# Patient Record
Sex: FEMALE | Race: OTHER | NOT HISPANIC OR LATINO | Employment: OTHER | URBAN - METROPOLITAN AREA
[De-identification: names, ages, dates, MRNs, and addresses within clinical notes are randomized per-mention and may not be internally consistent; named-entity substitution may affect disease eponyms.]

---

## 2018-01-01 ENCOUNTER — APPOINTMENT (OUTPATIENT)
Dept: RADIOLOGY | Facility: MEDICAL CENTER | Age: 83
DRG: 100 | End: 2018-01-01
Attending: HOSPITALIST
Payer: MEDICARE

## 2018-01-01 ENCOUNTER — APPOINTMENT (OUTPATIENT)
Dept: RADIOLOGY | Facility: MEDICAL CENTER | Age: 83
DRG: 470 | End: 2018-01-01
Attending: ORTHOPAEDIC SURGERY
Payer: OTHER MISCELLANEOUS

## 2018-01-01 ENCOUNTER — APPOINTMENT (OUTPATIENT)
Dept: RADIOLOGY | Facility: MEDICAL CENTER | Age: 83
DRG: 100 | End: 2018-01-01
Attending: INTERNAL MEDICINE
Payer: MEDICARE

## 2018-01-01 ENCOUNTER — APPOINTMENT (OUTPATIENT)
Dept: CARDIOLOGY | Facility: MEDICAL CENTER | Age: 83
DRG: 100 | End: 2018-01-01
Attending: INTERNAL MEDICINE
Payer: MEDICARE

## 2018-01-01 ENCOUNTER — HOSPITAL ENCOUNTER (INPATIENT)
Facility: MEDICAL CENTER | Age: 83
LOS: 5 days | DRG: 392 | End: 2018-10-10
Attending: EMERGENCY MEDICINE | Admitting: HOSPITALIST
Payer: MEDICARE

## 2018-01-01 ENCOUNTER — APPOINTMENT (OUTPATIENT)
Dept: RADIOLOGY | Facility: IMAGING CENTER | Age: 83
End: 2018-01-01
Attending: FAMILY MEDICINE
Payer: OTHER MISCELLANEOUS

## 2018-01-01 ENCOUNTER — APPOINTMENT (OUTPATIENT)
Dept: RADIOLOGY | Facility: MEDICAL CENTER | Age: 83
DRG: 392 | End: 2018-01-01
Attending: EMERGENCY MEDICINE
Payer: MEDICARE

## 2018-01-01 ENCOUNTER — OFFICE VISIT (OUTPATIENT)
Dept: URGENT CARE | Facility: CLINIC | Age: 83
End: 2018-01-01
Payer: OTHER MISCELLANEOUS

## 2018-01-01 ENCOUNTER — APPOINTMENT (OUTPATIENT)
Dept: RADIOLOGY | Facility: MEDICAL CENTER | Age: 83
DRG: 392 | End: 2018-01-01
Attending: INTERNAL MEDICINE
Payer: MEDICARE

## 2018-01-01 ENCOUNTER — APPOINTMENT (OUTPATIENT)
Dept: RADIOLOGY | Facility: MEDICAL CENTER | Age: 83
DRG: 100 | End: 2018-01-01
Attending: EMERGENCY MEDICINE
Payer: MEDICARE

## 2018-01-01 ENCOUNTER — HOSPITAL ENCOUNTER (INPATIENT)
Facility: MEDICAL CENTER | Age: 83
LOS: 18 days | DRG: 100 | End: 2018-10-31
Attending: EMERGENCY MEDICINE | Admitting: INTERNAL MEDICINE
Payer: MEDICARE

## 2018-01-01 ENCOUNTER — APPOINTMENT (OUTPATIENT)
Dept: RADIOLOGY | Facility: MEDICAL CENTER | Age: 83
DRG: 470 | End: 2018-01-01
Attending: EMERGENCY MEDICINE
Payer: OTHER MISCELLANEOUS

## 2018-01-01 ENCOUNTER — APPOINTMENT (OUTPATIENT)
Dept: RADIOLOGY | Facility: IMAGING CENTER | Age: 83
End: 2018-01-01
Attending: NURSE PRACTITIONER
Payer: OTHER MISCELLANEOUS

## 2018-01-01 ENCOUNTER — APPOINTMENT (OUTPATIENT)
Dept: RADIOLOGY | Facility: MEDICAL CENTER | Age: 83
DRG: 100 | End: 2018-01-01
Attending: PSYCHIATRY & NEUROLOGY
Payer: MEDICARE

## 2018-01-01 ENCOUNTER — HOSPITAL ENCOUNTER (INPATIENT)
Facility: MEDICAL CENTER | Age: 83
LOS: 4 days | DRG: 470 | End: 2018-07-18
Attending: EMERGENCY MEDICINE | Admitting: HOSPITALIST
Payer: OTHER MISCELLANEOUS

## 2018-01-01 VITALS
WEIGHT: 100 LBS | TEMPERATURE: 99.5 F | HEART RATE: 77 BPM | BODY MASS INDEX: 19.63 KG/M2 | OXYGEN SATURATION: 97 % | HEIGHT: 60 IN | DIASTOLIC BLOOD PRESSURE: 88 MMHG | SYSTOLIC BLOOD PRESSURE: 128 MMHG | RESPIRATION RATE: 14 BRPM

## 2018-01-01 VITALS
WEIGHT: 100 LBS | SYSTOLIC BLOOD PRESSURE: 150 MMHG | RESPIRATION RATE: 12 BRPM | TEMPERATURE: 101.6 F | DIASTOLIC BLOOD PRESSURE: 98 MMHG | HEART RATE: 84 BPM | BODY MASS INDEX: 19.53 KG/M2 | OXYGEN SATURATION: 93 %

## 2018-01-01 VITALS
OXYGEN SATURATION: 94 % | HEIGHT: 63 IN | DIASTOLIC BLOOD PRESSURE: 62 MMHG | SYSTOLIC BLOOD PRESSURE: 100 MMHG | BODY MASS INDEX: 18.59 KG/M2 | TEMPERATURE: 97.5 F | HEART RATE: 87 BPM | RESPIRATION RATE: 18 BRPM | WEIGHT: 104.94 LBS

## 2018-01-01 VITALS
HEART RATE: 93 BPM | RESPIRATION RATE: 18 BRPM | SYSTOLIC BLOOD PRESSURE: 115 MMHG | BODY MASS INDEX: 22.46 KG/M2 | HEIGHT: 63 IN | WEIGHT: 126.76 LBS | OXYGEN SATURATION: 99 % | DIASTOLIC BLOOD PRESSURE: 76 MMHG | TEMPERATURE: 98.1 F

## 2018-01-01 VITALS — BODY MASS INDEX: 28.05 KG/M2 | TEMPERATURE: 99.3 F | HEIGHT: 63 IN | WEIGHT: 158.29 LBS

## 2018-01-01 DIAGNOSIS — R79.89 ELEVATED TROPONIN: ICD-10-CM

## 2018-01-01 DIAGNOSIS — I48.0 PAROXYSMAL ATRIAL FIBRILLATION (HCC): ICD-10-CM

## 2018-01-01 DIAGNOSIS — K59.00 CONSTIPATION, UNSPECIFIED CONSTIPATION TYPE: ICD-10-CM

## 2018-01-01 DIAGNOSIS — G40.901 STATUS EPILEPTICUS (HCC): ICD-10-CM

## 2018-01-01 DIAGNOSIS — R05.8 PRODUCTIVE COUGH: ICD-10-CM

## 2018-01-01 DIAGNOSIS — R19.7 DIARRHEA WITH DEHYDRATION: ICD-10-CM

## 2018-01-01 DIAGNOSIS — E87.5 HYPERKALEMIA: ICD-10-CM

## 2018-01-01 DIAGNOSIS — B96.89 ACUTE BACTERIAL BRONCHITIS: Primary | ICD-10-CM
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
## 2018-01-01 DIAGNOSIS — D18.1 BENIGN EXTRA-AXIAL HYGROMA: ICD-10-CM

## 2018-01-01 DIAGNOSIS — S72.002A CLOSED FRACTURE OF NECK OF LEFT FEMUR, INITIAL ENCOUNTER (HCC): ICD-10-CM

## 2018-01-01 DIAGNOSIS — A41.9 SEPSIS, DUE TO UNSPECIFIED ORGANISM: ICD-10-CM

## 2018-01-01 DIAGNOSIS — J98.8 RTI (RESPIRATORY TRACT INFECTION): ICD-10-CM

## 2018-01-01 DIAGNOSIS — S72.002A CLOSED FRACTURE OF LEFT HIP, INITIAL ENCOUNTER (HCC): ICD-10-CM

## 2018-01-01 DIAGNOSIS — R56.9 NEW ONSET SEIZURE (HCC): ICD-10-CM

## 2018-01-01 DIAGNOSIS — J96.01 ACUTE RESPIRATORY FAILURE WITH HYPOXIA (HCC): ICD-10-CM

## 2018-01-01 DIAGNOSIS — N17.9 AKI (ACUTE KIDNEY INJURY) (HCC): ICD-10-CM

## 2018-01-01 DIAGNOSIS — J20.8 ACUTE BACTERIAL BRONCHITIS: Primary | ICD-10-CM

## 2018-01-01 DIAGNOSIS — J40 BRONCHITIS: ICD-10-CM

## 2018-01-01 LAB
ABO GROUP BLD: NORMAL
ABO GROUP BLD: NORMAL
ACTION RANGE TRIGGERED IACRT: NO
ACTION RANGE TRIGGERED IACRT: YES
ALBUMIN SERPL BCP-MCNC: 2.5 G/DL (ref 3.2–4.9)
ALBUMIN SERPL BCP-MCNC: 2.9 G/DL (ref 3.2–4.9)
ALBUMIN SERPL BCP-MCNC: 3.1 G/DL (ref 3.2–4.9)
ALBUMIN SERPL BCP-MCNC: 3.3 G/DL (ref 3.2–4.9)
ALBUMIN SERPL BCP-MCNC: 3.6 G/DL (ref 3.2–4.9)
ALBUMIN SERPL BCP-MCNC: 3.7 G/DL (ref 3.2–4.9)
ALBUMIN SERPL BCP-MCNC: 3.8 G/DL (ref 3.2–4.9)
ALBUMIN/GLOB SERPL: 0.9 G/DL
ALBUMIN/GLOB SERPL: 1 G/DL
ALBUMIN/GLOB SERPL: 1.1 G/DL
ALBUMIN/GLOB SERPL: 1.1 G/DL
ALBUMIN/GLOB SERPL: 1.2 G/DL
ALBUMIN/GLOB SERPL: 1.2 G/DL
ALBUMIN/GLOB SERPL: 1.8 G/DL
ALP SERPL-CCNC: 46 U/L (ref 30–99)
ALP SERPL-CCNC: 57 U/L (ref 30–99)
ALP SERPL-CCNC: 58 U/L (ref 30–99)
ALP SERPL-CCNC: 58 U/L (ref 30–99)
ALP SERPL-CCNC: 60 U/L (ref 30–99)
ALP SERPL-CCNC: 60 U/L (ref 30–99)
ALP SERPL-CCNC: 65 U/L (ref 30–99)
ALP SERPL-CCNC: 68 U/L (ref 30–99)
ALP SERPL-CCNC: 74 U/L (ref 30–99)
ALT SERPL-CCNC: 15 U/L (ref 2–50)
ALT SERPL-CCNC: 18 U/L (ref 2–50)
ALT SERPL-CCNC: 19 U/L (ref 2–50)
ALT SERPL-CCNC: 23 U/L (ref 2–50)
ALT SERPL-CCNC: 24 U/L (ref 2–50)
ALT SERPL-CCNC: 7 U/L (ref 2–50)
AMMONIA PLAS-SCNC: 33 UMOL/L (ref 11–45)
AMMONIA PLAS-SCNC: 36 UMOL/L (ref 11–45)
AMMONIA PLAS-SCNC: 40 UMOL/L (ref 11–45)
AMMONIA PLAS-SCNC: 42 UMOL/L (ref 11–45)
AMMONIA PLAS-SCNC: 44 UMOL/L (ref 11–45)
AMMONIA PLAS-SCNC: 47 UMOL/L (ref 11–45)
AMMONIA PLAS-SCNC: 54 UMOL/L (ref 11–45)
AMMONIA PLAS-SCNC: 63 UMOL/L (ref 11–45)
AMMONIA PLAS-SCNC: 70 UMOL/L (ref 11–45)
AMMONIA PLAS-SCNC: 72 UMOL/L (ref 11–45)
AMMONIA PLAS-SCNC: 83 UMOL/L (ref 11–45)
ANION GAP SERPL CALC-SCNC: 10 MMOL/L (ref 0–11.9)
ANION GAP SERPL CALC-SCNC: 11 MMOL/L (ref 0–11.9)
ANION GAP SERPL CALC-SCNC: 11 MMOL/L (ref 0–11.9)
ANION GAP SERPL CALC-SCNC: 12 MMOL/L (ref 0–11.9)
ANION GAP SERPL CALC-SCNC: 12 MMOL/L (ref 0–11.9)
ANION GAP SERPL CALC-SCNC: 14 MMOL/L (ref 0–11.9)
ANION GAP SERPL CALC-SCNC: 16 MMOL/L (ref 0–11.9)
ANION GAP SERPL CALC-SCNC: 18 MMOL/L (ref 0–11.9)
ANION GAP SERPL CALC-SCNC: 2 MMOL/L (ref 0–11.9)
ANION GAP SERPL CALC-SCNC: 3 MMOL/L (ref 0–11.9)
ANION GAP SERPL CALC-SCNC: 5 MMOL/L (ref 0–11.9)
ANION GAP SERPL CALC-SCNC: 6 MMOL/L (ref 0–11.9)
ANION GAP SERPL CALC-SCNC: 7 MMOL/L (ref 0–11.9)
ANION GAP SERPL CALC-SCNC: 8 MMOL/L (ref 0–11.9)
ANION GAP SERPL CALC-SCNC: 9 MMOL/L (ref 0–11.9)
ANION GAP SERPL CALC-SCNC: 9 MMOL/L (ref 0–11.9)
ANISOCYTOSIS BLD QL SMEAR: ABNORMAL
ANISOCYTOSIS BLD QL SMEAR: ABNORMAL
APPEARANCE UR: CLEAR
APTT PPP: 126 SEC (ref 24.7–36)
APTT PPP: 28.4 SEC (ref 24.7–36)
APTT PPP: 29.3 SEC (ref 24.7–36)
APTT PPP: 29.7 SEC (ref 24.7–36)
APTT PPP: 32.9 SEC (ref 24.7–36)
APTT PPP: 34.3 SEC (ref 24.7–36)
AST SERPL-CCNC: 13 U/L (ref 12–45)
AST SERPL-CCNC: 28 U/L (ref 12–45)
AST SERPL-CCNC: 34 U/L (ref 12–45)
AST SERPL-CCNC: 35 U/L (ref 12–45)
AST SERPL-CCNC: 40 U/L (ref 12–45)
AST SERPL-CCNC: 45 U/L (ref 12–45)
AST SERPL-CCNC: 46 U/L (ref 12–45)
AST SERPL-CCNC: 47 U/L (ref 12–45)
AST SERPL-CCNC: 48 U/L (ref 12–45)
BACTERIA #/AREA URNS HPF: ABNORMAL /HPF
BACTERIA #/AREA URNS HPF: ABNORMAL /HPF
BACTERIA BLD CULT: NORMAL
BACTERIA BRONCH AEROBE CULT: ABNORMAL
BACTERIA STL CULT: NORMAL
BACTERIA UR CULT: NORMAL
BASE EXCESS BLDA CALC-SCNC: -1 MMOL/L (ref -4–3)
BASE EXCESS BLDA CALC-SCNC: -4 MMOL/L (ref -4–3)
BASE EXCESS BLDA CALC-SCNC: 1 MMOL/L (ref -4–3)
BASE EXCESS BLDA CALC-SCNC: 10 MMOL/L (ref -4–3)
BASE EXCESS BLDA CALC-SCNC: 11 MMOL/L (ref -4–3)
BASE EXCESS BLDA CALC-SCNC: 12 MMOL/L (ref -4–3)
BASE EXCESS BLDA CALC-SCNC: 13 MMOL/L (ref -4–3)
BASE EXCESS BLDA CALC-SCNC: 14 MMOL/L (ref -4–3)
BASE EXCESS BLDA CALC-SCNC: 14 MMOL/L (ref -4–3)
BASE EXCESS BLDA CALC-SCNC: 15 MMOL/L (ref -4–3)
BASE EXCESS BLDA CALC-SCNC: 16 MMOL/L (ref -4–3)
BASE EXCESS BLDA CALC-SCNC: 2 MMOL/L (ref -4–3)
BASE EXCESS BLDA CALC-SCNC: 4 MMOL/L (ref -4–3)
BASE EXCESS BLDA CALC-SCNC: 9 MMOL/L (ref -4–3)
BASOPHILS # BLD AUTO: 0 % (ref 0–1.8)
BASOPHILS # BLD AUTO: 0.1 % (ref 0–1.8)
BASOPHILS # BLD AUTO: 0.2 % (ref 0–1.8)
BASOPHILS # BLD AUTO: 0.3 % (ref 0–1.8)
BASOPHILS # BLD AUTO: 0.3 % (ref 0–1.8)
BASOPHILS # BLD AUTO: 0.4 % (ref 0–1.8)
BASOPHILS # BLD AUTO: 0.5 % (ref 0–1.8)
BASOPHILS # BLD AUTO: 0.7 % (ref 0–1.8)
BASOPHILS # BLD: 0 K/UL (ref 0–0.12)
BASOPHILS # BLD: 0.01 K/UL (ref 0–0.12)
BASOPHILS # BLD: 0.02 K/UL (ref 0–0.12)
BASOPHILS # BLD: 0.03 K/UL (ref 0–0.12)
BASOPHILS # BLD: 0.04 K/UL (ref 0–0.12)
BASOPHILS # BLD: 0.04 K/UL (ref 0–0.12)
BASOPHILS # BLD: 0.05 K/UL (ref 0–0.12)
BASOPHILS # BLD: 0.06 K/UL (ref 0–0.12)
BILIRUB SERPL-MCNC: 0.4 MG/DL (ref 0.1–1.5)
BILIRUB SERPL-MCNC: 0.4 MG/DL (ref 0.1–1.5)
BILIRUB SERPL-MCNC: 0.5 MG/DL (ref 0.1–1.5)
BILIRUB SERPL-MCNC: 0.7 MG/DL (ref 0.1–1.5)
BILIRUB SERPL-MCNC: 0.7 MG/DL (ref 0.1–1.5)
BILIRUB SERPL-MCNC: 1 MG/DL (ref 0.1–1.5)
BILIRUB SERPL-MCNC: 1.8 MG/DL (ref 0.1–1.5)
BILIRUB UR QL STRIP.AUTO: ABNORMAL
BILIRUB UR QL STRIP.AUTO: NEGATIVE
BLD GP AB SCN SERPL QL: NORMAL
BNP SERPL-MCNC: 559 PG/ML (ref 0–100)
BODY TEMPERATURE: ABNORMAL DEGREES
BUN SERPL-MCNC: 16 MG/DL (ref 8–22)
BUN SERPL-MCNC: 16 MG/DL (ref 8–22)
BUN SERPL-MCNC: 18 MG/DL (ref 8–22)
BUN SERPL-MCNC: 19 MG/DL (ref 8–22)
BUN SERPL-MCNC: 19 MG/DL (ref 8–22)
BUN SERPL-MCNC: 20 MG/DL (ref 8–22)
BUN SERPL-MCNC: 20 MG/DL (ref 8–22)
BUN SERPL-MCNC: 22 MG/DL (ref 8–22)
BUN SERPL-MCNC: 26 MG/DL (ref 8–22)
BUN SERPL-MCNC: 27 MG/DL (ref 8–22)
BUN SERPL-MCNC: 29 MG/DL (ref 8–22)
BUN SERPL-MCNC: 31 MG/DL (ref 8–22)
BUN SERPL-MCNC: 32 MG/DL (ref 8–22)
BUN SERPL-MCNC: 32 MG/DL (ref 8–22)
BUN SERPL-MCNC: 35 MG/DL (ref 8–22)
BUN SERPL-MCNC: 39 MG/DL (ref 8–22)
BUN SERPL-MCNC: 39 MG/DL (ref 8–22)
BUN SERPL-MCNC: 40 MG/DL (ref 8–22)
BUN SERPL-MCNC: 42 MG/DL (ref 8–22)
BUN SERPL-MCNC: 44 MG/DL (ref 8–22)
BUN SERPL-MCNC: 46 MG/DL (ref 8–22)
BUN SERPL-MCNC: 47 MG/DL (ref 8–22)
BUN SERPL-MCNC: 50 MG/DL (ref 8–22)
BUN SERPL-MCNC: 56 MG/DL (ref 8–22)
BUN SERPL-MCNC: 57 MG/DL (ref 8–22)
BUN SERPL-MCNC: 58 MG/DL (ref 8–22)
C DIFF DNA SPEC QL NAA+PROBE: NEGATIVE
C DIFF TOX A+B STL QL IA: NEGATIVE
C DIFF TOX GENS STL QL NAA+PROBE: NORMAL
CALCIUM SERPL-MCNC: 7.6 MG/DL (ref 8.4–10.2)
CALCIUM SERPL-MCNC: 7.8 MG/DL (ref 8.5–10.5)
CALCIUM SERPL-MCNC: 8 MG/DL (ref 8.4–10.2)
CALCIUM SERPL-MCNC: 8.1 MG/DL (ref 8.4–10.2)
CALCIUM SERPL-MCNC: 8.1 MG/DL (ref 8.5–10.5)
CALCIUM SERPL-MCNC: 8.3 MG/DL (ref 8.5–10.5)
CALCIUM SERPL-MCNC: 8.4 MG/DL (ref 8.4–10.2)
CALCIUM SERPL-MCNC: 8.4 MG/DL (ref 8.5–10.5)
CALCIUM SERPL-MCNC: 8.5 MG/DL (ref 8.4–10.2)
CALCIUM SERPL-MCNC: 8.5 MG/DL (ref 8.5–10.5)
CALCIUM SERPL-MCNC: 8.5 MG/DL (ref 8.5–10.5)
CALCIUM SERPL-MCNC: 8.6 MG/DL (ref 8.4–10.2)
CALCIUM SERPL-MCNC: 8.7 MG/DL (ref 8.5–10.5)
CALCIUM SERPL-MCNC: 8.8 MG/DL (ref 8.4–10.2)
CALCIUM SERPL-MCNC: 8.8 MG/DL (ref 8.5–10.5)
CALCIUM SERPL-MCNC: 8.9 MG/DL (ref 8.5–10.5)
CALCIUM SERPL-MCNC: 9 MG/DL (ref 8.4–10.2)
CALCIUM SERPL-MCNC: 9 MG/DL (ref 8.5–10.5)
CALCIUM SERPL-MCNC: 9 MG/DL (ref 8.5–10.5)
CALCIUM SERPL-MCNC: 9.1 MG/DL (ref 8.5–10.5)
CALCIUM SERPL-MCNC: 9.3 MG/DL (ref 8.4–10.2)
CASTS 1761B: ABNORMAL /LPF
CASTS URNS QL MICRO: ABNORMAL /LPF
CASTS URNS QL MICRO: ABNORMAL /LPF
CHLORIDE SERPL-SCNC: 100 MMOL/L (ref 96–112)
CHLORIDE SERPL-SCNC: 100 MMOL/L (ref 96–112)
CHLORIDE SERPL-SCNC: 101 MMOL/L (ref 96–112)
CHLORIDE SERPL-SCNC: 102 MMOL/L (ref 96–112)
CHLORIDE SERPL-SCNC: 103 MMOL/L (ref 96–112)
CHLORIDE SERPL-SCNC: 103 MMOL/L (ref 96–112)
CHLORIDE SERPL-SCNC: 104 MMOL/L (ref 96–112)
CHLORIDE SERPL-SCNC: 104 MMOL/L (ref 96–112)
CHLORIDE SERPL-SCNC: 105 MMOL/L (ref 96–112)
CHLORIDE SERPL-SCNC: 105 MMOL/L (ref 96–112)
CHLORIDE SERPL-SCNC: 108 MMOL/L (ref 96–112)
CHLORIDE SERPL-SCNC: 109 MMOL/L (ref 96–112)
CHLORIDE SERPL-SCNC: 111 MMOL/L (ref 96–112)
CHLORIDE SERPL-SCNC: 113 MMOL/L (ref 96–112)
CHLORIDE SERPL-SCNC: 95 MMOL/L (ref 96–112)
CHLORIDE SERPL-SCNC: 97 MMOL/L (ref 96–112)
CHLORIDE SERPL-SCNC: 98 MMOL/L (ref 96–112)
CHLORIDE SERPL-SCNC: 99 MMOL/L (ref 96–112)
CHLORIDE SERPL-SCNC: 99 MMOL/L (ref 96–112)
CO2 BLDA-SCNC: 22 MMOL/L (ref 20–33)
CO2 BLDA-SCNC: 22 MMOL/L (ref 20–33)
CO2 BLDA-SCNC: 25 MMOL/L (ref 20–33)
CO2 BLDA-SCNC: 25 MMOL/L (ref 20–33)
CO2 BLDA-SCNC: 30 MMOL/L (ref 20–33)
CO2 BLDA-SCNC: 34 MMOL/L (ref 20–33)
CO2 BLDA-SCNC: 36 MMOL/L (ref 20–33)
CO2 BLDA-SCNC: 36 MMOL/L (ref 20–33)
CO2 BLDA-SCNC: 37 MMOL/L (ref 20–33)
CO2 BLDA-SCNC: 38 MMOL/L (ref 20–33)
CO2 BLDA-SCNC: 38 MMOL/L (ref 20–33)
CO2 BLDA-SCNC: 39 MMOL/L (ref 20–33)
CO2 BLDA-SCNC: 40 MMOL/L (ref 20–33)
CO2 BLDA-SCNC: 41 MMOL/L (ref 20–33)
CO2 SERPL-SCNC: 16 MMOL/L (ref 20–33)
CO2 SERPL-SCNC: 17 MMOL/L (ref 20–33)
CO2 SERPL-SCNC: 19 MMOL/L (ref 20–33)
CO2 SERPL-SCNC: 20 MMOL/L (ref 20–33)
CO2 SERPL-SCNC: 21 MMOL/L (ref 20–33)
CO2 SERPL-SCNC: 22 MMOL/L (ref 20–33)
CO2 SERPL-SCNC: 23 MMOL/L (ref 20–33)
CO2 SERPL-SCNC: 23 MMOL/L (ref 20–33)
CO2 SERPL-SCNC: 24 MMOL/L (ref 20–33)
CO2 SERPL-SCNC: 24 MMOL/L (ref 20–33)
CO2 SERPL-SCNC: 25 MMOL/L (ref 20–33)
CO2 SERPL-SCNC: 28 MMOL/L (ref 20–33)
CO2 SERPL-SCNC: 31 MMOL/L (ref 20–33)
CO2 SERPL-SCNC: 33 MMOL/L (ref 20–33)
CO2 SERPL-SCNC: 34 MMOL/L (ref 20–33)
CO2 SERPL-SCNC: 35 MMOL/L (ref 20–33)
CO2 SERPL-SCNC: 36 MMOL/L (ref 20–33)
CO2 SERPL-SCNC: 38 MMOL/L (ref 20–33)
CO2 SERPL-SCNC: 38 MMOL/L (ref 20–33)
CO2 SERPL-SCNC: 39 MMOL/L (ref 20–33)
COLOR UR: ABNORMAL
COLOR UR: YELLOW
COMMENT 1642: NORMAL
CORTIS SERPL-MCNC: 18.7 UG/DL (ref 0–23)
CREAT SERPL-MCNC: 0.76 MG/DL (ref 0.5–1.4)
CREAT SERPL-MCNC: 0.77 MG/DL (ref 0.5–1.4)
CREAT SERPL-MCNC: 0.78 MG/DL (ref 0.5–1.4)
CREAT SERPL-MCNC: 0.78 MG/DL (ref 0.5–1.4)
CREAT SERPL-MCNC: 0.79 MG/DL (ref 0.5–1.4)
CREAT SERPL-MCNC: 0.8 MG/DL (ref 0.5–1.4)
CREAT SERPL-MCNC: 0.82 MG/DL (ref 0.5–1.4)
CREAT SERPL-MCNC: 0.82 MG/DL (ref 0.5–1.4)
CREAT SERPL-MCNC: 0.83 MG/DL (ref 0.5–1.4)
CREAT SERPL-MCNC: 0.84 MG/DL (ref 0.5–1.4)
CREAT SERPL-MCNC: 0.87 MG/DL (ref 0.5–1.4)
CREAT SERPL-MCNC: 0.87 MG/DL (ref 0.5–1.4)
CREAT SERPL-MCNC: 0.88 MG/DL (ref 0.5–1.4)
CREAT SERPL-MCNC: 0.88 MG/DL (ref 0.5–1.4)
CREAT SERPL-MCNC: 0.89 MG/DL (ref 0.5–1.4)
CREAT SERPL-MCNC: 0.89 MG/DL (ref 0.5–1.4)
CREAT SERPL-MCNC: 0.9 MG/DL (ref 0.5–1.4)
CREAT SERPL-MCNC: 0.93 MG/DL (ref 0.5–1.4)
CREAT SERPL-MCNC: 0.93 MG/DL (ref 0.5–1.4)
CREAT SERPL-MCNC: 0.99 MG/DL (ref 0.5–1.4)
CREAT SERPL-MCNC: 1.05 MG/DL (ref 0.5–1.4)
CREAT SERPL-MCNC: 1.06 MG/DL (ref 0.5–1.4)
CREAT SERPL-MCNC: 1.14 MG/DL (ref 0.5–1.4)
CREAT SERPL-MCNC: 1.18 MG/DL (ref 0.5–1.4)
CREAT SERPL-MCNC: 1.37 MG/DL (ref 0.5–1.4)
CREAT SERPL-MCNC: 1.82 MG/DL (ref 0.5–1.4)
CREAT SERPL-MCNC: 1.87 MG/DL (ref 0.5–1.4)
CREAT SERPL-MCNC: 2.03 MG/DL (ref 0.5–1.4)
CRP SERPL HS-MCNC: 2.46 MG/DL (ref 0–0.75)
CRP SERPL HS-MCNC: 3.96 MG/DL (ref 0–0.75)
CRP SERPL HS-MCNC: 5.3 MG/DL (ref 0–0.75)
CRP SERPL HS-MCNC: 5.8 MG/DL (ref 0–0.75)
E COLI SXT1+2 STL IA: NORMAL
E COLI SXT1+2 STL IA: NORMAL
EKG IMPRESSION: NORMAL
EOSINOPHIL # BLD AUTO: 0 K/UL (ref 0–0.51)
EOSINOPHIL # BLD AUTO: 0.01 K/UL (ref 0–0.51)
EOSINOPHIL # BLD AUTO: 0.02 K/UL (ref 0–0.51)
EOSINOPHIL # BLD AUTO: 0.03 K/UL (ref 0–0.51)
EOSINOPHIL # BLD AUTO: 0.04 K/UL (ref 0–0.51)
EOSINOPHIL # BLD AUTO: 0.06 K/UL (ref 0–0.51)
EOSINOPHIL # BLD AUTO: 0.07 K/UL (ref 0–0.51)
EOSINOPHIL NFR BLD: 0 % (ref 0–6.9)
EOSINOPHIL NFR BLD: 0.1 % (ref 0–6.9)
EOSINOPHIL NFR BLD: 0.2 % (ref 0–6.9)
EOSINOPHIL NFR BLD: 0.3 % (ref 0–6.9)
EOSINOPHIL NFR BLD: 0.4 % (ref 0–6.9)
EOSINOPHIL NFR BLD: 0.4 % (ref 0–6.9)
EOSINOPHIL NFR BLD: 0.5 % (ref 0–6.9)
EOSINOPHIL NFR BLD: 0.5 % (ref 0–6.9)
EOSINOPHIL NFR BLD: 0.8 % (ref 0–6.9)
EPI CELLS #/AREA URNS HPF: ABNORMAL /HPF
EPI CELLS #/AREA URNS HPF: ABNORMAL /HPF
ERYTHROCYTE [DISTWIDTH] IN BLOOD BY AUTOMATED COUNT: 43.5 FL (ref 35.9–50)
ERYTHROCYTE [DISTWIDTH] IN BLOOD BY AUTOMATED COUNT: 43.7 FL (ref 35.9–50)
ERYTHROCYTE [DISTWIDTH] IN BLOOD BY AUTOMATED COUNT: 43.7 FL (ref 35.9–50)
ERYTHROCYTE [DISTWIDTH] IN BLOOD BY AUTOMATED COUNT: 45.1 FL (ref 35.9–50)
ERYTHROCYTE [DISTWIDTH] IN BLOOD BY AUTOMATED COUNT: 45.1 FL (ref 35.9–50)
ERYTHROCYTE [DISTWIDTH] IN BLOOD BY AUTOMATED COUNT: 45.3 FL (ref 35.9–50)
ERYTHROCYTE [DISTWIDTH] IN BLOOD BY AUTOMATED COUNT: 45.6 FL (ref 35.9–50)
ERYTHROCYTE [DISTWIDTH] IN BLOOD BY AUTOMATED COUNT: 46 FL (ref 35.9–50)
ERYTHROCYTE [DISTWIDTH] IN BLOOD BY AUTOMATED COUNT: 46.4 FL (ref 35.9–50)
ERYTHROCYTE [DISTWIDTH] IN BLOOD BY AUTOMATED COUNT: 46.8 FL (ref 35.9–50)
ERYTHROCYTE [DISTWIDTH] IN BLOOD BY AUTOMATED COUNT: 47.6 FL (ref 35.9–50)
ERYTHROCYTE [DISTWIDTH] IN BLOOD BY AUTOMATED COUNT: 47.7 FL (ref 35.9–50)
ERYTHROCYTE [DISTWIDTH] IN BLOOD BY AUTOMATED COUNT: 47.7 FL (ref 35.9–50)
ERYTHROCYTE [DISTWIDTH] IN BLOOD BY AUTOMATED COUNT: 48 FL (ref 35.9–50)
ERYTHROCYTE [DISTWIDTH] IN BLOOD BY AUTOMATED COUNT: 48.3 FL (ref 35.9–50)
ERYTHROCYTE [DISTWIDTH] IN BLOOD BY AUTOMATED COUNT: 48.5 FL (ref 35.9–50)
ERYTHROCYTE [DISTWIDTH] IN BLOOD BY AUTOMATED COUNT: 48.7 FL (ref 35.9–50)
ERYTHROCYTE [DISTWIDTH] IN BLOOD BY AUTOMATED COUNT: 48.8 FL (ref 35.9–50)
ERYTHROCYTE [DISTWIDTH] IN BLOOD BY AUTOMATED COUNT: 49 FL (ref 35.9–50)
ERYTHROCYTE [DISTWIDTH] IN BLOOD BY AUTOMATED COUNT: 49.4 FL (ref 35.9–50)
ERYTHROCYTE [DISTWIDTH] IN BLOOD BY AUTOMATED COUNT: 49.5 FL (ref 35.9–50)
ERYTHROCYTE [DISTWIDTH] IN BLOOD BY AUTOMATED COUNT: 49.5 FL (ref 35.9–50)
ERYTHROCYTE [DISTWIDTH] IN BLOOD BY AUTOMATED COUNT: 50.4 FL (ref 35.9–50)
EST. AVERAGE GLUCOSE BLD GHB EST-MCNC: 117 MG/DL
FLUAV+FLUBV AG SPEC QL IA: NORMAL
GIANT PLATELETS BLD QL SMEAR: NORMAL
GLOBULIN SER CALC-MCNC: 2.1 G/DL (ref 1.9–3.5)
GLOBULIN SER CALC-MCNC: 2.5 G/DL (ref 1.9–3.5)
GLOBULIN SER CALC-MCNC: 2.7 G/DL (ref 1.9–3.5)
GLOBULIN SER CALC-MCNC: 2.7 G/DL (ref 1.9–3.5)
GLOBULIN SER CALC-MCNC: 2.9 G/DL (ref 1.9–3.5)
GLOBULIN SER CALC-MCNC: 3 G/DL (ref 1.9–3.5)
GLOBULIN SER CALC-MCNC: 3.1 G/DL (ref 1.9–3.5)
GLOBULIN SER CALC-MCNC: 3.1 G/DL (ref 1.9–3.5)
GLOBULIN SER CALC-MCNC: 3.6 G/DL (ref 1.9–3.5)
GLUCOSE SERPL-MCNC: 103 MG/DL (ref 65–99)
GLUCOSE SERPL-MCNC: 104 MG/DL (ref 65–99)
GLUCOSE SERPL-MCNC: 105 MG/DL (ref 65–99)
GLUCOSE SERPL-MCNC: 105 MG/DL (ref 65–99)
GLUCOSE SERPL-MCNC: 107 MG/DL (ref 65–99)
GLUCOSE SERPL-MCNC: 110 MG/DL (ref 65–99)
GLUCOSE SERPL-MCNC: 112 MG/DL (ref 65–99)
GLUCOSE SERPL-MCNC: 118 MG/DL (ref 65–99)
GLUCOSE SERPL-MCNC: 120 MG/DL (ref 65–99)
GLUCOSE SERPL-MCNC: 124 MG/DL (ref 65–99)
GLUCOSE SERPL-MCNC: 126 MG/DL (ref 65–99)
GLUCOSE SERPL-MCNC: 127 MG/DL (ref 65–99)
GLUCOSE SERPL-MCNC: 130 MG/DL (ref 65–99)
GLUCOSE SERPL-MCNC: 131 MG/DL (ref 65–99)
GLUCOSE SERPL-MCNC: 132 MG/DL (ref 65–99)
GLUCOSE SERPL-MCNC: 133 MG/DL (ref 65–99)
GLUCOSE SERPL-MCNC: 141 MG/DL (ref 65–99)
GLUCOSE SERPL-MCNC: 147 MG/DL (ref 65–99)
GLUCOSE SERPL-MCNC: 155 MG/DL (ref 65–99)
GLUCOSE SERPL-MCNC: 155 MG/DL (ref 65–99)
GLUCOSE SERPL-MCNC: 70 MG/DL (ref 65–99)
GLUCOSE SERPL-MCNC: 81 MG/DL (ref 65–99)
GLUCOSE SERPL-MCNC: 83 MG/DL (ref 65–99)
GLUCOSE SERPL-MCNC: 85 MG/DL (ref 65–99)
GLUCOSE SERPL-MCNC: 87 MG/DL (ref 65–99)
GLUCOSE SERPL-MCNC: 90 MG/DL (ref 65–99)
GLUCOSE SERPL-MCNC: 94 MG/DL (ref 65–99)
GLUCOSE SERPL-MCNC: 96 MG/DL (ref 65–99)
GLUCOSE UR STRIP.AUTO-MCNC: NEGATIVE MG/DL
GRAM STN SPEC: ABNORMAL
GRAM STN SPEC: NORMAL
GRAM STN SPEC: NORMAL
HBA1C MFR BLD: 5.7 % (ref 0–5.6)
HBV CORE AB SERPL QL IA: NEGATIVE
HBV SURFACE AB SERPL IA-ACNC: 55.11 MIU/ML (ref 0–10)
HBV SURFACE AG SER QL: NEGATIVE
HCO3 BLDA-SCNC: 20.6 MMOL/L (ref 17–25)
HCO3 BLDA-SCNC: 20.8 MMOL/L (ref 17–25)
HCO3 BLDA-SCNC: 24.1 MMOL/L (ref 17–25)
HCO3 BLDA-SCNC: 24.3 MMOL/L (ref 17–25)
HCO3 BLDA-SCNC: 28.6 MMOL/L (ref 17–25)
HCO3 BLDA-SCNC: 33 MMOL/L (ref 17–25)
HCO3 BLDA-SCNC: 34.7 MMOL/L (ref 17–25)
HCO3 BLDA-SCNC: 35 MMOL/L (ref 17–25)
HCO3 BLDA-SCNC: 35.4 MMOL/L (ref 17–25)
HCO3 BLDA-SCNC: 36 MMOL/L (ref 17–25)
HCO3 BLDA-SCNC: 37.2 MMOL/L (ref 17–25)
HCO3 BLDA-SCNC: 37.2 MMOL/L (ref 17–25)
HCO3 BLDA-SCNC: 37.7 MMOL/L (ref 17–25)
HCO3 BLDA-SCNC: 37.9 MMOL/L (ref 17–25)
HCO3 BLDA-SCNC: 38.6 MMOL/L (ref 17–25)
HCO3 BLDA-SCNC: 39.3 MMOL/L (ref 17–25)
HCT VFR BLD AUTO: 24.1 % (ref 37–47)
HCT VFR BLD AUTO: 27 % (ref 37–47)
HCT VFR BLD AUTO: 27.4 % (ref 37–47)
HCT VFR BLD AUTO: 28.5 % (ref 37–47)
HCT VFR BLD AUTO: 29.5 % (ref 37–47)
HCT VFR BLD AUTO: 29.8 % (ref 37–47)
HCT VFR BLD AUTO: 30.1 % (ref 37–47)
HCT VFR BLD AUTO: 30.3 % (ref 37–47)
HCT VFR BLD AUTO: 30.5 % (ref 37–47)
HCT VFR BLD AUTO: 30.7 % (ref 37–47)
HCT VFR BLD AUTO: 30.8 % (ref 37–47)
HCT VFR BLD AUTO: 30.9 % (ref 37–47)
HCT VFR BLD AUTO: 31.3 % (ref 37–47)
HCT VFR BLD AUTO: 31.4 % (ref 37–47)
HCT VFR BLD AUTO: 31.8 % (ref 37–47)
HCT VFR BLD AUTO: 32.9 % (ref 37–47)
HCT VFR BLD AUTO: 34 % (ref 37–47)
HCT VFR BLD AUTO: 34.5 % (ref 37–47)
HCT VFR BLD AUTO: 34.9 % (ref 37–47)
HCT VFR BLD AUTO: 35.4 % (ref 37–47)
HCT VFR BLD AUTO: 35.9 % (ref 37–47)
HCT VFR BLD AUTO: 36.2 % (ref 37–47)
HCT VFR BLD AUTO: 36.8 % (ref 37–47)
HCT VFR BLD AUTO: 37.6 % (ref 37–47)
HCT VFR BLD AUTO: 38.6 % (ref 37–47)
HCV AB SER QL: NEGATIVE
HGB BLD-MCNC: 10 G/DL (ref 12–16)
HGB BLD-MCNC: 10.4 G/DL (ref 12–16)
HGB BLD-MCNC: 10.5 G/DL (ref 12–16)
HGB BLD-MCNC: 10.7 G/DL (ref 12–16)
HGB BLD-MCNC: 10.7 G/DL (ref 12–16)
HGB BLD-MCNC: 10.9 G/DL (ref 12–16)
HGB BLD-MCNC: 11.1 G/DL (ref 12–16)
HGB BLD-MCNC: 11.2 G/DL (ref 12–16)
HGB BLD-MCNC: 11.3 G/DL (ref 12–16)
HGB BLD-MCNC: 11.3 G/DL (ref 12–16)
HGB BLD-MCNC: 12 G/DL (ref 12–16)
HGB BLD-MCNC: 12.3 G/DL (ref 12–16)
HGB BLD-MCNC: 7.4 G/DL (ref 12–16)
HGB BLD-MCNC: 8.5 G/DL (ref 12–16)
HGB BLD-MCNC: 8.7 G/DL (ref 12–16)
HGB BLD-MCNC: 8.8 G/DL (ref 12–16)
HGB BLD-MCNC: 9.1 G/DL (ref 12–16)
HGB BLD-MCNC: 9.2 G/DL (ref 12–16)
HGB BLD-MCNC: 9.5 G/DL (ref 12–16)
HGB BLD-MCNC: 9.5 G/DL (ref 12–16)
HGB BLD-MCNC: 9.6 G/DL (ref 12–16)
HGB BLD-MCNC: 9.6 G/DL (ref 12–16)
HGB BLD-MCNC: 9.7 G/DL (ref 12–16)
HGB BLD-MCNC: 9.8 G/DL (ref 12–16)
HGB BLD-MCNC: 9.8 G/DL (ref 12–16)
HIV 1+2 AB+HIV1 P24 AG SERPL QL IA: NON REACTIVE
HOROWITZ INDEX BLDA+IHG-RTO: 116 MM[HG]
HOROWITZ INDEX BLDA+IHG-RTO: 142 MM[HG]
HOROWITZ INDEX BLDA+IHG-RTO: 220 MM[HG]
HOROWITZ INDEX BLDA+IHG-RTO: 234 MM[HG]
HOROWITZ INDEX BLDA+IHG-RTO: 245 MM[HG]
HOROWITZ INDEX BLDA+IHG-RTO: 247 MM[HG]
HOROWITZ INDEX BLDA+IHG-RTO: 247 MM[HG]
HOROWITZ INDEX BLDA+IHG-RTO: 248 MM[HG]
HOROWITZ INDEX BLDA+IHG-RTO: 253 MM[HG]
HOROWITZ INDEX BLDA+IHG-RTO: 293 MM[HG]
HOROWITZ INDEX BLDA+IHG-RTO: 293 MM[HG]
HOROWITZ INDEX BLDA+IHG-RTO: 307 MM[HG]
HOROWITZ INDEX BLDA+IHG-RTO: 323 MM[HG]
HOROWITZ INDEX BLDA+IHG-RTO: 369 MM[HG]
HOROWITZ INDEX BLDA+IHG-RTO: 43 MM[HG]
HOROWITZ INDEX BLDA+IHG-RTO: 447 MM[HG]
IMM GRANULOCYTES # BLD AUTO: 0.02 K/UL (ref 0–0.11)
IMM GRANULOCYTES # BLD AUTO: 0.04 K/UL (ref 0–0.11)
IMM GRANULOCYTES # BLD AUTO: 0.04 K/UL (ref 0–0.11)
IMM GRANULOCYTES # BLD AUTO: 0.06 K/UL (ref 0–0.11)
IMM GRANULOCYTES # BLD AUTO: 0.06 K/UL (ref 0–0.11)
IMM GRANULOCYTES # BLD AUTO: 0.07 K/UL (ref 0–0.11)
IMM GRANULOCYTES # BLD AUTO: 0.08 K/UL (ref 0–0.11)
IMM GRANULOCYTES # BLD AUTO: 0.09 K/UL (ref 0–0.11)
IMM GRANULOCYTES # BLD AUTO: 0.09 K/UL (ref 0–0.11)
IMM GRANULOCYTES # BLD AUTO: 0.13 K/UL (ref 0–0.11)
IMM GRANULOCYTES # BLD AUTO: 0.13 K/UL (ref 0–0.11)
IMM GRANULOCYTES # BLD AUTO: 0.14 K/UL (ref 0–0.11)
IMM GRANULOCYTES # BLD AUTO: 0.14 K/UL (ref 0–0.11)
IMM GRANULOCYTES # BLD AUTO: 0.2 K/UL (ref 0–0.11)
IMM GRANULOCYTES # BLD AUTO: 0.3 K/UL (ref 0–0.11)
IMM GRANULOCYTES NFR BLD AUTO: 0.4 % (ref 0–0.9)
IMM GRANULOCYTES NFR BLD AUTO: 0.4 % (ref 0–0.9)
IMM GRANULOCYTES NFR BLD AUTO: 0.5 % (ref 0–0.9)
IMM GRANULOCYTES NFR BLD AUTO: 0.6 % (ref 0–0.9)
IMM GRANULOCYTES NFR BLD AUTO: 0.7 % (ref 0–0.9)
IMM GRANULOCYTES NFR BLD AUTO: 0.8 % (ref 0–0.9)
IMM GRANULOCYTES NFR BLD AUTO: 0.8 % (ref 0–0.9)
IMM GRANULOCYTES NFR BLD AUTO: 0.9 % (ref 0–0.9)
IMM GRANULOCYTES NFR BLD AUTO: 1 % (ref 0–0.9)
IMM GRANULOCYTES NFR BLD AUTO: 1 % (ref 0–0.9)
IMM GRANULOCYTES NFR BLD AUTO: 1.1 % (ref 0–0.9)
IMM GRANULOCYTES NFR BLD AUTO: 1.7 % (ref 0–0.9)
IMM GRANULOCYTES NFR BLD AUTO: 2 % (ref 0–0.9)
IMM GRANULOCYTES NFR BLD AUTO: 2.7 % (ref 0–0.9)
IMM GRANULOCYTES NFR BLD AUTO: 2.8 % (ref 0–0.9)
INR PPP: 1.15 (ref 0.87–1.13)
INR PPP: 1.2 (ref 0.87–1.13)
INST. QUALIFIED PATIENT IIQPT: YES
INT CON NEG: NEGATIVE
INT CON POS: POSITIVE
IRON SATN MFR SERPL: ABNORMAL % (ref 15–55)
IRON SERPL-MCNC: <10 UG/DL (ref 40–170)
KETONES UR STRIP.AUTO-MCNC: 15 MG/DL
KETONES UR STRIP.AUTO-MCNC: NEGATIVE MG/DL
LACTATE BLD-SCNC: 1.8 MMOL/L (ref 0.5–2)
LACTATE BLD-SCNC: 2.3 MMOL/L (ref 0.5–2)
LACTATE BLD-SCNC: 2.4 MMOL/L (ref 0.5–2)
LACTATE BLD-SCNC: 2.6 MMOL/L (ref 0.5–2)
LACTATE BLD-SCNC: 3.5 MMOL/L (ref 0.5–2)
LACTATE BLD-SCNC: 5.9 MMOL/L (ref 0.5–2)
LEUKOCYTE ESTERASE UR QL STRIP.AUTO: NEGATIVE
LIPASE SERPL-CCNC: 15 U/L (ref 7–58)
LV EJECT FRACT  99904: 40
LV EJECT FRACT MOD 2C 99903: 53.23
LV EJECT FRACT MOD 4C 99902: 50.85
LV EJECT FRACT MOD BP 99901: 50.13
LYMPHOCYTES # BLD AUTO: 0.3 K/UL (ref 1–4.8)
LYMPHOCYTES # BLD AUTO: 0.39 K/UL (ref 1–4.8)
LYMPHOCYTES # BLD AUTO: 0.4 K/UL (ref 1–4.8)
LYMPHOCYTES # BLD AUTO: 0.42 K/UL (ref 1–4.8)
LYMPHOCYTES # BLD AUTO: 0.46 K/UL (ref 1–4.8)
LYMPHOCYTES # BLD AUTO: 0.51 K/UL (ref 1–4.8)
LYMPHOCYTES # BLD AUTO: 0.59 K/UL (ref 1–4.8)
LYMPHOCYTES # BLD AUTO: 0.6 K/UL (ref 1–4.8)
LYMPHOCYTES # BLD AUTO: 0.7 K/UL (ref 1–4.8)
LYMPHOCYTES # BLD AUTO: 0.71 K/UL (ref 1–4.8)
LYMPHOCYTES # BLD AUTO: 0.72 K/UL (ref 1–4.8)
LYMPHOCYTES # BLD AUTO: 0.72 K/UL (ref 1–4.8)
LYMPHOCYTES # BLD AUTO: 0.73 K/UL (ref 1–4.8)
LYMPHOCYTES # BLD AUTO: 0.74 K/UL (ref 1–4.8)
LYMPHOCYTES # BLD AUTO: 0.77 K/UL (ref 1–4.8)
LYMPHOCYTES # BLD AUTO: 0.78 K/UL (ref 1–4.8)
LYMPHOCYTES # BLD AUTO: 0.92 K/UL (ref 1–4.8)
LYMPHOCYTES # BLD AUTO: 0.93 K/UL (ref 1–4.8)
LYMPHOCYTES # BLD AUTO: 1.1 K/UL (ref 1–4.8)
LYMPHOCYTES # BLD AUTO: 1.2 K/UL (ref 1–4.8)
LYMPHOCYTES # BLD AUTO: 1.29 K/UL (ref 1–4.8)
LYMPHOCYTES # BLD AUTO: 1.36 K/UL (ref 1–4.8)
LYMPHOCYTES # BLD AUTO: 1.65 K/UL (ref 1–4.8)
LYMPHOCYTES NFR BLD: 10.1 % (ref 22–41)
LYMPHOCYTES NFR BLD: 10.1 % (ref 22–41)
LYMPHOCYTES NFR BLD: 11.7 % (ref 22–41)
LYMPHOCYTES NFR BLD: 11.8 % (ref 22–41)
LYMPHOCYTES NFR BLD: 12 % (ref 22–41)
LYMPHOCYTES NFR BLD: 13 % (ref 22–41)
LYMPHOCYTES NFR BLD: 15 % (ref 22–41)
LYMPHOCYTES NFR BLD: 4.3 % (ref 22–41)
LYMPHOCYTES NFR BLD: 4.7 % (ref 22–41)
LYMPHOCYTES NFR BLD: 4.9 % (ref 22–41)
LYMPHOCYTES NFR BLD: 5.6 % (ref 22–41)
LYMPHOCYTES NFR BLD: 5.6 % (ref 22–41)
LYMPHOCYTES NFR BLD: 5.7 % (ref 22–41)
LYMPHOCYTES NFR BLD: 6 % (ref 22–41)
LYMPHOCYTES NFR BLD: 6.2 % (ref 22–41)
LYMPHOCYTES NFR BLD: 6.6 % (ref 22–41)
LYMPHOCYTES NFR BLD: 7 % (ref 22–41)
LYMPHOCYTES NFR BLD: 7.1 % (ref 22–41)
LYMPHOCYTES NFR BLD: 8 % (ref 22–41)
LYMPHOCYTES NFR BLD: 8.2 % (ref 22–41)
LYMPHOCYTES NFR BLD: 8.4 % (ref 22–41)
LYMPHOCYTES NFR BLD: 9.8 % (ref 22–41)
LYMPHOCYTES NFR BLD: 9.8 % (ref 22–41)
MACROCYTES BLD QL SMEAR: ABNORMAL
MACROCYTES BLD QL SMEAR: ABNORMAL
MAGNESIUM SERPL-MCNC: 1.8 MG/DL (ref 1.5–2.5)
MAGNESIUM SERPL-MCNC: 1.8 MG/DL (ref 1.5–2.5)
MAGNESIUM SERPL-MCNC: 1.9 MG/DL (ref 1.5–2.5)
MAGNESIUM SERPL-MCNC: 2 MG/DL (ref 1.5–2.5)
MAGNESIUM SERPL-MCNC: 2.1 MG/DL (ref 1.5–2.5)
MAGNESIUM SERPL-MCNC: 2.2 MG/DL (ref 1.5–2.5)
MAGNESIUM SERPL-MCNC: 2.3 MG/DL (ref 1.5–2.5)
MAGNESIUM SERPL-MCNC: 2.3 MG/DL (ref 1.5–2.5)
MAGNESIUM SERPL-MCNC: 2.4 MG/DL (ref 1.5–2.5)
MAGNESIUM SERPL-MCNC: 2.7 MG/DL (ref 1.5–2.5)
MANUAL DIFF BLD: ABNORMAL
MANUAL DIFF BLD: NORMAL
MCH RBC QN AUTO: 26.1 PG (ref 27–33)
MCH RBC QN AUTO: 26.2 PG (ref 27–33)
MCH RBC QN AUTO: 26.5 PG (ref 27–33)
MCH RBC QN AUTO: 26.8 PG (ref 27–33)
MCH RBC QN AUTO: 26.8 PG (ref 27–33)
MCH RBC QN AUTO: 26.9 PG (ref 27–33)
MCH RBC QN AUTO: 27 PG (ref 27–33)
MCH RBC QN AUTO: 27.2 PG (ref 27–33)
MCH RBC QN AUTO: 27.2 PG (ref 27–33)
MCH RBC QN AUTO: 27.3 PG (ref 27–33)
MCH RBC QN AUTO: 27.4 PG (ref 27–33)
MCH RBC QN AUTO: 27.4 PG (ref 27–33)
MCH RBC QN AUTO: 27.5 PG (ref 27–33)
MCH RBC QN AUTO: 27.6 PG (ref 27–33)
MCH RBC QN AUTO: 27.7 PG (ref 27–33)
MCH RBC QN AUTO: 27.9 PG (ref 27–33)
MCH RBC QN AUTO: 29.2 PG (ref 27–33)
MCH RBC QN AUTO: 29.4 PG (ref 27–33)
MCHC RBC AUTO-ENTMCNC: 29.8 G/DL (ref 33.6–35)
MCHC RBC AUTO-ENTMCNC: 30.2 G/DL (ref 33.6–35)
MCHC RBC AUTO-ENTMCNC: 30.7 G/DL (ref 33.6–35)
MCHC RBC AUTO-ENTMCNC: 30.8 G/DL (ref 33.6–35)
MCHC RBC AUTO-ENTMCNC: 30.9 G/DL (ref 33.6–35)
MCHC RBC AUTO-ENTMCNC: 31 G/DL (ref 33.6–35)
MCHC RBC AUTO-ENTMCNC: 31.2 G/DL (ref 33.6–35)
MCHC RBC AUTO-ENTMCNC: 31.5 G/DL (ref 33.6–35)
MCHC RBC AUTO-ENTMCNC: 31.9 G/DL (ref 33.6–35)
MCHC RBC AUTO-ENTMCNC: 32 G/DL (ref 33.6–35)
MCHC RBC AUTO-ENTMCNC: 32.1 G/DL (ref 33.6–35)
MCHC RBC AUTO-ENTMCNC: 32.2 G/DL (ref 33.6–35)
MCHC RBC AUTO-ENTMCNC: 33.1 G/DL (ref 33.6–35)
MCV RBC AUTO: 84.2 FL (ref 81.4–97.8)
MCV RBC AUTO: 84.5 FL (ref 81.4–97.8)
MCV RBC AUTO: 84.9 FL (ref 81.4–97.8)
MCV RBC AUTO: 85 FL (ref 81.4–97.8)
MCV RBC AUTO: 85.4 FL (ref 81.4–97.8)
MCV RBC AUTO: 85.5 FL (ref 81.4–97.8)
MCV RBC AUTO: 85.5 FL (ref 81.4–97.8)
MCV RBC AUTO: 85.8 FL (ref 81.4–97.8)
MCV RBC AUTO: 85.9 FL (ref 81.4–97.8)
MCV RBC AUTO: 86 FL (ref 81.4–97.8)
MCV RBC AUTO: 86.1 FL (ref 81.4–97.8)
MCV RBC AUTO: 86.1 FL (ref 81.4–97.8)
MCV RBC AUTO: 86.9 FL (ref 81.4–97.8)
MCV RBC AUTO: 87.3 FL (ref 81.4–97.8)
MCV RBC AUTO: 87.5 FL (ref 81.4–97.8)
MCV RBC AUTO: 87.5 FL (ref 81.4–97.8)
MCV RBC AUTO: 87.6 FL (ref 81.4–97.8)
MCV RBC AUTO: 87.8 FL (ref 81.4–97.8)
MCV RBC AUTO: 87.9 FL (ref 81.4–97.8)
MCV RBC AUTO: 87.9 FL (ref 81.4–97.8)
MCV RBC AUTO: 88.2 FL (ref 81.4–97.8)
MCV RBC AUTO: 91.1 FL (ref 81.4–97.8)
MCV RBC AUTO: 92.1 FL (ref 81.4–97.8)
METAMYELOCYTES NFR BLD MANUAL: 13 %
METAMYELOCYTES NFR BLD MANUAL: 4 %
METAMYELOCYTES NFR BLD MANUAL: 8 %
MICRO URNS: ABNORMAL
MICRO URNS: ABNORMAL
MICRO URNS: NORMAL
MICRO URNS: NORMAL
MONOCYTES # BLD AUTO: 0.12 K/UL (ref 0–0.85)
MONOCYTES # BLD AUTO: 0.19 K/UL (ref 0–0.85)
MONOCYTES # BLD AUTO: 0.23 K/UL (ref 0–0.85)
MONOCYTES # BLD AUTO: 0.32 K/UL (ref 0–0.85)
MONOCYTES # BLD AUTO: 0.56 K/UL (ref 0–0.85)
MONOCYTES # BLD AUTO: 0.56 K/UL (ref 0–0.85)
MONOCYTES # BLD AUTO: 0.63 K/UL (ref 0–0.85)
MONOCYTES # BLD AUTO: 0.66 K/UL (ref 0–0.85)
MONOCYTES # BLD AUTO: 0.66 K/UL (ref 0–0.85)
MONOCYTES # BLD AUTO: 0.69 K/UL (ref 0–0.85)
MONOCYTES # BLD AUTO: 0.78 K/UL (ref 0–0.85)
MONOCYTES # BLD AUTO: 0.82 K/UL (ref 0–0.85)
MONOCYTES # BLD AUTO: 0.93 K/UL (ref 0–0.85)
MONOCYTES # BLD AUTO: 0.96 K/UL (ref 0–0.85)
MONOCYTES # BLD AUTO: 0.97 K/UL (ref 0–0.85)
MONOCYTES # BLD AUTO: 1.11 K/UL (ref 0–0.85)
MONOCYTES # BLD AUTO: 1.11 K/UL (ref 0–0.85)
MONOCYTES # BLD AUTO: 1.13 K/UL (ref 0–0.85)
MONOCYTES # BLD AUTO: 1.14 K/UL (ref 0–0.85)
MONOCYTES # BLD AUTO: 1.18 K/UL (ref 0–0.85)
MONOCYTES # BLD AUTO: 1.31 K/UL (ref 0–0.85)
MONOCYTES # BLD AUTO: 1.48 K/UL (ref 0–0.85)
MONOCYTES # BLD AUTO: 1.86 K/UL (ref 0–0.85)
MONOCYTES NFR BLD AUTO: 10 % (ref 0–13.4)
MONOCYTES NFR BLD AUTO: 10 % (ref 0–13.4)
MONOCYTES NFR BLD AUTO: 10.1 % (ref 0–13.4)
MONOCYTES NFR BLD AUTO: 10.2 % (ref 0–13.4)
MONOCYTES NFR BLD AUTO: 10.4 % (ref 0–13.4)
MONOCYTES NFR BLD AUTO: 10.6 % (ref 0–13.4)
MONOCYTES NFR BLD AUTO: 10.7 % (ref 0–13.4)
MONOCYTES NFR BLD AUTO: 11.8 % (ref 0–13.4)
MONOCYTES NFR BLD AUTO: 14.6 % (ref 0–13.4)
MONOCYTES NFR BLD AUTO: 2.2 % (ref 0–13.4)
MONOCYTES NFR BLD AUTO: 3.7 % (ref 0–13.4)
MONOCYTES NFR BLD AUTO: 4.1 % (ref 0–13.4)
MONOCYTES NFR BLD AUTO: 4.6 % (ref 0–13.4)
MONOCYTES NFR BLD AUTO: 6 % (ref 0–13.4)
MONOCYTES NFR BLD AUTO: 7 % (ref 0–13.4)
MONOCYTES NFR BLD AUTO: 7.5 % (ref 0–13.4)
MONOCYTES NFR BLD AUTO: 7.7 % (ref 0–13.4)
MONOCYTES NFR BLD AUTO: 9.2 % (ref 0–13.4)
MONOCYTES NFR BLD AUTO: 9.3 % (ref 0–13.4)
MONOCYTES NFR BLD AUTO: 9.5 % (ref 0–13.4)
MONOCYTES NFR BLD AUTO: 9.9 % (ref 0–13.4)
MORPHOLOGY BLD-IMP: NORMAL
MORPHOLOGY BLD-IMP: NORMAL
MUCOUS THREADS #/AREA URNS HPF: ABNORMAL /HPF
MYELOCYTES NFR BLD MANUAL: 1 %
NEUTROPHILS # BLD AUTO: 10.04 K/UL (ref 2–7.15)
NEUTROPHILS # BLD AUTO: 10.21 K/UL (ref 2–7.15)
NEUTROPHILS # BLD AUTO: 10.3 K/UL (ref 2–7.15)
NEUTROPHILS # BLD AUTO: 3.46 K/UL (ref 2–7.15)
NEUTROPHILS # BLD AUTO: 4.9 K/UL (ref 2–7.15)
NEUTROPHILS # BLD AUTO: 4.92 K/UL (ref 2–7.15)
NEUTROPHILS # BLD AUTO: 5.61 K/UL (ref 2–7.15)
NEUTROPHILS # BLD AUTO: 5.71 K/UL (ref 2–7.15)
NEUTROPHILS # BLD AUTO: 6.16 K/UL (ref 2–7.15)
NEUTROPHILS # BLD AUTO: 6.63 K/UL (ref 2–7.15)
NEUTROPHILS # BLD AUTO: 7.08 K/UL (ref 2–7.15)
NEUTROPHILS # BLD AUTO: 7.25 K/UL (ref 2–7.15)
NEUTROPHILS # BLD AUTO: 7.45 K/UL (ref 2–7.15)
NEUTROPHILS # BLD AUTO: 7.67 K/UL (ref 2–7.15)
NEUTROPHILS # BLD AUTO: 7.84 K/UL (ref 2–7.15)
NEUTROPHILS # BLD AUTO: 7.85 K/UL (ref 2–7.15)
NEUTROPHILS # BLD AUTO: 7.85 K/UL (ref 2–7.15)
NEUTROPHILS # BLD AUTO: 7.91 K/UL (ref 2–7.15)
NEUTROPHILS # BLD AUTO: 8.61 K/UL (ref 2–7.15)
NEUTROPHILS # BLD AUTO: 8.99 K/UL (ref 2–7.15)
NEUTROPHILS # BLD AUTO: 9.03 K/UL (ref 2–7.15)
NEUTROPHILS # BLD AUTO: 9.18 K/UL (ref 2–7.15)
NEUTROPHILS # BLD AUTO: 9.92 K/UL (ref 2–7.15)
NEUTROPHILS NFR BLD: 24 % (ref 44–72)
NEUTROPHILS NFR BLD: 46 % (ref 44–72)
NEUTROPHILS NFR BLD: 54 % (ref 44–72)
NEUTROPHILS NFR BLD: 71.1 % (ref 44–72)
NEUTROPHILS NFR BLD: 77 % (ref 44–72)
NEUTROPHILS NFR BLD: 77.4 % (ref 44–72)
NEUTROPHILS NFR BLD: 77.6 % (ref 44–72)
NEUTROPHILS NFR BLD: 78.5 % (ref 44–72)
NEUTROPHILS NFR BLD: 79 % (ref 44–72)
NEUTROPHILS NFR BLD: 80.4 % (ref 44–72)
NEUTROPHILS NFR BLD: 80.5 % (ref 44–72)
NEUTROPHILS NFR BLD: 81.7 % (ref 44–72)
NEUTROPHILS NFR BLD: 82.3 % (ref 44–72)
NEUTROPHILS NFR BLD: 82.7 % (ref 44–72)
NEUTROPHILS NFR BLD: 83.1 % (ref 44–72)
NEUTROPHILS NFR BLD: 83.4 % (ref 44–72)
NEUTROPHILS NFR BLD: 83.7 % (ref 44–72)
NEUTROPHILS NFR BLD: 84 % (ref 44–72)
NEUTROPHILS NFR BLD: 84.6 % (ref 44–72)
NEUTROPHILS NFR BLD: 86 % (ref 44–72)
NEUTROPHILS NFR BLD: 87.3 % (ref 44–72)
NEUTROPHILS NFR BLD: 87.7 % (ref 44–72)
NEUTROPHILS NFR BLD: 91.8 % (ref 44–72)
NEUTS BAND NFR BLD MANUAL: 24 % (ref 0–10)
NEUTS BAND NFR BLD MANUAL: 28 % (ref 0–10)
NEUTS BAND NFR BLD MANUAL: 43 % (ref 0–10)
NITRITE UR QL STRIP.AUTO: NEGATIVE
NRBC # BLD AUTO: 0 K/UL
NRBC BLD-RTO: 0 /100 WBC
O2/TOTAL GAS SETTING VFR VENT: 30 %
O2/TOTAL GAS SETTING VFR VENT: 40 %
O2/TOTAL GAS SETTING VFR VENT: 40 %
O2/TOTAL GAS SETTING VFR VENT: 50 %
O2/TOTAL GAS SETTING VFR VENT: 60 %
O2/TOTAL GAS SETTING VFR VENT: 60 %
O2/TOTAL GAS SETTING VFR VENT: 80 %
O2/TOTAL GAS SETTING VFR VENT: 80 %
OVALOCYTES BLD QL SMEAR: NORMAL
OVALOCYTES BLD QL SMEAR: NORMAL
PATHOLOGY CONSULT NOTE: NORMAL
PCO2 BLDA: 26 MMHG (ref 26–37)
PCO2 BLDA: 30.9 MMHG (ref 26–37)
PCO2 BLDA: 32.9 MMHG (ref 26–37)
PCO2 BLDA: 34 MMHG (ref 26–37)
PCO2 BLDA: 35.2 MMHG (ref 26–37)
PCO2 BLDA: 38.7 MMHG (ref 26–37)
PCO2 BLDA: 39.1 MMHG (ref 26–37)
PCO2 BLDA: 39.2 MMHG (ref 26–37)
PCO2 BLDA: 42.1 MMHG (ref 26–37)
PCO2 BLDA: 42.5 MMHG (ref 26–37)
PCO2 BLDA: 43.1 MMHG (ref 26–37)
PCO2 BLDA: 49.8 MMHG (ref 26–37)
PCO2 BLDA: 50.6 MMHG (ref 26–37)
PCO2 BLDA: 50.9 MMHG (ref 26–37)
PCO2 BLDA: 51 MMHG (ref 26–37)
PCO2 BLDA: 51.2 MMHG (ref 26–37)
PCO2 TEMP ADJ BLDA: 26.3 MMHG (ref 26–37)
PCO2 TEMP ADJ BLDA: 31.8 MMHG (ref 26–37)
PCO2 TEMP ADJ BLDA: 32.3 MMHG (ref 26–37)
PCO2 TEMP ADJ BLDA: 32.5 MMHG (ref 26–37)
PCO2 TEMP ADJ BLDA: 37.1 MMHG (ref 26–37)
PCO2 TEMP ADJ BLDA: 38.7 MMHG (ref 26–37)
PCO2 TEMP ADJ BLDA: 39.2 MMHG (ref 26–37)
PCO2 TEMP ADJ BLDA: 39.6 MMHG (ref 26–37)
PCO2 TEMP ADJ BLDA: 41.2 MMHG (ref 26–37)
PCO2 TEMP ADJ BLDA: 41.7 MMHG (ref 26–37)
PCO2 TEMP ADJ BLDA: 43.1 MMHG (ref 26–37)
PCO2 TEMP ADJ BLDA: 48.9 MMHG (ref 26–37)
PCO2 TEMP ADJ BLDA: 49.5 MMHG (ref 26–37)
PCO2 TEMP ADJ BLDA: 49.8 MMHG (ref 26–37)
PCO2 TEMP ADJ BLDA: 50.4 MMHG (ref 26–37)
PCO2 TEMP ADJ BLDA: 51.8 MMHG (ref 26–37)
PH BLDA: 7.4 [PH] (ref 7.4–7.5)
PH BLDA: 7.44 [PH] (ref 7.4–7.5)
PH BLDA: 7.45 [PH] (ref 7.4–7.5)
PH BLDA: 7.45 [PH] (ref 7.4–7.5)
PH BLDA: 7.47 [PH] (ref 7.4–7.5)
PH BLDA: 7.47 [PH] (ref 7.4–7.5)
PH BLDA: 7.49 [PH] (ref 7.4–7.5)
PH BLDA: 7.49 [PH] (ref 7.4–7.5)
PH BLDA: 7.5 [PH] (ref 7.4–7.5)
PH BLDA: 7.5 [PH] (ref 7.4–7.5)
PH BLDA: 7.51 [PH] (ref 7.4–7.5)
PH BLDA: 7.55 [PH] (ref 7.4–7.5)
PH BLDA: 7.56 [PH] (ref 7.4–7.5)
PH BLDA: 7.56 [PH] (ref 7.4–7.5)
PH BLDA: 7.59 [PH] (ref 7.4–7.5)
PH BLDA: 7.65 [PH] (ref 7.4–7.5)
PH TEMP ADJ BLDA: 7.41 [PH] (ref 7.4–7.5)
PH TEMP ADJ BLDA: 7.43 [PH] (ref 7.4–7.5)
PH TEMP ADJ BLDA: 7.44 [PH] (ref 7.4–7.5)
PH TEMP ADJ BLDA: 7.46 [PH] (ref 7.4–7.5)
PH TEMP ADJ BLDA: 7.47 [PH] (ref 7.4–7.5)
PH TEMP ADJ BLDA: 7.48 [PH] (ref 7.4–7.5)
PH TEMP ADJ BLDA: 7.48 [PH] (ref 7.4–7.5)
PH TEMP ADJ BLDA: 7.49 [PH] (ref 7.4–7.5)
PH TEMP ADJ BLDA: 7.49 [PH] (ref 7.4–7.5)
PH TEMP ADJ BLDA: 7.5 [PH] (ref 7.4–7.5)
PH TEMP ADJ BLDA: 7.51 [PH] (ref 7.4–7.5)
PH TEMP ADJ BLDA: 7.56 [PH] (ref 7.4–7.5)
PH TEMP ADJ BLDA: 7.59 [PH] (ref 7.4–7.5)
PH TEMP ADJ BLDA: 7.67 [PH] (ref 7.4–7.5)
PH UR STRIP.AUTO: 5 [PH]
PH UR STRIP.AUTO: 5 [PH]
PH UR STRIP.AUTO: 5.5 [PH]
PH UR STRIP.AUTO: 6.5 [PH]
PHOSPHATE SERPL-MCNC: 1.2 MG/DL (ref 2.5–4.5)
PHOSPHATE SERPL-MCNC: 1.3 MG/DL (ref 2.5–4.5)
PHOSPHATE SERPL-MCNC: 1.4 MG/DL (ref 2.5–4.5)
PHOSPHATE SERPL-MCNC: 1.6 MG/DL (ref 2.5–4.5)
PHOSPHATE SERPL-MCNC: 1.9 MG/DL (ref 2.5–4.5)
PHOSPHATE SERPL-MCNC: 2 MG/DL (ref 2.5–4.5)
PHOSPHATE SERPL-MCNC: 2.1 MG/DL (ref 2.5–4.5)
PHOSPHATE SERPL-MCNC: 2.3 MG/DL (ref 2.5–4.5)
PHOSPHATE SERPL-MCNC: 2.4 MG/DL (ref 2.5–4.5)
PHOSPHATE SERPL-MCNC: 2.6 MG/DL (ref 2.5–4.5)
PHOSPHATE SERPL-MCNC: 2.8 MG/DL (ref 2.5–4.5)
PHOSPHATE SERPL-MCNC: 3.5 MG/DL (ref 2.5–4.5)
PLATELET # BLD AUTO: 151 K/UL (ref 164–446)
PLATELET # BLD AUTO: 174 K/UL (ref 164–446)
PLATELET # BLD AUTO: 188 K/UL (ref 164–446)
PLATELET # BLD AUTO: 195 K/UL (ref 164–446)
PLATELET # BLD AUTO: 223 K/UL (ref 164–446)
PLATELET # BLD AUTO: 228 K/UL (ref 164–446)
PLATELET # BLD AUTO: 231 K/UL (ref 164–446)
PLATELET # BLD AUTO: 243 K/UL (ref 164–446)
PLATELET # BLD AUTO: 247 K/UL (ref 164–446)
PLATELET # BLD AUTO: 250 K/UL (ref 164–446)
PLATELET # BLD AUTO: 251 K/UL (ref 164–446)
PLATELET # BLD AUTO: 251 K/UL (ref 164–446)
PLATELET # BLD AUTO: 254 K/UL (ref 164–446)
PLATELET # BLD AUTO: 259 K/UL (ref 164–446)
PLATELET # BLD AUTO: 262 K/UL (ref 164–446)
PLATELET # BLD AUTO: 266 K/UL (ref 164–446)
PLATELET # BLD AUTO: 269 K/UL (ref 164–446)
PLATELET # BLD AUTO: 276 K/UL (ref 164–446)
PLATELET # BLD AUTO: 288 K/UL (ref 164–446)
PLATELET # BLD AUTO: 290 K/UL (ref 164–446)
PLATELET # BLD AUTO: 294 K/UL (ref 164–446)
PLATELET # BLD AUTO: 300 K/UL (ref 164–446)
PLATELET # BLD AUTO: 303 K/UL (ref 164–446)
PLATELET # BLD AUTO: 307 K/UL (ref 164–446)
PLATELET # BLD AUTO: 310 K/UL (ref 164–446)
PLATELET BLD QL SMEAR: NORMAL
PMV BLD AUTO: 10.2 FL (ref 9–12.9)
PMV BLD AUTO: 10.4 FL (ref 9–12.9)
PMV BLD AUTO: 10.4 FL (ref 9–12.9)
PMV BLD AUTO: 10.5 FL (ref 9–12.9)
PMV BLD AUTO: 10.6 FL (ref 9–12.9)
PMV BLD AUTO: 10.7 FL (ref 9–12.9)
PMV BLD AUTO: 10.7 FL (ref 9–12.9)
PMV BLD AUTO: 10.8 FL (ref 9–12.9)
PMV BLD AUTO: 10.9 FL (ref 9–12.9)
PMV BLD AUTO: 11 FL (ref 9–12.9)
PMV BLD AUTO: 11.1 FL (ref 9–12.9)
PMV BLD AUTO: 11.1 FL (ref 9–12.9)
PMV BLD AUTO: 11.2 FL (ref 9–12.9)
PMV BLD AUTO: 11.5 FL (ref 9–12.9)
PO2 BLDA: 117 MMHG (ref 64–87)
PO2 BLDA: 124 MMHG (ref 64–87)
PO2 BLDA: 132 MMHG (ref 64–87)
PO2 BLDA: 134 MMHG (ref 64–87)
PO2 BLDA: 187 MMHG (ref 64–87)
PO2 BLDA: 26 MMHG (ref 64–87)
PO2 BLDA: 295 MMHG (ref 64–87)
PO2 BLDA: 58 MMHG (ref 64–87)
PO2 BLDA: 71 MMHG (ref 64–87)
PO2 BLDA: 74 MMHG (ref 64–87)
PO2 BLDA: 74 MMHG (ref 64–87)
PO2 BLDA: 76 MMHG (ref 64–87)
PO2 BLDA: 88 MMHG (ref 64–87)
PO2 BLDA: 92 MMHG (ref 64–87)
PO2 BLDA: 97 MMHG (ref 64–87)
PO2 BLDA: 98 MMHG (ref 64–87)
PO2 TEMP ADJ BLDA: 100 MMHG (ref 64–87)
PO2 TEMP ADJ BLDA: 118 MMHG (ref 64–87)
PO2 TEMP ADJ BLDA: 121 MMHG (ref 64–87)
PO2 TEMP ADJ BLDA: 129 MMHG (ref 64–87)
PO2 TEMP ADJ BLDA: 131 MMHG (ref 64–87)
PO2 TEMP ADJ BLDA: 181 MMHG (ref 64–87)
PO2 TEMP ADJ BLDA: 27 MMHG (ref 64–87)
PO2 TEMP ADJ BLDA: 298 MMHG (ref 64–87)
PO2 TEMP ADJ BLDA: 58 MMHG (ref 64–87)
PO2 TEMP ADJ BLDA: 72 MMHG (ref 64–87)
PO2 TEMP ADJ BLDA: 74 MMHG (ref 64–87)
PO2 TEMP ADJ BLDA: 74 MMHG (ref 64–87)
PO2 TEMP ADJ BLDA: 76 MMHG (ref 64–87)
PO2 TEMP ADJ BLDA: 86 MMHG (ref 64–87)
PO2 TEMP ADJ BLDA: 93 MMHG (ref 64–87)
PO2 TEMP ADJ BLDA: 95 MMHG (ref 64–87)
POIKILOCYTOSIS BLD QL SMEAR: NORMAL
POTASSIUM SERPL-SCNC: 2.8 MMOL/L (ref 3.6–5.5)
POTASSIUM SERPL-SCNC: 3.1 MMOL/L (ref 3.6–5.5)
POTASSIUM SERPL-SCNC: 3.2 MMOL/L (ref 3.6–5.5)
POTASSIUM SERPL-SCNC: 3.3 MMOL/L (ref 3.6–5.5)
POTASSIUM SERPL-SCNC: 3.4 MMOL/L (ref 3.6–5.5)
POTASSIUM SERPL-SCNC: 3.5 MMOL/L (ref 3.6–5.5)
POTASSIUM SERPL-SCNC: 3.7 MMOL/L (ref 3.6–5.5)
POTASSIUM SERPL-SCNC: 3.7 MMOL/L (ref 3.6–5.5)
POTASSIUM SERPL-SCNC: 3.8 MMOL/L (ref 3.6–5.5)
POTASSIUM SERPL-SCNC: 3.9 MMOL/L (ref 3.6–5.5)
POTASSIUM SERPL-SCNC: 4.1 MMOL/L (ref 3.6–5.5)
POTASSIUM SERPL-SCNC: 4.5 MMOL/L (ref 3.6–5.5)
POTASSIUM SERPL-SCNC: 4.5 MMOL/L (ref 3.6–5.5)
POTASSIUM SERPL-SCNC: 4.6 MMOL/L (ref 3.6–5.5)
POTASSIUM SERPL-SCNC: 4.7 MMOL/L (ref 3.6–5.5)
POTASSIUM SERPL-SCNC: 5 MMOL/L (ref 3.6–5.5)
POTASSIUM SERPL-SCNC: 5.9 MMOL/L (ref 3.6–5.5)
PREALB SERPL-MCNC: 17 MG/DL (ref 18–38)
PREALB SERPL-MCNC: 6 MG/DL (ref 18–38)
PREALB SERPL-MCNC: 7 MG/DL (ref 18–38)
PREALB SERPL-MCNC: 7 MG/DL (ref 18–38)
PROT SERPL-MCNC: 5.2 G/DL (ref 6–8.2)
PROT SERPL-MCNC: 5.4 G/DL (ref 6–8.2)
PROT SERPL-MCNC: 5.6 G/DL (ref 6–8.2)
PROT SERPL-MCNC: 5.8 G/DL (ref 6–8.2)
PROT SERPL-MCNC: 5.8 G/DL (ref 6–8.2)
PROT SERPL-MCNC: 6.2 G/DL (ref 6–8.2)
PROT SERPL-MCNC: 6.3 G/DL (ref 6–8.2)
PROT SERPL-MCNC: 6.9 G/DL (ref 6–8.2)
PROT SERPL-MCNC: 7.2 G/DL (ref 6–8.2)
PROT UR QL STRIP: NEGATIVE MG/DL
PROTHROMBIN TIME: 14.9 SEC (ref 12–14.6)
PROTHROMBIN TIME: 15.1 SEC (ref 12–14.6)
RBC # BLD AUTO: 2.76 M/UL (ref 4.2–5.4)
RBC # BLD AUTO: 3.12 M/UL (ref 4.2–5.4)
RBC # BLD AUTO: 3.18 M/UL (ref 4.2–5.4)
RBC # BLD AUTO: 3.23 M/UL (ref 4.2–5.4)
RBC # BLD AUTO: 3.37 M/UL (ref 4.2–5.4)
RBC # BLD AUTO: 3.4 M/UL (ref 4.2–5.4)
RBC # BLD AUTO: 3.46 M/UL (ref 4.2–5.4)
RBC # BLD AUTO: 3.47 M/UL (ref 4.2–5.4)
RBC # BLD AUTO: 3.52 M/UL (ref 4.2–5.4)
RBC # BLD AUTO: 3.57 M/UL (ref 4.2–5.4)
RBC # BLD AUTO: 3.62 M/UL (ref 4.2–5.4)
RBC # BLD AUTO: 3.66 M/UL (ref 4.2–5.4)
RBC # BLD AUTO: 3.73 M/UL (ref 4.2–5.4)
RBC # BLD AUTO: 3.83 M/UL (ref 4.2–5.4)
RBC # BLD AUTO: 3.85 M/UL (ref 4.2–5.4)
RBC # BLD AUTO: 3.96 M/UL (ref 4.2–5.4)
RBC # BLD AUTO: 4.04 M/UL (ref 4.2–5.4)
RBC # BLD AUTO: 4.06 M/UL (ref 4.2–5.4)
RBC # BLD AUTO: 4.1 M/UL (ref 4.2–5.4)
RBC # BLD AUTO: 4.12 M/UL (ref 4.2–5.4)
RBC # BLD AUTO: 4.2 M/UL (ref 4.2–5.4)
RBC # BLD AUTO: 4.45 M/UL (ref 4.2–5.4)
RBC # BLD AUTO: 4.5 M/UL (ref 4.2–5.4)
RBC # URNS HPF: ABNORMAL /HPF
RBC # URNS HPF: ABNORMAL /HPF
RBC BLD AUTO: NORMAL
RBC BLD AUTO: PRESENT
RBC BLD AUTO: PRESENT
RBC UR QL AUTO: ABNORMAL
RBC UR QL AUTO: ABNORMAL
RBC UR QL AUTO: NEGATIVE
RBC UR QL AUTO: NEGATIVE
RH BLD: NORMAL
RH BLD: NORMAL
RHODAMINE-AURAMINE STN SPEC: NORMAL
RHODAMINE-AURAMINE STN SPEC: NORMAL
SAO2 % BLDA: 100 % (ref 93–99)
SAO2 % BLDA: 100 % (ref 93–99)
SAO2 % BLDA: 52 % (ref 93–99)
SAO2 % BLDA: 91 % (ref 93–99)
SAO2 % BLDA: 96 % (ref 93–99)
SAO2 % BLDA: 97 % (ref 93–99)
SAO2 % BLDA: 98 % (ref 93–99)
SAO2 % BLDA: 99 % (ref 93–99)
SIGNIFICANT IND 70042: ABNORMAL
SIGNIFICANT IND 70042: NORMAL
SITE SITE: ABNORMAL
SITE SITE: NORMAL
SODIUM SERPL-SCNC: 134 MMOL/L (ref 135–145)
SODIUM SERPL-SCNC: 136 MMOL/L (ref 135–145)
SODIUM SERPL-SCNC: 136 MMOL/L (ref 135–145)
SODIUM SERPL-SCNC: 137 MMOL/L (ref 135–145)
SODIUM SERPL-SCNC: 138 MMOL/L (ref 135–145)
SODIUM SERPL-SCNC: 139 MMOL/L (ref 135–145)
SODIUM SERPL-SCNC: 140 MMOL/L (ref 135–145)
SODIUM SERPL-SCNC: 141 MMOL/L (ref 135–145)
SODIUM SERPL-SCNC: 142 MMOL/L (ref 135–145)
SODIUM SERPL-SCNC: 142 MMOL/L (ref 135–145)
SODIUM SERPL-SCNC: 143 MMOL/L (ref 135–145)
SODIUM SERPL-SCNC: 143 MMOL/L (ref 135–145)
SOURCE SOURCE: ABNORMAL
SOURCE SOURCE: NORMAL
SP GR UR REFRACTOMETRY: 1.03
SP GR UR STRIP.AUTO: 1.01
SP GR UR STRIP.AUTO: 1.02
SP GR UR STRIP.AUTO: 1.02
SPECIMEN DRAWN FROM PATIENT: ABNORMAL
TARGETS BLD QL SMEAR: NORMAL
TIBC SERPL-MCNC: 179 UG/DL (ref 250–450)
TRIGL SERPL-MCNC: 102 MG/DL (ref 0–149)
TRIGL SERPL-MCNC: 188 MG/DL (ref 0–149)
TRIGL SERPL-MCNC: 75 MG/DL (ref 0–149)
TRIGL SERPL-MCNC: 90 MG/DL (ref 0–149)
TRIGL SERPL-MCNC: 96 MG/DL (ref 0–149)
TROPONIN I SERPL-MCNC: 0.06 NG/ML (ref 0–0.04)
TROPONIN I SERPL-MCNC: 0.06 NG/ML (ref 0–0.04)
TROPONIN I SERPL-MCNC: 0.15 NG/ML (ref 0–0.04)
TROPONIN I SERPL-MCNC: 1.07 NG/ML (ref 0–0.04)
TROPONIN I SERPL-MCNC: 1.22 NG/ML (ref 0–0.04)
TROPONIN I SERPL-MCNC: 1.4 NG/ML (ref 0–0.04)
TSH SERPL DL<=0.005 MIU/L-ACNC: 1.27 UIU/ML (ref 0.38–5.33)
UNIDENT CRYS URNS QL MICRO: ABNORMAL /HPF
UNIDENT CRYS URNS QL MICRO: ABNORMAL /HPF
UROBILINOGEN UR STRIP.AUTO-MCNC: 0.2 MG/DL
UROBILINOGEN UR STRIP.AUTO-MCNC: 0.2 MG/DL
VALPROATE SERPL-MCNC: 111.9 UG/ML (ref 50–100)
VALPROATE SERPL-MCNC: 124 UG/ML (ref 50–100)
VALPROATE SERPL-MCNC: 126.1 UG/ML (ref 50–100)
VALPROATE SERPL-MCNC: 127.3 UG/ML (ref 50–100)
VALPROATE SERPL-MCNC: 133 UG/ML (ref 50–100)
VALPROATE SERPL-MCNC: 135.7 UG/ML (ref 50–100)
VALPROATE SERPL-MCNC: 165.3 UG/ML (ref 50–100)
VALPROATE SERPL-MCNC: 37.9 UG/ML (ref 50–100)
VALPROATE SERPL-MCNC: 52.9 UG/ML (ref 50–100)
VALPROATE SERPL-MCNC: 71.2 UG/ML (ref 50–100)
VALPROATE SERPL-MCNC: 81.4 UG/ML (ref 50–100)
VALPROATE SERPL-MCNC: 90.2 UG/ML (ref 50–100)
VALPROATE SERPL-MCNC: 96.5 UG/ML (ref 50–100)
VIT B12 SERPL-MCNC: 960 PG/ML (ref 211–911)
WBC # BLD AUTO: 10.2 K/UL (ref 4.8–10.8)
WBC # BLD AUTO: 10.5 K/UL (ref 4.8–10.8)
WBC # BLD AUTO: 10.7 K/UL (ref 4.8–10.8)
WBC # BLD AUTO: 11 K/UL (ref 4.8–10.8)
WBC # BLD AUTO: 11 K/UL (ref 4.8–10.8)
WBC # BLD AUTO: 11.3 K/UL (ref 4.8–10.8)
WBC # BLD AUTO: 11.4 K/UL (ref 4.8–10.8)
WBC # BLD AUTO: 11.7 K/UL (ref 4.8–10.8)
WBC # BLD AUTO: 12.3 K/UL (ref 4.8–10.8)
WBC # BLD AUTO: 12.5 K/UL (ref 4.8–10.8)
WBC # BLD AUTO: 12.8 K/UL (ref 4.8–10.8)
WBC # BLD AUTO: 14 K/UL (ref 4.8–10.8)
WBC # BLD AUTO: 4.2 K/UL (ref 4.8–10.8)
WBC # BLD AUTO: 5.3 K/UL (ref 4.8–10.8)
WBC # BLD AUTO: 5.6 K/UL (ref 4.8–10.8)
WBC # BLD AUTO: 7.2 K/UL (ref 4.8–10.8)
WBC # BLD AUTO: 7.2 K/UL (ref 4.8–10.8)
WBC # BLD AUTO: 7.4 K/UL (ref 4.8–10.8)
WBC # BLD AUTO: 8 K/UL (ref 4.8–10.8)
WBC # BLD AUTO: 8.4 K/UL (ref 4.8–10.8)
WBC # BLD AUTO: 8.7 K/UL (ref 4.8–10.8)
WBC # BLD AUTO: 9.4 K/UL (ref 4.8–10.8)
WBC # BLD AUTO: 9.9 K/UL (ref 4.8–10.8)
WBC #/AREA URNS HPF: ABNORMAL /HPF
WBC #/AREA URNS HPF: ABNORMAL /HPF
WBC STL QL MICRO: NORMAL

## 2018-01-01 PROCEDURE — 700102 HCHG RX REV CODE 250 W/ 637 OVERRIDE(OP): Performed by: INTERNAL MEDICINE

## 2018-01-01 PROCEDURE — 80048 BASIC METABOLIC PNL TOTAL CA: CPT

## 2018-01-01 PROCEDURE — 80164 ASSAY DIPROPYLACETIC ACD TOT: CPT

## 2018-01-01 PROCEDURE — 700111 HCHG RX REV CODE 636 W/ 250 OVERRIDE (IP): Performed by: INTERNAL MEDICINE

## 2018-01-01 PROCEDURE — 700105 HCHG RX REV CODE 258: Performed by: INTERNAL MEDICINE

## 2018-01-01 PROCEDURE — 85027 COMPLETE CBC AUTOMATED: CPT

## 2018-01-01 PROCEDURE — 86140 C-REACTIVE PROTEIN: CPT

## 2018-01-01 PROCEDURE — 770001 HCHG ROOM/CARE - MED/SURG/GYN PRIV*

## 2018-01-01 PROCEDURE — 84100 ASSAY OF PHOSPHORUS: CPT

## 2018-01-01 PROCEDURE — 83735 ASSAY OF MAGNESIUM: CPT

## 2018-01-01 PROCEDURE — 97166 OT EVAL MOD COMPLEX 45 MIN: CPT

## 2018-01-01 PROCEDURE — 71045 X-RAY EXAM CHEST 1 VIEW: CPT

## 2018-01-01 PROCEDURE — 700105 HCHG RX REV CODE 258: Performed by: HOSPITALIST

## 2018-01-01 PROCEDURE — 700105 HCHG RX REV CODE 258: Performed by: PSYCHIATRY & NEUROLOGY

## 2018-01-01 PROCEDURE — 31500 INSERT EMERGENCY AIRWAY: CPT | Performed by: INTERNAL MEDICINE

## 2018-01-01 PROCEDURE — 81001 URINALYSIS AUTO W/SCOPE: CPT

## 2018-01-01 PROCEDURE — 0BH17EZ INSERTION OF ENDOTRACHEAL AIRWAY INTO TRACHEA, VIA NATURAL OR ARTIFICIAL OPENING: ICD-10-PCS | Performed by: INTERNAL MEDICINE

## 2018-01-01 PROCEDURE — 770022 HCHG ROOM/CARE - ICU (200)

## 2018-01-01 PROCEDURE — 36415 COLL VENOUS BLD VENIPUNCTURE: CPT

## 2018-01-01 PROCEDURE — 94640 AIRWAY INHALATION TREATMENT: CPT

## 2018-01-01 PROCEDURE — A9270 NON-COVERED ITEM OR SERVICE: HCPCS | Performed by: PSYCHIATRY & NEUROLOGY

## 2018-01-01 PROCEDURE — 95951 EEG-24 HR: CPT | Performed by: PSYCHIATRY & NEUROLOGY

## 2018-01-01 PROCEDURE — 87046 STOOL CULTR AEROBIC BACT EA: CPT

## 2018-01-01 PROCEDURE — 36600 WITHDRAWAL OF ARTERIAL BLOOD: CPT

## 2018-01-01 PROCEDURE — 700101 HCHG RX REV CODE 250: Performed by: INTERNAL MEDICINE

## 2018-01-01 PROCEDURE — A9270 NON-COVERED ITEM OR SERVICE: HCPCS | Performed by: INTERNAL MEDICINE

## 2018-01-01 PROCEDURE — 700102 HCHG RX REV CODE 250 W/ 637 OVERRIDE(OP): Performed by: PSYCHIATRY & NEUROLOGY

## 2018-01-01 PROCEDURE — 87206 SMEAR FLUORESCENT/ACID STAI: CPT

## 2018-01-01 PROCEDURE — 80053 COMPREHEN METABOLIC PANEL: CPT

## 2018-01-01 PROCEDURE — 82140 ASSAY OF AMMONIA: CPT

## 2018-01-01 PROCEDURE — 82803 BLOOD GASES ANY COMBINATION: CPT

## 2018-01-01 PROCEDURE — 94150 VITAL CAPACITY TEST: CPT

## 2018-01-01 PROCEDURE — 700101 HCHG RX REV CODE 250: Performed by: HOSPITALIST

## 2018-01-01 PROCEDURE — 85025 COMPLETE CBC W/AUTO DIFF WBC: CPT

## 2018-01-01 PROCEDURE — 700111 HCHG RX REV CODE 636 W/ 250 OVERRIDE (IP): Performed by: HOSPITALIST

## 2018-01-01 PROCEDURE — 97530 THERAPEUTIC ACTIVITIES: CPT

## 2018-01-01 PROCEDURE — 31624 DX BRONCHOSCOPE/LAVAGE: CPT | Performed by: INTERNAL MEDICINE

## 2018-01-01 PROCEDURE — 99233 SBSQ HOSP IP/OBS HIGH 50: CPT | Performed by: PSYCHIATRY & NEUROLOGY

## 2018-01-01 PROCEDURE — 93922 UPR/L XTREMITY ART 2 LEVELS: CPT

## 2018-01-01 PROCEDURE — 99291 CRITICAL CARE FIRST HOUR: CPT

## 2018-01-01 PROCEDURE — 83036 HEMOGLOBIN GLYCOSYLATED A1C: CPT

## 2018-01-01 PROCEDURE — 87040 BLOOD CULTURE FOR BACTERIA: CPT | Mod: 91

## 2018-01-01 PROCEDURE — 74176 CT ABD & PELVIS W/O CONTRAST: CPT

## 2018-01-01 PROCEDURE — 90670 PCV13 VACCINE IM: CPT | Performed by: INTERNAL MEDICINE

## 2018-01-01 PROCEDURE — C1751 CATH, INF, PER/CENT/MIDLINE: HCPCS

## 2018-01-01 PROCEDURE — 4A10X4Z MONITORING OF CENTRAL NERVOUS ELECTRICAL ACTIVITY, EXTERNAL APPROACH: ICD-10-PCS | Performed by: PSYCHIATRY & NEUROLOGY

## 2018-01-01 PROCEDURE — 99232 SBSQ HOSP IP/OBS MODERATE 35: CPT | Performed by: HOSPITALIST

## 2018-01-01 PROCEDURE — 700102 HCHG RX REV CODE 250 W/ 637 OVERRIDE(OP): Performed by: HOSPITALIST

## 2018-01-01 PROCEDURE — 84443 ASSAY THYROID STIM HORMONE: CPT

## 2018-01-01 PROCEDURE — 85610 PROTHROMBIN TIME: CPT

## 2018-01-01 PROCEDURE — 302977 HCHG BRONCHOSCOPY PROC-THERAPEUTIC

## 2018-01-01 PROCEDURE — 700105 HCHG RX REV CODE 258: Performed by: EMERGENCY MEDICINE

## 2018-01-01 PROCEDURE — 36569 INSJ PICC 5 YR+ W/O IMAGING: CPT

## 2018-01-01 PROCEDURE — 99232 SBSQ HOSP IP/OBS MODERATE 35: CPT | Performed by: PSYCHIATRY & NEUROLOGY

## 2018-01-01 PROCEDURE — P9047 ALBUMIN (HUMAN), 25%, 50ML: HCPCS | Mod: JG | Performed by: INTERNAL MEDICINE

## 2018-01-01 PROCEDURE — A9270 NON-COVERED ITEM OR SERVICE: HCPCS | Performed by: ORTHOPAEDIC SURGERY

## 2018-01-01 PROCEDURE — 99233 SBSQ HOSP IP/OBS HIGH 50: CPT | Performed by: HOSPITALIST

## 2018-01-01 PROCEDURE — 99223 1ST HOSP IP/OBS HIGH 75: CPT | Mod: AI | Performed by: INTERNAL MEDICINE

## 2018-01-01 PROCEDURE — 99291 CRITICAL CARE FIRST HOUR: CPT | Performed by: INTERNAL MEDICINE

## 2018-01-01 PROCEDURE — 86704 HEP B CORE ANTIBODY TOTAL: CPT

## 2018-01-01 PROCEDURE — 87116 MYCOBACTERIA CULTURE: CPT

## 2018-01-01 PROCEDURE — 87015 SPECIMEN INFECT AGNT CONCNTJ: CPT

## 2018-01-01 PROCEDURE — 99292 CRITICAL CARE ADDL 30 MIN: CPT | Mod: 25 | Performed by: INTERNAL MEDICINE

## 2018-01-01 PROCEDURE — 97535 SELF CARE MNGMENT TRAINING: CPT

## 2018-01-01 PROCEDURE — 99291 CRITICAL CARE FIRST HOUR: CPT | Mod: 25 | Performed by: INTERNAL MEDICINE

## 2018-01-01 PROCEDURE — 88305 TISSUE EXAM BY PATHOLOGIST: CPT

## 2018-01-01 PROCEDURE — 3E02340 INTRODUCTION OF INFLUENZA VACCINE INTO MUSCLE, PERCUTANEOUS APPROACH: ICD-10-PCS | Performed by: HOSPITALIST

## 2018-01-01 PROCEDURE — 86803 HEPATITIS C AB TEST: CPT

## 2018-01-01 PROCEDURE — A9270 NON-COVERED ITEM OR SERVICE: HCPCS | Performed by: HOSPITALIST

## 2018-01-01 PROCEDURE — 99292 CRITICAL CARE ADDL 30 MIN: CPT | Performed by: INTERNAL MEDICINE

## 2018-01-01 PROCEDURE — 72170 X-RAY EXAM OF PELVIS: CPT

## 2018-01-01 PROCEDURE — 73030 X-RAY EXAM OF SHOULDER: CPT | Mod: RT

## 2018-01-01 PROCEDURE — G8978 MOBILITY CURRENT STATUS: HCPCS | Mod: CL

## 2018-01-01 PROCEDURE — 94760 N-INVAS EAR/PLS OXIMETRY 1: CPT

## 2018-01-01 PROCEDURE — 94003 VENT MGMT INPAT SUBQ DAY: CPT

## 2018-01-01 PROCEDURE — 93926 LOWER EXTREMITY STUDY: CPT | Mod: RT

## 2018-01-01 PROCEDURE — 11055 PARING/CUTG B9 HYPRKER LES 1: CPT

## 2018-01-01 PROCEDURE — 73501 X-RAY EXAM HIP UNI 1 VIEW: CPT | Mod: LT

## 2018-01-01 PROCEDURE — 87102 FUNGUS ISOLATION CULTURE: CPT

## 2018-01-01 PROCEDURE — 160009 HCHG ANES TIME/MIN: Performed by: ORTHOPAEDIC SURGERY

## 2018-01-01 PROCEDURE — 160002 HCHG RECOVERY MINUTES (STAT): Performed by: ORTHOPAEDIC SURGERY

## 2018-01-01 PROCEDURE — 99291 CRITICAL CARE FIRST HOUR: CPT | Performed by: HOSPITALIST

## 2018-01-01 PROCEDURE — 99232 SBSQ HOSP IP/OBS MODERATE 35: CPT | Performed by: INTERNAL MEDICINE

## 2018-01-01 PROCEDURE — 84134 ASSAY OF PREALBUMIN: CPT

## 2018-01-01 PROCEDURE — 501445 HCHG STAPLER, SKIN DISP: Performed by: ORTHOPAEDIC SURGERY

## 2018-01-01 PROCEDURE — 92950 HEART/LUNG RESUSCITATION CPR: CPT

## 2018-01-01 PROCEDURE — 700111 HCHG RX REV CODE 636 W/ 250 OVERRIDE (IP): Performed by: PSYCHIATRY & NEUROLOGY

## 2018-01-01 PROCEDURE — 89055 LEUKOCYTE ASSESSMENT FECAL: CPT

## 2018-01-01 PROCEDURE — 95951 EEG-24 HR: CPT | Mod: 26 | Performed by: PSYCHIATRY & NEUROLOGY

## 2018-01-01 PROCEDURE — 500122 HCHG BOVIE, BLADE: Performed by: ORTHOPAEDIC SURGERY

## 2018-01-01 PROCEDURE — 85007 BL SMEAR W/DIFF WBC COUNT: CPT

## 2018-01-01 PROCEDURE — 83880 ASSAY OF NATRIURETIC PEPTIDE: CPT

## 2018-01-01 PROCEDURE — 74022 RADEX COMPL AQT ABD SERIES: CPT

## 2018-01-01 PROCEDURE — 99223 1ST HOSP IP/OBS HIGH 75: CPT | Mod: AI | Performed by: HOSPITALIST

## 2018-01-01 PROCEDURE — 84478 ASSAY OF TRIGLYCERIDES: CPT

## 2018-01-01 PROCEDURE — 99214 OFFICE O/P EST MOD 30 MIN: CPT | Performed by: NURSE PRACTITIONER

## 2018-01-01 PROCEDURE — 36556 INSERT NON-TUNNEL CV CATH: CPT

## 2018-01-01 PROCEDURE — 31500 INSERT EMERGENCY AIRWAY: CPT | Mod: GC | Performed by: INTERNAL MEDICINE

## 2018-01-01 PROCEDURE — 84484 ASSAY OF TROPONIN QUANT: CPT

## 2018-01-01 PROCEDURE — 500367 HCHG DRAIN KIT, HEMOVAC: Performed by: ORTHOPAEDIC SURGERY

## 2018-01-01 PROCEDURE — 85730 THROMBOPLASTIN TIME PARTIAL: CPT

## 2018-01-01 PROCEDURE — 83605 ASSAY OF LACTIC ACID: CPT

## 2018-01-01 PROCEDURE — 99239 HOSP IP/OBS DSCHRG MGMT >30: CPT | Performed by: INTERNAL MEDICINE

## 2018-01-01 PROCEDURE — 87186 SC STD MICRODIL/AGAR DIL: CPT

## 2018-01-01 PROCEDURE — 700111 HCHG RX REV CODE 636 W/ 250 OVERRIDE (IP)

## 2018-01-01 PROCEDURE — 90471 IMMUNIZATION ADMIN: CPT

## 2018-01-01 PROCEDURE — 302978 HCHG BRONCHOSCOPY-DIAGNOSTIC

## 2018-01-01 PROCEDURE — 99497 ADVNCD CARE PLAN 30 MIN: CPT | Performed by: INTERNAL MEDICINE

## 2018-01-01 PROCEDURE — 70450 CT HEAD/BRAIN W/O DYE: CPT

## 2018-01-01 PROCEDURE — 86901 BLOOD TYPING SEROLOGIC RH(D): CPT

## 2018-01-01 PROCEDURE — 73060 X-RAY EXAM OF HUMERUS: CPT | Mod: RT

## 2018-01-01 PROCEDURE — 87804 INFLUENZA ASSAY W/OPTIC: CPT | Performed by: FAMILY MEDICINE

## 2018-01-01 PROCEDURE — 99221 1ST HOSP IP/OBS SF/LOW 40: CPT | Mod: 25 | Performed by: PSYCHIATRY & NEUROLOGY

## 2018-01-01 PROCEDURE — 87077 CULTURE AEROBIC IDENTIFY: CPT

## 2018-01-01 PROCEDURE — B54NZZA ULTRASONOGRAPHY OF LEFT UPPER EXTREMITY VEINS, GUIDANCE: ICD-10-PCS | Performed by: INTERNAL MEDICINE

## 2018-01-01 PROCEDURE — 93971 EXTREMITY STUDY: CPT | Mod: LT

## 2018-01-01 PROCEDURE — 31500 INSERT EMERGENCY AIRWAY: CPT

## 2018-01-01 PROCEDURE — 96366 THER/PROPH/DIAG IV INF ADDON: CPT

## 2018-01-01 PROCEDURE — 94669 MECHANICAL CHEST WALL OSCILL: CPT

## 2018-01-01 PROCEDURE — 87045 FECES CULTURE AEROBIC BACT: CPT

## 2018-01-01 PROCEDURE — 700111 HCHG RX REV CODE 636 W/ 250 OVERRIDE (IP): Mod: JG | Performed by: INTERNAL MEDICINE

## 2018-01-01 PROCEDURE — 99285 EMERGENCY DEPT VISIT HI MDM: CPT

## 2018-01-01 PROCEDURE — 73090 X-RAY EXAM OF FOREARM: CPT | Mod: RT

## 2018-01-01 PROCEDURE — 87205 SMEAR GRAM STAIN: CPT

## 2018-01-01 PROCEDURE — G0475 HIV COMBINATION ASSAY: HCPCS

## 2018-01-01 PROCEDURE — 93306 TTE W/DOPPLER COMPLETE: CPT

## 2018-01-01 PROCEDURE — 700111 HCHG RX REV CODE 636 W/ 250 OVERRIDE (IP): Performed by: EMERGENCY MEDICINE

## 2018-01-01 PROCEDURE — 0B21XEZ CHANGE ENDOTRACHEAL AIRWAY IN TRACHEA, EXTERNAL APPROACH: ICD-10-PCS | Performed by: INTERNAL MEDICINE

## 2018-01-01 PROCEDURE — 770006 HCHG ROOM/CARE - MED/SURG/GYN SEMI*

## 2018-01-01 PROCEDURE — 700101 HCHG RX REV CODE 250

## 2018-01-01 PROCEDURE — 87086 URINE CULTURE/COLONY COUNT: CPT

## 2018-01-01 PROCEDURE — 87070 CULTURE OTHR SPECIMN AEROBIC: CPT

## 2018-01-01 PROCEDURE — 93005 ELECTROCARDIOGRAM TRACING: CPT | Performed by: EMERGENCY MEDICINE

## 2018-01-01 PROCEDURE — 36556 INSERT NON-TUNNEL CV CATH: CPT | Performed by: INTERNAL MEDICINE

## 2018-01-01 PROCEDURE — A9270 NON-COVERED ITEM OR SERVICE: HCPCS

## 2018-01-01 PROCEDURE — A6250 SKIN SEAL PROTECT MOISTURIZR: HCPCS | Performed by: INTERNAL MEDICINE

## 2018-01-01 PROCEDURE — 90662 IIV NO PRSV INCREASED AG IM: CPT | Performed by: HOSPITALIST

## 2018-01-01 PROCEDURE — 96365 THER/PROPH/DIAG IV INF INIT: CPT

## 2018-01-01 PROCEDURE — G8988 SELF CARE GOAL STATUS: HCPCS | Mod: CJ

## 2018-01-01 PROCEDURE — 86850 RBC ANTIBODY SCREEN: CPT

## 2018-01-01 PROCEDURE — 87340 HEPATITIS B SURFACE AG IA: CPT

## 2018-01-01 PROCEDURE — 94002 VENT MGMT INPAT INIT DAY: CPT

## 2018-01-01 PROCEDURE — 99231 SBSQ HOSP IP/OBS SF/LOW 25: CPT | Performed by: INTERNAL MEDICINE

## 2018-01-01 PROCEDURE — 81003 URINALYSIS AUTO W/O SCOPE: CPT

## 2018-01-01 PROCEDURE — L8699 PROSTHETIC IMPLANT NOS: HCPCS | Performed by: ORTHOPAEDIC SURGERY

## 2018-01-01 PROCEDURE — 87493 C DIFF AMPLIFIED PROBE: CPT

## 2018-01-01 PROCEDURE — 31645 BRNCHSC W/THER ASPIR 1ST: CPT | Mod: 59 | Performed by: INTERNAL MEDICINE

## 2018-01-01 PROCEDURE — 700102 HCHG RX REV CODE 250 W/ 637 OVERRIDE(OP)

## 2018-01-01 PROCEDURE — 502000 HCHG MISC OR IMPLANTS RC 0278: Performed by: ORTHOPAEDIC SURGERY

## 2018-01-01 PROCEDURE — G8987 SELF CARE CURRENT STATUS: HCPCS | Mod: CK

## 2018-01-01 PROCEDURE — 700102 HCHG RX REV CODE 250 W/ 637 OVERRIDE(OP): Performed by: ORTHOPAEDIC SURGERY

## 2018-01-01 PROCEDURE — 97161 PT EVAL LOW COMPLEX 20 MIN: CPT

## 2018-01-01 PROCEDURE — 0B9F8ZX DRAINAGE OF RIGHT LOWER LUNG LOBE, VIA NATURAL OR ARTIFICIAL OPENING ENDOSCOPIC, DIAGNOSTIC: ICD-10-PCS | Performed by: INTERNAL MEDICINE

## 2018-01-01 PROCEDURE — 93010 ELECTROCARDIOGRAM REPORT: CPT | Performed by: INTERNAL MEDICINE

## 2018-01-01 PROCEDURE — A6402 STERILE GAUZE <= 16 SQ IN: HCPCS | Performed by: ORTHOPAEDIC SURGERY

## 2018-01-01 PROCEDURE — 86900 BLOOD TYPING SEROLOGIC ABO: CPT

## 2018-01-01 PROCEDURE — 5A1955Z RESPIRATORY VENTILATION, GREATER THAN 96 CONSECUTIVE HOURS: ICD-10-PCS | Performed by: INTERNAL MEDICINE

## 2018-01-01 PROCEDURE — 93005 ELECTROCARDIOGRAM TRACING: CPT | Performed by: INTERNAL MEDICINE

## 2018-01-01 PROCEDURE — 99221 1ST HOSP IP/OBS SF/LOW 40: CPT | Performed by: INTERNAL MEDICINE

## 2018-01-01 PROCEDURE — 83540 ASSAY OF IRON: CPT

## 2018-01-01 PROCEDURE — 302214 INTUBATION BOX: Performed by: INTERNAL MEDICINE

## 2018-01-01 PROCEDURE — 95951 EEG-24 HR: CPT | Mod: 52 | Performed by: PSYCHIATRY & NEUROLOGY

## 2018-01-01 PROCEDURE — 83550 IRON BINDING TEST: CPT

## 2018-01-01 PROCEDURE — 83605 ASSAY OF LACTIC ACID: CPT | Mod: 91

## 2018-01-01 PROCEDURE — 95951 EEG-24 HR: CPT | Mod: 26,52 | Performed by: PSYCHIATRY & NEUROLOGY

## 2018-01-01 PROCEDURE — 88112 CYTOPATH CELL ENHANCE TECH: CPT

## 2018-01-01 PROCEDURE — 87899 AGENT NOS ASSAY W/OPTIC: CPT

## 2018-01-01 PROCEDURE — 5A1945Z RESPIRATORY VENTILATION, 24-96 CONSECUTIVE HOURS: ICD-10-PCS | Performed by: HOSPITALIST

## 2018-01-01 PROCEDURE — 99214 OFFICE O/P EST MOD 30 MIN: CPT | Mod: 25 | Performed by: FAMILY MEDICINE

## 2018-01-01 PROCEDURE — B548ZZA ULTRASONOGRAPHY OF SUPERIOR VENA CAVA, GUIDANCE: ICD-10-PCS | Performed by: INTERNAL MEDICINE

## 2018-01-01 PROCEDURE — 160042 HCHG SURGERY MINUTES - EA ADDL 1 MIN LEVEL 5: Performed by: ORTHOPAEDIC SURGERY

## 2018-01-01 PROCEDURE — 303105 HCHG CATHETER EXTRA

## 2018-01-01 PROCEDURE — 87040 BLOOD CULTURE FOR BACTERIA: CPT

## 2018-01-01 PROCEDURE — 87324 CLOSTRIDIUM AG IA: CPT

## 2018-01-01 PROCEDURE — 82607 VITAMIN B-12: CPT

## 2018-01-01 PROCEDURE — 82533 TOTAL CORTISOL: CPT

## 2018-01-01 PROCEDURE — 302255 BARRIER CREAM MOISTURE BAZA PROTECT (ZINC) 5OZ: Performed by: INTERNAL MEDICINE

## 2018-01-01 PROCEDURE — 87106 FUNGI IDENTIFICATION YEAST: CPT

## 2018-01-01 PROCEDURE — G8979 MOBILITY GOAL STATUS: HCPCS | Mod: CJ

## 2018-01-01 PROCEDURE — 74018 RADEX ABDOMEN 1 VIEW: CPT

## 2018-01-01 PROCEDURE — P9047 ALBUMIN (HUMAN), 25%, 50ML: HCPCS | Mod: JG | Performed by: HOSPITALIST

## 2018-01-01 PROCEDURE — 501838 HCHG SUTURE GENERAL: Performed by: ORTHOPAEDIC SURGERY

## 2018-01-01 PROCEDURE — 99233 SBSQ HOSP IP/OBS HIGH 50: CPT | Performed by: INTERNAL MEDICINE

## 2018-01-01 PROCEDURE — G8978 MOBILITY CURRENT STATUS: HCPCS | Mod: CJ

## 2018-01-01 PROCEDURE — 3E0234Z INTRODUCTION OF SERUM, TOXOID AND VACCINE INTO MUSCLE, PERCUTANEOUS APPROACH: ICD-10-PCS | Performed by: HOSPITALIST

## 2018-01-01 PROCEDURE — 700105 HCHG RX REV CODE 258: Performed by: PHYSICIAN ASSISTANT

## 2018-01-01 PROCEDURE — 160031 HCHG SURGERY MINUTES - 1ST 30 MINS LEVEL 5: Performed by: ORTHOPAEDIC SURGERY

## 2018-01-01 PROCEDURE — 83690 ASSAY OF LIPASE: CPT

## 2018-01-01 PROCEDURE — 160048 HCHG OR STATISTICAL LEVEL 1-5: Performed by: ORTHOPAEDIC SURGERY

## 2018-01-01 PROCEDURE — 05HC33Z INSERTION OF INFUSION DEVICE INTO LEFT BASILIC VEIN, PERCUTANEOUS APPROACH: ICD-10-PCS | Performed by: INTERNAL MEDICINE

## 2018-01-01 PROCEDURE — 97110 THERAPEUTIC EXERCISES: CPT

## 2018-01-01 PROCEDURE — 31720 CLEARANCE OF AIRWAYS: CPT

## 2018-01-01 PROCEDURE — 0BH17EZ INSERTION OF ENDOTRACHEAL AIRWAY INTO TRACHEA, VIA NATURAL OR ARTIFICIAL OPENING: ICD-10-PCS | Performed by: EMERGENCY MEDICINE

## 2018-01-01 PROCEDURE — 160035 HCHG PACU - 1ST 60 MINS PHASE I: Performed by: ORTHOPAEDIC SURGERY

## 2018-01-01 PROCEDURE — 95956 EEG-24 HR: CPT | Performed by: PSYCHIATRY & NEUROLOGY

## 2018-01-01 PROCEDURE — 86706 HEP B SURFACE ANTIBODY: CPT

## 2018-01-01 PROCEDURE — 0SRS019 REPLACEMENT OF LEFT HIP JOINT, FEMORAL SURFACE WITH METAL SYNTHETIC SUBSTITUTE, CEMENTED, OPEN APPROACH: ICD-10-PCS | Performed by: ORTHOPAEDIC SURGERY

## 2018-01-01 PROCEDURE — 70551 MRI BRAIN STEM W/O DYE: CPT

## 2018-01-01 PROCEDURE — 96372 THER/PROPH/DIAG INJ SC/IM: CPT | Performed by: FAMILY MEDICINE

## 2018-01-01 PROCEDURE — 51702 INSERT TEMP BLADDER CATH: CPT

## 2018-01-01 PROCEDURE — 71046 X-RAY EXAM CHEST 2 VIEWS: CPT | Mod: TC,FY | Performed by: FAMILY MEDICINE

## 2018-01-01 PROCEDURE — 502578 HCHG PACK, TOTAL HIP: Performed by: ORTHOPAEDIC SURGERY

## 2018-01-01 PROCEDURE — G8979 MOBILITY GOAL STATUS: HCPCS | Mod: CI

## 2018-01-01 PROCEDURE — 02HV33Z INSERTION OF INFUSION DEVICE INTO SUPERIOR VENA CAVA, PERCUTANEOUS APPROACH: ICD-10-PCS | Performed by: INTERNAL MEDICINE

## 2018-01-01 PROCEDURE — 700111 HCHG RX REV CODE 636 W/ 250 OVERRIDE (IP): Performed by: PHYSICIAN ASSISTANT

## 2018-01-01 DEVICE — IMPLANT COCR 12/14 FEM HEAD 28 +0: Type: IMPLANTABLE DEVICE | Site: HIP | Status: FUNCTIONAL

## 2018-01-01 DEVICE — IMPLANT CONQ FX FEM COMP SZ 11: Type: IMPLANTABLE DEVICE | Site: HIP | Status: FUNCTIONAL

## 2018-01-01 DEVICE — BONE CEMENT SIMPLEX ANTIBIO - (10/PK): Type: IMPLANTABLE DEVICE | Site: HIP | Status: FUNCTIONAL

## 2018-01-01 DEVICE — DISTAL INVIS CENT SZ 10MM: Type: IMPLANTABLE DEVICE | Site: HIP | Status: FUNCTIONAL

## 2018-01-01 DEVICE — IMPLANT TANDEM BIPOLAR COCR 46OD 28ID: Type: IMPLANTABLE DEVICE | Site: HIP | Status: FUNCTIONAL

## 2018-01-01 DEVICE — IMPLANTABLE DEVICE: Type: IMPLANTABLE DEVICE | Site: HIP | Status: FUNCTIONAL

## 2018-01-01 RX ORDER — CEFDINIR 300 MG/1
300 CAPSULE ORAL EVERY 12 HOURS
Qty: 12 CAP | Refills: 0 | Status: SHIPPED | OUTPATIENT
Start: 2018-01-01 | End: 2018-01-01

## 2018-01-01 RX ORDER — BISACODYL 10 MG
10 SUPPOSITORY, RECTAL RECTAL
Status: DISCONTINUED | OUTPATIENT
Start: 2018-01-01 | End: 2018-01-01 | Stop reason: HOSPADM

## 2018-01-01 RX ORDER — AMOXICILLIN 250 MG
2 CAPSULE ORAL 2 TIMES DAILY
Qty: 30 TAB | Refills: 0 | Status: SHIPPED | OUTPATIENT
Start: 2018-01-01

## 2018-01-01 RX ORDER — DEXMEDETOMIDINE HYDROCHLORIDE 4 UG/ML
.1-1.5 INJECTION, SOLUTION INTRAVENOUS CONTINUOUS
Status: DISCONTINUED | OUTPATIENT
Start: 2018-01-01 | End: 2018-01-01

## 2018-01-01 RX ORDER — MAGNESIUM SULFATE HEPTAHYDRATE 40 MG/ML
2 INJECTION, SOLUTION INTRAVENOUS ONCE
Status: COMPLETED | OUTPATIENT
Start: 2018-01-01 | End: 2018-01-01

## 2018-01-01 RX ORDER — POLYETHYLENE GLYCOL 3350 17 G/17G
1 POWDER, FOR SOLUTION ORAL
Status: DISCONTINUED | OUTPATIENT
Start: 2018-01-01 | End: 2018-01-01

## 2018-01-01 RX ORDER — ALBUMIN (HUMAN) 12.5 G/50ML
25 SOLUTION INTRAVENOUS EVERY 6 HOURS
Status: COMPLETED | OUTPATIENT
Start: 2018-01-01 | End: 2018-01-01

## 2018-01-01 RX ORDER — ACETAMINOPHEN 650 MG/1
650 SUPPOSITORY RECTAL EVERY 4 HOURS PRN
Status: DISCONTINUED | OUTPATIENT
Start: 2018-01-01 | End: 2018-01-01

## 2018-01-01 RX ORDER — SUCCINYLCHOLINE CHLORIDE 20 MG/ML
100 INJECTION INTRAMUSCULAR; INTRAVENOUS ONCE
Status: COMPLETED | OUTPATIENT
Start: 2018-01-01 | End: 2018-01-01

## 2018-01-01 RX ORDER — AMOXICILLIN 250 MG
2 CAPSULE ORAL 2 TIMES DAILY
Status: DISCONTINUED | OUTPATIENT
Start: 2018-01-01 | End: 2018-01-01

## 2018-01-01 RX ORDER — BISACODYL 10 MG
10 SUPPOSITORY, RECTAL RECTAL
Status: DISCONTINUED | OUTPATIENT
Start: 2018-01-01 | End: 2018-01-01

## 2018-01-01 RX ORDER — GUAIFENESIN AND DEXTROMETHORPHAN HYDROBROMIDE 1200; 60 MG/1; MG/1
1 TABLET, EXTENDED RELEASE ORAL EVERY 12 HOURS PRN
Qty: 20 TAB | Refills: 0 | Status: SHIPPED | OUTPATIENT
Start: 2018-01-01 | End: 2018-01-01

## 2018-01-01 RX ORDER — AMOXICILLIN 250 MG
2 CAPSULE ORAL 2 TIMES DAILY
Status: DISCONTINUED | OUTPATIENT
Start: 2018-01-01 | End: 2018-01-01 | Stop reason: HOSPADM

## 2018-01-01 RX ORDER — FUROSEMIDE 10 MG/ML
20 INJECTION INTRAMUSCULAR; INTRAVENOUS
Status: DISCONTINUED | OUTPATIENT
Start: 2018-01-01 | End: 2018-01-01

## 2018-01-01 RX ORDER — LABETALOL HYDROCHLORIDE 5 MG/ML
10 INJECTION, SOLUTION INTRAVENOUS EVERY 4 HOURS PRN
Status: DISCONTINUED | OUTPATIENT
Start: 2018-01-01 | End: 2018-01-01 | Stop reason: HOSPADM

## 2018-01-01 RX ORDER — ETOMIDATE 2 MG/ML
20 INJECTION INTRAVENOUS ONCE
Status: COMPLETED | OUTPATIENT
Start: 2018-01-01 | End: 2018-01-01

## 2018-01-01 RX ORDER — MORPHINE SULFATE 0.5 MG/ML
INJECTION, SOLUTION EPIDURAL; INTRATHECAL; INTRAVENOUS
Status: DISCONTINUED | OUTPATIENT
Start: 2018-01-01 | End: 2018-01-01 | Stop reason: HOSPADM

## 2018-01-01 RX ORDER — ONDANSETRON 2 MG/ML
4 INJECTION INTRAMUSCULAR; INTRAVENOUS EVERY 4 HOURS PRN
Status: DISCONTINUED | OUTPATIENT
Start: 2018-01-01 | End: 2018-01-01 | Stop reason: HOSPADM

## 2018-01-01 RX ORDER — SUCCINYLCHOLINE CHLORIDE 20 MG/ML
50 INJECTION INTRAMUSCULAR; INTRAVENOUS ONCE
Status: COMPLETED | OUTPATIENT
Start: 2018-01-01 | End: 2018-01-01

## 2018-01-01 RX ORDER — SODIUM CHLORIDE 9 MG/ML
1000 INJECTION, SOLUTION INTRAVENOUS ONCE
Status: COMPLETED | OUTPATIENT
Start: 2018-01-01 | End: 2018-01-01

## 2018-01-01 RX ORDER — PROPOFOL 10 MG/ML
50 INJECTION, EMULSION INTRAVENOUS ONCE
Status: COMPLETED | OUTPATIENT
Start: 2018-01-01 | End: 2018-01-01

## 2018-01-01 RX ORDER — LORAZEPAM 2 MG/ML
1 CONCENTRATE ORAL
Status: DISCONTINUED | OUTPATIENT
Start: 2018-01-01 | End: 2018-01-01

## 2018-01-01 RX ORDER — CEFTRIAXONE 1 G/1
1 INJECTION, POWDER, FOR SOLUTION INTRAMUSCULAR; INTRAVENOUS ONCE
Status: COMPLETED | OUTPATIENT
Start: 2018-01-01 | End: 2018-01-01

## 2018-01-01 RX ORDER — ASPIRIN 81 MG/1
81 TABLET, CHEWABLE ORAL DAILY
Status: DISCONTINUED | OUTPATIENT
Start: 2018-01-01 | End: 2018-01-01

## 2018-01-01 RX ORDER — ASPIRIN 300 MG/1
300 SUPPOSITORY RECTAL EVERY 6 HOURS PRN
Status: DISCONTINUED | OUTPATIENT
Start: 2018-01-01 | End: 2018-01-01

## 2018-01-01 RX ORDER — MIDODRINE HYDROCHLORIDE 5 MG/1
5 TABLET ORAL
Status: DISCONTINUED | OUTPATIENT
Start: 2018-01-01 | End: 2018-01-01

## 2018-01-01 RX ORDER — DOXYCYCLINE HYCLATE 100 MG
100 TABLET ORAL 2 TIMES DAILY
Qty: 20 TAB | Refills: 0 | Status: SHIPPED | OUTPATIENT
Start: 2018-01-01 | End: 2018-01-01

## 2018-01-01 RX ORDER — POTASSIUM CHLORIDE 750 MG/1
10 TABLET, EXTENDED RELEASE ORAL DAILY
Qty: 60 TAB | Refills: 11
Start: 2018-01-01

## 2018-01-01 RX ORDER — ENEMA 19; 7 G/133ML; G/133ML
1 ENEMA RECTAL ONCE
Status: COMPLETED | OUTPATIENT
Start: 2018-01-01 | End: 2018-01-01

## 2018-01-01 RX ORDER — LEVOCARNITINE 1 G/10ML
1000 SOLUTION ORAL 2 TIMES DAILY WITH MEALS
Status: DISCONTINUED | OUTPATIENT
Start: 2018-01-01 | End: 2018-01-01

## 2018-01-01 RX ORDER — IPRATROPIUM BROMIDE AND ALBUTEROL SULFATE 2.5; .5 MG/3ML; MG/3ML
3 SOLUTION RESPIRATORY (INHALATION)
Status: DISCONTINUED | OUTPATIENT
Start: 2018-01-01 | End: 2018-01-01

## 2018-01-01 RX ORDER — ASPIRIN 300 MG/1
300 SUPPOSITORY RECTAL DAILY
Status: DISCONTINUED | OUTPATIENT
Start: 2018-01-01 | End: 2018-01-01

## 2018-01-01 RX ORDER — MORPHINE SULFATE 10 MG/ML
5 INJECTION, SOLUTION INTRAMUSCULAR; INTRAVENOUS
Status: DISCONTINUED | OUTPATIENT
Start: 2018-01-01 | End: 2018-01-01

## 2018-01-01 RX ORDER — OXYCODONE HYDROCHLORIDE 5 MG/1
5 TABLET ORAL EVERY 6 HOURS PRN
Qty: 12 TAB | Refills: 0 | Status: SHIPPED | OUTPATIENT
Start: 2018-01-01 | End: 2018-01-01

## 2018-01-01 RX ORDER — HEPARIN SODIUM 5000 [USP'U]/ML
5000 INJECTION, SOLUTION INTRAVENOUS; SUBCUTANEOUS EVERY 8 HOURS
Status: DISCONTINUED | OUTPATIENT
Start: 2018-01-01 | End: 2018-01-01

## 2018-01-01 RX ORDER — FAMOTIDINE 20 MG/1
20 TABLET, FILM COATED ORAL DAILY
Status: DISCONTINUED | OUTPATIENT
Start: 2018-01-01 | End: 2018-01-01

## 2018-01-01 RX ORDER — SODIUM CHLORIDE 9 MG/ML
30 INJECTION, SOLUTION INTRAVENOUS
Status: COMPLETED | OUTPATIENT
Start: 2018-01-01 | End: 2018-01-01

## 2018-01-01 RX ORDER — MORPHINE SULFATE 100 MG/5ML
10 SOLUTION ORAL
Status: DISCONTINUED | OUTPATIENT
Start: 2018-01-01 | End: 2018-01-01

## 2018-01-01 RX ORDER — HYDROMORPHONE HYDROCHLORIDE 2 MG/ML
.25-.5 INJECTION, SOLUTION INTRAMUSCULAR; INTRAVENOUS; SUBCUTANEOUS
Status: DISCONTINUED | OUTPATIENT
Start: 2018-01-01 | End: 2018-01-01 | Stop reason: HOSPADM

## 2018-01-01 RX ORDER — OXYCODONE HYDROCHLORIDE 10 MG/1
10 TABLET ORAL
Status: DISCONTINUED | OUTPATIENT
Start: 2018-01-01 | End: 2018-01-01

## 2018-01-01 RX ORDER — MORPHINE SULFATE 10 MG/ML
10 INJECTION, SOLUTION INTRAMUSCULAR; INTRAVENOUS
Status: DISCONTINUED | OUTPATIENT
Start: 2018-01-01 | End: 2018-01-01 | Stop reason: HOSPADM

## 2018-01-01 RX ORDER — HEPARIN SODIUM 5000 [USP'U]/ML
5000 INJECTION, SOLUTION INTRAVENOUS; SUBCUTANEOUS EVERY 8 HOURS
Status: DISCONTINUED | OUTPATIENT
Start: 2018-01-01 | End: 2018-01-01 | Stop reason: HOSPADM

## 2018-01-01 RX ORDER — ROCURONIUM BROMIDE 10 MG/ML
50 INJECTION, SOLUTION INTRAVENOUS ONCE
Status: COMPLETED | OUTPATIENT
Start: 2018-01-01 | End: 2018-01-01

## 2018-01-01 RX ORDER — SODIUM CHLORIDE 9 MG/ML
INJECTION, SOLUTION INTRAVENOUS ONCE
Status: COMPLETED | OUTPATIENT
Start: 2018-01-01 | End: 2018-01-01

## 2018-01-01 RX ORDER — MORPHINE SULFATE 10 MG/ML
10 INJECTION, SOLUTION INTRAMUSCULAR; INTRAVENOUS
Status: DISCONTINUED | OUTPATIENT
Start: 2018-01-01 | End: 2018-01-01

## 2018-01-01 RX ORDER — ONDANSETRON 4 MG/1
4 TABLET, ORALLY DISINTEGRATING ORAL EVERY 4 HOURS PRN
Status: DISCONTINUED | OUTPATIENT
Start: 2018-01-01 | End: 2018-01-01 | Stop reason: HOSPADM

## 2018-01-01 RX ORDER — ROCURONIUM BROMIDE 10 MG/ML
INJECTION, SOLUTION INTRAVENOUS
Status: DISCONTINUED
Start: 2018-01-01 | End: 2018-01-01

## 2018-01-01 RX ORDER — ACETAMINOPHEN 325 MG/1
650 TABLET ORAL EVERY 6 HOURS PRN
Status: DISCONTINUED | OUTPATIENT
Start: 2018-01-01 | End: 2018-01-01

## 2018-01-01 RX ORDER — POLYETHYLENE GLYCOL 3350 17 G/17G
1 POWDER, FOR SOLUTION ORAL
Status: DISCONTINUED | OUTPATIENT
Start: 2018-01-01 | End: 2018-01-01 | Stop reason: HOSPADM

## 2018-01-01 RX ORDER — ACETAMINOPHEN 325 MG/1
650 TABLET ORAL EVERY 6 HOURS PRN
Status: DISCONTINUED | OUTPATIENT
Start: 2018-01-01 | End: 2018-01-01 | Stop reason: HOSPADM

## 2018-01-01 RX ORDER — ACETAMINOPHEN 325 MG/1
650 TABLET ORAL EVERY 6 HOURS PRN
Qty: 30 TAB | Refills: 0 | Status: SHIPPED | OUTPATIENT
Start: 2018-01-01

## 2018-01-01 RX ORDER — LORAZEPAM 2 MG/ML
4 INJECTION INTRAMUSCULAR
Status: DISCONTINUED | OUTPATIENT
Start: 2018-01-01 | End: 2018-01-01

## 2018-01-01 RX ORDER — LACTULOSE 20 G/30ML
30 SOLUTION ORAL 3 TIMES DAILY
Status: DISCONTINUED | OUTPATIENT
Start: 2018-01-01 | End: 2018-01-01

## 2018-01-01 RX ORDER — ASPIRIN 325 MG
325 TABLET ORAL DAILY
Status: DISCONTINUED | OUTPATIENT
Start: 2018-01-01 | End: 2018-01-01

## 2018-01-01 RX ORDER — HEPARIN SODIUM 5000 [USP'U]/ML
5000 INJECTION, SOLUTION INTRAVENOUS; SUBCUTANEOUS EVERY 8 HOURS
Refills: 0 | Status: ON HOLD
Start: 2018-01-01 | End: 2018-01-01

## 2018-01-01 RX ORDER — HEPARIN SODIUM 1000 [USP'U]/ML
2600 INJECTION, SOLUTION INTRAVENOUS; SUBCUTANEOUS PRN
Status: DISCONTINUED | OUTPATIENT
Start: 2018-01-01 | End: 2018-01-01

## 2018-01-01 RX ORDER — MICONAZOLE NITRATE 20 MG/G
CREAM TOPICAL EVERY 6 HOURS PRN
Status: DISCONTINUED | OUTPATIENT
Start: 2018-01-01 | End: 2018-01-01

## 2018-01-01 RX ORDER — HYDROMORPHONE HYDROCHLORIDE 2 MG/ML
0.5 INJECTION, SOLUTION INTRAMUSCULAR; INTRAVENOUS; SUBCUTANEOUS
Status: DISCONTINUED | OUTPATIENT
Start: 2018-01-01 | End: 2018-01-01

## 2018-01-01 RX ORDER — OXYCODONE HYDROCHLORIDE 5 MG/1
5 TABLET ORAL EVERY 6 HOURS PRN
Qty: 20 TAB | Refills: 0 | Status: SHIPPED | OUTPATIENT
Start: 2018-01-01 | End: 2018-01-01

## 2018-01-01 RX ORDER — BISACODYL 10 MG
10 SUPPOSITORY, RECTAL RECTAL
Refills: 0 | Status: ON HOLD
Start: 2018-01-01 | End: 2018-01-01

## 2018-01-01 RX ORDER — ASPIRIN 81 MG/1
81 TABLET, CHEWABLE ORAL 2 TIMES DAILY
Status: DISCONTINUED | OUTPATIENT
Start: 2018-01-01 | End: 2018-01-01 | Stop reason: HOSPADM

## 2018-01-01 RX ORDER — LEVOCARNITINE 1 G/10ML
500 SOLUTION ORAL 2 TIMES DAILY WITH MEALS
Status: DISCONTINUED | OUTPATIENT
Start: 2018-01-01 | End: 2018-01-01

## 2018-01-01 RX ORDER — ATROPINE SULFATE 10 MG/ML
2 SOLUTION/ DROPS OPHTHALMIC EVERY 4 HOURS PRN
Status: DISCONTINUED | OUTPATIENT
Start: 2018-01-01 | End: 2018-01-01

## 2018-01-01 RX ORDER — PHENYLEPHRINE HCL IN 0.9% NACL 0.5 MG/5ML
SYRINGE (ML) INTRAVENOUS
Status: DISPENSED
Start: 2018-01-01 | End: 2018-01-01

## 2018-01-01 RX ORDER — SODIUM CHLORIDE 9 MG/ML
INJECTION, SOLUTION INTRAVENOUS CONTINUOUS
Status: DISCONTINUED | OUTPATIENT
Start: 2018-01-01 | End: 2018-01-01

## 2018-01-01 RX ORDER — MIDODRINE HYDROCHLORIDE 5 MG/1
10 TABLET ORAL
Status: DISCONTINUED | OUTPATIENT
Start: 2018-01-01 | End: 2018-01-01

## 2018-01-01 RX ORDER — SENNOSIDES A AND B 8.6 MG/1
8.6 TABLET, FILM COATED ORAL 2 TIMES DAILY
Status: DISCONTINUED | OUTPATIENT
Start: 2018-01-01 | End: 2018-01-01 | Stop reason: HOSPADM

## 2018-01-01 RX ORDER — POLYETHYLENE GLYCOL 3350 17 G/17G
1 POWDER, FOR SOLUTION ORAL DAILY
Status: DISCONTINUED | OUTPATIENT
Start: 2018-01-01 | End: 2018-01-01 | Stop reason: HOSPADM

## 2018-01-01 RX ORDER — POLYETHYLENE GLYCOL 3350 17 G/17G
1 POWDER, FOR SOLUTION ORAL 2 TIMES DAILY
Status: DISCONTINUED | OUTPATIENT
Start: 2018-01-01 | End: 2018-01-01

## 2018-01-01 RX ORDER — SODIUM CHLORIDE 9 MG/ML
500 INJECTION, SOLUTION INTRAVENOUS ONCE
Status: COMPLETED | OUTPATIENT
Start: 2018-01-01 | End: 2018-01-01

## 2018-01-01 RX ORDER — LORAZEPAM 2 MG/ML
1 INJECTION INTRAMUSCULAR
Status: DISCONTINUED | OUTPATIENT
Start: 2018-01-01 | End: 2018-01-01

## 2018-01-01 RX ORDER — PHENYLEPHRINE HCL IN 0.9% NACL 0.5 MG/5ML
SYRINGE (ML) INTRAVENOUS
Status: COMPLETED
Start: 2018-01-01 | End: 2018-01-01

## 2018-01-01 RX ORDER — PHENYLEPHRINE HCL IN 0.9% NACL 0.5 MG/5ML
100 SYRINGE (ML) INTRAVENOUS
Status: ACTIVE | OUTPATIENT
Start: 2018-01-01 | End: 2018-01-01

## 2018-01-01 RX ORDER — BUPIVACAINE HYDROCHLORIDE 2.5 MG/ML
INJECTION, SOLUTION EPIDURAL; INFILTRATION; INTRACAUDAL
Status: DISCONTINUED | OUTPATIENT
Start: 2018-01-01 | End: 2018-01-01 | Stop reason: HOSPADM

## 2018-01-01 RX ORDER — ONDANSETRON 4 MG/1
4 TABLET, ORALLY DISINTEGRATING ORAL EVERY 4 HOURS PRN
Qty: 10 TAB | Refills: 0 | Status: SHIPPED | OUTPATIENT
Start: 2018-01-01 | End: 2018-01-01

## 2018-01-01 RX ORDER — POLYVINYL ALCOHOL 14 MG/ML
2 SOLUTION/ DROPS OPHTHALMIC EVERY 6 HOURS PRN
Status: DISCONTINUED | OUTPATIENT
Start: 2018-01-01 | End: 2018-01-01

## 2018-01-01 RX ORDER — ETOMIDATE 2 MG/ML
10 INJECTION INTRAVENOUS ONCE
Status: COMPLETED | OUTPATIENT
Start: 2018-01-01 | End: 2018-01-01

## 2018-01-01 RX ORDER — FERROUS SULFATE 325(65) MG
325 TABLET ORAL DAILY
Qty: 30 TAB
Start: 2018-01-01

## 2018-01-01 RX ORDER — ASPIRIN 81 MG/1
81 TABLET, CHEWABLE ORAL 2 TIMES DAILY
Qty: 100 TAB
Start: 2018-01-01 | End: 2018-01-01

## 2018-01-01 RX ORDER — FUROSEMIDE 10 MG/ML
20 INJECTION INTRAMUSCULAR; INTRAVENOUS EVERY 12 HOURS
Status: DISCONTINUED | OUTPATIENT
Start: 2018-01-01 | End: 2018-01-01

## 2018-01-01 RX ORDER — LINEZOLID 600 MG/1
600 TABLET, FILM COATED ORAL EVERY 12 HOURS
Status: COMPLETED | OUTPATIENT
Start: 2018-01-01 | End: 2018-01-01

## 2018-01-01 RX ORDER — PHENYLEPHRINE HCL IN 0.9% NACL 0.5 MG/5ML
SYRINGE (ML) INTRAVENOUS
Status: DISCONTINUED
Start: 2018-01-01 | End: 2018-01-01

## 2018-01-01 RX ORDER — POLYETHYLENE GLYCOL 3350 17 G/17G
17 POWDER, FOR SOLUTION ORAL DAILY
Start: 2018-01-01

## 2018-01-01 RX ORDER — POTASSIUM CHLORIDE 20 MEQ/1
10 TABLET, EXTENDED RELEASE ORAL DAILY
Status: DISCONTINUED | OUTPATIENT
Start: 2018-01-01 | End: 2018-01-01 | Stop reason: HOSPADM

## 2018-01-01 RX ORDER — LORAZEPAM 2 MG/ML
1-4 INJECTION INTRAMUSCULAR
Status: DISCONTINUED | OUTPATIENT
Start: 2018-01-01 | End: 2018-01-01

## 2018-01-01 RX ORDER — SODIUM CHLORIDE 9 MG/ML
1000 INJECTION, SOLUTION INTRAVENOUS
Status: COMPLETED | OUTPATIENT
Start: 2018-01-01 | End: 2018-01-01

## 2018-01-01 RX ORDER — OXYCODONE HYDROCHLORIDE 5 MG/1
5 TABLET ORAL
Status: DISCONTINUED | OUTPATIENT
Start: 2018-01-01 | End: 2018-01-01 | Stop reason: HOSPADM

## 2018-01-01 RX ORDER — ONDANSETRON 4 MG/1
4 TABLET, ORALLY DISINTEGRATING ORAL EVERY 4 HOURS PRN
Status: DISCONTINUED | OUTPATIENT
Start: 2018-01-01 | End: 2018-01-01

## 2018-01-01 RX ORDER — LEVETIRACETAM 500 MG/1
1000 TABLET ORAL 2 TIMES DAILY
Status: DISCONTINUED | OUTPATIENT
Start: 2018-01-01 | End: 2018-01-01

## 2018-01-01 RX ORDER — LEVETIRACETAM 500 MG/1
1500 TABLET ORAL 2 TIMES DAILY
Status: DISCONTINUED | OUTPATIENT
Start: 2018-01-01 | End: 2018-01-01

## 2018-01-01 RX ORDER — FAMOTIDINE 20 MG/1
20 TABLET, FILM COATED ORAL EVERY 12 HOURS
Status: DISCONTINUED | OUTPATIENT
Start: 2018-01-01 | End: 2018-01-01

## 2018-01-01 RX ORDER — KETOROLAC TROMETHAMINE 30 MG/ML
INJECTION, SOLUTION INTRAMUSCULAR; INTRAVENOUS
Status: DISCONTINUED | OUTPATIENT
Start: 2018-01-01 | End: 2018-01-01 | Stop reason: HOSPADM

## 2018-01-01 RX ORDER — ACETYLCYSTEINE 200 MG/ML
3 SOLUTION ORAL; RESPIRATORY (INHALATION) EVERY 4 HOURS
Status: DISCONTINUED | OUTPATIENT
Start: 2018-01-01 | End: 2018-01-01

## 2018-01-01 RX ORDER — DIVALPROEX SODIUM 125 MG/1
500 CAPSULE, COATED PELLETS ORAL EVERY 12 HOURS
Status: DISCONTINUED | OUTPATIENT
Start: 2018-01-01 | End: 2018-01-01

## 2018-01-01 RX ADMIN — DEXMEDETOMIDINE HYDROCHLORIDE 0.4 MCG/KG/HR: 100 INJECTION, SOLUTION INTRAVENOUS at 10:06

## 2018-01-01 RX ADMIN — LEVOCARNITINE 1000 MG: 1 SOLUTION ORAL at 17:17

## 2018-01-01 RX ADMIN — PIPERACILLIN AND TAZOBACTAM 3.38 G: 3; .375 INJECTION, POWDER, LYOPHILIZED, FOR SOLUTION INTRAVENOUS; PARENTERAL at 06:00

## 2018-01-01 RX ADMIN — HEPARIN SODIUM 5000 UNITS: 5000 INJECTION, SOLUTION INTRAVENOUS; SUBCUTANEOUS at 13:00

## 2018-01-01 RX ADMIN — POTASSIUM BICARBONATE 75 MEQ: 25 TABLET, EFFERVESCENT ORAL at 05:17

## 2018-01-01 RX ADMIN — SODIUM CHLORIDE 1250 MG: 9 INJECTION, SOLUTION INTRAVENOUS at 17:40

## 2018-01-01 RX ADMIN — Medication 81 MG: at 06:16

## 2018-01-01 RX ADMIN — FAMOTIDINE 20 MG: 20 TABLET ORAL at 06:12

## 2018-01-01 RX ADMIN — IPRATROPIUM BROMIDE AND ALBUTEROL SULFATE 3 ML: .5; 3 SOLUTION RESPIRATORY (INHALATION) at 22:12

## 2018-01-01 RX ADMIN — HYDROCORTISONE SODIUM SUCCINATE 50 MG: 100 INJECTION, POWDER, FOR SOLUTION INTRAMUSCULAR; INTRAVENOUS at 04:59

## 2018-01-01 RX ADMIN — LEVOCARNITINE 500 MG: 1 SOLUTION ORAL at 09:35

## 2018-01-01 RX ADMIN — POTASSIUM BICARBONATE 25 MEQ: 25 TABLET, EFFERVESCENT ORAL at 10:22

## 2018-01-01 RX ADMIN — ACETYLCYSTEINE 3 ML: 200 INHALANT RESPIRATORY (INHALATION) at 23:19

## 2018-01-01 RX ADMIN — VALPROATE SODIUM 750 MG: 100 INJECTION, SOLUTION INTRAVENOUS at 22:03

## 2018-01-01 RX ADMIN — SODIUM CHLORIDE 500 MG: 9 INJECTION, SOLUTION INTRAVENOUS at 17:58

## 2018-01-01 RX ADMIN — HYDROCORTISONE SODIUM SUCCINATE 50 MG: 100 INJECTION, POWDER, FOR SOLUTION INTRAMUSCULAR; INTRAVENOUS at 05:07

## 2018-01-01 RX ADMIN — IPRATROPIUM BROMIDE AND ALBUTEROL SULFATE 3 ML: .5; 3 SOLUTION RESPIRATORY (INHALATION) at 06:36

## 2018-01-01 RX ADMIN — POTASSIUM BICARBONATE 25 MEQ: 25 TABLET, EFFERVESCENT ORAL at 08:50

## 2018-01-01 RX ADMIN — Medication 81 MG: at 05:04

## 2018-01-01 RX ADMIN — VALPROATE SODIUM 750 MG: 100 INJECTION, SOLUTION INTRAVENOUS at 13:42

## 2018-01-01 RX ADMIN — CALCIUM CARBONATE-CHOLECALCIFEROL TAB 250 MG-125 UNIT 1 TABLET: 250-125 TAB at 06:00

## 2018-01-01 RX ADMIN — IPRATROPIUM BROMIDE AND ALBUTEROL SULFATE 3 ML: .5; 3 SOLUTION RESPIRATORY (INHALATION) at 02:27

## 2018-01-01 RX ADMIN — SODIUM CHLORIDE: 9 INJECTION, SOLUTION INTRAVENOUS at 00:17

## 2018-01-01 RX ADMIN — MIDODRINE HYDROCHLORIDE 10 MG: 5 TABLET ORAL at 09:06

## 2018-01-01 RX ADMIN — ACETAMINOPHEN 650 MG: 325 TABLET, FILM COATED ORAL at 21:13

## 2018-01-01 RX ADMIN — FUROSEMIDE 20 MG: 10 INJECTION, SOLUTION INTRAMUSCULAR; INTRAVENOUS at 06:18

## 2018-01-01 RX ADMIN — ACETYLCYSTEINE 3 ML: 200 INHALANT RESPIRATORY (INHALATION) at 03:24

## 2018-01-01 RX ADMIN — IPRATROPIUM BROMIDE AND ALBUTEROL SULFATE 3 ML: .5; 3 SOLUTION RESPIRATORY (INHALATION) at 11:46

## 2018-01-01 RX ADMIN — IPRATROPIUM BROMIDE AND ALBUTEROL SULFATE 3 ML: .5; 3 SOLUTION RESPIRATORY (INHALATION) at 10:19

## 2018-01-01 RX ADMIN — CEFTRIAXONE 1 G: 1 INJECTION, POWDER, FOR SOLUTION INTRAMUSCULAR; INTRAVENOUS at 16:52

## 2018-01-01 RX ADMIN — IPRATROPIUM BROMIDE AND ALBUTEROL SULFATE 3 ML: .5; 3 SOLUTION RESPIRATORY (INHALATION) at 15:23

## 2018-01-01 RX ADMIN — ASPIRIN 325 MG: 325 TABLET, COATED ORAL at 05:10

## 2018-01-01 RX ADMIN — FENTANYL CITRATE 25 MCG: 50 INJECTION, SOLUTION INTRAMUSCULAR; INTRAVENOUS at 00:52

## 2018-01-01 RX ADMIN — MAGNESIUM SULFATE HEPTAHYDRATE 2 G: 40 INJECTION, SOLUTION INTRAVENOUS at 09:15

## 2018-01-01 RX ADMIN — SODIUM CHLORIDE 1250 MG: 9 INJECTION, SOLUTION INTRAVENOUS at 11:17

## 2018-01-01 RX ADMIN — DIVALPROEX SODIUM 500 MG: 125 CAPSULE, COATED PELLETS ORAL at 05:17

## 2018-01-01 RX ADMIN — HEPARIN SODIUM 5000 UNITS: 5000 INJECTION, SOLUTION INTRAVENOUS; SUBCUTANEOUS at 06:40

## 2018-01-01 RX ADMIN — FAMOTIDINE 20 MG: 20 TABLET ORAL at 17:33

## 2018-01-01 RX ADMIN — CEFAZOLIN 1 G: 1 INJECTION, POWDER, FOR SOLUTION INTRAMUSCULAR; INTRAVENOUS at 22:48

## 2018-01-01 RX ADMIN — IPRATROPIUM BROMIDE AND ALBUTEROL SULFATE 3 ML: .5; 3 SOLUTION RESPIRATORY (INHALATION) at 10:37

## 2018-01-01 RX ADMIN — VALPROATE SODIUM 500 MG: 100 INJECTION, SOLUTION INTRAVENOUS at 05:10

## 2018-01-01 RX ADMIN — AMPICILLIN SODIUM AND SULBACTAM SODIUM 3 G: 2; 1 INJECTION, POWDER, FOR SOLUTION INTRAMUSCULAR; INTRAVENOUS at 11:34

## 2018-01-01 RX ADMIN — FAMOTIDINE 20 MG: 20 TABLET ORAL at 05:05

## 2018-01-01 RX ADMIN — MIDODRINE HYDROCHLORIDE 5 MG: 5 TABLET ORAL at 11:50

## 2018-01-01 RX ADMIN — SODIUM CHLORIDE: 9 INJECTION, SOLUTION INTRAVENOUS at 14:41

## 2018-01-01 RX ADMIN — LORAZEPAM 4 MG: 2 INJECTION INTRAMUSCULAR; INTRAVENOUS at 22:55

## 2018-01-01 RX ADMIN — ACETYLCYSTEINE 3 ML: 200 INHALANT RESPIRATORY (INHALATION) at 10:45

## 2018-01-01 RX ADMIN — SODIUM CHLORIDE: 9 INJECTION, SOLUTION INTRAVENOUS at 18:27

## 2018-01-01 RX ADMIN — DIVALPROEX SODIUM 500 MG: 125 CAPSULE, COATED PELLETS ORAL at 18:13

## 2018-01-01 RX ADMIN — VALPROATE SODIUM 750 MG: 100 INJECTION, SOLUTION INTRAVENOUS at 14:55

## 2018-01-01 RX ADMIN — IPRATROPIUM BROMIDE AND ALBUTEROL SULFATE 3 ML: .5; 3 SOLUTION RESPIRATORY (INHALATION) at 03:08

## 2018-01-01 RX ADMIN — LEVOCARNITINE 500 MG: 1 SOLUTION ORAL at 17:23

## 2018-01-01 RX ADMIN — FENTANYL CITRATE 50 MCG: 50 INJECTION INTRAMUSCULAR; INTRAVENOUS at 03:55

## 2018-01-01 RX ADMIN — ENOXAPARIN SODIUM 40 MG: 100 INJECTION SUBCUTANEOUS at 05:04

## 2018-01-01 RX ADMIN — IPRATROPIUM BROMIDE AND ALBUTEROL SULFATE 3 ML: .5; 3 SOLUTION RESPIRATORY (INHALATION) at 22:39

## 2018-01-01 RX ADMIN — SODIUM CHLORIDE 1250 MG: 9 INJECTION, SOLUTION INTRAVENOUS at 17:53

## 2018-01-01 RX ADMIN — HEPARIN SODIUM 5000 UNITS: 5000 INJECTION, SOLUTION INTRAVENOUS; SUBCUTANEOUS at 05:11

## 2018-01-01 RX ADMIN — VALPROATE SODIUM 750 MG: 100 INJECTION, SOLUTION INTRAVENOUS at 21:51

## 2018-01-01 RX ADMIN — LEVOCARNITINE 1000 MG: 1 SOLUTION ORAL at 07:46

## 2018-01-01 RX ADMIN — CALCIUM CARBONATE-CHOLECALCIFEROL TAB 250 MG-125 UNIT 1 TABLET: 250-125 TAB at 05:19

## 2018-01-01 RX ADMIN — HEPARIN SODIUM 5000 UNITS: 5000 INJECTION, SOLUTION INTRAVENOUS; SUBCUTANEOUS at 05:16

## 2018-01-01 RX ADMIN — HEPARIN SODIUM 5000 UNITS: 5000 INJECTION, SOLUTION INTRAVENOUS; SUBCUTANEOUS at 06:16

## 2018-01-01 RX ADMIN — POTASSIUM BICARBONATE 50 MEQ: 25 TABLET, EFFERVESCENT ORAL at 09:35

## 2018-01-01 RX ADMIN — LEVETIRACETAM 1000 MG: 500 TABLET ORAL at 05:39

## 2018-01-01 RX ADMIN — SENNOSIDES AND DOCUSATE SODIUM 2 TABLET: 8.6; 5 TABLET ORAL at 17:14

## 2018-01-01 RX ADMIN — LINEZOLID 600 MG: 600 TABLET, FILM COATED ORAL at 06:39

## 2018-01-01 RX ADMIN — SODIUM CHLORIDE 1000 ML: 9 INJECTION, SOLUTION INTRAVENOUS at 15:56

## 2018-01-01 RX ADMIN — SODIUM CHLORIDE: 9 INJECTION, SOLUTION INTRAVENOUS at 19:30

## 2018-01-01 RX ADMIN — POTASSIUM BICARBONATE 25 MEQ: 25 TABLET, EFFERVESCENT ORAL at 06:06

## 2018-01-01 RX ADMIN — POTASSIUM BICARBONATE 25 MEQ: 25 TABLET, EFFERVESCENT ORAL at 10:28

## 2018-01-01 RX ADMIN — METOPROLOL TARTRATE 12.5 MG: 25 TABLET, FILM COATED ORAL at 06:11

## 2018-01-01 RX ADMIN — FAMOTIDINE 20 MG: 20 TABLET ORAL at 04:59

## 2018-01-01 RX ADMIN — HEPARIN SODIUM 5000 UNITS: 5000 INJECTION, SOLUTION INTRAVENOUS; SUBCUTANEOUS at 22:47

## 2018-01-01 RX ADMIN — ACETYLCYSTEINE 3 ML: 200 INHALANT RESPIRATORY (INHALATION) at 03:08

## 2018-01-01 RX ADMIN — HEPARIN SODIUM 5000 UNITS: 5000 INJECTION, SOLUTION INTRAVENOUS; SUBCUTANEOUS at 22:14

## 2018-01-01 RX ADMIN — IPRATROPIUM BROMIDE AND ALBUTEROL SULFATE 3 ML: .5; 3 SOLUTION RESPIRATORY (INHALATION) at 06:30

## 2018-01-01 RX ADMIN — SODIUM CHLORIDE 1250 MG: 9 INJECTION, SOLUTION INTRAVENOUS at 18:51

## 2018-01-01 RX ADMIN — FENTANYL CITRATE 50 MCG: 50 INJECTION INTRAMUSCULAR; INTRAVENOUS at 23:07

## 2018-01-01 RX ADMIN — STANDARDIZED SENNA CONCENTRATE AND DOCUSATE SODIUM 2 TABLET: 8.6; 5 TABLET ORAL at 17:23

## 2018-01-01 RX ADMIN — ACETAMINOPHEN 650 MG: 325 TABLET, FILM COATED ORAL at 08:46

## 2018-01-01 RX ADMIN — LEVOCARNITINE 500 MG: 1 SOLUTION ORAL at 08:50

## 2018-01-01 RX ADMIN — SODIUM CHLORIDE 500 MG: 9 INJECTION, SOLUTION INTRAVENOUS at 10:22

## 2018-01-01 RX ADMIN — SODIUM CHLORIDE 1000 ML: 9 INJECTION, SOLUTION INTRAVENOUS at 18:44

## 2018-01-01 RX ADMIN — ENOXAPARIN SODIUM 40 MG: 100 INJECTION SUBCUTANEOUS at 06:06

## 2018-01-01 RX ADMIN — ACETYLCYSTEINE 3 ML: 200 INHALANT RESPIRATORY (INHALATION) at 07:26

## 2018-01-01 RX ADMIN — HYDROCORTISONE SODIUM SUCCINATE 50 MG: 100 INJECTION, POWDER, FOR SOLUTION INTRAMUSCULAR; INTRAVENOUS at 11:50

## 2018-01-01 RX ADMIN — HEPARIN SODIUM 2600 UNITS: 1000 INJECTION, SOLUTION INTRAVENOUS; SUBCUTANEOUS at 00:23

## 2018-01-01 RX ADMIN — AMPICILLIN SODIUM AND SULBACTAM SODIUM 3 G: 2; 1 INJECTION, POWDER, FOR SOLUTION INTRAMUSCULAR; INTRAVENOUS at 12:18

## 2018-01-01 RX ADMIN — CALCIUM CARBONATE-CHOLECALCIFEROL TAB 250 MG-125 UNIT 1 TABLET: 250-125 TAB at 21:06

## 2018-01-01 RX ADMIN — MORPHINE SULFATE 10 MG: 10 INJECTION INTRAVENOUS at 04:13

## 2018-01-01 RX ADMIN — HEPARIN SODIUM 5000 UNITS: 5000 INJECTION, SOLUTION INTRAVENOUS; SUBCUTANEOUS at 21:07

## 2018-01-01 RX ADMIN — SENNOSIDES AND DOCUSATE SODIUM 2 TABLET: 8.6; 5 TABLET ORAL at 17:29

## 2018-01-01 RX ADMIN — FAMOTIDINE 20 MG: 20 TABLET ORAL at 05:17

## 2018-01-01 RX ADMIN — HYDROCORTISONE SODIUM SUCCINATE 50 MG: 100 INJECTION, POWDER, FOR SOLUTION INTRAMUSCULAR; INTRAVENOUS at 17:24

## 2018-01-01 RX ADMIN — FAMOTIDINE 20 MG: 20 TABLET ORAL at 06:38

## 2018-01-01 RX ADMIN — VALPROATE SODIUM 500 MG: 100 INJECTION, SOLUTION INTRAVENOUS at 18:01

## 2018-01-01 RX ADMIN — ASPIRIN 81 MG 81 MG: 81 TABLET ORAL at 06:09

## 2018-01-01 RX ADMIN — IPRATROPIUM BROMIDE AND ALBUTEROL SULFATE 3 ML: .5; 3 SOLUTION RESPIRATORY (INHALATION) at 18:26

## 2018-01-01 RX ADMIN — MIDODRINE HYDROCHLORIDE 10 MG: 5 TABLET ORAL at 08:49

## 2018-01-01 RX ADMIN — HEPARIN SODIUM 5000 UNITS: 5000 INJECTION, SOLUTION INTRAVENOUS; SUBCUTANEOUS at 05:07

## 2018-01-01 RX ADMIN — MIDODRINE HYDROCHLORIDE 10 MG: 5 TABLET ORAL at 09:21

## 2018-01-01 RX ADMIN — Medication 81 MG: at 05:07

## 2018-01-01 RX ADMIN — LEVETIRACETAM 1500 MG: 500 TABLET ORAL at 17:27

## 2018-01-01 RX ADMIN — HEPARIN SODIUM 1250 UNITS/HR: 5000 INJECTION, SOLUTION INTRAVENOUS at 15:00

## 2018-01-01 RX ADMIN — VALPROATE SODIUM 500 MG: 100 INJECTION, SOLUTION INTRAVENOUS at 21:08

## 2018-01-01 RX ADMIN — ENOXAPARIN SODIUM 40 MG: 100 INJECTION SUBCUTANEOUS at 14:50

## 2018-01-01 RX ADMIN — METOPROLOL TARTRATE 12.5 MG: 25 TABLET, FILM COATED ORAL at 17:23

## 2018-01-01 RX ADMIN — FAMOTIDINE 20 MG: 20 TABLET ORAL at 05:47

## 2018-01-01 RX ADMIN — ACETAMINOPHEN 650 MG: 325 TABLET, FILM COATED ORAL at 20:14

## 2018-01-01 RX ADMIN — ENOXAPARIN SODIUM 40 MG: 100 INJECTION SUBCUTANEOUS at 05:42

## 2018-01-01 RX ADMIN — SENNOSIDES AND DOCUSATE SODIUM 2 TABLET: 8.6; 5 TABLET ORAL at 06:08

## 2018-01-01 RX ADMIN — IPRATROPIUM BROMIDE AND ALBUTEROL SULFATE 3 ML: .5; 3 SOLUTION RESPIRATORY (INHALATION) at 07:05

## 2018-01-01 RX ADMIN — IPRATROPIUM BROMIDE AND ALBUTEROL SULFATE 3 ML: .5; 3 SOLUTION RESPIRATORY (INHALATION) at 18:46

## 2018-01-01 RX ADMIN — LINEZOLID 600 MG: 600 TABLET, FILM COATED ORAL at 06:15

## 2018-01-01 RX ADMIN — IRON SUCROSE 200 MG: 20 INJECTION, SOLUTION INTRAVENOUS at 06:44

## 2018-01-01 RX ADMIN — IRON SUCROSE 200 MG: 20 INJECTION, SOLUTION INTRAVENOUS at 06:37

## 2018-01-01 RX ADMIN — LEVETIRACETAM 1000 MG: 500 TABLET ORAL at 17:59

## 2018-01-01 RX ADMIN — SODIUM CHLORIDE 500 MG: 9 INJECTION, SOLUTION INTRAVENOUS at 10:48

## 2018-01-01 RX ADMIN — ACETAMINOPHEN 650 MG: 325 TABLET, FILM COATED ORAL at 11:36

## 2018-01-01 RX ADMIN — SODIUM CHLORIDE 1000 ML: 9 INJECTION, SOLUTION INTRAVENOUS at 04:36

## 2018-01-01 RX ADMIN — LEVETIRACETAM 1500 MG: 500 TABLET ORAL at 05:18

## 2018-01-01 RX ADMIN — LEVOCARNITINE 500 MG: 1 SOLUTION ORAL at 11:08

## 2018-01-01 RX ADMIN — HEPARIN SODIUM 5000 UNITS: 5000 INJECTION, SOLUTION INTRAVENOUS; SUBCUTANEOUS at 14:45

## 2018-01-01 RX ADMIN — SENNOSIDES AND DOCUSATE SODIUM 2 TABLET: 8.6; 5 TABLET ORAL at 06:05

## 2018-01-01 RX ADMIN — ACETAMINOPHEN 650 MG: 325 TABLET, FILM COATED ORAL at 21:40

## 2018-01-01 RX ADMIN — MIDODRINE HYDROCHLORIDE 10 MG: 5 TABLET ORAL at 07:46

## 2018-01-01 RX ADMIN — ACETYLCYSTEINE 3 ML: 200 INHALANT RESPIRATORY (INHALATION) at 06:37

## 2018-01-01 RX ADMIN — ACETYLCYSTEINE 3 ML: 200 INHALANT RESPIRATORY (INHALATION) at 02:40

## 2018-01-01 RX ADMIN — FUROSEMIDE 20 MG: 10 INJECTION, SOLUTION INTRAMUSCULAR; INTRAVENOUS at 06:03

## 2018-01-01 RX ADMIN — POTASSIUM BICARBONATE 25 MEQ: 25 TABLET, EFFERVESCENT ORAL at 12:17

## 2018-01-01 RX ADMIN — IPRATROPIUM BROMIDE AND ALBUTEROL SULFATE 3 ML: .5; 3 SOLUTION RESPIRATORY (INHALATION) at 22:26

## 2018-01-01 RX ADMIN — SODIUM CHLORIDE 1250 MG: 9 INJECTION, SOLUTION INTRAVENOUS at 09:00

## 2018-01-01 RX ADMIN — IRON SUCROSE 200 MG: 20 INJECTION, SOLUTION INTRAVENOUS at 13:41

## 2018-01-01 RX ADMIN — DIBASIC SODIUM PHOSPHATE, MONOBASIC POTASSIUM PHOSPHATE AND MONOBASIC SODIUM PHOSPHATE 2 TABLET: 852; 155; 130 TABLET ORAL at 06:08

## 2018-01-01 RX ADMIN — HEPARIN SODIUM 5000 UNITS: 5000 INJECTION, SOLUTION INTRAVENOUS; SUBCUTANEOUS at 05:00

## 2018-01-01 RX ADMIN — SODIUM CHLORIDE 1250 MG: 9 INJECTION, SOLUTION INTRAVENOUS at 01:40

## 2018-01-01 RX ADMIN — SODIUM CHLORIDE: 9 INJECTION, SOLUTION INTRAVENOUS at 11:40

## 2018-01-01 RX ADMIN — FENTANYL CITRATE 50 MCG: 50 INJECTION INTRAMUSCULAR; INTRAVENOUS at 14:03

## 2018-01-01 RX ADMIN — ESLICARBAZEPINE ACETATE 400 MG: 800 TABLET ORAL at 20:50

## 2018-01-01 RX ADMIN — DIVALPROEX SODIUM 500 MG: 125 CAPSULE, COATED PELLETS ORAL at 17:28

## 2018-01-01 RX ADMIN — DIBASIC SODIUM PHOSPHATE, MONOBASIC POTASSIUM PHOSPHATE AND MONOBASIC SODIUM PHOSPHATE 2 TABLET: 852; 155; 130 TABLET ORAL at 10:27

## 2018-01-01 RX ADMIN — FENTANYL CITRATE 25 MCG: 50 INJECTION, SOLUTION INTRAMUSCULAR; INTRAVENOUS at 16:03

## 2018-01-01 RX ADMIN — LEVETIRACETAM 1500 MG: 500 TABLET ORAL at 17:31

## 2018-01-01 RX ADMIN — FUROSEMIDE 20 MG: 10 INJECTION, SOLUTION INTRAMUSCULAR; INTRAVENOUS at 23:36

## 2018-01-01 RX ADMIN — MIDODRINE HYDROCHLORIDE 10 MG: 5 TABLET ORAL at 17:59

## 2018-01-01 RX ADMIN — IPRATROPIUM BROMIDE AND ALBUTEROL SULFATE 3 ML: .5; 3 SOLUTION RESPIRATORY (INHALATION) at 06:35

## 2018-01-01 RX ADMIN — FENTANYL CITRATE 25 MCG: 50 INJECTION, SOLUTION INTRAMUSCULAR; INTRAVENOUS at 23:44

## 2018-01-01 RX ADMIN — IPRATROPIUM BROMIDE AND ALBUTEROL SULFATE 3 ML: .5; 3 SOLUTION RESPIRATORY (INHALATION) at 02:36

## 2018-01-01 RX ADMIN — STANDARDIZED SENNA CONCENTRATE AND DOCUSATE SODIUM 2 TABLET: 8.6; 5 TABLET ORAL at 17:28

## 2018-01-01 RX ADMIN — IPRATROPIUM BROMIDE AND ALBUTEROL SULFATE 3 ML: .5; 3 SOLUTION RESPIRATORY (INHALATION) at 18:28

## 2018-01-01 RX ADMIN — ACETYLCYSTEINE 3 ML: 200 INHALANT RESPIRATORY (INHALATION) at 22:43

## 2018-01-01 RX ADMIN — MIDODRINE HYDROCHLORIDE 5 MG: 5 TABLET ORAL at 07:53

## 2018-01-01 RX ADMIN — ACETYLCYSTEINE 3 ML: 200 INHALANT RESPIRATORY (INHALATION) at 15:32

## 2018-01-01 RX ADMIN — POLYETHYLENE GLYCOL 3350 1 PACKET: 17 POWDER, FOR SOLUTION ORAL at 17:27

## 2018-01-01 RX ADMIN — POTASSIUM BICARBONATE 25 MEQ: 25 TABLET, EFFERVESCENT ORAL at 11:34

## 2018-01-01 RX ADMIN — POLYETHYLENE GLYCOL 3350 1 PACKET: 17 POWDER, FOR SOLUTION ORAL at 08:19

## 2018-01-01 RX ADMIN — IPRATROPIUM BROMIDE AND ALBUTEROL SULFATE 3 ML: .5; 3 SOLUTION RESPIRATORY (INHALATION) at 07:18

## 2018-01-01 RX ADMIN — ETOMIDATE 20 MG: 2 INJECTION INTRAVENOUS at 23:22

## 2018-01-01 RX ADMIN — HYDROCORTISONE SODIUM SUCCINATE 50 MG: 100 INJECTION, POWDER, FOR SOLUTION INTRAMUSCULAR; INTRAVENOUS at 00:33

## 2018-01-01 RX ADMIN — FUROSEMIDE 20 MG: 10 INJECTION, SOLUTION INTRAMUSCULAR; INTRAVENOUS at 17:30

## 2018-01-01 RX ADMIN — ALBUTEROL SULFATE 2.5 MG: 2.5 SOLUTION RESPIRATORY (INHALATION) at 10:50

## 2018-01-01 RX ADMIN — ESLICARBAZEPINE ACETATE 400 MG: 800 TABLET ORAL at 21:05

## 2018-01-01 RX ADMIN — IPRATROPIUM BROMIDE AND ALBUTEROL SULFATE 3 ML: .5; 3 SOLUTION RESPIRATORY (INHALATION) at 16:04

## 2018-01-01 RX ADMIN — SODIUM CHLORIDE 1250 MG: 9 INJECTION, SOLUTION INTRAVENOUS at 17:26

## 2018-01-01 RX ADMIN — FAMOTIDINE 20 MG: 20 TABLET ORAL at 06:05

## 2018-01-01 RX ADMIN — ESLICARBAZEPINE ACETATE 400 MG: 800 TABLET ORAL at 21:59

## 2018-01-01 RX ADMIN — DIBASIC SODIUM PHOSPHATE, MONOBASIC POTASSIUM PHOSPHATE AND MONOBASIC SODIUM PHOSPHATE 2 TABLET: 852; 155; 130 TABLET ORAL at 00:18

## 2018-01-01 RX ADMIN — AMPICILLIN SODIUM AND SULBACTAM SODIUM 3 G: 2; 1 INJECTION, POWDER, FOR SOLUTION INTRAMUSCULAR; INTRAVENOUS at 17:08

## 2018-01-01 RX ADMIN — IPRATROPIUM BROMIDE AND ALBUTEROL SULFATE 3 ML: .5; 3 SOLUTION RESPIRATORY (INHALATION) at 22:57

## 2018-01-01 RX ADMIN — HEPARIN SODIUM 5000 UNITS: 5000 INJECTION, SOLUTION INTRAVENOUS; SUBCUTANEOUS at 14:02

## 2018-01-01 RX ADMIN — IPRATROPIUM BROMIDE AND ALBUTEROL SULFATE 3 ML: .5; 3 SOLUTION RESPIRATORY (INHALATION) at 02:40

## 2018-01-01 RX ADMIN — ESLICARBAZEPINE ACETATE 400 MG: 800 TABLET ORAL at 21:08

## 2018-01-01 RX ADMIN — DIBASIC SODIUM PHOSPHATE, MONOBASIC POTASSIUM PHOSPHATE AND MONOBASIC SODIUM PHOSPHATE 2 TABLET: 852; 155; 130 TABLET ORAL at 11:43

## 2018-01-01 RX ADMIN — FENTANYL CITRATE 100 MCG: 50 INJECTION, SOLUTION INTRAMUSCULAR; INTRAVENOUS at 18:14

## 2018-01-01 RX ADMIN — IPRATROPIUM BROMIDE AND ALBUTEROL SULFATE 3 ML: .5; 3 SOLUTION RESPIRATORY (INHALATION) at 11:10

## 2018-01-01 RX ADMIN — IPRATROPIUM BROMIDE AND ALBUTEROL SULFATE 3 ML: .5; 3 SOLUTION RESPIRATORY (INHALATION) at 15:22

## 2018-01-01 RX ADMIN — AMPICILLIN SODIUM AND SULBACTAM SODIUM 3 G: 2; 1 INJECTION, POWDER, FOR SOLUTION INTRAMUSCULAR; INTRAVENOUS at 13:14

## 2018-01-01 RX ADMIN — IPRATROPIUM BROMIDE AND ALBUTEROL SULFATE 3 ML: .5; 3 SOLUTION RESPIRATORY (INHALATION) at 15:27

## 2018-01-01 RX ADMIN — SENNOSIDES AND DOCUSATE SODIUM 2 TABLET: 8.6; 5 TABLET ORAL at 05:04

## 2018-01-01 RX ADMIN — PROPOFOL 25 MG: 10 INJECTION, EMULSION INTRAVENOUS at 16:40

## 2018-01-01 RX ADMIN — FENTANYL CITRATE 50 MCG: 50 INJECTION INTRAMUSCULAR; INTRAVENOUS at 19:22

## 2018-01-01 RX ADMIN — IPRATROPIUM BROMIDE AND ALBUTEROL SULFATE 3 ML: .5; 3 SOLUTION RESPIRATORY (INHALATION) at 06:49

## 2018-01-01 RX ADMIN — ACETYLCYSTEINE 3 ML: 200 INHALANT RESPIRATORY (INHALATION) at 20:09

## 2018-01-01 RX ADMIN — HEPARIN SODIUM 5000 UNITS: 5000 INJECTION, SOLUTION INTRAVENOUS; SUBCUTANEOUS at 14:47

## 2018-01-01 RX ADMIN — POTASSIUM BICARBONATE 50 MEQ: 25 TABLET, EFFERVESCENT ORAL at 09:52

## 2018-01-01 RX ADMIN — MIDODRINE HYDROCHLORIDE 10 MG: 5 TABLET ORAL at 17:31

## 2018-01-01 RX ADMIN — VALPROATE SODIUM 500 MG: 100 INJECTION, SOLUTION INTRAVENOUS at 01:00

## 2018-01-01 RX ADMIN — SENNOSIDES AND DOCUSATE SODIUM 2 TABLET: 8.6; 5 TABLET ORAL at 18:15

## 2018-01-01 RX ADMIN — CALCIUM CARBONATE-CHOLECALCIFEROL TAB 250 MG-125 UNIT 1 TABLET: 250-125 TAB at 05:16

## 2018-01-01 RX ADMIN — FAMOTIDINE 20 MG: 20 TABLET ORAL at 06:10

## 2018-01-01 RX ADMIN — Medication 81 MG: at 06:38

## 2018-01-01 RX ADMIN — SODIUM CHLORIDE: 9 INJECTION, SOLUTION INTRAVENOUS at 00:00

## 2018-01-01 RX ADMIN — LEVETIRACETAM 1000 MG: 500 TABLET ORAL at 17:08

## 2018-01-01 RX ADMIN — ACETAMINOPHEN 650 MG: 325 TABLET, FILM COATED ORAL at 20:17

## 2018-01-01 RX ADMIN — IPRATROPIUM BROMIDE AND ALBUTEROL SULFATE 3 ML: .5; 3 SOLUTION RESPIRATORY (INHALATION) at 19:52

## 2018-01-01 RX ADMIN — DIBASIC SODIUM PHOSPHATE, MONOBASIC POTASSIUM PHOSPHATE AND MONOBASIC SODIUM PHOSPHATE 2 TABLET: 852; 155; 130 TABLET ORAL at 17:16

## 2018-01-01 RX ADMIN — LEVETIRACETAM 1000 MG: 500 TABLET ORAL at 06:08

## 2018-01-01 RX ADMIN — AMPICILLIN SODIUM AND SULBACTAM SODIUM 3 G: 2; 1 INJECTION, POWDER, FOR SOLUTION INTRAMUSCULAR; INTRAVENOUS at 00:59

## 2018-01-01 RX ADMIN — ETOMIDATE 20 MG: 2 INJECTION INTRAVENOUS at 17:52

## 2018-01-01 RX ADMIN — IPRATROPIUM BROMIDE AND ALBUTEROL SULFATE 3 ML: .5; 3 SOLUTION RESPIRATORY (INHALATION) at 15:08

## 2018-01-01 RX ADMIN — POTASSIUM BICARBONATE 25 MEQ: 25 TABLET, EFFERVESCENT ORAL at 12:33

## 2018-01-01 RX ADMIN — HYDROCORTISONE SODIUM SUCCINATE 50 MG: 100 INJECTION, POWDER, FOR SOLUTION INTRAMUSCULAR; INTRAVENOUS at 21:07

## 2018-01-01 RX ADMIN — IPRATROPIUM BROMIDE AND ALBUTEROL SULFATE 3 ML: .5; 3 SOLUTION RESPIRATORY (INHALATION) at 02:24

## 2018-01-01 RX ADMIN — IPRATROPIUM BROMIDE AND ALBUTEROL SULFATE 3 ML: .5; 3 SOLUTION RESPIRATORY (INHALATION) at 06:41

## 2018-01-01 RX ADMIN — HEPARIN SODIUM 5000 UNITS: 5000 INJECTION, SOLUTION INTRAVENOUS; SUBCUTANEOUS at 21:14

## 2018-01-01 RX ADMIN — ENOXAPARIN SODIUM 40 MG: 100 INJECTION SUBCUTANEOUS at 05:10

## 2018-01-01 RX ADMIN — IPRATROPIUM BROMIDE AND ALBUTEROL SULFATE 3 ML: .5; 3 SOLUTION RESPIRATORY (INHALATION) at 22:38

## 2018-01-01 RX ADMIN — Medication 81 MG: at 06:08

## 2018-01-01 RX ADMIN — MIDODRINE HYDROCHLORIDE 10 MG: 5 TABLET ORAL at 07:45

## 2018-01-01 RX ADMIN — IPRATROPIUM BROMIDE AND ALBUTEROL SULFATE 3 ML: .5; 3 SOLUTION RESPIRATORY (INHALATION) at 18:58

## 2018-01-01 RX ADMIN — IPRATROPIUM BROMIDE AND ALBUTEROL SULFATE 3 ML: .5; 3 SOLUTION RESPIRATORY (INHALATION) at 22:35

## 2018-01-01 RX ADMIN — HYDROCORTISONE SODIUM SUCCINATE 25 MG: 100 INJECTION, POWDER, FOR SOLUTION INTRAMUSCULAR; INTRAVENOUS at 17:08

## 2018-01-01 RX ADMIN — PIPERACILLIN AND TAZOBACTAM 3.38 G: 3; .375 INJECTION, POWDER, LYOPHILIZED, FOR SOLUTION INTRAVENOUS; PARENTERAL at 18:00

## 2018-01-01 RX ADMIN — SODIUM CHLORIDE 1250 MG: 9 INJECTION, SOLUTION INTRAVENOUS at 02:03

## 2018-01-01 RX ADMIN — DIBASIC SODIUM PHOSPHATE, MONOBASIC POTASSIUM PHOSPHATE AND MONOBASIC SODIUM PHOSPHATE 1 TABLET: 852; 155; 130 TABLET ORAL at 17:30

## 2018-01-01 RX ADMIN — ACETYLCYSTEINE 3 ML: 200 INHALANT RESPIRATORY (INHALATION) at 11:37

## 2018-01-01 RX ADMIN — HEPARIN SODIUM 5000 UNITS: 5000 INJECTION, SOLUTION INTRAVENOUS; SUBCUTANEOUS at 21:05

## 2018-01-01 RX ADMIN — LEVOCARNITINE 1000 MG: 1 SOLUTION ORAL at 07:45

## 2018-01-01 RX ADMIN — HEPARIN SODIUM 5000 UNITS: 5000 INJECTION, SOLUTION INTRAVENOUS; SUBCUTANEOUS at 21:53

## 2018-01-01 RX ADMIN — POLYETHYLENE GLYCOL (3350) 1 PACKET: 17 POWDER, FOR SOLUTION ORAL at 06:42

## 2018-01-01 RX ADMIN — HYDROCORTISONE SODIUM SUCCINATE 50 MG: 100 INJECTION, POWDER, FOR SOLUTION INTRAMUSCULAR; INTRAVENOUS at 23:58

## 2018-01-01 RX ADMIN — IPRATROPIUM BROMIDE AND ALBUTEROL SULFATE 3 ML: .5; 3 SOLUTION RESPIRATORY (INHALATION) at 07:26

## 2018-01-01 RX ADMIN — FENTANYL CITRATE 50 MCG: 50 INJECTION INTRAMUSCULAR; INTRAVENOUS at 11:26

## 2018-01-01 RX ADMIN — HEPARIN SODIUM 5000 UNITS: 5000 INJECTION, SOLUTION INTRAVENOUS; SUBCUTANEOUS at 05:03

## 2018-01-01 RX ADMIN — MIDODRINE HYDROCHLORIDE 10 MG: 5 TABLET ORAL at 17:08

## 2018-01-01 RX ADMIN — DIBASIC SODIUM PHOSPHATE, MONOBASIC POTASSIUM PHOSPHATE AND MONOBASIC SODIUM PHOSPHATE 2 TABLET: 852; 155; 130 TABLET ORAL at 17:54

## 2018-01-01 RX ADMIN — POLYETHYLENE GLYCOL (3350) 1 PACKET: 17 POWDER, FOR SOLUTION ORAL at 10:10

## 2018-01-01 RX ADMIN — IPRATROPIUM BROMIDE AND ALBUTEROL SULFATE 3 ML: .5; 3 SOLUTION RESPIRATORY (INHALATION) at 10:45

## 2018-01-01 RX ADMIN — VALPROATE SODIUM 750 MG: 100 INJECTION, SOLUTION INTRAVENOUS at 06:09

## 2018-01-01 RX ADMIN — MAGNESIUM SULFATE IN WATER 2 G: 40 INJECTION, SOLUTION INTRAVENOUS at 10:53

## 2018-01-01 RX ADMIN — HYDROCORTISONE SODIUM SUCCINATE 50 MG: 100 INJECTION, POWDER, FOR SOLUTION INTRAMUSCULAR; INTRAVENOUS at 17:30

## 2018-01-01 RX ADMIN — MAGNESIUM SULFATE HEPTAHYDRATE 2 G: 40 INJECTION, SOLUTION INTRAVENOUS at 09:00

## 2018-01-01 RX ADMIN — VALPROATE SODIUM 750 MG: 100 INJECTION, SOLUTION INTRAVENOUS at 06:03

## 2018-01-01 RX ADMIN — ROCURONIUM BROMIDE 50 MG: 10 INJECTION, SOLUTION INTRAVENOUS at 11:42

## 2018-01-01 RX ADMIN — ASPIRIN 81 MG 81 MG: 81 TABLET ORAL at 20:04

## 2018-01-01 RX ADMIN — HEPARIN SODIUM 5000 UNITS: 5000 INJECTION, SOLUTION INTRAVENOUS; SUBCUTANEOUS at 05:17

## 2018-01-01 RX ADMIN — CALCIUM CARBONATE-CHOLECALCIFEROL TAB 250 MG-125 UNIT 1 TABLET: 250-125 TAB at 05:07

## 2018-01-01 RX ADMIN — Medication 81 MG: at 06:12

## 2018-01-01 RX ADMIN — IPRATROPIUM BROMIDE AND ALBUTEROL SULFATE 3 ML: .5; 3 SOLUTION RESPIRATORY (INHALATION) at 18:53

## 2018-01-01 RX ADMIN — OXYCODONE HYDROCHLORIDE 5 MG: 5 TABLET ORAL at 05:20

## 2018-01-01 RX ADMIN — LEVOCARNITINE 1000 MG: 1 SOLUTION ORAL at 17:59

## 2018-01-01 RX ADMIN — VANCOMYCIN 125 MG: KIT at 06:17

## 2018-01-01 RX ADMIN — HYDROCORTISONE SODIUM SUCCINATE 50 MG: 100 INJECTION, POWDER, FOR SOLUTION INTRAMUSCULAR; INTRAVENOUS at 05:16

## 2018-01-01 RX ADMIN — FENTANYL CITRATE 25 MCG: 50 INJECTION, SOLUTION INTRAMUSCULAR; INTRAVENOUS at 20:46

## 2018-01-01 RX ADMIN — LEVOCARNITINE 500 MG: 1 SOLUTION ORAL at 17:33

## 2018-01-01 RX ADMIN — POTASSIUM CHLORIDE 10 MEQ: 1500 TABLET, EXTENDED RELEASE ORAL at 05:52

## 2018-01-01 RX ADMIN — SENNOSIDES 8.6 MG: 8.6 TABLET, FILM COATED ORAL at 08:17

## 2018-01-01 RX ADMIN — MIDODRINE HYDROCHLORIDE 10 MG: 5 TABLET ORAL at 11:51

## 2018-01-01 RX ADMIN — LINEZOLID 600 MG: 600 TABLET, FILM COATED ORAL at 05:07

## 2018-01-01 RX ADMIN — LEVOCARNITINE 500 MG: 1 SOLUTION ORAL at 17:30

## 2018-01-01 RX ADMIN — POTASSIUM BICARBONATE 25 MEQ: 25 TABLET, EFFERVESCENT ORAL at 17:30

## 2018-01-01 RX ADMIN — POLYETHYLENE GLYCOL 3350 1 PACKET: 17 POWDER, FOR SOLUTION ORAL at 05:42

## 2018-01-01 RX ADMIN — IPRATROPIUM BROMIDE AND ALBUTEROL SULFATE 3 ML: .5; 3 SOLUTION RESPIRATORY (INHALATION) at 20:09

## 2018-01-01 RX ADMIN — ALBUMIN (HUMAN) 25 G: 0.25 INJECTION, SOLUTION INTRAVENOUS at 17:21

## 2018-01-01 RX ADMIN — MIDODRINE HYDROCHLORIDE 10 MG: 5 TABLET ORAL at 09:54

## 2018-01-01 RX ADMIN — ALBUMIN (HUMAN) 25 G: 0.25 INJECTION, SOLUTION INTRAVENOUS at 13:11

## 2018-01-01 RX ADMIN — LEVETIRACETAM 1000 MG: 500 TABLET ORAL at 05:04

## 2018-01-01 RX ADMIN — SODIUM CHLORIDE: 9 INJECTION, SOLUTION INTRAVENOUS at 05:28

## 2018-01-01 RX ADMIN — SENNOSIDES AND DOCUSATE SODIUM 2 TABLET: 8.6; 5 TABLET ORAL at 06:07

## 2018-01-01 RX ADMIN — MIDODRINE HYDROCHLORIDE 10 MG: 5 TABLET ORAL at 17:24

## 2018-01-01 RX ADMIN — Medication 100 MCG: at 11:55

## 2018-01-01 RX ADMIN — MIDODRINE HYDROCHLORIDE 10 MG: 5 TABLET ORAL at 13:15

## 2018-01-01 RX ADMIN — IPRATROPIUM BROMIDE AND ALBUTEROL SULFATE 3 ML: .5; 3 SOLUTION RESPIRATORY (INHALATION) at 11:42

## 2018-01-01 RX ADMIN — DIBASIC SODIUM PHOSPHATE, MONOBASIC POTASSIUM PHOSPHATE AND MONOBASIC SODIUM PHOSPHATE 2 TABLET: 852; 155; 130 TABLET ORAL at 14:56

## 2018-01-01 RX ADMIN — SODIUM CHLORIDE: 9 INJECTION, SOLUTION INTRAVENOUS at 05:12

## 2018-01-01 RX ADMIN — SODIUM CHLORIDE 575 ML: 9 INJECTION, SOLUTION INTRAVENOUS at 00:21

## 2018-01-01 RX ADMIN — MIDODRINE HYDROCHLORIDE 10 MG: 5 TABLET ORAL at 18:14

## 2018-01-01 RX ADMIN — HEPARIN SODIUM 5000 UNITS: 5000 INJECTION, SOLUTION INTRAVENOUS; SUBCUTANEOUS at 21:12

## 2018-01-01 RX ADMIN — DIVALPROEX SODIUM 500 MG: 125 CAPSULE, COATED PELLETS ORAL at 05:07

## 2018-01-01 RX ADMIN — STANDARDIZED SENNA CONCENTRATE AND DOCUSATE SODIUM 2 TABLET: 8.6; 5 TABLET ORAL at 18:14

## 2018-01-01 RX ADMIN — SODIUM CHLORIDE 1250 MG: 9 INJECTION, SOLUTION INTRAVENOUS at 10:54

## 2018-01-01 RX ADMIN — DIVALPROEX SODIUM 500 MG: 125 CAPSULE, COATED PELLETS ORAL at 17:16

## 2018-01-01 RX ADMIN — FENTANYL CITRATE 50 MCG: 50 INJECTION INTRAMUSCULAR; INTRAVENOUS at 13:11

## 2018-01-01 RX ADMIN — MORPHINE SULFATE 10 MG: 10 INJECTION INTRAVENOUS at 18:20

## 2018-01-01 RX ADMIN — DIBASIC SODIUM PHOSPHATE, MONOBASIC POTASSIUM PHOSPHATE AND MONOBASIC SODIUM PHOSPHATE 2 TABLET: 852; 155; 130 TABLET ORAL at 05:10

## 2018-01-01 RX ADMIN — POTASSIUM CHLORIDE 10 MEQ: 1500 TABLET, EXTENDED RELEASE ORAL at 06:42

## 2018-01-01 RX ADMIN — LEVOCARNITINE 500 MG: 1 SOLUTION ORAL at 17:38

## 2018-01-01 RX ADMIN — LINEZOLID 600 MG: 600 TABLET, FILM COATED ORAL at 14:59

## 2018-01-01 RX ADMIN — MIDODRINE HYDROCHLORIDE 5 MG: 5 TABLET ORAL at 17:28

## 2018-01-01 RX ADMIN — SODIUM CHLORIDE: 9 INJECTION, SOLUTION INTRAVENOUS at 14:14

## 2018-01-01 RX ADMIN — THERA TABS 1 TABLET: TAB at 06:08

## 2018-01-01 RX ADMIN — SENNOSIDES AND DOCUSATE SODIUM 2 TABLET: 8.6; 5 TABLET ORAL at 05:18

## 2018-01-01 RX ADMIN — POTASSIUM BICARBONATE 25 MEQ: 25 TABLET, EFFERVESCENT ORAL at 02:24

## 2018-01-01 RX ADMIN — ACETAMINOPHEN 650 MG: 325 TABLET, FILM COATED ORAL at 17:36

## 2018-01-01 RX ADMIN — HYDROCORTISONE SODIUM SUCCINATE 50 MG: 100 INJECTION, POWDER, FOR SOLUTION INTRAMUSCULAR; INTRAVENOUS at 23:06

## 2018-01-01 RX ADMIN — SODIUM CHLORIDE: 9 INJECTION, SOLUTION INTRAVENOUS at 17:38

## 2018-01-01 RX ADMIN — HEPARIN SODIUM 5000 UNITS: 5000 INJECTION, SOLUTION INTRAVENOUS; SUBCUTANEOUS at 14:30

## 2018-01-01 RX ADMIN — DIBASIC SODIUM PHOSPHATE, MONOBASIC POTASSIUM PHOSPHATE AND MONOBASIC SODIUM PHOSPHATE 2 TABLET: 852; 155; 130 TABLET ORAL at 10:54

## 2018-01-01 RX ADMIN — MORPHINE SULFATE 10 MG: 10 INJECTION INTRAVENOUS at 08:01

## 2018-01-01 RX ADMIN — SODIUM CHLORIDE: 9 INJECTION, SOLUTION INTRAVENOUS at 09:00

## 2018-01-01 RX ADMIN — ALBUMIN (HUMAN) 25 G: 0.25 INJECTION, SOLUTION INTRAVENOUS at 23:53

## 2018-01-01 RX ADMIN — FUROSEMIDE 20 MG: 10 INJECTION, SOLUTION INTRAMUSCULAR; INTRAVENOUS at 17:53

## 2018-01-01 RX ADMIN — HEPARIN SODIUM 5000 UNITS: 5000 INJECTION, SOLUTION INTRAVENOUS; SUBCUTANEOUS at 13:35

## 2018-01-01 RX ADMIN — LEVOCARNITINE 500 MG: 1 SOLUTION ORAL at 07:52

## 2018-01-01 RX ADMIN — VALPROATE SODIUM 500 MG: 100 INJECTION, SOLUTION INTRAVENOUS at 14:31

## 2018-01-01 RX ADMIN — VALPROATE SODIUM 750 MG: 100 INJECTION, SOLUTION INTRAVENOUS at 05:43

## 2018-01-01 RX ADMIN — SODIUM CHLORIDE 500 MG: 9 INJECTION, SOLUTION INTRAVENOUS at 01:16

## 2018-01-01 RX ADMIN — SODIUM CHLORIDE: 9 INJECTION, SOLUTION INTRAVENOUS at 05:40

## 2018-01-01 RX ADMIN — DIBASIC SODIUM PHOSPHATE, MONOBASIC POTASSIUM PHOSPHATE AND MONOBASIC SODIUM PHOSPHATE 2 TABLET: 852; 155; 130 TABLET ORAL at 17:58

## 2018-01-01 RX ADMIN — POLYETHYLENE GLYCOL 3350 1 PACKET: 17 POWDER, FOR SOLUTION ORAL at 18:14

## 2018-01-01 RX ADMIN — FUROSEMIDE 20 MG: 10 INJECTION, SOLUTION INTRAMUSCULAR; INTRAVENOUS at 05:05

## 2018-01-01 RX ADMIN — POTASSIUM BICARBONATE 25 MEQ: 25 TABLET, EFFERVESCENT ORAL at 00:33

## 2018-01-01 RX ADMIN — SODIUM CHLORIDE: 9 INJECTION, SOLUTION INTRAVENOUS at 00:44

## 2018-01-01 RX ADMIN — CEFTRIAXONE 1 G: 1 INJECTION, POWDER, FOR SOLUTION INTRAMUSCULAR; INTRAVENOUS at 06:36

## 2018-01-01 RX ADMIN — POTASSIUM BICARBONATE 25 MEQ: 25 TABLET, EFFERVESCENT ORAL at 15:39

## 2018-01-01 RX ADMIN — ACETYLCYSTEINE 3 ML: 200 INHALANT RESPIRATORY (INHALATION) at 07:18

## 2018-01-01 RX ADMIN — IPRATROPIUM BROMIDE AND ALBUTEROL SULFATE 3 ML: .5; 3 SOLUTION RESPIRATORY (INHALATION) at 10:44

## 2018-01-01 RX ADMIN — MICONAZOLE NITRATE: 20 CREAM TOPICAL at 14:23

## 2018-01-01 RX ADMIN — LORAZEPAM 2 MG: 2 INJECTION INTRAMUSCULAR; INTRAVENOUS at 12:18

## 2018-01-01 RX ADMIN — SUCCINYLCHOLINE CHLORIDE 100 MG: 20 INJECTION, SOLUTION INTRAMUSCULAR; INTRAVENOUS at 13:15

## 2018-01-01 RX ADMIN — PIPERACILLIN AND TAZOBACTAM 3.38 G: 3; .375 INJECTION, POWDER, LYOPHILIZED, FOR SOLUTION INTRAVENOUS; PARENTERAL at 14:57

## 2018-01-01 RX ADMIN — ACETYLCYSTEINE 3 ML: 200 INHALANT RESPIRATORY (INHALATION) at 10:29

## 2018-01-01 RX ADMIN — DEXMEDETOMIDINE HYDROCHLORIDE 0.3 MCG/KG/HR: 100 INJECTION, SOLUTION INTRAVENOUS at 10:09

## 2018-01-01 RX ADMIN — ESLICARBAZEPINE ACETATE 400 MG: 800 TABLET ORAL at 21:14

## 2018-01-01 RX ADMIN — SENNOSIDES AND DOCUSATE SODIUM 2 TABLET: 8.6; 5 TABLET ORAL at 20:04

## 2018-01-01 RX ADMIN — IPRATROPIUM BROMIDE AND ALBUTEROL SULFATE 3 ML: .5; 3 SOLUTION RESPIRATORY (INHALATION) at 02:17

## 2018-01-01 RX ADMIN — LEVOCARNITINE 1000 MG: 1 SOLUTION ORAL at 09:06

## 2018-01-01 RX ADMIN — POTASSIUM CHLORIDE 10 MEQ: 1500 TABLET, EXTENDED RELEASE ORAL at 14:48

## 2018-01-01 RX ADMIN — DIBASIC SODIUM PHOSPHATE, MONOBASIC POTASSIUM PHOSPHATE AND MONOBASIC SODIUM PHOSPHATE 2 TABLET: 852; 155; 130 TABLET ORAL at 08:16

## 2018-01-01 RX ADMIN — HYDROCORTISONE SODIUM SUCCINATE 50 MG: 100 INJECTION, POWDER, FOR SOLUTION INTRAMUSCULAR; INTRAVENOUS at 06:39

## 2018-01-01 RX ADMIN — METOPROLOL TARTRATE 12.5 MG: 25 TABLET, FILM COATED ORAL at 06:12

## 2018-01-01 RX ADMIN — IPRATROPIUM BROMIDE AND ALBUTEROL SULFATE 3 ML: .5; 3 SOLUTION RESPIRATORY (INHALATION) at 10:48

## 2018-01-01 RX ADMIN — IPRATROPIUM BROMIDE AND ALBUTEROL SULFATE 3 ML: .5; 3 SOLUTION RESPIRATORY (INHALATION) at 15:30

## 2018-01-01 RX ADMIN — FUROSEMIDE 20 MG: 10 INJECTION, SOLUTION INTRAMUSCULAR; INTRAVENOUS at 06:06

## 2018-01-01 RX ADMIN — FAMOTIDINE 20 MG: 20 TABLET ORAL at 05:16

## 2018-01-01 RX ADMIN — ALBUMIN (HUMAN) 25 G: 0.25 INJECTION, SOLUTION INTRAVENOUS at 11:45

## 2018-01-01 RX ADMIN — INFLUENZA A VIRUS A/MICHIGAN/45/2015 X-275 (H1N1) ANTIGEN (FORMALDEHYDE INACTIVATED), INFLUENZA A VIRUS A/SINGAPORE/INFIMH-16-0019/2016 IVR-186 (H3N2) ANTIGEN (FORMALDEHYDE INACTIVATED), AND INFLUENZA B VIRUS B/MARYLAND/15/2016 BX-69A (A B/COLORADO/6/2017-LIKE VIRUS) ANTIGEN (FORMALDEHYDE INACTIVATED) 0.5 ML: 60; 60; 60 INJECTION, SUSPENSION INTRAMUSCULAR at 03:09

## 2018-01-01 RX ADMIN — HEPARIN SODIUM 5000 UNITS: 5000 INJECTION, SOLUTION INTRAVENOUS; SUBCUTANEOUS at 23:14

## 2018-01-01 RX ADMIN — AMPICILLIN SODIUM AND SULBACTAM SODIUM 3 G: 2; 1 INJECTION, POWDER, FOR SOLUTION INTRAMUSCULAR; INTRAVENOUS at 01:01

## 2018-01-01 RX ADMIN — SODIUM CHLORIDE 500 MG: 9 INJECTION, SOLUTION INTRAVENOUS at 17:19

## 2018-01-01 RX ADMIN — ASPIRIN 81 MG 81 MG: 81 TABLET ORAL at 05:18

## 2018-01-01 RX ADMIN — HYDROCORTISONE SODIUM SUCCINATE 25 MG: 100 INJECTION, POWDER, FOR SOLUTION INTRAMUSCULAR; INTRAVENOUS at 05:30

## 2018-01-01 RX ADMIN — LEVETIRACETAM 1500 MG: 500 TABLET ORAL at 18:13

## 2018-01-01 RX ADMIN — HEPARIN SODIUM 2600 UNITS: 1000 INJECTION, SOLUTION INTRAVENOUS; SUBCUTANEOUS at 06:48

## 2018-01-01 RX ADMIN — HEPARIN SODIUM 5000 UNITS: 5000 INJECTION, SOLUTION INTRAVENOUS; SUBCUTANEOUS at 05:52

## 2018-01-01 RX ADMIN — SUCCINYLCHOLINE CHLORIDE 50 MG: 20 INJECTION, SOLUTION INTRAMUSCULAR; INTRAVENOUS at 17:52

## 2018-01-01 RX ADMIN — VALPROATE SODIUM 500 MG: 100 INJECTION, SOLUTION INTRAVENOUS at 17:29

## 2018-01-01 RX ADMIN — AMPICILLIN SODIUM AND SULBACTAM SODIUM 3 G: 2; 1 INJECTION, POWDER, FOR SOLUTION INTRAMUSCULAR; INTRAVENOUS at 05:26

## 2018-01-01 RX ADMIN — FENTANYL CITRATE 50 MCG: 50 INJECTION INTRAMUSCULAR; INTRAVENOUS at 12:33

## 2018-01-01 RX ADMIN — IPRATROPIUM BROMIDE AND ALBUTEROL SULFATE 3 ML: .5; 3 SOLUTION RESPIRATORY (INHALATION) at 19:14

## 2018-01-01 RX ADMIN — LINEZOLID 600 MG: 600 TABLET, FILM COATED ORAL at 18:13

## 2018-01-01 RX ADMIN — METOPROLOL TARTRATE 12.5 MG: 25 TABLET, FILM COATED ORAL at 17:16

## 2018-01-01 RX ADMIN — THERA TABS 1 TABLET: TAB at 05:39

## 2018-01-01 RX ADMIN — DIBASIC SODIUM PHOSPHATE, MONOBASIC POTASSIUM PHOSPHATE AND MONOBASIC SODIUM PHOSPHATE 1 TABLET: 852; 155; 130 TABLET ORAL at 00:00

## 2018-01-01 RX ADMIN — LINEZOLID 600 MG: 600 TABLET, FILM COATED ORAL at 06:12

## 2018-01-01 RX ADMIN — ACETYLCYSTEINE 3 ML: 200 INHALANT RESPIRATORY (INHALATION) at 18:28

## 2018-01-01 RX ADMIN — DIBASIC SODIUM PHOSPHATE, MONOBASIC POTASSIUM PHOSPHATE AND MONOBASIC SODIUM PHOSPHATE 2 TABLET: 852; 155; 130 TABLET ORAL at 17:31

## 2018-01-01 RX ADMIN — DIBASIC SODIUM PHOSPHATE, MONOBASIC POTASSIUM PHOSPHATE AND MONOBASIC SODIUM PHOSPHATE 2 TABLET: 852; 155; 130 TABLET ORAL at 12:11

## 2018-01-01 RX ADMIN — ALBUMIN (HUMAN) 25 G: 0.25 INJECTION, SOLUTION INTRAVENOUS at 05:42

## 2018-01-01 RX ADMIN — IPRATROPIUM BROMIDE AND ALBUTEROL SULFATE 3 ML: .5; 3 SOLUTION RESPIRATORY (INHALATION) at 02:39

## 2018-01-01 RX ADMIN — VALPROATE SODIUM 750 MG: 100 INJECTION, SOLUTION INTRAVENOUS at 17:27

## 2018-01-01 RX ADMIN — PROPOFOL 10 MCG/KG/MIN: 10 INJECTION, EMULSION INTRAVENOUS at 23:21

## 2018-01-01 RX ADMIN — VANCOMYCIN 125 MG: KIT at 00:48

## 2018-01-01 RX ADMIN — NOREPINEPHRINE BITARTRATE 3 MCG/MIN: 1 INJECTION INTRAVENOUS at 17:29

## 2018-01-01 RX ADMIN — HEPARIN SODIUM 5000 UNITS: 5000 INJECTION, SOLUTION INTRAVENOUS; SUBCUTANEOUS at 06:09

## 2018-01-01 RX ADMIN — SODIUM CHLORIDE 1500 MG: 9 INJECTION, SOLUTION INTRAVENOUS at 17:18

## 2018-01-01 RX ADMIN — IPRATROPIUM BROMIDE AND ALBUTEROL SULFATE 3 ML: .5; 3 SOLUTION RESPIRATORY (INHALATION) at 14:11

## 2018-01-01 RX ADMIN — IPRATROPIUM BROMIDE AND ALBUTEROL SULFATE 3 ML: .5; 3 SOLUTION RESPIRATORY (INHALATION) at 02:54

## 2018-01-01 RX ADMIN — FENTANYL CITRATE 25 MCG: 50 INJECTION, SOLUTION INTRAMUSCULAR; INTRAVENOUS at 08:12

## 2018-01-01 RX ADMIN — IPRATROPIUM BROMIDE AND ALBUTEROL SULFATE 3 ML: .5; 3 SOLUTION RESPIRATORY (INHALATION) at 15:32

## 2018-01-01 RX ADMIN — ROCURONIUM BROMIDE 50 MG: 10 INJECTION, SOLUTION INTRAVENOUS at 23:22

## 2018-01-01 RX ADMIN — HEPARIN SODIUM 5000 UNITS: 5000 INJECTION, SOLUTION INTRAVENOUS; SUBCUTANEOUS at 13:11

## 2018-01-01 RX ADMIN — ACETYLCYSTEINE 3 ML: 200 INHALANT RESPIRATORY (INHALATION) at 23:16

## 2018-01-01 RX ADMIN — ESLICARBAZEPINE ACETATE 400 MG: 800 TABLET ORAL at 21:42

## 2018-01-01 RX ADMIN — ALBUMIN (HUMAN) 25 G: 0.25 INJECTION, SOLUTION INTRAVENOUS at 05:02

## 2018-01-01 RX ADMIN — POTASSIUM PHOSPHATE, MONOBASIC AND POTASSIUM PHOSPHATE, DIBASIC 30 MMOL: 224; 236 INJECTION, SOLUTION INTRAVENOUS at 10:56

## 2018-01-01 RX ADMIN — HYDROCORTISONE SODIUM SUCCINATE 50 MG: 100 INJECTION, POWDER, FOR SOLUTION INTRAMUSCULAR; INTRAVENOUS at 17:21

## 2018-01-01 RX ADMIN — POTASSIUM BICARBONATE 25 MEQ: 25 TABLET, EFFERVESCENT ORAL at 09:21

## 2018-01-01 RX ADMIN — AMPICILLIN SODIUM AND SULBACTAM SODIUM 3 G: 2; 1 INJECTION, POWDER, FOR SOLUTION INTRAMUSCULAR; INTRAVENOUS at 05:15

## 2018-01-01 RX ADMIN — THERA TABS 1 TABLET: TAB at 18:27

## 2018-01-01 RX ADMIN — CALCIUM CARBONATE-CHOLECALCIFEROL TAB 250 MG-125 UNIT 1 TABLET: 250-125 TAB at 05:39

## 2018-01-01 RX ADMIN — FENTANYL CITRATE 25 MCG: 50 INJECTION, SOLUTION INTRAMUSCULAR; INTRAVENOUS at 10:23

## 2018-01-01 RX ADMIN — LEVOCARNITINE 1000 MG: 1 SOLUTION ORAL at 07:44

## 2018-01-01 RX ADMIN — PROPOFOL 5 MCG/KG/MIN: 10 INJECTION, EMULSION INTRAVENOUS at 13:47

## 2018-01-01 RX ADMIN — HEPARIN SODIUM 5000 UNITS: 5000 INJECTION, SOLUTION INTRAVENOUS; SUBCUTANEOUS at 21:08

## 2018-01-01 RX ADMIN — IPRATROPIUM BROMIDE AND ALBUTEROL SULFATE 3 ML: .5; 3 SOLUTION RESPIRATORY (INHALATION) at 07:00

## 2018-01-01 RX ADMIN — SENNOSIDES AND DOCUSATE SODIUM 2 TABLET: 8.6; 5 TABLET ORAL at 17:58

## 2018-01-01 RX ADMIN — SODIUM CHLORIDE 1000 MG: 9 INJECTION, SOLUTION INTRAVENOUS at 18:15

## 2018-01-01 RX ADMIN — LEVETIRACETAM 1500 MG: 500 TABLET ORAL at 05:17

## 2018-01-01 RX ADMIN — HYDROCORTISONE SODIUM SUCCINATE 50 MG: 100 INJECTION, POWDER, FOR SOLUTION INTRAMUSCULAR; INTRAVENOUS at 21:05

## 2018-01-01 RX ADMIN — IPRATROPIUM BROMIDE AND ALBUTEROL SULFATE 3 ML: .5; 3 SOLUTION RESPIRATORY (INHALATION) at 14:21

## 2018-01-01 RX ADMIN — IPRATROPIUM BROMIDE AND ALBUTEROL SULFATE 3 ML: .5; 3 SOLUTION RESPIRATORY (INHALATION) at 23:09

## 2018-01-01 RX ADMIN — ACETAMINOPHEN 650 MG: 325 TABLET, FILM COATED ORAL at 06:09

## 2018-01-01 RX ADMIN — FENTANYL CITRATE 12.5 MCG: 50 INJECTION, SOLUTION INTRAMUSCULAR; INTRAVENOUS at 21:08

## 2018-01-01 RX ADMIN — POLYETHYLENE GLYCOL 3350 1 PACKET: 17 POWDER, FOR SOLUTION ORAL at 17:23

## 2018-01-01 RX ADMIN — DIBASIC SODIUM PHOSPHATE, MONOBASIC POTASSIUM PHOSPHATE AND MONOBASIC SODIUM PHOSPHATE 1 TABLET: 852; 155; 130 TABLET ORAL at 05:04

## 2018-01-01 RX ADMIN — IPRATROPIUM BROMIDE AND ALBUTEROL SULFATE 3 ML: .5; 3 SOLUTION RESPIRATORY (INHALATION) at 23:20

## 2018-01-01 RX ADMIN — ACETYLCYSTEINE 3 ML: 200 INHALANT RESPIRATORY (INHALATION) at 03:05

## 2018-01-01 RX ADMIN — SODIUM CHLORIDE 1250 MG: 9 INJECTION, SOLUTION INTRAVENOUS at 02:48

## 2018-01-01 RX ADMIN — VALPROATE SODIUM 750 MG: 100 INJECTION, SOLUTION INTRAVENOUS at 17:26

## 2018-01-01 RX ADMIN — OXYCODONE HYDROCHLORIDE 5 MG: 5 TABLET ORAL at 20:14

## 2018-01-01 RX ADMIN — ACETYLCYSTEINE 3 ML: 200 INHALANT RESPIRATORY (INHALATION) at 14:21

## 2018-01-01 RX ADMIN — Medication 81 MG: at 05:16

## 2018-01-01 RX ADMIN — SENNOSIDES AND DOCUSATE SODIUM 2 TABLET: 8.6; 5 TABLET ORAL at 06:09

## 2018-01-01 RX ADMIN — LINEZOLID 600 MG: 600 TABLET, FILM COATED ORAL at 05:18

## 2018-01-01 RX ADMIN — IPRATROPIUM BROMIDE AND ALBUTEROL SULFATE 3 ML: .5; 3 SOLUTION RESPIRATORY (INHALATION) at 09:34

## 2018-01-01 RX ADMIN — HYDROCORTISONE SODIUM SUCCINATE 50 MG: 100 INJECTION, POWDER, FOR SOLUTION INTRAMUSCULAR; INTRAVENOUS at 11:32

## 2018-01-01 RX ADMIN — FENTANYL CITRATE 50 MCG: 50 INJECTION INTRAMUSCULAR; INTRAVENOUS at 15:07

## 2018-01-01 RX ADMIN — ASPIRIN 81 MG 81 MG: 81 TABLET ORAL at 06:07

## 2018-01-01 RX ADMIN — HEPARIN SODIUM 5000 UNITS: 5000 INJECTION, SOLUTION INTRAVENOUS; SUBCUTANEOUS at 21:22

## 2018-01-01 RX ADMIN — IPRATROPIUM BROMIDE AND ALBUTEROL SULFATE 3 ML: .5; 3 SOLUTION RESPIRATORY (INHALATION) at 10:58

## 2018-01-01 RX ADMIN — POTASSIUM BICARBONATE 25 MEQ: 25 TABLET, EFFERVESCENT ORAL at 18:20

## 2018-01-01 RX ADMIN — IPRATROPIUM BROMIDE AND ALBUTEROL SULFATE 3 ML: .5; 3 SOLUTION RESPIRATORY (INHALATION) at 03:06

## 2018-01-01 RX ADMIN — SODIUM CHLORIDE: 9 INJECTION, SOLUTION INTRAVENOUS at 07:12

## 2018-01-01 RX ADMIN — SODIUM CHLORIDE 2000 MG: 9 INJECTION, SOLUTION INTRAVENOUS at 11:03

## 2018-01-01 RX ADMIN — IPRATROPIUM BROMIDE AND ALBUTEROL SULFATE 3 ML: .5; 3 SOLUTION RESPIRATORY (INHALATION) at 15:15

## 2018-01-01 RX ADMIN — HYDROCORTISONE SODIUM SUCCINATE 50 MG: 100 INJECTION, POWDER, FOR SOLUTION INTRAMUSCULAR; INTRAVENOUS at 06:08

## 2018-01-01 RX ADMIN — SODIUM CHLORIDE 500 MG: 9 INJECTION, SOLUTION INTRAVENOUS at 05:36

## 2018-01-01 RX ADMIN — PROPOFOL 25 MCG/KG/MIN: 10 INJECTION, EMULSION INTRAVENOUS at 19:13

## 2018-01-01 RX ADMIN — ALBUMIN (HUMAN) 25 G: 0.25 INJECTION, SOLUTION INTRAVENOUS at 01:15

## 2018-01-01 RX ADMIN — IPRATROPIUM BROMIDE AND ALBUTEROL SULFATE 3 ML: .5; 3 SOLUTION RESPIRATORY (INHALATION) at 03:11

## 2018-01-01 RX ADMIN — HEPARIN SODIUM 5000 UNITS: 5000 INJECTION, SOLUTION INTRAVENOUS; SUBCUTANEOUS at 06:07

## 2018-01-01 RX ADMIN — THERA TABS 1 TABLET: TAB at 05:07

## 2018-01-01 RX ADMIN — STANDARDIZED SENNA CONCENTRATE AND DOCUSATE SODIUM 2 TABLET: 8.6; 5 TABLET ORAL at 05:18

## 2018-01-01 RX ADMIN — HEPARIN SODIUM 5000 UNITS: 5000 INJECTION, SOLUTION INTRAVENOUS; SUBCUTANEOUS at 23:32

## 2018-01-01 RX ADMIN — Medication 81 MG: at 06:10

## 2018-01-01 RX ADMIN — POTASSIUM BICARBONATE 25 MEQ: 25 TABLET, EFFERVESCENT ORAL at 05:04

## 2018-01-01 RX ADMIN — ESLICARBAZEPINE ACETATE 400 MG: 800 TABLET ORAL at 22:13

## 2018-01-01 RX ADMIN — MIDODRINE HYDROCHLORIDE 5 MG: 5 TABLET ORAL at 17:24

## 2018-01-01 RX ADMIN — IPRATROPIUM BROMIDE AND ALBUTEROL SULFATE 3 ML: .5; 3 SOLUTION RESPIRATORY (INHALATION) at 23:07

## 2018-01-01 RX ADMIN — VALPROATE SODIUM 500 MG: 100 INJECTION, SOLUTION INTRAVENOUS at 05:15

## 2018-01-01 RX ADMIN — ACETAMINOPHEN 650 MG: 325 TABLET, FILM COATED ORAL at 17:16

## 2018-01-01 RX ADMIN — POTASSIUM BICARBONATE 25 MEQ: 25 TABLET, EFFERVESCENT ORAL at 09:06

## 2018-01-01 RX ADMIN — POLYETHYLENE GLYCOL 3350 1 PACKET: 17 POWDER, FOR SOLUTION ORAL at 17:24

## 2018-01-01 RX ADMIN — SODIUM CHLORIDE 500 MG: 9 INJECTION, SOLUTION INTRAVENOUS at 19:30

## 2018-01-01 RX ADMIN — FAMOTIDINE 20 MG: 20 TABLET ORAL at 06:16

## 2018-01-01 RX ADMIN — FUROSEMIDE 20 MG: 10 INJECTION, SOLUTION INTRAMUSCULAR; INTRAVENOUS at 05:42

## 2018-01-01 RX ADMIN — IPRATROPIUM BROMIDE AND ALBUTEROL SULFATE 3 ML: .5; 3 SOLUTION RESPIRATORY (INHALATION) at 22:43

## 2018-01-01 RX ADMIN — HEPARIN SODIUM 5000 UNITS: 5000 INJECTION, SOLUTION INTRAVENOUS; SUBCUTANEOUS at 14:39

## 2018-01-01 RX ADMIN — ALBUMIN (HUMAN) 25 G: 0.25 INJECTION, SOLUTION INTRAVENOUS at 17:20

## 2018-01-01 RX ADMIN — HYDROCORTISONE SODIUM SUCCINATE 50 MG: 100 INJECTION, POWDER, FOR SOLUTION INTRAMUSCULAR; INTRAVENOUS at 12:34

## 2018-01-01 RX ADMIN — VALPROATE SODIUM 750 MG: 100 INJECTION, SOLUTION INTRAVENOUS at 06:17

## 2018-01-01 RX ADMIN — HEPARIN SODIUM 5000 UNITS: 5000 INJECTION, SOLUTION INTRAVENOUS; SUBCUTANEOUS at 13:45

## 2018-01-01 RX ADMIN — ESLICARBAZEPINE ACETATE 400 MG: 800 TABLET ORAL at 21:31

## 2018-01-01 RX ADMIN — CEFAZOLIN 1 G: 1 INJECTION, POWDER, FOR SOLUTION INTRAMUSCULAR; INTRAVENOUS at 17:37

## 2018-01-01 RX ADMIN — MIDODRINE HYDROCHLORIDE 5 MG: 5 TABLET ORAL at 09:26

## 2018-01-01 RX ADMIN — IPRATROPIUM BROMIDE AND ALBUTEROL SULFATE 3 ML: .5; 3 SOLUTION RESPIRATORY (INHALATION) at 18:13

## 2018-01-01 RX ADMIN — AMPICILLIN SODIUM AND SULBACTAM SODIUM 3 G: 2; 1 INJECTION, POWDER, FOR SOLUTION INTRAMUSCULAR; INTRAVENOUS at 17:35

## 2018-01-01 RX ADMIN — LINEZOLID 600 MG: 600 TABLET, FILM COATED ORAL at 04:59

## 2018-01-01 RX ADMIN — MIDODRINE HYDROCHLORIDE 10 MG: 5 TABLET ORAL at 11:43

## 2018-01-01 RX ADMIN — SODIUM CHLORIDE 1250 MG: 9 INJECTION, SOLUTION INTRAVENOUS at 21:41

## 2018-01-01 RX ADMIN — SENNOSIDES AND DOCUSATE SODIUM 2 TABLET: 8.6; 5 TABLET ORAL at 05:39

## 2018-01-01 RX ADMIN — STANDARDIZED SENNA CONCENTRATE AND DOCUSATE SODIUM 2 TABLET: 8.6; 5 TABLET ORAL at 17:54

## 2018-01-01 RX ADMIN — CEFTRIAXONE 1 G: 1 INJECTION, POWDER, FOR SOLUTION INTRAMUSCULAR; INTRAVENOUS at 08:45

## 2018-01-01 RX ADMIN — ESLICARBAZEPINE ACETATE 400 MG: 800 TABLET ORAL at 21:07

## 2018-01-01 RX ADMIN — SODIUM CHLORIDE 1250 MG: 9 INJECTION, SOLUTION INTRAVENOUS at 03:09

## 2018-01-01 RX ADMIN — IPRATROPIUM BROMIDE AND ALBUTEROL SULFATE 3 ML: .5; 3 SOLUTION RESPIRATORY (INHALATION) at 23:16

## 2018-01-01 RX ADMIN — PROPOFOL 20 MCG/KG/MIN: 10 INJECTION, EMULSION INTRAVENOUS at 05:36

## 2018-01-01 RX ADMIN — POLYETHYLENE GLYCOL 3350 1 PACKET: 17 POWDER, FOR SOLUTION ORAL at 17:54

## 2018-01-01 RX ADMIN — LINEZOLID 600 MG: 600 TABLET, FILM COATED ORAL at 17:38

## 2018-01-01 RX ADMIN — HEPARIN SODIUM 5000 UNITS: 5000 INJECTION, SOLUTION INTRAVENOUS; SUBCUTANEOUS at 05:18

## 2018-01-01 RX ADMIN — ETOMIDATE 10 MG: 2 INJECTION INTRAVENOUS at 13:15

## 2018-01-01 RX ADMIN — ACETAMINOPHEN 650 MG: 325 TABLET, FILM COATED ORAL at 16:51

## 2018-01-01 RX ADMIN — SODIUM CHLORIDE 500 MG: 9 INJECTION, SOLUTION INTRAVENOUS at 05:13

## 2018-01-01 RX ADMIN — ESLICARBAZEPINE ACETATE 400 MG: 800 TABLET ORAL at 08:50

## 2018-01-01 RX ADMIN — IPRATROPIUM BROMIDE AND ALBUTEROL SULFATE 3 ML: .5; 3 SOLUTION RESPIRATORY (INHALATION) at 18:56

## 2018-01-01 RX ADMIN — SODIUM CHLORIDE 1500 MG: 9 INJECTION, SOLUTION INTRAVENOUS at 04:55

## 2018-01-01 RX ADMIN — LEVOCARNITINE 1000 MG: 1 SOLUTION ORAL at 18:25

## 2018-01-01 RX ADMIN — IPRATROPIUM BROMIDE AND ALBUTEROL SULFATE 3 ML: .5; 3 SOLUTION RESPIRATORY (INHALATION) at 07:45

## 2018-01-01 RX ADMIN — HYDROCORTISONE SODIUM SUCCINATE 50 MG: 100 INJECTION, POWDER, FOR SOLUTION INTRAMUSCULAR; INTRAVENOUS at 05:17

## 2018-01-01 RX ADMIN — THERA TABS 1 TABLET: TAB at 05:19

## 2018-01-01 RX ADMIN — MIDODRINE HYDROCHLORIDE 5 MG: 5 TABLET ORAL at 12:33

## 2018-01-01 RX ADMIN — LINEZOLID 600 MG: 600 TABLET, FILM COATED ORAL at 17:29

## 2018-01-01 RX ADMIN — POTASSIUM BICARBONATE 25 MEQ: 25 TABLET, EFFERVESCENT ORAL at 05:42

## 2018-01-01 RX ADMIN — POTASSIUM BICARBONATE 25 MEQ: 25 TABLET, EFFERVESCENT ORAL at 17:54

## 2018-01-01 RX ADMIN — Medication 81 MG: at 05:43

## 2018-01-01 RX ADMIN — MIDODRINE HYDROCHLORIDE 10 MG: 5 TABLET ORAL at 12:35

## 2018-01-01 RX ADMIN — VALPROATE SODIUM 1000 MG: 100 INJECTION, SOLUTION INTRAVENOUS at 10:44

## 2018-01-01 RX ADMIN — LEVOCARNITINE 500 MG: 1 SOLUTION ORAL at 08:40

## 2018-01-01 RX ADMIN — SENNOSIDES AND DOCUSATE SODIUM 2 TABLET: 8.6; 5 TABLET ORAL at 18:00

## 2018-01-01 RX ADMIN — SODIUM CHLORIDE 1500 MG: 9 INJECTION, SOLUTION INTRAVENOUS at 17:33

## 2018-01-01 RX ADMIN — POTASSIUM BICARBONATE 25 MEQ: 25 TABLET, EFFERVESCENT ORAL at 12:18

## 2018-01-01 RX ADMIN — SODIUM CHLORIDE 500 MG: 9 INJECTION, SOLUTION INTRAVENOUS at 01:43

## 2018-01-01 RX ADMIN — HYDROCORTISONE SODIUM SUCCINATE 50 MG: 100 INJECTION, POWDER, FOR SOLUTION INTRAMUSCULAR; INTRAVENOUS at 17:58

## 2018-01-01 RX ADMIN — HEPARIN SODIUM 5000 UNITS: 5000 INJECTION, SOLUTION INTRAVENOUS; SUBCUTANEOUS at 16:47

## 2018-01-01 RX ADMIN — SODIUM CHLORIDE 1250 MG: 9 INJECTION, SOLUTION INTRAVENOUS at 02:14

## 2018-01-01 RX ADMIN — SODIUM CHLORIDE 500 MG: 9 INJECTION, SOLUTION INTRAVENOUS at 00:34

## 2018-01-01 RX ADMIN — IPRATROPIUM BROMIDE AND ALBUTEROL SULFATE 3 ML: .5; 3 SOLUTION RESPIRATORY (INHALATION) at 22:21

## 2018-01-01 RX ADMIN — LEVOCARNITINE 500 MG: 1 SOLUTION ORAL at 06:16

## 2018-01-01 RX ADMIN — Medication 100 MCG: at 12:00

## 2018-01-01 RX ADMIN — ESLICARBAZEPINE ACETATE 400 MG: 800 TABLET ORAL at 21:22

## 2018-01-01 RX ADMIN — IPRATROPIUM BROMIDE AND ALBUTEROL SULFATE 3 ML: .5; 3 SOLUTION RESPIRATORY (INHALATION) at 02:20

## 2018-01-01 RX ADMIN — LEVOCARNITINE 500 MG: 1 SOLUTION ORAL at 17:53

## 2018-01-01 RX ADMIN — MIDODRINE HYDROCHLORIDE 10 MG: 5 TABLET ORAL at 11:34

## 2018-01-01 RX ADMIN — ACETYLCYSTEINE 3 ML: 200 INHALANT RESPIRATORY (INHALATION) at 19:52

## 2018-01-01 RX ADMIN — IPRATROPIUM BROMIDE AND ALBUTEROL SULFATE 3 ML: .5; 3 SOLUTION RESPIRATORY (INHALATION) at 06:37

## 2018-01-01 RX ADMIN — FAMOTIDINE 20 MG: 10 INJECTION, SOLUTION INTRAVENOUS at 19:30

## 2018-01-01 RX ADMIN — IPRATROPIUM BROMIDE AND ALBUTEROL SULFATE 3 ML: .5; 3 SOLUTION RESPIRATORY (INHALATION) at 22:37

## 2018-01-01 RX ADMIN — LEVETIRACETAM 1500 MG: 500 TABLET ORAL at 05:07

## 2018-01-01 RX ADMIN — POLYETHYLENE GLYCOL (3350) 1 PACKET: 17 POWDER, FOR SOLUTION ORAL at 05:51

## 2018-01-01 RX ADMIN — HEPARIN SODIUM 5000 UNITS: 5000 INJECTION, SOLUTION INTRAVENOUS; SUBCUTANEOUS at 14:42

## 2018-01-01 RX ADMIN — FUROSEMIDE 20 MG: 10 INJECTION, SOLUTION INTRAMUSCULAR; INTRAVENOUS at 15:51

## 2018-01-01 RX ADMIN — IPRATROPIUM BROMIDE AND ALBUTEROL SULFATE 3 ML: .5; 3 SOLUTION RESPIRATORY (INHALATION) at 10:26

## 2018-01-01 RX ADMIN — Medication 81 MG: at 05:18

## 2018-01-01 RX ADMIN — LINEZOLID 600 MG: 600 TABLET, FILM COATED ORAL at 17:16

## 2018-01-01 RX ADMIN — ACETYLCYSTEINE 3 ML: 200 INHALANT RESPIRATORY (INHALATION) at 10:48

## 2018-01-01 RX ADMIN — HYDROCORTISONE SODIUM SUCCINATE 50 MG: 100 INJECTION, POWDER, FOR SOLUTION INTRAMUSCULAR; INTRAVENOUS at 05:03

## 2018-01-01 RX ADMIN — VALPROATE SODIUM 500 MG: 100 INJECTION, SOLUTION INTRAVENOUS at 07:53

## 2018-01-01 RX ADMIN — IPRATROPIUM BROMIDE AND ALBUTEROL SULFATE 3 ML: .5; 3 SOLUTION RESPIRATORY (INHALATION) at 18:34

## 2018-01-01 RX ADMIN — LINEZOLID 600 MG: 600 TABLET, FILM COATED ORAL at 17:25

## 2018-01-01 RX ADMIN — POLYETHYLENE GLYCOL 3350, SODIUM SULFATE ANHYDROUS, SODIUM BICARBONATE, SODIUM CHLORIDE, POTASSIUM CHLORIDE 2 L: 236; 22.74; 6.74; 5.86; 2.97 POWDER, FOR SOLUTION ORAL at 11:06

## 2018-01-01 RX ADMIN — HYDROCORTISONE SODIUM SUCCINATE 50 MG: 100 INJECTION, POWDER, FOR SOLUTION INTRAMUSCULAR; INTRAVENOUS at 17:28

## 2018-01-01 RX ADMIN — ASPIRIN 300 MG: 300 SUPPOSITORY RECTAL at 10:02

## 2018-01-01 RX ADMIN — ALBUMIN (HUMAN) 25 G: 0.25 INJECTION, SOLUTION INTRAVENOUS at 01:06

## 2018-01-01 RX ADMIN — FAMOTIDINE 20 MG: 20 TABLET ORAL at 05:39

## 2018-01-01 RX ADMIN — LEVETIRACETAM 1000 MG: 500 TABLET ORAL at 17:24

## 2018-01-01 RX ADMIN — POTASSIUM PHOSPHATE, MONOBASIC AND POTASSIUM PHOSPHATE, DIBASIC 15 MMOL: 224; 236 INJECTION, SOLUTION INTRAVENOUS at 12:06

## 2018-01-01 RX ADMIN — LEVETIRACETAM 1000 MG: 500 TABLET ORAL at 05:16

## 2018-01-01 RX ADMIN — HEPARIN SODIUM 5000 UNITS: 5000 INJECTION, SOLUTION INTRAVENOUS; SUBCUTANEOUS at 14:52

## 2018-01-01 RX ADMIN — POTASSIUM BICARBONATE 25 MEQ: 25 TABLET, EFFERVESCENT ORAL at 11:43

## 2018-01-01 RX ADMIN — IRON SUCROSE 200 MG: 20 INJECTION, SOLUTION INTRAVENOUS at 05:52

## 2018-01-01 RX ADMIN — LEVOCARNITINE 1000 MG: 1 SOLUTION ORAL at 10:26

## 2018-01-01 RX ADMIN — ALBUMIN (HUMAN) 25 G: 0.25 INJECTION, SOLUTION INTRAVENOUS at 15:40

## 2018-01-01 RX ADMIN — STANDARDIZED SENNA CONCENTRATE AND DOCUSATE SODIUM 2 TABLET: 8.6; 5 TABLET ORAL at 06:38

## 2018-01-01 RX ADMIN — SODIUM CHLORIDE 1500 MG: 9 INJECTION, SOLUTION INTRAVENOUS at 06:38

## 2018-01-01 RX ADMIN — STANDARDIZED SENNA CONCENTRATE AND DOCUSATE SODIUM 2 TABLET: 8.6; 5 TABLET ORAL at 17:24

## 2018-01-01 RX ADMIN — IPRATROPIUM BROMIDE AND ALBUTEROL SULFATE 3 ML: .5; 3 SOLUTION RESPIRATORY (INHALATION) at 07:03

## 2018-01-01 RX ADMIN — PIPERACILLIN SODIUM AND TAZOBACTAM SODIUM 4.5 G: 4; .5 INJECTION, POWDER, FOR SOLUTION INTRAVENOUS at 20:35

## 2018-01-01 RX ADMIN — FAMOTIDINE 20 MG: 20 TABLET ORAL at 06:08

## 2018-01-01 RX ADMIN — FAMOTIDINE 20 MG: 10 INJECTION, SOLUTION INTRAVENOUS at 05:36

## 2018-01-01 RX ADMIN — HEPARIN SODIUM 5000 UNITS: 5000 INJECTION, SOLUTION INTRAVENOUS; SUBCUTANEOUS at 22:00

## 2018-01-01 RX ADMIN — IPRATROPIUM BROMIDE AND ALBUTEROL SULFATE 3 ML: .5; 3 SOLUTION RESPIRATORY (INHALATION) at 14:55

## 2018-01-01 RX ADMIN — CALCIUM CARBONATE-CHOLECALCIFEROL TAB 250 MG-125 UNIT 1 TABLET: 250-125 TAB at 06:20

## 2018-01-01 RX ADMIN — HEPARIN SODIUM 5000 UNITS: 5000 INJECTION, SOLUTION INTRAVENOUS; SUBCUTANEOUS at 21:41

## 2018-01-01 RX ADMIN — FAMOTIDINE 20 MG: 20 TABLET ORAL at 05:10

## 2018-01-01 RX ADMIN — SENNOSIDES AND DOCUSATE SODIUM 2 TABLET: 8.6; 5 TABLET ORAL at 17:24

## 2018-01-01 RX ADMIN — Medication 81 MG: at 04:59

## 2018-01-01 RX ADMIN — LINEZOLID 600 MG: 600 TABLET, FILM COATED ORAL at 17:24

## 2018-01-01 RX ADMIN — Medication 81 MG: at 05:17

## 2018-01-01 RX ADMIN — HEPARIN SODIUM 5000 UNITS: 5000 INJECTION, SOLUTION INTRAVENOUS; SUBCUTANEOUS at 22:57

## 2018-01-01 RX ADMIN — IPRATROPIUM BROMIDE AND ALBUTEROL SULFATE 3 ML: .5; 3 SOLUTION RESPIRATORY (INHALATION) at 18:47

## 2018-01-01 RX ADMIN — IRON SUCROSE 200 MG: 20 INJECTION, SOLUTION INTRAVENOUS at 05:10

## 2018-01-01 RX ADMIN — LEVOCARNITINE 1000 MG: 1 SOLUTION ORAL at 08:49

## 2018-01-01 RX ADMIN — ACETAMINOPHEN 650 MG: 325 TABLET, FILM COATED ORAL at 04:28

## 2018-01-01 RX ADMIN — FENTANYL CITRATE 25 MCG: 50 INJECTION, SOLUTION INTRAMUSCULAR; INTRAVENOUS at 03:52

## 2018-01-01 RX ADMIN — MIDODRINE HYDROCHLORIDE 10 MG: 5 TABLET ORAL at 12:17

## 2018-01-01 RX ADMIN — MIDODRINE HYDROCHLORIDE 10 MG: 5 TABLET ORAL at 17:16

## 2018-01-01 RX ADMIN — PROPOFOL 25 MG: 10 INJECTION, EMULSION INTRAVENOUS at 15:23

## 2018-01-01 RX ADMIN — HEPARIN SODIUM 5000 UNITS: 5000 INJECTION, SOLUTION INTRAVENOUS; SUBCUTANEOUS at 06:08

## 2018-01-01 RX ADMIN — HYDROCORTISONE SODIUM SUCCINATE 50 MG: 100 INJECTION, POWDER, FOR SOLUTION INTRAMUSCULAR; INTRAVENOUS at 11:51

## 2018-01-01 RX ADMIN — METOPROLOL TARTRATE 12.5 MG: 25 TABLET, FILM COATED ORAL at 06:16

## 2018-01-01 RX ADMIN — FENTANYL CITRATE 50 MCG: 50 INJECTION INTRAMUSCULAR; INTRAVENOUS at 14:45

## 2018-01-01 RX ADMIN — POTASSIUM BICARBONATE 25 MEQ: 25 TABLET, EFFERVESCENT ORAL at 23:40

## 2018-01-01 RX ADMIN — ACETYLCYSTEINE 3 ML: 200 INHALANT RESPIRATORY (INHALATION) at 23:06

## 2018-01-01 RX ADMIN — SODIUM CHLORIDE 500 ML: 9 INJECTION, SOLUTION INTRAVENOUS at 09:18

## 2018-01-01 RX ADMIN — VALPROATE SODIUM 1000 MG: 100 INJECTION, SOLUTION INTRAVENOUS at 09:15

## 2018-01-01 RX ADMIN — DIVALPROEX SODIUM 500 MG: 125 CAPSULE, COATED PELLETS ORAL at 05:18

## 2018-01-01 RX ADMIN — HEPARIN SODIUM 5000 UNITS: 5000 INJECTION, SOLUTION INTRAVENOUS; SUBCUTANEOUS at 15:00

## 2018-01-01 RX ADMIN — SENNOSIDES AND DOCUSATE SODIUM 2 TABLET: 8.6; 5 TABLET ORAL at 17:53

## 2018-01-01 RX ADMIN — LEVOCARNITINE 500 MG: 1 SOLUTION ORAL at 17:25

## 2018-01-01 RX ADMIN — FUROSEMIDE 20 MG: 10 INJECTION, SOLUTION INTRAMUSCULAR; INTRAVENOUS at 21:22

## 2018-01-01 RX ADMIN — SODIUM PHOSPHATE 133 ML: 7; 19 ENEMA RECTAL at 14:02

## 2018-01-01 RX ADMIN — SENNOSIDES AND DOCUSATE SODIUM 2 TABLET: 8.6; 5 TABLET ORAL at 17:30

## 2018-01-01 RX ADMIN — LEVETIRACETAM 1500 MG: 500 TABLET ORAL at 17:16

## 2018-01-01 RX ADMIN — ETOMIDATE 20 MG: 2 INJECTION INTRAVENOUS at 11:42

## 2018-01-01 RX ADMIN — BISACODYL 10 MG: 10 SUPPOSITORY RECTAL at 15:16

## 2018-01-01 RX ADMIN — POTASSIUM BICARBONATE 25 MEQ: 25 TABLET, EFFERVESCENT ORAL at 17:28

## 2018-01-01 RX ADMIN — Medication 81 MG: at 06:05

## 2018-01-01 RX ADMIN — IPRATROPIUM BROMIDE AND ALBUTEROL SULFATE 3 ML: .5; 3 SOLUTION RESPIRATORY (INHALATION) at 14:35

## 2018-01-01 RX ADMIN — ESLICARBAZEPINE ACETATE 400 MG: 800 TABLET ORAL at 20:17

## 2018-01-01 RX ADMIN — SENNOSIDES AND DOCUSATE SODIUM 2 TABLET: 8.6; 5 TABLET ORAL at 05:10

## 2018-01-01 RX ADMIN — IPRATROPIUM BROMIDE AND ALBUTEROL SULFATE 3 ML: .5; 3 SOLUTION RESPIRATORY (INHALATION) at 22:33

## 2018-01-01 RX ADMIN — IPRATROPIUM BROMIDE AND ALBUTEROL SULFATE 3 ML: .5; 3 SOLUTION RESPIRATORY (INHALATION) at 07:27

## 2018-01-01 RX ADMIN — ASPIRIN 81 MG 81 MG: 81 TABLET ORAL at 17:54

## 2018-01-01 RX ADMIN — METOPROLOL TARTRATE 12.5 MG: 25 TABLET, FILM COATED ORAL at 17:54

## 2018-01-01 RX ADMIN — STANDARDIZED SENNA CONCENTRATE AND DOCUSATE SODIUM 2 TABLET: 8.6; 5 TABLET ORAL at 05:43

## 2018-01-01 RX ADMIN — MAGNESIUM HYDROXIDE 30 ML: 400 SUSPENSION ORAL at 13:52

## 2018-01-01 RX ADMIN — MAGESIUM CITRATE 296 ML: 1.75 LIQUID ORAL at 14:54

## 2018-01-01 RX ADMIN — ACETYLCYSTEINE 3 ML: 200 INHALANT RESPIRATORY (INHALATION) at 15:23

## 2018-01-01 RX ADMIN — FAMOTIDINE 20 MG: 20 TABLET ORAL at 05:04

## 2018-01-01 RX ADMIN — IPRATROPIUM BROMIDE AND ALBUTEROL SULFATE 3 ML: .5; 3 SOLUTION RESPIRATORY (INHALATION) at 11:32

## 2018-01-01 RX ADMIN — DIBASIC SODIUM PHOSPHATE, MONOBASIC POTASSIUM PHOSPHATE AND MONOBASIC SODIUM PHOSPHATE 2 TABLET: 852; 155; 130 TABLET ORAL at 18:19

## 2018-01-01 RX ADMIN — ASPIRIN 81 MG 81 MG: 81 TABLET ORAL at 17:36

## 2018-01-01 RX ADMIN — SODIUM CHLORIDE 1000 MG: 9 INJECTION, SOLUTION INTRAVENOUS at 06:00

## 2018-01-01 RX ADMIN — LEVOCARNITINE 1000 MG: 1 SOLUTION ORAL at 17:30

## 2018-01-01 RX ADMIN — MORPHINE SULFATE 10 MG: 10 INJECTION INTRAVENOUS at 05:27

## 2018-01-01 RX ADMIN — FENTANYL CITRATE 50 MCG: 50 INJECTION INTRAMUSCULAR; INTRAVENOUS at 12:18

## 2018-01-01 RX ADMIN — FENTANYL CITRATE 50 MCG: 50 INJECTION INTRAMUSCULAR; INTRAVENOUS at 08:49

## 2018-01-01 RX ADMIN — MIDODRINE HYDROCHLORIDE 10 MG: 5 TABLET ORAL at 11:54

## 2018-01-01 RX ADMIN — LEVOCARNITINE 500 MG: 1 SOLUTION ORAL at 07:40

## 2018-01-01 RX ADMIN — IPRATROPIUM BROMIDE AND ALBUTEROL SULFATE 3 ML: .5; 3 SOLUTION RESPIRATORY (INHALATION) at 15:43

## 2018-01-01 RX ADMIN — ACETYLCYSTEINE 3 ML: 200 INHALANT RESPIRATORY (INHALATION) at 06:33

## 2018-01-01 RX ADMIN — LEVOCARNITINE 1000 MG: 1 SOLUTION ORAL at 17:24

## 2018-01-01 RX ADMIN — THERA TABS 1 TABLET: TAB at 05:04

## 2018-01-01 RX ADMIN — IPRATROPIUM BROMIDE AND ALBUTEROL SULFATE 3 ML: .5; 3 SOLUTION RESPIRATORY (INHALATION) at 06:56

## 2018-01-01 RX ADMIN — HEPARIN SODIUM 1050 UNITS/HR: 5000 INJECTION, SOLUTION INTRAVENOUS at 17:46

## 2018-01-01 RX ADMIN — SENNOSIDES AND DOCUSATE SODIUM 2 TABLET: 8.6; 5 TABLET ORAL at 05:16

## 2018-01-01 RX ADMIN — FUROSEMIDE 20 MG: 10 INJECTION, SOLUTION INTRAMUSCULAR; INTRAVENOUS at 06:08

## 2018-01-01 RX ADMIN — IPRATROPIUM BROMIDE AND ALBUTEROL SULFATE 3 ML: .5; 3 SOLUTION RESPIRATORY (INHALATION) at 02:47

## 2018-01-01 RX ADMIN — THERA TABS 1 TABLET: TAB at 05:16

## 2018-01-01 RX ADMIN — PNEUMOCOCCAL 13-VALENT CONJUGATE VACCINE 0.5 ML: 2.2; 2.2; 2.2; 2.2; 2.2; 4.4; 2.2; 2.2; 2.2; 2.2; 2.2; 2.2; 2.2 INJECTION, SUSPENSION INTRAMUSCULAR at 03:10

## 2018-01-01 RX ADMIN — SENNOSIDES AND DOCUSATE SODIUM 2 TABLET: 8.6; 5 TABLET ORAL at 11:36

## 2018-01-01 RX ADMIN — IPRATROPIUM BROMIDE AND ALBUTEROL SULFATE 3 ML: .5; 3 SOLUTION RESPIRATORY (INHALATION) at 03:24

## 2018-01-01 RX ADMIN — SODIUM CHLORIDE: 9 INJECTION, SOLUTION INTRAVENOUS at 17:39

## 2018-01-01 RX ADMIN — DIBASIC SODIUM PHOSPHATE, MONOBASIC POTASSIUM PHOSPHATE AND MONOBASIC SODIUM PHOSPHATE 2 TABLET: 852; 155; 130 TABLET ORAL at 05:04

## 2018-01-01 RX ADMIN — LEVOCARNITINE 500 MG: 1 SOLUTION ORAL at 17:18

## 2018-01-01 RX ADMIN — HYDROCORTISONE SODIUM SUCCINATE 50 MG: 100 INJECTION, POWDER, FOR SOLUTION INTRAMUSCULAR; INTRAVENOUS at 13:00

## 2018-01-01 RX ADMIN — HEPARIN SODIUM 5000 UNITS: 5000 INJECTION, SOLUTION INTRAVENOUS; SUBCUTANEOUS at 06:38

## 2018-01-01 RX ADMIN — SODIUM CHLORIDE 1250 MG: 9 INJECTION, SOLUTION INTRAVENOUS at 11:05

## 2018-01-01 RX ADMIN — SODIUM CHLORIDE 1250 MG: 9 INJECTION, SOLUTION INTRAVENOUS at 14:07

## 2018-01-01 ASSESSMENT — ENCOUNTER SYMPTOMS
SHORTNESS OF BREATH: 0
DIARRHEA: 0
FEVER: 0
VOMITING: 0
FEVER: 0
NERVOUS/ANXIOUS: 1
COUGH: 0
NERVOUS/ANXIOUS: 0
MYALGIAS: 0
ABDOMINAL PAIN: 0
ABDOMINAL PAIN: 0
FEVER: 0
WEIGHT LOSS: 1
WHEEZING: 1
COUGH: 0
VOMITING: 0
EYE REDNESS: 0
NAUSEA: 0
TINGLING: 0
COUGH: 1
CHILLS: 0
DIARRHEA: 0
MEMORY LOSS: 1
DEPRESSION: 0
EYE REDNESS: 0
FALLS: 1
CHILLS: 0
WEAKNESS: 1
DIZZINESS: 0
COUGH: 0
EYES NEGATIVE: 1
PALPITATIONS: 0
VOMITING: 0
HEADACHES: 0
DIZZINESS: 0
WEAKNESS: 0
SPUTUM PRODUCTION: 0
CONSTIPATION: 0
EYE DISCHARGE: 0
STRIDOR: 0
CHILLS: 0
DIARRHEA: 0
SHORTNESS OF BREATH: 0
CARDIOVASCULAR NEGATIVE: 1
CONSTIPATION: 0
SPUTUM PRODUCTION: 1
LOSS OF CONSCIOUSNESS: 0
NAUSEA: 0
CONSTIPATION: 0
DIZZINESS: 0
FEVER: 1
HEMOPTYSIS: 0
EYE DISCHARGE: 0
SHORTNESS OF BREATH: 0
CHILLS: 0
GASTROINTESTINAL NEGATIVE: 1
SPUTUM PRODUCTION: 0
COUGH: 1
SORE THROAT: 1
ABDOMINAL PAIN: 0
NAUSEA: 0
MUSCULOSKELETAL NEGATIVE: 1
FOCAL WEAKNESS: 0
DIZZINESS: 0

## 2018-01-01 ASSESSMENT — PAIN SCALES - GENERAL
PAINLEVEL_OUTOF10: 0
PAINLEVEL_OUTOF10: 0
PAINLEVEL_OUTOF10: ASSUMED PAIN PRESENT
PAINLEVEL_OUTOF10: 0
PAINLEVEL_OUTOF10: 0
PAINLEVEL_OUTOF10: 10
PAINLEVEL_OUTOF10: 0
PAINLEVEL_OUTOF10: ASSUMED PAIN PRESENT
PAINLEVEL_OUTOF10: 0
PAINLEVEL_OUTOF10: 0
PAINLEVEL_OUTOF10: ASSUMED PAIN PRESENT
PAINLEVEL_OUTOF10: ASSUMED PAIN PRESENT
PAINLEVEL_OUTOF10: 0
PAINLEVEL_OUTOF10: 8
PAINLEVEL_OUTOF10: 5
PAINLEVEL_OUTOF10: 10
PAINLEVEL_OUTOF10: 0
PAINLEVEL_OUTOF10: ASSUMED PAIN PRESENT
PAINLEVEL_OUTOF10: 10
PAINLEVEL_OUTOF10: ASSUMED PAIN PRESENT
PAINLEVEL_OUTOF10: 0
PAINLEVEL_OUTOF10: 2
PAINLEVEL_OUTOF10: 0
PAINLEVEL_OUTOF10: ASSUMED PAIN PRESENT

## 2018-01-01 ASSESSMENT — LIFESTYLE VARIABLES
EVER_SMOKED: NEVER
EVER_SMOKED: NEVER
ALCOHOL_USE: NO
EVER_SMOKED: NEVER
ALCOHOL_USE: NO
EVER_SMOKED: NEVER

## 2018-01-01 ASSESSMENT — PATIENT HEALTH QUESTIONNAIRE - PHQ9
2. FEELING DOWN, DEPRESSED, IRRITABLE, OR HOPELESS: NOT AT ALL
SUM OF ALL RESPONSES TO PHQ9 QUESTIONS 1 AND 2: 0
SUM OF ALL RESPONSES TO PHQ9 QUESTIONS 1 AND 2: 0
1. LITTLE INTEREST OR PLEASURE IN DOING THINGS: NOT AT ALL
SUM OF ALL RESPONSES TO PHQ9 QUESTIONS 1 AND 2: 0
2. FEELING DOWN, DEPRESSED, IRRITABLE, OR HOPELESS: NOT AT ALL
1. LITTLE INTEREST OR PLEASURE IN DOING THINGS: NOT AT ALL
2. FEELING DOWN, DEPRESSED, IRRITABLE, OR HOPELESS: NOT AT ALL
1. LITTLE INTEREST OR PLEASURE IN DOING THINGS: NOT AT ALL
1. LITTLE INTEREST OR PLEASURE IN DOING THINGS: NOT AT ALL
SUM OF ALL RESPONSES TO PHQ9 QUESTIONS 1 AND 2: 0
1. LITTLE INTEREST OR PLEASURE IN DOING THINGS: NOT AT ALL
SUM OF ALL RESPONSES TO PHQ9 QUESTIONS 1 AND 2: 0
2. FEELING DOWN, DEPRESSED, IRRITABLE, OR HOPELESS: NOT AT ALL
2. FEELING DOWN, DEPRESSED, IRRITABLE, OR HOPELESS: NOT AT ALL

## 2018-01-01 ASSESSMENT — COGNITIVE AND FUNCTIONAL STATUS - GENERAL
STANDING UP FROM CHAIR USING ARMS: A LOT
TURNING FROM BACK TO SIDE WHILE IN FLAT BAD: A LITTLE
MOVING FROM LYING ON BACK TO SITTING ON SIDE OF FLAT BED: A LITTLE
HELP NEEDED FOR BATHING: TOTAL
MOBILITY SCORE: 13
MOVING FROM LYING ON BACK TO SITTING ON SIDE OF FLAT BED: A LOT
MOVING TO AND FROM BED TO CHAIR: UNABLE
SUGGESTED CMS G CODE MODIFIER DAILY ACTIVITY: CK
DRESSING REGULAR LOWER BODY CLOTHING: TOTAL
TOILETING: TOTAL
TOILETING: A LITTLE
WALKING IN HOSPITAL ROOM: A LITTLE
HELP NEEDED FOR BATHING: A LOT
DAILY ACTIVITIY SCORE: 12
TOILETING: A LOT
SUGGESTED CMS G CODE MODIFIER MOBILITY: CL
STANDING UP FROM CHAIR USING ARMS: A LOT
MOBILITY SCORE: 15
CLIMB 3 TO 5 STEPS WITH RAILING: TOTAL
EATING MEALS: A LOT
PERSONAL GROOMING: A LITTLE
STANDING UP FROM CHAIR USING ARMS: TOTAL
WALKING IN HOSPITAL ROOM: A LOT
WALKING IN HOSPITAL ROOM: TOTAL
TURNING FROM BACK TO SIDE WHILE IN FLAT BAD: A LITTLE
MOVING TO AND FROM BED TO CHAIR: A LOT
STANDING UP FROM CHAIR USING ARMS: A LITTLE
DRESSING REGULAR UPPER BODY CLOTHING: A LITTLE
HELP NEEDED FOR BATHING: A LITTLE
DAILY ACTIVITIY SCORE: 6
PERSONAL GROOMING: A LITTLE
MOBILITY SCORE: 6
DRESSING REGULAR LOWER BODY CLOTHING: A LOT
DAILY ACTIVITIY SCORE: 19
SUGGESTED CMS G CODE MODIFIER DAILY ACTIVITY: CK
DRESSING REGULAR UPPER BODY CLOTHING: TOTAL
HELP NEEDED FOR BATHING: A LOT
EATING MEALS: TOTAL
CLIMB 3 TO 5 STEPS WITH RAILING: A LOT
DRESSING REGULAR UPPER BODY CLOTHING: A LITTLE
DRESSING REGULAR UPPER BODY CLOTHING: A LOT
MOVING FROM LYING ON BACK TO SITTING ON SIDE OF FLAT BED: UNABLE
MOVING TO AND FROM BED TO CHAIR: A LITTLE
SUGGESTED CMS G CODE MODIFIER DAILY ACTIVITY: CN
MOVING TO AND FROM BED TO CHAIR: A LOT
CLIMB 3 TO 5 STEPS WITH RAILING: A LOT
DAILY ACTIVITIY SCORE: 16
SUGGESTED CMS G CODE MODIFIER MOBILITY: CK
TOILETING: A LOT
TURNING FROM BACK TO SIDE WHILE IN FLAT BAD: UNABLE
SUGGESTED CMS G CODE MODIFIER DAILY ACTIVITY: CL
SUGGESTED CMS G CODE MODIFIER MOBILITY: CN
MOVING FROM LYING ON BACK TO SITTING ON SIDE OF FLAT BED: A LITTLE
DRESSING REGULAR LOWER BODY CLOTHING: A LITTLE
PERSONAL GROOMING: A LOT
TURNING FROM BACK TO SIDE WHILE IN FLAT BAD: A LITTLE
SUGGESTED CMS G CODE MODIFIER MOBILITY: CK
CLIMB 3 TO 5 STEPS WITH RAILING: A LOT
DRESSING REGULAR LOWER BODY CLOTHING: A LOT
PERSONAL GROOMING: TOTAL
MOBILITY SCORE: 16
WALKING IN HOSPITAL ROOM: A LOT

## 2018-01-01 ASSESSMENT — COPD QUESTIONNAIRES
COPD SCREENING SCORE: 2
DURING THE PAST 4 WEEKS HOW MUCH DID YOU FEEL SHORT OF BREATH: NONE/LITTLE OF THE TIME
DO YOU EVER COUGH UP ANY MUCUS OR PHLEGM?: YES, A FEW DAYS A WEEK OR MONTH
DURING THE PAST 4 WEEKS HOW MUCH DID YOU FEEL SHORT OF BREATH: NONE/LITTLE OF THE TIME
HAVE YOU SMOKED AT LEAST 100 CIGARETTES IN YOUR ENTIRE LIFE: NO/DON'T KNOW
COPD SCREENING SCORE: 3
DO YOU EVER COUGH UP ANY MUCUS OR PHLEGM?: NO/ONLY WITH OCCASIONAL COLDS OR INFECTIONS
HAVE YOU SMOKED AT LEAST 100 CIGARETTES IN YOUR ENTIRE LIFE: NO/DON'T KNOW

## 2018-01-01 ASSESSMENT — GAIT ASSESSMENTS
ASSISTIVE DEVICE: FRONT WHEEL WALKER
DISTANCE (FEET): 5
GAIT LEVEL OF ASSIST: CONTACT GUARD ASSIST
ASSISTIVE DEVICE: FRONT WHEEL WALKER
GAIT LEVEL OF ASSIST: CONTACT GUARD ASSIST
GAIT LEVEL OF ASSIST: UNABLE TO PARTICIPATE
DISTANCE (FEET): 40
DEVIATION: DECREASED HEEL STRIKE;DECREASED TOE OFF;ANTALGIC

## 2018-01-01 ASSESSMENT — PULMONARY FUNCTION TESTS
FVC: .7
FVC: .6
FVC: .4
FVC: .6

## 2018-01-01 ASSESSMENT — ACTIVITIES OF DAILY LIVING (ADL): TOILETING: INDEPENDENT

## 2018-01-01 NOTE — PROGRESS NOTES
Subjective:      Rene Becerril is a 89 y.o. female who presents with Cough (patient states concern for bronchitis)    History reviewed. No pertinent past medical history.    Social History     Social History   • Marital status: Unknown     Spouse name: N/A   • Number of children: N/A   • Years of education: N/A     Occupational History   • Not on file.     Social History Main Topics   • Smoking status: Never Smoker   • Smokeless tobacco: Never Used   • Alcohol use No   • Drug use: No   • Sexual activity: Not on file     Other Topics Concern   • Not on file     Social History Narrative   • No narrative on file     History reviewed. No pertinent family history.    Allergies: Patient has no known allergies.    Patient is a 89-year-old female who presents today with complaint of productive cough over the last 7 days. She is brought in today by her daughter. Patient has had some low-grade fever and aches, she denies shortness breath or difficulty breathing. No prior history of pneumonia.          Cough   This is a new problem. The current episode started in the past 7 days. The problem has been unchanged. The problem occurs constantly. The cough is non-productive. Associated symptoms include a sore throat and wheezing. Pertinent negatives include no hemoptysis. Nothing aggravates the symptoms. She has tried nothing for the symptoms. The treatment provided no relief.       Review of Systems   Constitutional: Positive for malaise/fatigue.   HENT: Positive for congestion and sore throat.    Eyes: Negative.    Respiratory: Positive for cough and wheezing. Negative for hemoptysis and sputum production.    Cardiovascular: Negative.    Gastrointestinal: Negative.    Musculoskeletal: Negative.    Skin: Negative.    All other systems reviewed and are negative.         Objective:     /88   Pulse 77   Temp 37.5 °C (99.5 °F)   Resp 14   Ht 1.524 m (5')   Wt 45.4 kg (100 lb)   SpO2 97%   BMI 19.53 kg/m²      Physical  Exam   Constitutional: She is oriented to person, place, and time. She appears well-developed and well-nourished. No distress.   HENT:   Head: Normocephalic.   Right Ear: External ear normal.   Left Ear: External ear normal.   Nose: Nose normal.   Mouth/Throat: Oropharynx is clear and moist. No oropharyngeal exudate.   Eyes: Conjunctivae and EOM are normal. Pupils are equal, round, and reactive to light.   Neck: Normal range of motion. Neck supple.   Cardiovascular: Normal rate and regular rhythm.    Pulmonary/Chest: Effort normal.   Few scattered wheezes  Breath sounds harsh    Musculoskeletal: Normal range of motion.   Neurological: She is alert and oriented to person, place, and time.   Skin: Skin is warm and dry. Capillary refill takes less than 2 seconds. She is not diaphoretic.   Psychiatric: She has a normal mood and affect. Her behavior is normal. Judgment and thought content normal.   Vitals reviewed.    XR chest:             1/1/2018 3:53 PM    HISTORY/REASON FOR EXAM:  Cough.      TECHNIQUE/EXAM DESCRIPTION AND NUMBER OF VIEWS:  Two views of the chest.    COMPARISON:  None.    FINDINGS:  The lungs are clear.    The cardiac silhouette: normal in size.    Pleura: There are no pleural effusion or pneumothoraces.    Osseous structures: No significant bony abnormality is present.   Impression       No acute cardiopulmonary abnormality identified.            Assessment/Plan:   Bronchitis  Cough     -doxycycline   -tylenol PRN   -ER precautions for respiratory distress    -follow up otherwise for persistent symptoms  There are no diagnoses linked to this encounter.

## 2018-02-01 NOTE — PROGRESS NOTES
Subjective:      Rene Becerril is a 89 y.o. female who presents with Cough (chest congestion x 1 week was treated with doxycycline )    Chief Complaint   Patient presents with   • Cough     chest congestion x 1 week was treated with doxycycline         - This is a very pleasant 89 y.o. female with complaints of cough x 1week. No nv/cp/sob. Feeding well, acting at baseline otherwise. Had some fevers past couple days           ALLERGIES:  Patient has no known allergies.     PMH:  No past medical history on file.     MEDS:    Current Outpatient Prescriptions:   •  cefdinir (OMNICEF) 300 MG Cap, Take 1 Cap by mouth every 12 hours for 6 days., Disp: 12 Cap, Rfl: 0  •  Dextromethorphan-Guaifenesin (MUCINEX DM MAXIMUM STRENGTH)  MG TABLET SR 12 HR, Take 1 tablet by mouth every 12 hours as needed., Disp: 20 Tab, Rfl: 0  •  acetaminophen (TYLENOL) 325 MG Suppos, Insert 325 mg in rectum every four hours as needed., Disp: , Rfl:     Current Facility-Administered Medications:   •  cefTRIAXone (ROCEPHIN) injection 1 g, 1 g, Intramuscular, Once, Amrik Perla M.D.    ** I have documented what I find to be significant in regards to past medical, social, family and surgical history  in my HPI or under PMH/PSH/FH review section, otherwise it is contributory **           HPI    Review of Systems   Constitutional: Positive for fever. Negative for chills.   HENT: Negative for congestion.    Respiratory: Positive for cough and sputum production.    Neurological: Negative for dizziness and focal weakness.          Objective:     /98   Pulse 84   Temp (!) 38.7 °C (101.6 °F)   Resp 12   Wt 45.4 kg (100 lb)   SpO2 93%   BMI 19.53 kg/m²      Physical Exam   Constitutional: She appears well-developed. No distress.   HENT:   Head: Normocephalic and atraumatic.   Mouth/Throat: Oropharynx is clear and moist.   Eyes: Conjunctivae are normal.   Neck: Neck supple.   Cardiovascular: Regular rhythm.    No murmur  heard.  Pulmonary/Chest: Effort normal and breath sounds normal. No respiratory distress.   Neurological: She is alert. She exhibits normal muscle tone.   Skin: Skin is warm and dry.   Psychiatric: She has a normal mood and affect. Judgment normal.   Nursing note and vitals reviewed.              Assessment/Plan:         1. Acute bacterial bronchitis  cefTRIAXone (ROCEPHIN) injection 1 g    cefdinir (OMNICEF) 300 MG Cap    Dextromethorphan-Guaifenesin (MUCINEX DM MAXIMUM STRENGTH)  MG TABLET SR 12 HR   2. RTI (respiratory tract infection)  DX-CHEST-2 VIEWS    POCT Influenza A/B         - rest hydrate  - 24hr recheck w/ PCP  - ER if worse     Dx & d/c instructions discussed w/ patient and/or family members. Follow up w/ Prvt Dr or here in 1 day if not getting better, sooner if needed,  ER if worse and UC/PCP unavailable.        Possible side effects (i.e. Rash, GI upset/constipation, sedation, elevation of BP or sugars) of any medications given discussed.

## 2018-07-14 PROBLEM — R73.9 HYPERGLYCEMIA: Status: ACTIVE | Noted: 2018-01-01

## 2018-07-14 PROBLEM — D64.9 NORMOCYTIC ANEMIA: Status: ACTIVE | Noted: 2018-01-01

## 2018-07-14 PROBLEM — S72.002A FRACTURE OF FEMORAL NECK, LEFT (HCC): Status: ACTIVE | Noted: 2018-01-01

## 2018-07-14 PROBLEM — N17.9 ACUTE KIDNEY INJURY (HCC): Status: ACTIVE | Noted: 2018-01-01

## 2018-07-14 PROBLEM — E86.0 DEHYDRATION: Status: ACTIVE | Noted: 2018-01-01

## 2018-07-14 PROBLEM — D72.829 LEUKOCYTOSIS: Status: ACTIVE | Noted: 2018-01-01

## 2018-07-14 NOTE — ED NOTES
Pt fell yesterday when stepping out of bed. Pt fell on L hip. Has L hip pain and outer rotation. Family states that she has no significant medical hx. Denies hitting head or LOC

## 2018-07-14 NOTE — ASSESSMENT & PLAN NOTE
-No sign of gross bleeding  Iron studies c/w chronic disease   NEPHROLOGY VISIT REPORT    DATE OF VISIT:   12/15/2017     PATIENT NAME: FLORENCIO FOSTER  MEDICAL RECORD NUMBER: [id number]  YOB: 1964     PRIMARY CARE PROVIDER:   Tomás Lance MD    FOLLOW UP OF PROBLEM LIST:   1. Chronic kidney disease stage-III  calculated creatinine clearance 39 ml/min, urine protein/creatinine 0.345, serum creatinine 1.18 mg/dl-November 2017 in the setting of systemic hypertension, cardiorenal syndrome, carboplatinum-based nephrotoxic agents,trastuzumab endothelial damage, positive DONNA-Smith-ribonucleoprotein--- renal histopathology consistent with hypertensive changes versus endothelial cell damaging pathology  2. Vitamin D deficiency-on vitamin D supplementation  3. Secondary hyperparathyroidism-on vitamin D analog therapy  4. Systemic hypertension  5. Hyperlipidemia  6. 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 Sleep apnea, unspecified  8. Ischemic cardiomyopathy-left ventricular ejection fraction 30%-anterior, apical, inferior  9. Breast carcinoma with lymph node metastasis-status post bilateral mastectomy-radiochemotherapy  10. Gastroesophageal reflux disease  11. Anemia of chronic kidney disease/chemotherapy    PHARMACOLOGIC REGIMEN:   Current Medications    ALPRAZOLAM (XANAX) 0.25 MG TABLET    Take 1 tablet by mouth 3 times daily.    ASPIRIN 81 MG TBDP    Take 1 tablet by mouth daily.    BUPROPION (WELLBUTRIN XL) 150 MG 24 HR TABLET    Take 1 tablet by mouth daily.    CALCITRIOL (ROCALTROL) 0.25 MCG CAPSULE    1 cap [id number]    CARVEDILOL (COREG) 12.5 MG TABLET    Take 0.5 tablets by mouth 2 times daily (with meals).    FAMOTIDINE (PEPCID) 20 MG TABLET    Take 1 tablet by mouth 2 times daily.    HYDROCHLOROTHIAZIDE (HYDRODIURIL) 25 MG TABLET    Take 1 tablet by mouth as needed (Leg swelling- take potassium along with this medication).    MAGNESIUM OXIDE (MAG-OX) 400 MG TABLET    Take 800 mg by mouth 2 times daily.    MIRTAZAPINE (REMERON) 15 MG TABLET    Take 1 tablet by  mouth nightly.    POTASSIUM CHLORIDE (K-DUR,KLOR-CON) 20 MEQ CR TABLET    Take 20 mEq by mouth as needed (When taking Hydrochlorothiazide for leg swellng).    ROSUVASTATIN (CRESTOR) 40 MG TABLET    Take 1 tablet by mouth daily.    SERTRALINE (ZOLOFT) 100 MG TABLET    Take 2 tablets by mouth daily.    VITAMIN D, ERGOCALCIFEROL, 71632 UNITS CAPSULE    TAKE ONE CAPSULE BY MOUTH EVERY OTHER WEEK      URINALYSIS:  Specific gravity 1.023, pH 7.5, qualitative protein trace, qualitative blood negative, leukocyte esterase negative, nitrates negative, glucose negative -- urine microscopy many squamous epithelial cells, bacteria of contaminated urinary specimen     INTERIM LABORATORIES:  May 2017--urine protein/creatinine 0.239    August 2017--hematocrit 37.5, hemoglobin 11.9, WBC 5.4, platelets 169, neutrophils 74%, lymphocytes 15%, monocytes 9%, eosinophils 2%--sodium 142, potassium 4.1, chloride 103, bicarbonate 25, blood urea nitrogen 31, creatinine 1.31 glucose 107, calcium 9.9, phosphorus 4.1, magnesium 1.5, albumin 4.1, intact parathyroid hormone 26 November 2017--hematocrit 37.4, hemoglobin 12.0, WBC 4.7, platelet count 150, neutrophils 74%, lymphocytes 14%, monocytes 10%, eosinophils 2%--sodium 142, potassium 3.9, chloride 103, bicarbonate 28, blood urea nitrogen 32, creatinine 1.18, glucose 93, calcium 9.9, phosphorus 4.0, magnesium 1.4, total protein 8.3, albumin 3.9--25 hydroxy vitamin D 36.1, intact parathyroid hormone 39--CA 27.29 24.0, CA 15.3 14    OTHER STUDIES:  CT chest without contrast-November 2017- no lymphadenopathy, normal cardiac silhouette, no pericardial effusion, his esophagus normal, severe coronary calcification, no pleural effusions, right upper lobe reticulated lesion secondary to radiation therapy, diffuse hepatic attenuation, granulomatous changes of liver, no bony lesions    PET scan August 2017 increased uptake left upper lobe lung with CT findings consistent with inflammatory/infectious  infiltrate, right lower lobe increased uptake with CT findings raising possibility of inflammatory changes, increased uptake in subcutaneous right chest wall-posttreatment phenomena, increase activity gluteal musculature consistent with physiologic muscular activity    PT chest-abdomen-pelvis left lower lobe 1.6 x 2.3 nodule, right lower lobe pulmonary nodule 8 mm, diffuse hepatic steatosis    Echocardiogram-left ventricular ejection fraction 30%, hypokinesis of anterior, apical, inferior wall    PROCEDURES/SURGERIES:  Removal of subcutaneous port    HOSPITALIZATIONS:  None    PROVIDER CONTACTS:  Stephen Salvador MD - onocology  Travis Monahan M.D.-pulmonary medicine  Ron Bacon M.D.-general surgery     INTERIM NEPHROLOGY HISTORY:   No urgency, frequency, dysuria or hematuria. Knowledge of proteinuria. No renal colic, urinary incontinence, difficulty initiating urinary stream or sense of incomplete void. One void nocturia.    No edematous changes or cardiorespiratory correlates of volume excess or volume depletion.    No uremic symptoms, distal paresthesias occasional    INTERIM MEDICAL HISTORY:  Presently with clinical remission of invasive ductal carcinoma of the breast    Ongoing evaluated for obstructive sleep apnea and therapy under direction of Travis Monahan pulmonary medicine     PHYSICAL EXAMINATION:   VITAL SIGNS: Blood pressure 128/82, pulse 86, height 5' 4\" (1.626 m), weight 97.9 kg. Body mass index is 37.04 kg/m².  LUNGS: Clear to auscultation without inspiratory rales, expiratory wheezes or pleuritic rub  CARDIAC: Normal rate and rhythm without murmur or pericarditic rub  BACK: Without costovertebral angle tenderness or presacral edema  VASCULATURE: No jugular venous distention sitting  EXTREMITIES: Trace ankle edematous changes   NEUROLOGIC: Normal mentation without muscle fasciculations or myoclonic jerks     TIME SPENT IN DIRECT CONTACT WITH PATIENT: 45 minutes  PERCENT COUNSELING TIME: Greater than 50%  counseling time    IMPRESSION:      1. Chronic kidney disease stage III -- renal clearance function is as good as July 2014--minimal glomerular proteinuria      2. Vitamin D deficiency -- adequate vitamin D supplementation      3. Secondary hyperparathyroidism -- adequate vitamin D analog suppressive therapy      4. Hypomagnesemia -- reasonable oral iron magnesium supplementation with persistent hypomagnesemia      5. Systemic hypertension -- adequate control    MEDICATIONS CHANGES:   None    LABORATORY ORDERS:  August 2018-renal panel, magnesium, intact parathyroid hormone, CBC with differential, urine protein/creatinine    ADDITIONAL ORDERS:  Contact office if need for chemotherapy    REFERRALS/CONSULTATIONS:  None    FOLLOWUP VISIT:  September 2018

## 2018-07-14 NOTE — PROGRESS NOTES
Pt arrived to unit. Slid pt from [facility] to bed, extreme pain, 10/10 to LT hip. Repositioned pt. Placed bonner. Spoke with granddaughter who is POA, informed her of surgery at 0730 tomorrow. Pt notified to call for assistance, bed alarm on.

## 2018-07-14 NOTE — PROGRESS NOTES
Pt resting comfortably in bed. No pain medication required at this time. Granddaughter  At bedside.

## 2018-07-14 NOTE — PROGRESS NOTES
Discussed with Dr. Emerson  Had fall yesterday  Presented today, displaced left femoral neck fx  Ate breakfast  Will plan hemiarthroplasty tomorrow AM

## 2018-07-14 NOTE — CARE PLAN
Problem: Pain Management  Goal: Pain level will decrease to patient's comfort goal  Outcome: PROGRESSING AS EXPECTED  Pt notified to inform the RN of any pain greater than comfort goal.     Problem: Safety  Goal: Will remain free from injury  Outcome: PROGRESSING AS EXPECTED  Plan for surgery tomorrow AM.

## 2018-07-14 NOTE — ED NOTES
IV started. Labs drawn and sent. Unable to place bonner. Straight cath done and urine sent. Family at BS. Call light within reach

## 2018-07-14 NOTE — H&P
Hospital Medicine History & Physical Note    Date of Service  7/14/2018    Primary Care Physician  Pcp Pt States None    Consultants  Orthopedic surgery    Code Status  Full    Chief Complaint  Left hip pain    History of Presenting Illness  89 y.o. female who presented 7/14/2018 with fall with resultant left hip pain.  Patient was in her usual state of health, yesterday morning she went to get out of bed, lost her balance and fell landing on her left hip.  Patient had instant pain.  Son happened to check on her shortly thereafter found her on the floor and assisted her back to bed.  Patient continued to have severe pain.  Today patient continued to have pain so she patient was brought to the emergency department and noted to have a left hip fracture.  Patient has already eaten breakfast so orthopedic surgery will postpone surgery till tomorrow.  Again patient was in her usual state of health, denied any fever, chills, nausea or vomiting.    Review of Systems  Review of Systems   Constitutional: Negative for chills, fever and malaise/fatigue.   HENT: Negative for congestion.    Respiratory: Negative for cough, sputum production, shortness of breath and stridor.    Cardiovascular: Negative for chest pain, palpitations and leg swelling.   Gastrointestinal: Negative for abdominal pain, constipation, diarrhea, nausea and vomiting.   Genitourinary: Negative for dysuria and urgency.   Musculoskeletal: Positive for falls and joint pain (left hip ). Negative for myalgias.   Neurological: Negative for dizziness, tingling, loss of consciousness, weakness and headaches.   Psychiatric/Behavioral: Negative for depression and suicidal ideas.   All other systems reviewed and are negative.      Past Medical History  None    Surgical History  None    Family History  family history is not on file.     Social History   reports that she has never smoked. She has never used smokeless tobacco. She reports that she does not drink alcohol  or use drugs.    Allergies  No Known Allergies    Medications  None       Physical Exam  Blood Pressure : 118/64   Temperature: 37.4 °C (99.4 °F)   Pulse: 91   Respiration: (!) 21   Pulse Oximetry: 97 %     Physical Exam   Constitutional: She is oriented to person, place, and time. No distress.   Thin and frail appearing    HENT:   Head: Normocephalic and atraumatic.   Mouth/Throat: Mucous membranes are dry. No oropharyngeal exudate.   Eyes: Right eye exhibits no discharge. Left eye exhibits no discharge.   Neck: Neck supple. No tracheal deviation present.   Cardiovascular: Normal rate and regular rhythm.  Exam reveals no gallop and no friction rub.    No murmur heard.  Pulmonary/Chest: Effort normal and breath sounds normal. No stridor. No respiratory distress. She has no wheezes. She has no rales. She exhibits no tenderness.   Abdominal: Soft. Bowel sounds are normal. She exhibits no distension. There is no tenderness.   Musculoskeletal: She exhibits tenderness (left hip ). She exhibits no edema.   Decreased rom left hip    Lymphadenopathy:     She has no cervical adenopathy.   Neurological: She is alert and oriented to person, place, and time. No cranial nerve deficit.   Skin: Skin is warm and dry. No rash noted. She is not diaphoretic. No erythema.   Psychiatric: She has a normal mood and affect. Her behavior is normal. Judgment and thought content normal.   Nursing note and vitals reviewed.      Laboratory:  Recent Labs      07/14/18   1142   WBC  11.0*   RBC  3.83*   HEMOGLOBIN  11.2*   HEMATOCRIT  34.9*   MCV  91.1   MCH  29.2   MCHC  32.1*   RDW  47.6   PLATELETCT  269   MPV  10.4     Recent Labs      07/14/18   1142   SODIUM  137   POTASSIUM  3.8   CHLORIDE  104   CO2  24   GLUCOSE  127*   BUN  35*   CREATININE  1.18   CALCIUM  9.0     Recent Labs      07/14/18   1142   ALTSGPT  24   ASTSGOT  46*   ALKPHOSPHAT  57   TBILIRUBIN  1.8*   GLUCOSE  127*     Recent Labs      07/14/18   1142   APTT  28.4   INR   1.20*             No results found for: TROPONINI    Urinalysis:    No results found for: SPECGRAVITY, GLUCOSEUR, KETONES, NITRITE, WBCURINE, RBCURINE, BACTERIA, EPITHELCELL     Imaging:  DX-CHEST-PORTABLE (1 VIEW)   Final Result      No acute cardiopulmonary findings.      DX-HIP-UNILATERAL-WITH PELVIS-1 VIEW LEFT   Final Result      1.  Left femoral neck fracture.      2.  Osteopenia            Assessment/Plan:  I anticipate this patient will require at least two midnights for appropriate medical management, necessitating inpatient admission.    Fracture of femoral neck, left (HCC)   Assessment & Plan    -Patient has already eaten today, orthopedic surgery stated surgery for tomorrow morning  -Diet for now, n.p.o. at midnight, patient cleared medically for surgery  -symptomatic care  -Post fall, non-syncopal  -PT/OT postop        Dehydration   Assessment & Plan    -Start IV fluids        Acute kidney injury (HCC)   Assessment & Plan    -Due to dehydration  -Start IV fluids and repeat BMP in the morning        Hyperglycemia   Assessment & Plan    -Mild, no coverage needed at this time  -No history of diabetes, obtain hemoglobin A1c  -No insulin sliding scale at this time and patient who will soon be n.p.o.        Normocytic anemia   Assessment & Plan    -No sign of gross bleeding  -Repeat CBC in the morning  -Hemoglobin will likely decrease since she is dehydrated        Leukocytosis   Assessment & Plan    -Likely reactive from fall and fracture  -Repeat CBC in the morning            VTE prophylaxis: SCDs

## 2018-07-14 NOTE — ED NOTES
The Medication Reconciliation process has been completed by interviewing the patient    Allergies have been reviewed  Antibiotic use in 30 days - none    Home Pharmacy:  [facility] - [address]

## 2018-07-14 NOTE — ED PROVIDER NOTES
ED Provider Note    CHIEF COMPLAINT  Chief Complaint   Patient presents with   • Hip Injury     L hip pain after fall yesteday. +outer rotation   • Fall     fell yesterday after stepping wrong off bed.       HPI  Rene Becerril is a 89 y.o. female who presents left hip pain.  The patient fell out of bed yesterday and has had pain to her left hip since then.  She is taking care of by her family at home.  She is unable to bear weight today.  Therefore they brought her in the emergency room for further workup.  She ate breakfast this morning about 9 including an egg tortilla and fluids to drink.  She offers no other complaints except for left hip pain.  Denied headache neck or spine pain.  Moving the leg around makes it worse.  Hold still makes it better she says the pain is moderate in nature.  Patient does not speak English but the family interprets.    REVIEW OF SYSTEMS  CONSTITUTIONAL:  Denies fever, chills, weight gain or weight loss.  Positive weakness to the left leg due to pain  EYES:  Denies discharge   ENT:  Denies sore throat, nose or ear pain  CARDIOVASCULAR:  Denies chest pain, palpitations or swelling  RESPIRATORY:  Denies cough or shortness of breath or difficulty breathing  GI:  Denies abdominal pain, vomiting, or diarrhea  MUSCULOSKELETAL: Positive weakness due to pain in the left hip with left hip pain only.  No knee or ankle pain.  No right leg pain no upper extremity pain  SKIN:  No rash  ALLERGIC: No itchy rashes.  NEUROLOGIC:  Denies headache,.  Positive weakness to the left leg due to pain  PSYCHIATRIC:  Denies depression    PAST MEDICAL HISTORY  Per family healthy female    FAMILY HISTORY  No one else is sick at this time  Lives with the granddaughter    SOCIAL HISTORY   reports that she has never smoked. She has never used smokeless tobacco. She reports that she does not drink alcohol or use drugs.    SURGICAL HISTORY  History reviewed. No pertinent surgical history.    CURRENT MEDICATIONS  No  "current facility-administered medications for this encounter.     Current Outpatient Prescriptions:   •  Dextromethorphan-Guaifenesin (MUCINEX DM MAXIMUM STRENGTH)  MG TABLET SR 12 HR, Take 1 tablet by mouth every 12 hours as needed., Disp: 20 Tab, Rfl: 0  •  acetaminophen (TYLENOL) 325 MG Suppos, Insert 325 mg in rectum every four hours as needed., Disp: , Rfl:       ALLERGIES  No Known Allergies    PHYSICAL EXAM  VITAL SIGNS: /64   Pulse 94   Temp 37.4 °C (99.4 °F)   Resp 18   Ht 1.6 m (5' 3\")   Wt 47.6 kg (105 lb)   SpO2 95%   BMI 18.60 kg/m²      Constitutional: Patient is awake alert person place and time. No acute respiratory distress thin elderly female.  Information comes from the family    HENT: Normocephalic, Atraumatic,  Bilateral external ears normal, Oropharynx pink moist with no exudates, Nose patent.   Eyes: Sclera and conjunctiva clear, No discharge.   Neck:  Supple no nuchal rigidity, no thyromegaly or mass. Non-tender  Lymphatic: No supraclavicular,   Cardiovascular: Heart is regular rate and rhythm no murmur, rub or thrill.   Thorax & Lungs: Chest is symmetrical, with good breath sounds. No wheezing or crackles. No respiratory distress, No chest tenderness.   Abdomen:  Soft, No tenderness no hepatosplenomegaly there is no guarding or rebound, No masses, No pulsatile masses.   Skin: Warm, Dry, no petechia, purpura or rash.   Back: Non tender with palpation, No CVA tenderness..  I did palpate her whole spine she said it did not hurt.  All the pain is in the left hip.  Extremities: Left hip is painful to her.  Her leg is shortened and externally rotated.  No knee pain no ankle pain.  She is able to wiggle her toes well.  She states she can feel me touching her toes.  Musculoskeletal: Good range of motion wrists, elbows, shoulders, , knees, ankles pulses  Left leg is externally rotated shortened tenderness to the hip.  Pulses are 1+ dorsalis pedis and posterior tibial to " the left leg.  It is warm.    Neurologic: Alert & oriented Strength is symmetrical in her upper extremities.  She moves the right leg without pain.  She does not move the left leg due to pain to the left hip.    Psychiatric: As per the  she is cooperative without depression      EKG  11:32 AM, normal sinus rhythm, rate 72, , axis QRS -35.  No ST elevations.  Q waves inferiorly.  No old EKG for comparison.    LABS:  Lab Results   Component Value Date    WBC 11.0 (H) 07/14/2018    HEMOGLOBIN 11.2 (L) 07/14/2018    HEMATOCRIT 34.9 (L) 07/14/2018    PLATELETCT 269 07/14/2018    SODIUM 137 07/14/2018    POTASSIUM 3.8 07/14/2018    CHLORIDE 104 07/14/2018    CO2 24 07/14/2018    BUN 35 (H) 07/14/2018    GLUCOSE 127 (H) 07/14/2018    ALTSGPT 24 07/14/2018    ASTSGOT 46 (H) 07/14/2018    INR 1.20 (H) 07/14/2018       RADIOLOGY/PROCEDURES  DX-HIP-UNILATERAL-WITH PELVIS-1 VIEW LEFT   Final Result      1.  Left femoral neck fracture.      2.  Osteopenia      DX-CHEST-PORTABLE (1 VIEW)    (Results Pending)         COURSE & MEDICAL DECISION MAKING  Pertinent Labs & Imaging studies reviewed. (See chart for details)    Patient who fell out of bed last night.  Now has a left femoral neck fracture.  I discussed the case with Dr. Oakley orthopedics.  Since the patient ate just recently he will put her on the schedule to operate tomorrow.  I discussed the case with the [facility] Hospitalist's and they will admit her to the hospital and keep her n.p.o. after midnight tonight.  Again this is an elderly patient who we will obtain EKGs lab work chest x-ray urine and have her kept n.p.o. at midnight and she will have her left hip fracture fixed tomorrow.    FINAL IMPRESSION  1.  Fall  2.  Left hip fracture  3.  Constipation     PLAN  1.  Admission [facility] Hospitalist's  2.  Orthopedic consultation    Electronically signed by: Jhonny Emerson, 7/14/2018 10:44 AM

## 2018-07-15 NOTE — DISCHARGE PLANNING
Medical Social Work    SW reviewed chart and met with pt at bedside.  Present was pt's granddaughter/POA.  SW discussed d/c plan with granddtr, their goal is for pt to d/c to SNF.  SW assisted pt's granddtr is completing choice form, preference is [facility].

## 2018-07-15 NOTE — CARE PLAN
Problem: Infection  Goal: Will remain free from infection  CHG bath performed prior to surgery, clean gown and linens    Problem: Knowledge Deficit  Goal: Knowledge of disease process/condition, treatment plan, diagnostic tests, and medications will improve  Updated family and pt on plan of care for pre-op prep

## 2018-07-15 NOTE — PROGRESS NOTES
Assumed care of pt, daughter bedside, reports feeling to have BM, pt placed on bed pan, safety precautions in place, will continue rounding.

## 2018-07-15 NOTE — CONSULTS
DATE OF SERVICE:  07/14/2018    REQUESTING PHYSICIAN:  Dr. Jhonny Emerson.    CHIEF COMPLAINT:  Left hip pain.    HISTORY:  The patient is a 89-year-old [place] woman who fell yesterday when   she was trying to get out of bed and lost her balance, she landed hard on a   carpeted floor and had pain and could not bear weight, came in today for   evaluation and was found to have a femoral neck fracture.  She had just eaten   breakfast.  Orthopedic consultation was requested.    ALLERGIES:  None.    MEDICATIONS:  None.    PAST MEDICAL HISTORY:  None.    PAST SURGICAL HISTORY:  None.    SOCIAL HISTORY:  The patient does not smoke or drink alcohol or use any drugs.    She lives with her granddaughter and her other granddaughter lives close by.    They live in [place].    FAMILY HISTORY:  Negative.    REVIEW OF SYSTEMS:  No loss of conscious, nausea, vomiting, diarrhea,   constipation, polyuria, dysuria, fevers, chills, weight loss, weight gain,   abdominal pain, chest pain or shortness of breath.    PHYSICAL EXAMINATION:  GENERAL:  The patient is in no acute distress, lying in her bed.  VITAL SIGNS:  Her blood pressure 110/67, heart rate 78, respirations 16,   temperature 97.6.  HEENT:  Normocephalic, atraumatic.  NECK:  Supple, nontender.  CHEST AND ABDOMEN:  Nontender.  No labored breathing.  EXTREMITIES:  Right lower and bilateral upper extremities without tenderness   or deformity.  Left lower extremity is shortened and externally rotated.  Skin   over the left hip is intact.  She is able to dorsiflex and plantarflex her   toes.    LABORATORY DATA:  Include white blood cell count 11,000, hematocrit 34.9% and   platelet count 269,000.  Sodium 137, potassium is 3.8 and creatinine 1.18.    AST and ALT are 46 and 24 respectively.  Albumin is 3.6.  INR is 1.20.    Urinalysis shows no nitrites or leukocyte esterase.    Radiographs of the left hip and pelvis show a displaced femoral neck fracture.    ASSESSMENT:  Closed left  displaced femoral neck fracture.    PLAN:  I recommended hemiarthroplasty.  We will plan for surgery in the   morning.  I have discussed with the patient's granddaughter who speaks   English.  Risks include bleeding, infection, neurovascular injury, pain,   stiffness, fracture, dislocation, leg length discrepancy, implant failure,   thromboembolic phenomena, anesthetic and medical complications, etc.       ____________________________________     MD BRAYDEN ACUNA / BRII    DD:  07/14/2018 18:31:20  DT:  07/14/2018 18:48:37    D#:  [id number]  Job#:  [id number]

## 2018-07-15 NOTE — PROGRESS NOTES
[facility] Hospitalist Progress Note    Date of Service: 7/15/2018    Chief Complaint  89 y.o. female admitted 2018 with hip pain after a fall found to have a hip fracture.     Interval Problem Update  Went to surgery today.   Stable afterwards and comfortable.     Consultants/Specialty  ortho    Disposition  Hospital  Suspect snf        Review of Systems   Unable to perform ROS: Language      Physical Exam  Laboratory/Imaging   Hemodynamics  Temp (24hrs), Av.7 °C (98.1 °F), Min:36.4 °C (97.6 °F), Max:37.4 °C (99.4 °F)   Temperature: 36.4 °C (97.6 °F)  Pulse  Av.7  Min: 66  Max: 94 Heart Rate (Monitored): (!) 45  Blood Pressure : (!) 98/53, NIBP: 126/77      Respiratory      Respiration: 16, Pulse Oximetry: 96 %             Fluids    Intake/Output Summary (Last 24 hours) at 07/15/18 0858  Last data filed at 18 1900   Gross per 24 hour   Intake              120 ml   Output                0 ml   Net              120 ml       Nutrition  Orders Placed This Encounter   Procedures   • DIET NPO     Standing Status:   Standing     Number of Occurrences:   1     Order Specific Question:   Restrict to:     Answer:   Strict [1]     Comments:   pre-op   • Diet Order Regular     Standing Status:   Standing     Number of Occurrences:   1     Order Specific Question:   Diet:     Answer:   Regular [1]     Physical Exam   Constitutional: She is oriented to person, place, and time. She appears well-developed and well-nourished. No distress.   Patient seen and examined  Discussed plan with RN   AKINT:   Right Ear: External ear normal.   Left Ear: External ear normal.   Nose: Nose normal.   Eyes: Conjunctivae are normal. Right eye exhibits no discharge. Left eye exhibits no discharge.   Neck: No JVD present.   Cardiovascular: Regular rhythm and normal heart sounds.    No murmur heard.  Cap refill 2sec  Pulses 2+ throughout     Pulmonary/Chest: Effort normal and breath sounds normal. No stridor. No respiratory distress.  She has no wheezes. She has no rales.   Abdominal: Soft. Bowel sounds are normal. She exhibits no distension. There is no tenderness.   Musculoskeletal: She exhibits tenderness. She exhibits no edema.   Left hip dressed c/d/i  No edema   Neurological: She is alert and oriented to person, place, and time.   Skin: Skin is warm and dry. She is not diaphoretic. No erythema.   Normal skin  Color.    Psychiatric: She has a normal mood and affect. Her behavior is normal.   Nursing note and vitals reviewed.      Recent Labs      07/14/18   1142  07/15/18   0508   WBC  11.0*  8.0   RBC  3.83*  3.40*   HEMOGLOBIN  11.2*  10.0*   HEMATOCRIT  34.9*  31.3*   MCV  91.1  92.1   MCH  29.2  29.4   MCHC  32.1*  31.9*   RDW  47.6  48.3   PLATELETCT  269  262   MPV  10.4  10.6     Recent Labs      07/14/18   1142  07/15/18   0508   SODIUM  137  138   POTASSIUM  3.8  3.8   CHLORIDE  104  109   CO2  24  24   GLUCOSE  127*  94   BUN  35*  35*   CREATININE  1.18  1.14   CALCIUM  9.0  8.5     Recent Labs      07/14/18   1142   APTT  28.4   INR  1.20*                  Assessment/Plan     Fracture of femoral neck, left (HCC)   Assessment & Plan    S/p orif  Pain control  Heparin dvt proph  Pt/ot  Trend cbc          Dehydration   Assessment & Plan    -IV fluids        Acute kidney injury (HCC)   Assessment & Plan    -Due to dehydration  -continue IV fluids         Hyperglycemia   Assessment & Plan    Pending obtain hemoglobin A1c  No dm hx  -No insulin sliding scale for now        Normocytic anemia   Assessment & Plan    -No sign of gross bleeding  fe studies        Leukocytosis   Assessment & Plan    -Likely reactive from fall and fracture  trending          Quality-Core Measures

## 2018-07-15 NOTE — PROGRESS NOTES
Patient with 10/10 pain to right hip with movement after bed pan, medicated per MAR prior to CHG bath

## 2018-07-15 NOTE — OP REPORT
DATE OF SERVICE:  07/15/2018    PREOPERATIVE DIAGNOSIS:  Left femoral neck fracture, closed, comminuted,   displaced.    POSTOPERATIVE DIAGNOSIS:  Left femoral neck fracture, closed, comminuted,   displaced.    SURGICAL PROCEDURE:  Left hip hemiarthroplasty.    SURGEON:  Deven Oakley MD    ASSISTANT:  Renny Sanchez PA-C    ANESTHESIOLOGIST:  Rusty Rico MD    ANESTHETIC:  General.    ESTIMATED BLOOD LOSS:  50 mL    IMPLANTS:  1.  Smith and Nephew size 11 Conquest femoral stem.  2.  Syed and Nephew 46 mm shell with 28 mm/+0 head.    INDICATIONS:  The patient is 89 years old.  She had a fall 2 days ago.  She   had pain in her left hip, could not bear weight.  She came in to the emergency   room yesterday and was found to have displaced femoral neck and was admitted   to the hospitalist service, found to be medically optimized.  I recommended   hemiarthroplasty.  Risks were discussed with the patient by her daughter, who   interpreted for her.  This was acceptable to the patient.  These risks include   bleeding, infection, neurovascular injury, pain, stiffness, fracture,   dislocation, implant failure, leg length discrepancy, thromboembolic   phenomena, anesthetic complications, etc.    DESCRIPTION OF PROCEDURE:  The patient was identified in the preoperative   holding area.  Left leg was marked.  She was taken to the operating room where   general anesthetic was administered.  Intravenous antibiotics were given.    She was placed in the lateral decubitus position on the OR table on a beanbag.    Bony prominences were padded.  The left hindquarter was then prepped and   draped in a sterile fashion.  Time-out was held to confirm the patient   identity and correct surgical site.    Posterior approach to the hip was performed.  Longitudinal incision was made   over the greater trochanter, carried down to the IT band, which was split   longitudinally.  Split was carried into the gluteus fred in line with  its   fibers.  Short external rotators were then elevated off the proximal femur and   a capsulotomy was performed.  The femoral head was removed and sized.  We   removed some comminuted fragments as well.    The proximal femur was then prepared with the box osteotome and hand reamers   up to size 14 and then and then re-broached up to size 13, where we had good   proximal fit.  We trialed and found good leg length and stability.    The trials were removed.  We injected our local anesthetic mixture into the   periarticular tissues.  Distal cement plug was then inserted into the femur   and the canal was washed, brushed, and dried.  Cement was injected in a   retrograde fashion and the femoral stem was inserted in appropriate position.    Extra cement was removed.  Once the cement had dried, we again trialed and   found good leg length and stability.  The trial head was removed and we then   washed and dried the Gracia taper, tapped our bipolar component into place,   reduced the hip.    A short dilute Betadine soak was performed.  This was then irrigated out of   the wound with saline via pulsatile lavage.  The capsule was repaired with #1   Vicryl.  Short external rotators were repaired with #1 Vicryl.  IT band was   closed with #1 Vicryl.  The skin was then closed with 2-0 Vicryl and 3-0   Monocryl.  Steri-Strips were applied followed by gauze and Tegaderms.  The   patient was transferred to her bed in the supine position, extubated, and   taken to recovery room in stable condition.    POSTOPERATIVE PLAN:  1.  Intravenous antibiotics for 24 hours.  2.  Deep venous thrombosis prophylaxis with early mobilization, aspirin and   sequential compression devices.  3.  AP pelvis radiograph in recovery room.  4.  Ongoing preoperative medical management per hospitalist.       ____________________________________     MD BRAYDEN ACUNA / BRII    DD:  07/15/2018 09:04:54  DT:  07/15/2018 09:58:27    D#:  [id number]   Job#:  [id number]

## 2018-07-15 NOTE — OR NURSING
0906- Patient admitted to PACU from Operating room. Endotracheal tube in place. SPO2 noted at 100%. Respirations noted at 10, even and unlabored. Surgical dressing noted to left hip as intact. Ice pack applied to left hip.    0917- Endotracheal Tube discontinued without incident. Lung sounds clear bilaterally and throughout. SPO2 of 100%on 6 liters of oxygen infusing via nasal cannula.     0925- Patient denies discomfort to left hip at this time. Patient able to dorsiflex and plantarflex with left lower extremity. 2+ pulses noted to the dorsalis pedialis and posterior tibialis.     0940-Patient resting in bed. SPO2 99% on 1 liter of oxygen.     0952-At this time patient has met criteria for discharge from PACU stage I and discharge to floor.     1000- Patient discharged back to floor. Report called to general surgical registered nurse.

## 2018-07-16 PROBLEM — Z71.89 ADVANCED CARE PLANNING/COUNSELING DISCUSSION: Status: ACTIVE | Noted: 2018-01-01

## 2018-07-16 PROBLEM — N18.30 CKD (CHRONIC KIDNEY DISEASE) STAGE 3, GFR 30-59 ML/MIN: Status: ACTIVE | Noted: 2018-01-01

## 2018-07-16 PROBLEM — D63.8 ANEMIA OF CHRONIC DISEASE: Status: ACTIVE | Noted: 2018-01-01

## 2018-07-16 NOTE — PROGRESS NOTES
Received report from NOC RN; assumed pt care. Pt resting comfortably in bed. Denies pain at this moment. +CMS. Pt able to dorsi/plantar flex. Ice pack place on LT hip. Rosa in place, IV fluid running. Family at bedside. Pt notified to call for assistance.

## 2018-07-16 NOTE — DIETARY
"Nutrition services: Day 2 of admit.  Rene Becerril is a 89 y.o. female with admitting DX of Closed left hip fracture.     Consult received for BMI - Pt seen for BMI <19 (=18.59), ht=63\", wt=47.6 kg. Pt with negative nursing nutritional screening.     Pt with PMH of renal disorder, fall, cataract, bronchitis, and arthritis. Per chart review, pt presented s/p fall with L hip pain. Pt with L hip hemiarthroplasty on 7/15. Pt with regular diet and intake of dinner on 7/15 50-75% and sufficient.     This writer visited pt and granddaughter in room. Granddaughter, Lashonda, noted the pt speaks tongong and Lashonda provided most of the history. Stated pt has lost weight in the past 2 months and noted her activity has decreased. Pt stated she has not appetite. Lashonda notes the pt is a snacker and tends to have the best appetite in the morning. Pt/Granddaughter agreeable to supplements and snacks. Followed up with MD and agreeable.     Labs, MAR, and Chart Reviewed.     Malnutrition Risk: Age, Recent hip frx, Low BMI    Recommendations/Plan:  1. Diet as ordered. Small portions requested and entered. Supplements and Snacks entered.   2. Encourage intake of 50% or greater.   3. Document intake of all meals  as % taken in ADL's to provide interdisciplinary communication across all shifts.   4. Monitor weight.  5. Nutrition rep will continue to see patient for ongoing meal and snack preferences.             "

## 2018-07-16 NOTE — THERAPY
"Occupational Therapy Evaluation completed.   Functional Status:  Pt is a 89 y/o female, admit after a GLF at home, with resultant L hip fx, underwent hemiarthroplasty. Pt lives with her daughter, who often works out of town. PLOF- I with AMB ADLS with the use of a cane. Pt presents with decreased activity tolerance, pain with ADLS and functional mobility, decreased I with ADLS and functional mobility. Pt requires Min A for UB, Max A for LB self care, Mod A to complete functional transfers with the use of a FWW. Pt and family were educated regarding precautions, AE, DME and safety . Pt will benefit from Acute OT services to increase functional I and safety prior to d/c.   Plan of Care: Will benefit from Occupational Therapy 3 times per week  Discharge Recommendations:  Equipment: Will Continue to Assess for Equipment Needs. Post-acute therapy Discharge to a transitional care facility for continued skilled therapy services.    See \"Rehab Therapy-Acute\" Patient Summary Report for complete documentation.    "

## 2018-07-16 NOTE — DISCHARGE PLANNING
Agency/Facility Name: [facility]  Outcome: Patient declined. [facility] does not contract with patient's insurance provider.

## 2018-07-16 NOTE — CARE PLAN
Problem: Nutritional:  Goal: Achieve adequate nutritional intake  Patient will consume 50% or greater of meals  Outcome: PROGRESSING AS EXPECTED

## 2018-07-16 NOTE — CARE PLAN
Problem: Mobility  Goal: Risk for activity intolerance will decrease    Intervention: Provide rest periods  Patient fatigued post op, provided rest during ambulation  Intervention: Encourage patient to increase activity level in collaboration with Interdisciplinary Team  Patient encouraged to ambulate post op, up with 2 assist, unsteady gait, tolerating 20 feet with ambulation

## 2018-07-16 NOTE — DISCHARGE PLANNING
This SW called [facility] to discuss this patient's referral further.  CAROLEE was informed that they declined the referral because there was no insurance listed on the face sheet.  CAROLEE asked ZAKIA Galdamez to resend referral with insurance information.

## 2018-07-16 NOTE — PROGRESS NOTES
D/Cd bonner per MDs request. Pt transferred up to commode, assistance of 3 people, tolerated poorly.

## 2018-07-16 NOTE — CARE PLAN
Problem: Knowledge Deficit  Goal: Knowledge of disease process/condition, treatment plan, diagnostic tests, and medications will improve    Intervention: Explain information regarding disease process/condition, treatment plan, diagnostic tests, and medications and document in education  1030  Received back to room 215 from PACU VSS dressing CDI to Lt Hip, neuro vasc status intact all post op orders reviewed with granddaughter, 1200 Granddaughter to spend noc assist with ADLS and interpretation pt states little pain Tylenol provides adeq pain relief.  1700 Up in chair bonner output 200 IV fluid bolus initiated, VSS drsg remains CDI with neuro vasc status intact.

## 2018-07-16 NOTE — PROGRESS NOTES
Seen & examined  OOB to chair  Pain controlled    Vitals:    07/15/18 1958 07/16/18 0000 07/16/18 0400 07/16/18 0700   BP: 105/71 108/62 101/61 108/64   Pulse: 75 95 95 64   Resp: 20 18 20 18   Temp: 36.5 °C (97.7 °F) 36.5 °C (97.7 °F) 36.3 °C (97.3 °F) 36.9 °C (98.5 °F)   SpO2: 95% 93% 95% 97%   Weight:       Height:         LLE:  Dressing c/d/I  f/e ankle, toes  Foot w/w/p    POD#1 s/p L hip jeri    - WBAT, posterior hip precautions  - PT/OT, OOB daily  - SQH, ASA 81 mg  - Dispo planning

## 2018-07-16 NOTE — THERAPY
"Physical Therapy Evaluation completed.   Bed Mobility:  Supine to Sit:  (NT, pt sitting on BSC upon entry)  Transfers: Sit to Stand: Moderate Assist, modA stand pivot transfer to chair with FWW  Gait: Level Of Assist: Unable to Participate   Plan of Care: Will benefit from Physical Therapy 5 times per week  Discharge Recommendations: Equipment: Will Continue to Assess for Equipment Needs. Post-acute therapy Discharge to a transitional care facility for continued skilled therapy services.    Pt is a 89 yo female presenting with post-op pain and weakness s/p L hip hemiarthroplasty. Recommend continued acute PT to improve strength and mobility, would benefit from SNF at [place].    See \"Rehab Therapy-Acute\" Patient Summary Report for complete documentation.     "

## 2018-07-16 NOTE — PROGRESS NOTES
Pt sitting up in cardiac chair, nidao, vss, reports no pain to right hip, ice to right hip, cms+, dressing c/d/I, IVG running as ordered, family bedside, will continue rounding.

## 2018-07-17 PROBLEM — F05 DELIRIUM SUPERIMPOSED ON DEMENTIA: Status: ACTIVE | Noted: 2018-01-01

## 2018-07-17 NOTE — THERAPY
"Physical Therapy Treatment completed.   Bed Mobility:  Supine to Sit:  (NT, pt sitting up in chair)  Transfers: Sit to Stand: Contact Guard Assist  Gait: Level Of Assist: Contact Guard Assist with Front-Wheel Walker x 40 ft       Plan of Care: Will benefit from Physical Therapy 5 times per week  Discharge Recommendations: Equipment: Will Continue to Assess for Equipment Needs. Post-acute therapy Discharge to a transitional care facility for continued skilled therapy services.    Pt with good improvement today, able to tolerate increased WB L LE, pain minimal.     See \"Rehab Therapy-Acute\" Patient Summary Report for complete documentation.       "

## 2018-07-17 NOTE — DISCHARGE PLANNING
SW notified by [facility] that [facility] is working on insurance authorization and they will let her know when they have received it.

## 2018-07-17 NOTE — CARE PLAN
Problem: Pain Management  Goal: Pain level will decrease to patient's comfort goal  Outcome: PROGRESSING AS EXPECTED   07/17/18 0122   OTHER   Non Verbal Scale  Calm;Sleeping;Unlabored Breathing   Pt educated to call for pain medication when needed    Problem: Venous Thromboembolism (VTW)/Deep Vein Thrombosis (DVT) Prevention:  Goal: Patient will participate in Venous Thrombosis (VTE)/Deep Vein Thrombosis (DVT)Prevention Measures  Outcome: PROGRESSING AS EXPECTED   07/16/18 2000   OTHER   Risk Assessment Score 2   VTE RISK Moderate   Pharmacologic Prophylaxis Used Unfractionated Heparin   Mechanical/VTE Prophylaxis   Mechanical Prophylaxis  SCDs, Sequential Compression Device   SCDs, Sequential Compression Device Refused

## 2018-07-17 NOTE — PROGRESS NOTES
[facility] Hospitalist Progress Note    Date of Service: 2018    Chief Complaint  89 y.o. female admitted 2018 with left hip fracture following ground-level fall, she underwent ORIF 7/15- Dr Del Cid    Interval Problem Update  Family present when seen, discussed advance directives, explained that patient regardless of otherwise good health would be severely impaired and injured with attempt at CPR.  Granddaughter is agreeable with patient being DNR.  She states the patient had previously been functional in all her ADLs and lives with her other granddaughter, however that granddaughter is not home all the time and they are agreeable with skilled nursing.  Other than pain from her surgery the patient denies complaints.  She is primarily Colombian speaking.    Consultants/Specialty  [facility], orthopedics    Disposition  Skilled nursing-order placed        Review of Systems   Constitutional: Positive for weight loss. Negative for chills, fever and malaise/fatigue.   HENT: Negative for congestion.    Eyes: Negative for discharge and redness.   Respiratory: Negative for cough and shortness of breath.    Cardiovascular: Positive for leg swelling (Her left hip area has significant soft tissue swelling). Negative for chest pain.   Gastrointestinal: Negative for abdominal pain, constipation, diarrhea, nausea and vomiting.   Genitourinary: Negative for dysuria.   Musculoskeletal: Positive for joint pain.   Skin: Negative for itching and rash.   Neurological: Positive for weakness. Negative for dizziness.   Psychiatric/Behavioral: The patient is not nervous/anxious.       Physical Exam  Laboratory/Imaging   Hemodynamics  Temp (24hrs), Av.5 °C (97.7 °F), Min:36.3 °C (97.3 °F), Max:36.9 °C (98.5 °F)   Temperature: 36.4 °C (97.5 °F)  Pulse  Av.3  Min: 62  Max: 96    Blood Pressure : 103/67      Respiratory      Respiration: 18, Pulse Oximetry: 96 %             Fluids    Intake/Output Summary (Last 24 hours) at 18  1912  Last data filed at 07/16/18 1600   Gross per 24 hour   Intake              940 ml   Output              650 ml   Net              290 ml       Nutrition  Orders Placed This Encounter   Procedures   • Diet Order Regular     Standing Status:   Standing     Number of Occurrences:   1     Order Specific Question:   Diet:     Answer:   Regular [1]     Physical Exam   Constitutional: She is oriented to person, place, and time. She appears well-developed and well-nourished. No distress.   HENT:   Head: Normocephalic and atraumatic.   Right Ear: External ear normal.   Left Ear: External ear normal.   Nose: Nose normal.   Mouth/Throat: Oropharynx is clear and moist.   Eyes: Conjunctivae and EOM are normal. Pupils are equal, round, and reactive to light. Right eye exhibits no discharge. Left eye exhibits no discharge. No scleral icterus.   Neck: Neck supple.   Cardiovascular: Normal rate and regular rhythm.    Pulmonary/Chest: Effort normal and breath sounds normal.   Abdominal: Soft. Bowel sounds are normal. She exhibits no distension. There is no tenderness.   Musculoskeletal: She exhibits edema (Left lateral thigh) and tenderness.   Neurological: She is alert and oriented to person, place, and time. A cranial nerve deficit is present.   Skin: Skin is warm and dry. She is not diaphoretic.   Psychiatric: She has a normal mood and affect.   Nursing note and vitals reviewed.      Recent Labs      07/14/18   1142  07/15/18   0508   WBC  11.0*  8.0   RBC  3.83*  3.40*   HEMOGLOBIN  11.2*  10.0*   HEMATOCRIT  34.9*  31.3*   MCV  91.1  92.1   MCH  29.2  29.4   MCHC  32.1*  31.9*   RDW  47.6  48.3   PLATELETCT  269  262   MPV  10.4  10.6     Recent Labs      07/14/18   1142  07/15/18   0508   SODIUM  137  138   POTASSIUM  3.8  3.8   CHLORIDE  104  109   CO2  24  24   GLUCOSE  127*  94   BUN  35*  35*   CREATININE  1.18  1.14   CALCIUM  9.0  8.5     Recent Labs      07/14/18   1142   APTT  28.4   INR  1.20*                   Assessment/Plan     Fracture of femoral neck, left (HCC)   Assessment & Plan    S/p orif  Mobilizing well  Family agreeable w/ SNF          Advanced care planning/counseling discussion   Assessment & Plan    Discussed for 9 minutes with granddaughter explaining CPR consequences  She is agreeable that DNR is appropriate, intubation is OK however        CKD (chronic kidney disease) stage 3, GFR 30-59 ml/min   Assessment & Plan    Little change w/ IVF suspect is CKD        Dehydration   Assessment & Plan    resolved        Hyperglycemia   Assessment & Plan    A1c 5.7        Anemia of chronic disease   Assessment & Plan    -No sign of gross bleeding  Iron studies c/w chronic disease        Leukocytosis   Assessment & Plan    -Likely reactive from fall and fracture  resolved          Quality-Core Measures   Reviewed items::  Labs reviewed and Medications reviewed  Rosa catheter::  No Rosa  DVT prophylaxis pharmacological::  Heparin  Ulcer Prophylaxis::  Not indicated  Assessed for rehabilitation services:  Patient was assess for and/or received rehabilitation services during this hospitalization

## 2018-07-17 NOTE — PROGRESS NOTES
Received report from NOC RN; assumed pt care. Pt attempted to get out of bed by herself twice last night. Pt now up to chair, with granddaughter in room. Pt complains of pain while moving. Granddaughter concerned if SNF is the best choice for this pt. Pt notified to call for assistance.

## 2018-07-17 NOTE — THERAPY
"Occupational Therapy Treatment completed with focus on ADLs, ADL transfers and caregiver training.  Functional Status:  Pt was in the chair at start of OT session- requesting to get dressed. Pt was able to use AE and Min A to complete dressing, Oral care and face washing sinkside with CGA. Pt with impulsive behavior and decreased safety awareness. CGA to complete functional transfers with SOB during activity- informed NSG. Pt will continue to benefit from Acute OT services.  Plan of Care: Will benefit from Occupational Therapy 3 times per week  Discharge Recommendations:  Equipment Will Continue to Assess for Equipment Needs. Post-acute therapy Discharge to a transitional care facility for continued skilled therapy services.    See \"Rehab Therapy-Acute\" Patient Summary Report for complete documentation.   "

## 2018-07-17 NOTE — CARE PLAN
Problem: Pain Management  Goal: Pain level will decrease to patient's comfort goal  Outcome: PROGRESSING AS EXPECTED  Tylenol for pain     Problem: Discharge Barriers/Planning  Goal: Patient's continuum of care needs will be met  Outcome: PROGRESSING AS EXPECTED  Educated pt on POC. Informed daughter that pt is not safe to D/C home. Will updated MD on Granddaughters concern.

## 2018-07-17 NOTE — ASSESSMENT & PLAN NOTE
Completed POLST with Patient who deferred all decisions to her Granddaughter who is her DPOA, both signed as patient was deferring her responses to granddaughter.  > 30 minutes spent completing and explaing POLST

## 2018-07-17 NOTE — DISCHARGE SUMMARY
Discharge Summary    CHIEF COMPLAINT ON ADMISSION  Chief Complaint   Patient presents with   • Hip Injury     L hip pain after fall yesteday. +outer rotation   • Fall     fell yesterday after stepping wrong off bed.       Reason for Admission  Hip Pain     CODE STATUS  DNAR, I OK    HPI & HOSPITAL COURSE  This is a 89 y.o. female without known past medical history here with closed left femoral neck fracture following accidental fall at home.  She presented on 7/14/2018, unfortunately had eaten breakfast that day so surgery could not be performed until the following morning.  On 7/15/2018, she underwent a left hip hemiarthroplasty without complication.  Postoperatively she had a bowel movement and was up to the bathroom with physical therapy.  During the night patient became confused, but is improved this morning.  She's not eating much solid food yet but will drink boost.  She has had some swelling in the area of the surgery extending down her leg.     Advanced directives were addressed with the patient's D POA, the patient participated in the discussion but this deferred all decisions to the DPOA in that discussion. POLST was completed and a copy will accompany her to skilled nursing.    She had some hyperglycemia noted on admission, subsequent A1c was 5.7.  Consistent carbohydrate diet would be most appropriate at skilled nursing, however given her marginal oral intake at present, I think her appetite needs to improve before we restrict her diet.    ADDENDUM:  Patient's DC delayed a day because insurance auth not obtained by SNF yesterday...  Granddaughter not present this AM but patient recognizes me and smiles and is more relaxed this AM.   She denies pain and her LLE swelling is improved somewhat.  Lungs are clear and heart RRR  Abdomen soft and non tender.         Therefore, she is discharged in good and stable condition to skilled nursing facility.    The patient met 2-midnight criteria for an inpatient stay  at the time of discharge.          DISCHARGE DIAGNOSES  Active Problems:    Fracture of femoral neck, left (HCC) POA: Unknown    Leukocytosis POA: Unknown    Anemia of chronic disease POA: Unknown    Hyperglycemia POA: Unknown    Dehydration POA: Unknown    CKD (chronic kidney disease) stage 3, GFR 30-59 ml/min POA: Unknown    Advanced care planning/counseling discussion POA: Unknown    Delirium superimposed on dementia POA: Unknown  Resolved Problems:    * No resolved hospital problems. *      FOLLOW UP  [facility]    MEDICATIONS ON DISCHARGE     Medication List      START taking these medications      Instructions   acetaminophen 325 MG Tabs  Commonly known as:  TYLENOL   Take 2 Tabs by mouth every 6 hours as needed (Mild Pain; (Pain scale 1-3); Temp greater than 100.5 F).  Dose:  650 mg     aspirin 81 MG Chew chewable tablet  Commonly known as:  ASA   Take 1 Tab by mouth 2 Times a Day.  Dose:  81 mg     bisacodyl 10 MG Supp  Commonly known as:  DULCOLAX   Insert 1 Suppository in rectum 1 time daily as needed (if magnesium hydroxide ineffective after 24 hours).  Dose:  10 mg     heparin 5000 UNIT/ML Soln   Inject 1 mL as instructed every 8 hours.  Dose:  5000 Units     magnesium hydroxide 400 MG/5ML Susp  Commonly known as:  MILK OF MAGNESIA   Take 30 mL by mouth 1 time daily as needed (if polyethylene glycol ineffective after 24 hours).  Dose:  30 mL     ondansetron 4 MG Tbdp  Commonly known as:  ZOFRAN ODT   Take 1 Tab by mouth every four hours as needed (give PO if IV route is unavailable. May give per feeding tube.).  Dose:  4 mg     oxyCODONE immediate-release 5 MG Tabs  Commonly known as:  ROXICODONE   Take 1 Tab by mouth every 6 hours as needed for Severe Pain for up to 5 days.  Dose:  5 mg     polyethylene glycol/lytes Pack  Commonly known as:  MIRALAX   Take 1 Packet by mouth every day.  Dose:  17 g     senna-docusate 8.6-50 MG Tabs  Commonly known as:  PERICOLACE or SENOKOT S   Take 2 Tabs by mouth  2 Times a Day.  Dose:  2 Tab        Oxycodone Rx missing today (7/18) will re-print # 12 (3 day supply)    Allergies  No Known Allergies    DIET  Orders Placed This Encounter   Procedures   • Diet Order Regular     Standing Status:   Standing     Number of Occurrences:   1     Order Specific Question:   Diet:     Answer:   Regular [1]   Boost supplements 3 times daily with meals  Transition to consistent carbohydrate as appetite improves    ACTIVITY  - WBAT, posterior hip precautions    Patient should be watched carefully in the evening and at night as she becomes confused and may try to get up without assistance.    LINES, DRAINS, AND WOUNDS  This is an automated list. Peripheral IVs will be removed prior to discharge.       Surgical Incision  Incision Left Hip (Active)   Wound Bed Other (comment) 7/17/2018  7:00 AM   Drainage  None 7/17/2018  7:00 AM   Periwound Skin Normal 7/17/2018  7:00 AM   Daily - Wound Closure Other (Comments) 7/17/2018  7:00 AM   Dressing Options Dry Gauze;Tape 7/17/2018  7:00 AM   Dressing Status / Change Clean;Dry;Intact 7/17/2018  7:00 AM   Daily - Dressing Change Observed 7/17/2018  7:00 AM                  MENTAL STATUS ON TRANSFER  Level of Consciousness: Alert  Orientation : Oriented x 4  Speech: Speech Clear    CONSULTATIONS  Orthopedic-[facility]    PROCEDURES  DATE OF SERVICE:  07/15/2018     PREOPERATIVE DIAGNOSIS:  Left femoral neck fracture, closed, comminuted,   displaced.     POSTOPERATIVE DIAGNOSIS:  Left femoral neck fracture, closed, comminuted,   displaced.     SURGICAL PROCEDURE:  Left hip hemiarthroplasty.     SURGEON:  Deven Oakley MD     ASSISTANT:  Renny Sanchez PA-C     ANESTHESIOLOGIST:  Rusty Rico MD     ANESTHETIC:  General.     ESTIMATED BLOOD LOSS:  50 mL     IMPLANTS:  1.  Smith and Nephew size 11 Conquest femoral stem.  2.  Syed and Nephew 46 mm shell with 28 mm/+0 head.     INDICATIONS:  The patient is 89 years old.   She had a fall 2 days ago.  She   had pain in her left hip, could not bear weight.  She came in to the emergency   room yesterday and was found to have displaced femoral neck and was admitted   to the hospitalist service, found to be medically optimized.  I recommended   hemiarthroplasty.  Risks were discussed with the patient by her daughter, who   interpreted for her.  This was acceptable to the patient.  These risks include   bleeding, infection, neurovascular injury, pain, stiffness, fracture,   dislocation, implant failure, leg length discrepancy, thromboembolic   phenomena, anesthetic complications, etc.     DESCRIPTION OF PROCEDURE:  The patient was identified in the preoperative   holding area.  Left leg was marked.  She was taken to the operating room where   general anesthetic was administered.  Intravenous antibiotics were given.    She was placed in the lateral decubitus position on the OR table on a beanbag.    Bony prominences were padded.  The left hindquarter was then prepped and   draped in a sterile fashion.  Time-out was held to confirm the patient   identity and correct surgical site.     Posterior approach to the hip was performed.  Longitudinal incision was made   over the greater trochanter, carried down to the IT band, which was split   longitudinally.  Split was carried into the gluteus fred in line with its   fibers.  Short external rotators were then elevated off the proximal femur and   a capsulotomy was performed.  The femoral head was removed and sized.  We   removed some comminuted fragments as well.     The proximal femur was then prepared with the box osteotome and hand reamers   up to size 14 and then and then re-broached up to size 13, where we had good   proximal fit.  We trialed and found good leg length and stability.     The trials were removed.  We injected our local anesthetic mixture into the   periarticular tissues.  Distal cement plug was then inserted into the femur    and the canal was washed, brushed, and dried.  Cement was injected in a   retrograde fashion and the femoral stem was inserted in appropriate position.    Extra cement was removed.  Once the cement had dried, we again trialed and   found good leg length and stability.  The trial head was removed and we then   washed and dried the Garcia taper, tapped our bipolar component into place,   reduced the hip.     A short dilute Betadine soak was performed.  This was then irrigated out of   the wound with saline via pulsatile lavage.  The capsule was repaired with #1   Vicryl.  Short external rotators were repaired with #1 Vicryl.  IT band was   closed with #1 Vicryl.  The skin was then closed with 2-0 Vicryl and 3-0   Monocryl.  Steri-Strips were applied followed by gauze and Tegaderms.  The   patient was transferred to her bed in the supine position, extubated, and   taken to recovery room in stable condition.     POSTOPERATIVE PLAN:  1.  Intravenous antibiotics for 24 hours.  2.  Deep venous thrombosis prophylaxis with early mobilization, aspirin and   sequential compression devices.  3.  AP pelvis radiograph in recovery room.  4.  Ongoing preoperative medical management per hospitalist.        ____________________________________     NANCY CELAYA MD            LABORATORY  Lab Results   Component Value Date    SODIUM 138 07/15/2018    POTASSIUM 3.8 07/15/2018    CHLORIDE 109 07/15/2018    CO2 24 07/15/2018    GLUCOSE 94 07/15/2018    BUN 35 (H) 07/15/2018    CREATININE 1.14 07/15/2018        Lab Results   Component Value Date    WBC 8.0 07/15/2018    HEMOGLOBIN 10.0 (L) 07/15/2018    HEMATOCRIT 31.3 (L) 07/15/2018    PLATELETCT 262 07/15/2018      Total D/C time is 33 minutes

## 2018-07-18 NOTE — DISCHARGE PLANNING
Agency/Facility Name: [facility]  Spoke To: Josephine  Outcome: Patient scheduled for transport at 1000 via [facility].   Deborah COUCH notified.

## 2018-07-18 NOTE — DISCHARGE INSTRUCTIONS
"Discharge Instructions    Discharged to other by medical transportation with escort. Discharged via wheelchair, hospital escort: Yes.  Special equipment needed: Not Applicable    Be sure to schedule a follow-up appointment with your primary care doctor or any specialists as instructed.     Discharge Plan:   Diet Plan: Discussed  Activity Level: Discussed  Confirmed Follow up Appointment: Patient to Call and Schedule Appointment  Confirmed Symptoms Management: Discussed  Medication Reconciliation Updated: Yes  Pneumococcal Vaccine Administered/Refused: Not given - Patient refused pneumococcal vaccine  Influenza Vaccine Indication: Patient Refuses    I understand that a diet low in cholesterol, fat, and sodium is recommended for good health. Unless I have been given specific instructions below for another diet, I accept this instruction as my diet prescription.   Other diet: regular    Special Instructions: Discharge instructions for the Orthopedic Patient    Follow up with Primary Care Physician within 2 weeks of discharge to home, regarding:  Review of medications and diagnostic testing.  Surveillance for medical complications.  Workup and treatment of osteoporosis, if appropriate.     -Is this a Joint Replacement patient? No    -Is this patient being discharged with medication to prevent blood clots?  No    · Is patient discharged on Warfarin / Coumadin?   No         Hip Hemiarthroplasty  The hip joint is located where the upper end of the femur meets the pelvis socket (acetabulum). The femur, or thigh bone, looks like a long stem with a ball on the end. The acetabulum is a socket or cup-like structure in the pelvis, or hip bone. This \"ball and socket\" allows your hip to move.  During Hip hemiarthroplasty, your surgeon removes the diseased bone tissue and cartilage from the hip joint. The healthy parts of the hip are left intact. The head of the femur (the ball) and the socket (acetabulum) is replaced with new, " artificial parts. The new hip is made of materials that allow a normal movement of the joint. This surgery usually lasts 2 to 3 hours.   The purpose of this surgery is to reduce pain and improve range of motion. It is one of the most successful joint replacement surgeries. It most often greatly improves the quality of life.  LET YOUR CAREGIVERS KNOW ABOUT:  · Allergies  · Medications taken including herbs, eye drops, over the counter medications, and creams  · Use of steroids (by mouth or creams)  · Previous problems with anesthetics or Novocaine  · Possibility of pregnancy, if this applies  · History of blood clots (thrombophlebitis)  · History of bleeding or blood problems  · Previous surgery  · Other health problems  BEFORE THE PROCEDURE  · Do not eat or drink anything for as long as directed by your caregiver prior to surgery.  · You should be present 60 minutes prior to your procedure or as directed.  Prior to surgery an IV (intravenous line for giving fluids) may be started. You may be given an anesthetic (medications and gas to help you sleep) during the procedure.   AFTER THE PROCEDURE  After surgery, you will be taken to the recovery area. There a nurse will watch and check your progress. You may have a catheter (a long, narrow, hollow tube) in your bladder following surgery. The catheter helps you pass your water. Once you're awake, stable, and taking fluids well, barring other problems you'll be returned to your room. You will receive physical therapy until you are doing well and your caregiver feels it is safe for you to be transferred home or to an extended care facility.  HOME CARE INSTRUCTIONS   · You may resume normal diet and activities as directed or allowed.  · Change dressings if necessary or as directed.  · Only take over-the-counter or prescription medicines for pain, discomfort, or fever as directed by your caregiver.  SEEK IMMEDIATE MEDICAL CARE IF:  You develop:  · Swelling of your calf or  leg or develop shortness of breath or chest pain.  · Redness, swelling, or increasing pain in the wound.  · Pus coming from wound.  · An unexplained oral temperature over 102° F (38.9° C) develops.  · A foul smell coming from the wound or dressing.  · A breaking open of the wound (edges not staying together) after sutures or staples have been removed.  MAKE SURE YOU:   · Understand these instructions.  · Will watch your condition.  · Will get help right away if you are not doing well or get worse.  Document Released: 01/02/2008 Document Revised: 03/11/2013 Document Reviewed: 01/29/2008  ExitCare® Patient Information ©2014 [employer].    Depression / Suicide Risk    As you are discharged from this [facility] facility, it is important to learn how to keep safe from harming yourself.    Recognize the warning signs:  · Abrupt changes in personality, positive or negative- including increase in energy   · Giving away possessions  · Change in eating patterns- significant weight changes-  positive or negative  · Change in sleeping patterns- unable to sleep or sleeping all the time   · Unwillingness or inability to communicate  · Depression  · Unusual sadness, discouragement and loneliness  · Talk of wanting to die  · Neglect of personal appearance   · Rebelliousness- reckless behavior  · Withdrawal from people/activities they love  · Confusion- inability to concentrate     If you or a loved one observes any of these behaviors or has concerns about self-harm, here's what you can do:  · Talk about it- your feelings and reasons for harming yourself  · Remove any means that you might use to hurt yourself (examples: pills, rope, extension cords, firearm)  · Get professional help from the community (Mental Health, Substance Abuse, psychological counseling)  · Do not be alone:Call your Safe Contact- someone whom you trust who will be there for you.  · Call your local CRISIS HOTLINE 149-9118 or 534-311-0441  · Call your local  Children's Mobile Crisis Response Team [place] (592) 692-2467 or www.[employer]  · Call the toll free National Suicide Prevention Hotlines   · National Suicide Prevention Lifeline 312-191-CMAE (4318)  · National Hope Line Network 800-SUICIDE (960-3712)

## 2018-07-18 NOTE — PROGRESS NOTES
[facility] Hospitalist Progress Note    Date of Service: 2018    Chief Complaint  89 y.o. female admitted 2018 with left hip fracture following ground-level fall, she underwent ORIF 7/15- Dr Del Cid    Interval Problem Update  Patient got OOB middle of night and urinated on floor, was anxious and confused and impulsive, managed to get back into bed w/o falling. Granddaughter present and tried to intervene but patient would not follow her instructions to not move w/o assistance.    Consultants/Specialty  [facility], orthopedics    Disposition  Skilled nursing-pending insurance        Review of Systems   Constitutional: Negative for chills, fever and malaise/fatigue.   HENT: Negative for congestion.    Eyes: Negative for discharge and redness.   Respiratory: Negative for cough and shortness of breath.    Cardiovascular: Negative for chest pain.   Gastrointestinal: Negative for abdominal pain, constipation, diarrhea, nausea and vomiting.   Genitourinary: Negative for dysuria.   Musculoskeletal: Positive for joint pain.   Skin: Negative for itching and rash.   Neurological: Negative for dizziness.   Psychiatric/Behavioral: Positive for memory loss. The patient is nervous/anxious.       Physical Exam  Laboratory/Imaging   Hemodynamics  Temp (24hrs), Av.4 °C (97.6 °F), Min:36.3 °C (97.4 °F), Max:36.7 °C (98 °F)   Temperature: 36.4 °C (97.5 °F)  Pulse  Av.2  Min: 62  Max: 96    Blood Pressure : 100/62      Respiratory      Respiration: 18, Pulse Oximetry: 94 %             Fluids    Intake/Output Summary (Last 24 hours) at 18 0940  Last data filed at 18 0900   Gross per 24 hour   Intake              360 ml   Output                0 ml   Net              360 ml       Nutrition  Orders Placed This Encounter   Procedures   • Diet Order Regular     Standing Status:   Standing     Number of Occurrences:   1     Order Specific Question:   Diet:     Answer:   Regular [1]     Physical Exam   Constitutional: She  is oriented to person, place, and time. She appears well-developed and well-nourished. No distress.   HENT:   Head: Normocephalic and atraumatic.   Right Ear: External ear normal.   Left Ear: External ear normal.   Nose: Nose normal.   Mouth/Throat: Oropharynx is clear and moist.   Eyes: Conjunctivae and EOM are normal. Pupils are equal, round, and reactive to light. Right eye exhibits no discharge. Left eye exhibits no discharge. No scleral icterus.   Neck: Neck supple.   Cardiovascular: Normal rate and regular rhythm.    Pulmonary/Chest: Effort normal and breath sounds normal.   Abdominal: Soft. Bowel sounds are normal. She exhibits no distension. There is no tenderness.   Musculoskeletal: She exhibits edema (more diffused down leg, mild RLE edema also) and tenderness.   Neurological: She is alert and oriented to person, place, and time. A cranial nerve deficit is present.   Skin: Skin is warm and dry. She is not diaphoretic.   Psychiatric: Her mood appears anxious. She is hyperactive. Cognition and memory are impaired. She expresses impulsivity.   Nursing note and vitals reviewed.                               Assessment/Plan     Fracture of femoral neck, left (HCC)   Assessment & Plan    S/p orif  Mobilizing well  Accepted at SNF but insurance authpending          Delirium superimposed on dementia   Assessment & Plan    Resolved this morning but needs to be watched at skilled nursing for unsafe mobility attempt        Advanced care planning/counseling discussion   Assessment & Plan    Completed POLST with Patient who deferred all decisions to her Granddaughter who is her DPOA, both signed as patient was deferring her responses to granddaughter.  > 30 minutes spent completing and explaing POLST        CKD (chronic kidney disease) stage 3, GFR 30-59 ml/min   Assessment & Plan    Little change w/ IVF suspect is CKD        Dehydration   Assessment & Plan    resolved        Hyperglycemia   Assessment & Plan    A1c  5.7        Anemia of chronic disease   Assessment & Plan    -No sign of gross bleeding  Iron studies c/w chronic disease        Leukocytosis   Assessment & Plan    -Likely reactive from fall and fracture  resolved          Quality-Core Measures   Reviewed items::  Labs reviewed and Medications reviewed  Rosa catheter::  No Rosa  DVT prophylaxis pharmacological::  Heparin  Ulcer Prophylaxis::  Not indicated  Assessed for rehabilitation services:  Patient was assess for and/or received rehabilitation services during this hospitalization

## 2018-07-18 NOTE — PROGRESS NOTES
Sleeping  Dressing dry  No SCDs on  AVSS  No labs today  XRs OK    Plan:    PT/OT  WBAT LLE  Posterior hip precautions  DVT proph (SCDs, ASA x 4 weeks)  D/c planning  Remove dressing 3-4 days, then may shower with incision uncovered  F/u JADEN 1 week

## 2018-07-18 NOTE — PROGRESS NOTES
[facility]. RX for oxycodone included in discharge packet. Pt discharged in stable condition. Grand daughter with pt at time of transport.     1034 report called to AY[facility] golden.

## 2018-07-18 NOTE — DISCHARGE PLANNING
CAROLEE completed COBRA form and provided to Floor RN.  CAROLEE met with this patient bedside and called and left her [facility] two message per patinet request.

## 2018-07-18 NOTE — CARE PLAN
Problem: Venous Thromboembolism (VTW)/Deep Vein Thrombosis (DVT) Prevention:  Goal: Patient will participate in Venous Thrombosis (VTE)/Deep Vein Thrombosis (DVT)Prevention Measures   07/18/18 0100   OTHER   Risk Assessment Score 3   VTE RISK High   Pharmacologic Prophylaxis Used Unfractionated Heparin   Mechanical/VTE Prophylaxis   Mechanical Prophylaxis  SCDs, Sequential Compression Device   SCDs, Sequential Compression Device Refused

## 2018-07-18 NOTE — PROGRESS NOTES
Received report from Lydia ROLLINS, assumed pt care. Pt resting in bed. Dressing CDI, + pulses. States no needs at this time. Educated to call for assistance. Bed alarm on.

## 2018-10-05 PROBLEM — R65.20 SEVERE SEPSIS (HCC): Status: ACTIVE | Noted: 2018-01-01

## 2018-10-05 PROBLEM — A41.9 SEVERE SEPSIS (HCC): Status: ACTIVE | Noted: 2018-01-01

## 2018-10-05 PROBLEM — N17.9 ACUTE RENAL FAILURE (ARF) (HCC): Status: ACTIVE | Noted: 2018-01-01

## 2018-10-05 PROBLEM — E87.5 HYPERKALEMIA: Status: ACTIVE | Noted: 2018-01-01

## 2018-10-05 PROBLEM — E87.20 LACTIC ACIDOSIS: Status: ACTIVE | Noted: 2018-01-01

## 2018-10-05 NOTE — ED TRIAGE NOTES
"Chief Complaint   Patient presents with   • Diarrhea     x 2 days   • Weakness     /75   Pulse 87   Temp 36.6 °C (97.8 °F)   Resp 17   Ht 1.6 m (5' 3\")   Wt 52.5 kg (115 lb 11.9 oz)   SpO2 99%   BMI 20.50 kg/m²     Family member denies any recent antibx use, reports pt does have \"four very large inflamed hemorrhoids\"  "

## 2018-10-06 PROBLEM — D50.9 IRON DEFICIENCY ANEMIA: Status: ACTIVE | Noted: 2018-01-01

## 2018-10-06 PROBLEM — R79.89 ELEVATED TROPONIN: Status: ACTIVE | Noted: 2018-01-01

## 2018-10-06 PROBLEM — L84 CALLUS OF FOOT: Status: ACTIVE | Noted: 2018-01-01

## 2018-10-06 PROBLEM — R19.7 DIARRHEA WITH DEHYDRATION: Status: ACTIVE | Noted: 2018-01-01

## 2018-10-06 PROBLEM — N18.30 ACUTE RENAL FAILURE SUPERIMPOSED ON STAGE 3 CHRONIC KIDNEY DISEASE (HCC): Status: ACTIVE | Noted: 2018-01-01

## 2018-10-06 NOTE — ASSESSMENT & PLAN NOTE
This is severe sepsis with the following associated acute organ dysfunction(s): acute kidney failure.   Sepsis has been ruled out.  Stool for C. difficile was negative.  No evidence for infection, stop ceftriaxone and observe.  Patient has severe obstipation with stercoral colitis.

## 2018-10-06 NOTE — ASSESSMENT & PLAN NOTE
From dehydration  Doubt infection ut waiting for all cultures  For now to keep on vanc po for  c diff and ceftriaxone for concernf or uti with hx of malodorous urine

## 2018-10-06 NOTE — ASSESSMENT & PLAN NOTE
Initially likely from supply and demand  Unchanged due to clearance from kidneys  Hasn't increased and vitals normal so no need to follow levels at this time

## 2018-10-06 NOTE — CARE PLAN
Problem: Safety  Goal: Will remain free from injury  Outcome: PROGRESSING AS EXPECTED  All safety precautions in place, bed alarm in use, side rails up x3, call light within reach at all times and pt is able to teach back, pt forgetful at times, frequently reeducated and responds to commands/education appropriately    Problem: Infection  Goal: Will remain free from infection  Outcome: PROGRESSING AS EXPECTED  No fever noted; antibiotic therapy in use per MAR    Problem: Bowel/Gastric:  Goal: Normal bowel function is maintained or improved  Outcome: PROGRESSING AS EXPECTED  Isolation for rule out C-diff in use appropriately; awaiting c-diff results; pt having loose stools via incontinence, voiding up to commode    Problem: Skin Integrity  Goal: Risk for impaired skin integrity will decrease  Outcome: PROGRESSING AS EXPECTED  Q2h turns implemented, pillows in use for support/repositioning, barrier cream/mepilex implemented to sacrum

## 2018-10-06 NOTE — ED PROVIDER NOTES
"ED Provider Note    CHIEF COMPLAINT  Chief Complaint   Patient presents with   • Diarrhea     x 2 days   • Weakness       HPI  Rene Becerril is a 89 y.o. female who presents to the emergency department with diarrhea.  Past medical history as documented below.  Patient's granddaughter and power of  at bedside providing history as patient cannot provide history secondary to her dementia.  Granddaughter notes that the patient was discharged proximate 2 months ago from [facility] where she was admitted after fall and hip fracture status post orthopedic surgery inclusive of a left hemiarthroplasty.  Patient has been on a \"vegetable laxative \"since the time of discharge from [facility].  The patient has a chronic history of constipation however over the last couple days has started with a foul-smelling loose persistent diarrhea.  Patient does have hemorrhoids which have been agitated as well.  The patient has also had increased urinary frequency and malodorous urine per the granddaughter.  Otherwise patient has been at her baseline.  No nausea or vomiting.  No evidence of pain.    REVIEW OF SYSTEMS  See HPI for further details. All other systems are negative.     PAST MEDICAL HISTORY   has a past medical history of Arthritis; Bronchitis; Cataract; Delirium superimposed on dementia (7/17/2018); Fall; Hemorrhoids; Renal disorder; and Severe sepsis (HCC) (10/5/2018).    SOCIAL HISTORY  Social History     Social History Main Topics   • Smoking status: Never Smoker   • Smokeless tobacco: Never Used   • Alcohol use No   • Drug use: No   • Sexual activity: Not on file       SURGICAL HISTORY   has a past surgical history that includes other; other; and hip hemiarthroplasty (Left, 7/15/2018).    CURRENT MEDICATIONS  Home Medications    **Home medications have not yet been reviewed for this encounter**         ALLERGIES  No Known Allergies    PHYSICAL EXAM  VITAL SIGNS: /75   Pulse 76   Temp 36.6 °C (97.8 °F)   Resp " "13   Ht 1.6 m (5' 3\")   Wt 52.5 kg (115 lb 11.9 oz)   SpO2 96%   BMI 20.50 kg/m²  @KARLI[[id number]::@   Pulse ox interpretation: I interpret this pulse ox as normal.  Constitutional: Alert, pleasantly demented  HENT: No signs of trauma, Bilateral external ears normal, Nose normal.  Dry mucous membranes  Eyes: Pupils are equal and reactive, Conjunctiva normal, Non-icteric.   Neck: Normal range of motion, No tenderness, Supple, No stridor.   Cardiovascular: Regular rate and rhythm, no murmurs.   Thorax & Lungs: Normal breath sounds, No respiratory distress, No wheezing, No chest tenderness.   Abdomen: Bowel sounds normal, Soft, No tenderness, No masses, No pulsatile masses. No peritoneal signs.  Foul-smelling stool in diaper  Skin: Warm, Dry, No erythema, No rash.   Back: No bony tenderness, No CVA tenderness.   Extremities: Intact distal pulses, No edema, No tenderness, No cyanosis,  Negative Albin's sign.   Musculoskeletal: Good range of motion in all major joints. No tenderness to palpation or major deformities noted.   Neurologic: Alert , Normal motor function, Normal sensory function, No focal deficits noted.  Pleasantly demented      DIAGNOSTIC STUDIES / PROCEDURES    EKG  Sinus rhythm at a rate of 70, PACs, left axis, nonspecific ST changes    LABS  Results for orders placed or performed during the hospital encounter of 10/05/18   CBC WITH DIFFERENTIAL   Result Value Ref Range    WBC 11.3 (H) 4.8 - 10.8 K/uL    RBC 4.50 4.20 - 5.40 M/uL    Hemoglobin 12.3 12.0 - 16.0 g/dL    Hematocrit 38.6 37.0 - 47.0 %    MCV 85.8 81.4 - 97.8 fL    MCH 27.3 27.0 - 33.0 pg    MCHC 31.9 (L) 33.6 - 35.0 g/dL    RDW 43.7 35.9 - 50.0 fL    Platelet Count 303 164 - 446 K/uL    MPV 11.1 9.0 - 12.9 fL    Nucleated RBC 0.00 /100 WBC    NRBC (Absolute) 0.00 K/uL    Neutrophils-Polys 46.00 44.00 - 72.00 %    Lymphocytes 12.00 (L) 22.00 - 41.00 %    Monocytes 10.00 0.00 - 13.40 %    Eosinophils 0.00 0.00 - 6.90 %    Basophils 0.00 0.00 - " 1.80 %    Neutrophils (Absolute) 7.91 (H) 2.00 - 7.15 K/uL    Lymphs (Absolute) 1.36 1.00 - 4.80 K/uL    Monos (Absolute) 1.13 (H) 0.00 - 0.85 K/uL    Eos (Absolute) 0.00 0.00 - 0.51 K/uL    Baso (Absolute) 0.00 0.00 - 0.12 K/uL   COMP METABOLIC PANEL   Result Value Ref Range    Sodium 134 (L) 135 - 145 mmol/L    Potassium 5.9 (H) 3.6 - 5.5 mmol/L    Chloride 101 96 - 112 mmol/L    Co2 17 (L) 20 - 33 mmol/L    Anion Gap 16.0 (H) 0.0 - 11.9    Glucose 130 (H) 65 - 99 mg/dL    Bun 58 (H) 8 - 22 mg/dL    Creatinine 2.03 (H) 0.50 - 1.40 mg/dL    Calcium 9.3 8.4 - 10.2 mg/dL    AST(SGOT) 47 (H) 12 - 45 U/L    ALT(SGPT) 18 2 - 50 U/L    Alkaline Phosphatase 60 30 - 99 U/L    Total Bilirubin 0.7 0.1 - 1.5 mg/dL    Albumin 3.8 3.2 - 4.9 g/dL    Total Protein 6.9 6.0 - 8.2 g/dL    Globulin 3.1 1.9 - 3.5 g/dL    A-G Ratio 1.2 g/dL   LIPASE   Result Value Ref Range    Lipase 15 7 - 58 U/L   TROPONIN   Result Value Ref Range    Troponin I 0.06 (H) 0.00 - 0.04 ng/mL   URINALYSIS CULTURE, IF INDICATED   Result Value Ref Range    Micro Urine Req Microscopic     Color Dark Yellow     Character Clear     Specific Gravity 1.025 <1.035    Ph 5.0 5.0 - 8.0    Glucose Negative Negative mg/dL    Ketones 15 (A) Negative mg/dL    Protein Negative Negative mg/dL    Bilirubin Moderate (A) Negative    Nitrite Negative Negative    Leukocyte Esterase Negative Negative    Occult Blood Trace (A) Negative   LACTIC ACID   Result Value Ref Range    Lactic Acid 5.9 (HH) 0.5 - 2.0 mmol/L   ESTIMATED GFR   Result Value Ref Range    GFR If  28 (A) >60 mL/min/1.73 m 2    GFR If Non African American 23 (A) >60 mL/min/1.73 m 2   DIFFERENTIAL MANUAL   Result Value Ref Range    Bands-Stabs 24.00 (H) 0.00 - 10.00 %    Metamyelocytes 8.00 %    Manual Diff Status PERFORMED    PLATELET ESTIMATE   Result Value Ref Range    Plt Estimation Normal    MORPHOLOGY   Result Value Ref Range    RBC Morphology Normal     Giant Platelets 1+    URINE  MICROSCOPIC (W/UA)   Result Value Ref Range    WBC 0-2 /hpf    RBC 0-2 /hpf    Bacteria Few (A) None /hpf    Epithelial Cells Few Few /hpf    Mucous Threads Moderate /hpf    Urine Crystals Few Amorphous /hpf    Urine Casts >10 Hyaline (A) /lpf    Urine Casts 0-2 Granular /lpf   BASIC METABOLIC PANEL   Result Value Ref Range    Sodium 136 135 - 145 mmol/L    Potassium 5.0 3.6 - 5.5 mmol/L    Chloride 105 96 - 112 mmol/L    Co2 19 (L) 20 - 33 mmol/L    Glucose 103 (H) 65 - 99 mg/dL    Bun 57 (H) 8 - 22 mg/dL    Creatinine 1.87 (H) 0.50 - 1.40 mg/dL    Calcium 8.6 8.4 - 10.2 mg/dL    Anion Gap 12.0 (H) 0.0 - 11.9   Magnesium   Result Value Ref Range    Magnesium 2.7 (H) 1.5 - 2.5 mg/dL   LACTIC ACID   Result Value Ref Range    Lactic Acid 3.5 (H) 0.5 - 2.0 mmol/L   ESTIMATED GFR   Result Value Ref Range    GFR If  31 (A) >60 mL/min/1.73 m 2    GFR If Non African American 25 (A) >60 mL/min/1.73 m 2   EKG (NOW)   Result Value Ref Range    Report       [facility]    Test Date:  2018-10-05  Pt Name:    RENA MCCORMICK                 Department: [facility]  MRN:        [id number]                      [facility]   Gender:     Female                       Technician: HRS  :        [id number]                   Requested By:PAULINE VALENCIA  Order #:    [id number]                    Reading MD:    Measurements  Intervals                                Axis  Rate:       70                           P:          -21  TN:         160                          QRS:        -25  QRSD:       113                          T:          -17  QT:         434  QTc:        469    Interpretive Statements  Sinus rhythm  Multiple premature complexes, vent & supraven  Anterior infarct, old  Compared to ECG 2018 11:32:55  Atrial premature complex(es) no longer present  Myocardial infarct finding still present           RADIOLOGY  CT-ABDOMEN-PELVIS W/O   Final Result         1.  Large  quantity of stool in the colon compatible with severe constipation. Distal rectal wall thickening suggests component of proctocolitis.   2.  Atherosclerosis      Note: Diagnostic sensitivity of this examination is significantly diminished due to artifacts, noncontrast technique, and poor contrast differentiation between structures.      DX-ABDOMEN COMPLETE WITH AP OR PA CXR   Final Result      Moderate colonic distention. This may represent ileus. Partial obstruction is not excluded.      Moderate to large amount of colonic stool.      No acute cardiopulmonary process is identified.        CRITICAL CARE  The very real possibilty of a deterioration of this patient's condition required the highest level of my preparedness for sudden, emergent intervention.  I provided critical care services, which included medication orders, frequent reevaluations of the patient's condition and response to treatment, ordering and reviewing test results, and discussing the case with various consultants.  The critical care time associated with the care of the patient was 35 minutes. Review chart for interventions. This time is exclusive of any other billable procedures.         COURSE & MEDICAL DECISION MAKING  Pertinent Labs & Imaging studies reviewed. (See chart for details)  Patient presenting with the above complaint.  Difficult history given the patient's dementia although granddaughter provides history above.  Lab evaluation as noted.  Strong suspicion for possible C. difficile.  No evidence of yady toxic megacolon.  CT does show a proctocolitis and significant constipation.  Clinically do not believe that the patient is obstructed.  Empiric antibiotics were started.  C. difficile pending.  IV fluids started as per septic protocol although proceeded with cautiously given the patient's age and possible CHF.  I discussed the case with the hospital service was agreeable to ongoing inpatient care at this time.  Patient will be kept  on telemetry given her hyperkalemia currently.    FINAL IMPRESSION  1. Sepsis, due to unspecified organism (HCC)    2. KIERA (acute kidney injury) (HCC)    3. Elevated troponin    4. Hyperkalemia            Electronically signed by: Francesco Hodge, 10/5/2018 5:58 PM

## 2018-10-06 NOTE — PROGRESS NOTES
2 RN skin assessment completed. Skin NOT within normal limits (see wound doc) and wound pictures taken.

## 2018-10-06 NOTE — H&P
Hospital Medicine History and Physical    Date of Service  10/5/2018    Chief Complaint  Chief Complaint   Patient presents with   • Diarrhea     x 2 days   • Weakness       History of Presenting Illness  89 y.o. Female recently admitted in July for left hip fracture s/p  left hip hemiarthroplasty on 7/14/2018,Then went to [facility] for rehabilitation. Presented 10/5/2018 to the ER for evaluation of diarrhea. Patient's granddaughter who is her Power of  is providing history as patient has dementia. Apparently after patient's discharge patient was taking a vegetable laxative. However the past several days her granddaughter noted foul-smelling loose persistent diarrhea, watery. The patient also has been having malodor order is urine output.   At this point patient will be admitted for antibiotic therapy and further diagnostics.      Primary Care Physician  Pcp Pt States None    Consultants  None    Code Status  Code: DNR/DNI    Review of Systems  Review of Systems   Unable to perform ROS: Dementia          Past Medical History  Past Medical History:   Diagnosis Date   • Delirium superimposed on dementia 7/17/2018   • Arthritis    • Bronchitis    • Cataract    • Fall    • Hemorrhoids    • Renal disorder        Surgical History  Past Surgical History:   Procedure Laterality Date   • HIP HEMIARTHROPLASTY Left 7/15/2018    Procedure: HIP HEMIARTHROPLASTY;  Surgeon: Deven Oakley M.D.;  Location: SURGERY [facility];  Service: Orthopedics   • OTHER     • OTHER      Cataracts 2013       Medications  No current facility-administered medications on file prior to encounter.      Current Outpatient Prescriptions on File Prior to Encounter   Medication Sig Dispense Refill   • acetaminophen (TYLENOL) 325 MG Tab Take 2 Tabs by mouth every 6 hours as needed (Mild Pain; (Pain scale 1-3); Temp greater than 100.5 F). 30 Tab 0   • aspirin (ASA) 81 MG Chew Tab chewable tablet Take 1 Tab by mouth 2 Times a Day. 100 Tab     • heparin 5000 UNIT/ML Solution Inject 1 mL as instructed every 8 hours.  0   • ondansetron (ZOFRAN ODT) 4 MG TABLET DISPERSIBLE Take 1 Tab by mouth every four hours as needed (give PO if IV route is unavailable. May give per feeding tube.). 10 Tab 0   • senna-docusate (PERICOLACE OR SENOKOT S) 8.6-50 MG Tab Take 2 Tabs by mouth 2 Times a Day. 30 Tab 0   • polyethylene glycol/lytes (MIRALAX) Pack Take 1 Packet by mouth every day.     • magnesium hydroxide (MILK OF MAGNESIA) 400 MG/5ML Suspension Take 30 mL by mouth 1 time daily as needed (if polyethylene glycol ineffective after 24 hours). 1 Bottle    • bisacodyl (DULCOLAX) 10 MG Suppos Insert 1 Suppository in rectum 1 time daily as needed (if magnesium hydroxide ineffective after 24 hours).  0       Family History  Unable to obtain from patient due to dementia.  Family says no hx of GI malignancies that they are aware of     Social History  Social History   Substance Use Topics   • Smoking status: Never Smoker   • Smokeless tobacco: Never Used   • Alcohol use No       Allergies  No Known Allergies     Physical Exam  Laboratory   Hemodynamics  Temp (24hrs), Av.6 °C (97.8 °F), Min:36.6 °C (97.8 °F), Max:36.6 °C (97.8 °F)   Temperature: 36.6 °C (97.8 °F)  Pulse  Av  Min: 87  Max: 87    Blood Pressure : 124/75      Respiratory      Respiration: 17, Pulse Oximetry: 99 %             Physical Exam   Constitutional: No distress.   Ill appearing   HENT:   Head: Normocephalic and atraumatic.   Mouth/Throat: No oropharyngeal exudate.   Eyes: Pupils are equal, round, and reactive to light. Conjunctivae are normal. Right eye exhibits no discharge. Left eye exhibits no discharge. No scleral icterus.   Neck: Neck supple. No JVD present. No thyromegaly present.   Cardiovascular: Intact distal pulses.    No murmur heard.  Pulmonary/Chest: Effort normal and breath sounds normal. No stridor. No respiratory distress. She has no wheezes. She has no rales.   Abdominal: Soft.  Bowel sounds are normal. She exhibits no distension. There is no tenderness. There is no rebound.   Musculoskeletal: Normal range of motion. She exhibits no edema.   Neurological: She is alert. No cranial nerve deficit.   Skin: Skin is warm. She is not diaphoretic. No erythema.         Assessment/Plan  * Severe sepsis (HCC)- (present on admission)   Assessment & Plan    This is severe sepsis with the following associated acute organ dysfunction(s): acute kidney failure.   2/2 to CDIFF colitis  CT abdomen/pelvis :Large quantity of stool in the colon compatible with severe constipation. Distal rectal wall thickening suggests component of proctocolitis.  Start PO Vancomycin, IV zosyn.  Cultures pending.  IV fluids          Lactic acidosis   Assessment & Plan    Lactic Acid 5.9  Trend serial lactic acids q6hrs until normalization        Hyperkalemia- (present on admission)   Assessment & Plan    IV fluids,  Recheck bmp now.  If elevated we will utilize D50W+insulin.           Acute renal failure (ARF) (HCC)- (present on admission)   Assessment & Plan    2/2 to sepsis and dehydration, pre-renal  IV fluids, recheck bmp in the am        Anemia of chronic disease- (present on admission)   Assessment & Plan    Stable, no signs of gross bleeding  CTM            I anticipate this patient will require at least two midnights for appropriate medical management, necessitating inpatient admission.    Prophylaxis: sc heparin    Recent Labs      10/05/18   1835   WBC  11.3*   RBC  4.50   HEMOGLOBIN  12.3   HEMATOCRIT  38.6   MCV  85.8   MCH  27.3   MCHC  31.9*   RDW  43.7   PLATELETCT  303   MPV  11.1     Recent Labs      10/05/18   1835   SODIUM  134*   POTASSIUM  5.9*   CHLORIDE  101   CO2  17*   GLUCOSE  130*   BUN  58*   CREATININE  2.03*   CALCIUM  9.3     Recent Labs      10/05/18   1835   ALTSGPT  18   ASTSGOT  47*   ALKPHOSPHAT  60   TBILIRUBIN  0.7   LIPASE  15   GLUCOSE  130*                 Lab Results   Component Value  Date    TROPONINI 0.06 (H) 10/05/2018       Imaging  DX-ABDOMEN COMPLETE WITH AP OR PA CXR   Final Result      Moderate colonic distention. This may represent ileus. Partial obstruction is not excluded.      Moderate to large amount of colonic stool.      No acute cardiopulmonary process is identified.      CT-ABDOMEN-PELVIS W/O    (Results Pending)

## 2018-10-06 NOTE — PROGRESS NOTES
Report on night shift to Dr. Hardwick, notified MD pt left leg swelling greater than right, pt legs same color bilaterally (see wound doc), no other complications noted, orders for unilateral LE duplex.     Report given to day RN, labs, meds and POC reviewed, pt in bed resting and calm with no s/s of distress noted.

## 2018-10-06 NOTE — PROGRESS NOTES
2350 - Report received, discussed labs, meds, POC. No blood cultures as of yet will draw blood cultures. Pt A&O 3, disoriented to time, lethargic but easily arousable, pt responding to commands for assessment appropriately. Heart rate 60-70's beats/min on telemetry. Pt O2 sats maintaining >92% on room air. Sepsis bolus completed from ER. Pt bowel movement via incontinence noted, pt cleaned and linens changed appropriately, wound pictures taken. All safety precautions in place, bed alarm in use, side rails up x3, call light within reach at all times.     0030 - Pt void up to commode.    0045 - Blood cultures drawn upon arrival, this RN poked x2 unsuccessful, lab called and blood cultures drawn before administration of next ordered antibiotic vancomycin per MAR.     0110 - Pt in bed sleeping, resting/calm, unlabored breathing, no signs/symptoms of distress noted. Pillows in use for support/repositioning, call light within reach. Will continue to monitor.

## 2018-10-06 NOTE — ASSESSMENT & PLAN NOTE
This is her initial presentation  Awaiting for results to rule out c diff and bacterial pathogens   The diarrhea seems to be an overflow diarrhea from severe constipation  Fleets enema for the constipation  Continue IV fluids

## 2018-10-06 NOTE — ED NOTES
ERP at bedside. Pt agrees with plan of care discussed by ERP. AIDET acknowledged with patient. Jania in low position, side rail up for pt safety. Call light within reach. Will continue to monitor.

## 2018-10-06 NOTE — PROGRESS NOTES
PROGRESS NOTE  Pulmonary & Critical Care Medicine     Date of service: 10/6/2018    Assessment & Plan   Diarrhea with dehydration   Assessment & Plan    This is her initial presentation  Awaiting for results to rule out c diff and bacterial pathogens   The diarrhea seems to be an overflow diarrhea from severe constipation  Fleets enema for the constipation  Continue IV fluids        Elevated troponin   Assessment & Plan    Initially likely from supply and demand  Unchanged due to clearance from kidneys  Hasn't increased and vitals normal so no need to follow levels at this time        Iron deficiency anemia   Assessment & Plan    IV iron for replacement        Lactic acidosis   Assessment & Plan    From dehydration  Doubt infection ut waiting for all cultures  For now to keep on vanc po for  c diff and ceftriaxone for concernf or uti with hx of malodorous urine        Hyperkalemia   Assessment & Plan    Resolved  Am labs        Acute renal failure superimposed on stage 3 chronic kidney disease (HCC)   Assessment & Plan    From dehydration improving slowly  Baseline creatinine is 1.14        Delirium superimposed on dementia   Assessment & Plan    At this time no evidence of delirium        Callus of foot   Assessment & Plan    Multiple on both feet, not infected  Wound care nurse following            Feeding:  Taking po  Analgesia: n/a  Sedation:  n/a  Thromboembolic prophylaxis:    Head of bed elevation:  30 degrees  Ulcer prophylaxis:  Not needed taking po  Glucose:  normal  Spontaneous breathing trial:  n/a  Bowel regimen:  Watery diarrhoea  Indwelling catheter:  no  Descalation of Abx:stop iv vancomycin, continue po vanc till final on c diff and continue ceftriaxone till final on urine culture  Code Status:  DNR  Disposition:  Floor   Restraints:  None needed    Interval History    Rene Becerril is an 89 y.o. female admitted on 10/5/18 for diarrheas. She was at [facility] care for rehab post left hip hemiarthroplasty  "on 7/14/2018 and discharged 2 months ago to home.  She has had chronic constipation and has been taking a vegetable laxative  2 days prior to admit developed diarrhea and foul smelling urine  Patient was also becoming weaker so was admitted for additional work up which is all pending  On po Vanc and ceftriaxone  CT abdoman done on 10/5/18 showed = \"  1.  Large quantity of stool in the colon compatible with severe constipation. Distal rectal wall thickening suggests component of proctocolitis.   2.  Atherosclerosis     She has lower extremity edema on left and ultrasound was negative for clots      24-Hour Events: lactic acid came down, vitals are holding    ROS   Patient denying pain  Says everything is fine       Objective     PHYSICAL EXAM:   BP (!) 99/58   Pulse 94   Temp 36.7 °C (98 °F)   Resp 20   Ht 1.6 m (5' 3\")   Wt 54.3 kg (119 lb 11.4 oz)   SpO2 96%   BMI 21.21 kg/m²     Intake/Output Summary (Last 24 hours) at 10/06/18 0818  Last data filed at 10/06/18 0800   Gross per 24 hour   Intake             2295 ml   Output              400 ml   Net             1895 ml       Gen: no apparent distress, resting comfortably  HEENT: Normocephalic atraumatic, PERRL, oropharynx clear no erythema or exudates; Nares patents, no discharge from the eyes and no discharge from either ear  Trachea midline, no adenopathy  Cardiovascular: Regular rate and rhythm, no murmurs or rubs  Pulmonary: No apparent wheezes, rales or rhonchi   Abdomen: Soft, nondistended, nontender, normoactive bowel sounds; no organomegaly  Gu: no bonner catheter present  Extremities: No lower extremity edema bilaterally, peripheral pulses +2 in all 4 extremities, no calf tenderness  Evaluated her feet with [facility] [place] wound care -- callouses di not appear infected  Neuro: Alert and oriented to person. Able to follow one step commands, short term memory not present  Skin: No rash      Labs:  Lab Results   Component Value Date/Time    SODIUM 136 " 10/06/2018 03:05 AM    POTASSIUM 4.5 10/06/2018 03:05 AM    CHLORIDE 103 10/06/2018 03:05 AM    CO2 23 10/06/2018 03:05 AM    GLUCOSE 110 (H) 10/06/2018 03:05 AM    BUN 56 (H) 10/06/2018 03:05 AM    CREATININE 1.82 (H) 10/06/2018 03:05 AM        Lab Results   Component Value Date/Time    WBC 9.9 10/06/2018 03:05 AM    RBC 4.45 10/06/2018 03:05 AM    HEMOGLOBIN 12.0 10/06/2018 03:05 AM    HEMATOCRIT 37.6 10/06/2018 03:05 AM    MCV 84.5 10/06/2018 03:05 AM    MCH 27.0 10/06/2018 03:05 AM    MCHC 31.9 (L) 10/06/2018 03:05 AM    MPV 10.8 10/06/2018 03:05 AM    NEUTSPOLYS 24.00 (L) 10/06/2018 03:05 AM    LYMPHOCYTES 13.00 (L) 10/06/2018 03:05 AM    MONOCYTES 7.00 10/06/2018 03:05 AM    EOSINOPHILS 0.00 10/06/2018 03:05 AM    BASOPHILS 0.00 10/06/2018 03:05 AM      Lactic 2.3    Troponin 0.06     Carrie Conteh

## 2018-10-06 NOTE — CARE PLAN
Problem: Skin Integrity  Goal: Risk for impaired skin integrity will decrease    Intervention: Implement precautions to protect skin integrity in collaboration with the interdisciplinary team  Wound care orders reviewed with Wound RN, barrier paste applied to coccyx and perineal area after cleansing. Heels floated. Pt repositioned Q 2 hours.

## 2018-10-06 NOTE — ED NOTES
Pt straight cathed for urine specimen at this time. Sterile technique maintained. Specimen taken to lab.

## 2018-10-06 NOTE — PROGRESS NOTES
Report from Harmeet RN, Pt resting calmly, A/O X1, re-oriented to location and situation, Pt denies pain, Wound RN at bedside assisted with turning and cleaning Pt,Pt has small incontinent stool.  Pt set up for clear liquids, POC reviewed with Dr Hardwick Pt now medical overflow. Isolation precautions in place for R/O CDIFF. FRACISCOM

## 2018-10-06 NOTE — ED NOTES
Jennifer from Lab called with critical result of Lactic acid 5.9 at 1850. Critical lab result read back to Светлана.   Dr. Hodge notified of critical lab result at 1851.  Critical lab result read back by Dr. Hodge.

## 2018-10-07 PROBLEM — K59.00 OBSTIPATION: Status: ACTIVE | Noted: 2018-01-01

## 2018-10-07 NOTE — PROGRESS NOTES
Alert and oriented  to person, pt reoriented.Edema +1 BLE, on room air sat 92%.No S/S of discomfort or pain at this time. POC discussed, Safety precaution in place, call light at reach. N/S infusing at 100mL/hr.

## 2018-10-07 NOTE — ASSESSMENT & PLAN NOTE
Causing overflow diarrhea and dehdyration.  Secondary to recent use of narcotics in the setting of a hip fracture  Fleets enema did produce some results, milk of molasses enema today, magnesium citrate, bowel program going forward.  Avoid pain medication, daily lactulose.

## 2018-10-07 NOTE — PROGRESS NOTES
[facility] Medicine Daily Progress Note    Date of Service  10/7/2018    Chief Complaint  89 y.o. female admitted 10/5/2018 with continuous diarrhea.    Hospital Course    89 y.o. Female recently admitted in July for left hip fracture s/p  left hip hemiarthroplasty on 7/14/2018,Then went to [facility] for rehabilitation. Presented 10/5/2018 to the ER for evaluation of diarrhea. Patient's granddaughter who is her Power of  is providing history as patient has dementia. Apparently after patient's discharge patient was taking a vegetable laxative. However the past several days her granddaughter noted foul-smelling loose persistent diarrhea, watery. The patient also has been having malodorous urine output.       Interval Problem Update  CT scan reviewed, large amount of impacted stool in the colon with evidence of proctocolitis.  Fleets enema done yesterday with small amount of hard stool production.    Consultants/Specialty  None    Code Status  DNAR/DNI    Disposition  TBD, lives with granddaughter Lashonda she is here at bedside.    Review of Systems  Review of Systems   Unable to perform ROS: Dementia        Physical Exam  Temp:  [36.6 °C (97.8 °F)-36.7 °C (98 °F)] 36.6 °C (97.8 °F)  Pulse:  [] 96  Resp:  [18-20] 20  BP: (111-114)/(54-72) 111/61    Physical Exam   Constitutional: She appears well-developed and well-nourished. No distress.   Patient seen and examined by me.   HENT:   Nose: Nose normal.   Mouth/Throat: Oropharynx is clear and moist. No oropharyngeal exudate.   Eyes: Conjunctivae are normal. Right eye exhibits no discharge. Left eye exhibits no discharge. No scleral icterus.   Neck: No JVD present. No tracheal deviation present.   Cardiovascular: Normal rate, regular rhythm and normal heart sounds.    Pulmonary/Chest: Effort normal and breath sounds normal. No stridor. No respiratory distress. She has no wheezes. She has no rales. She exhibits no tenderness.   Abdominal: Soft. Bowel sounds are  normal. She exhibits distension. She exhibits no mass. There is no tenderness. There is no rebound and no guarding.   Musculoskeletal: She exhibits no edema or tenderness.   Neurological: She is alert. She is disoriented. No cranial nerve deficit. She exhibits normal muscle tone.   Confused, mumbling and singing to herself.   Skin: Skin is warm and dry. She is not diaphoretic. No pallor.   Psychiatric: She has a normal mood and affect. Her speech is delayed. Cognition and memory are impaired. She exhibits abnormal recent memory and abnormal remote memory.   Nursing note and vitals reviewed.      Fluids    Intake/Output Summary (Last 24 hours) at 10/07/18 1434  Last data filed at 10/07/18 0636   Gross per 24 hour   Intake             2290 ml   Output              350 ml   Net             1940 ml       Laboratory  Recent Labs      10/05/18   1835  10/06/18   0305  10/07/18   0424   WBC  11.3*  9.9  10.5   RBC  4.50  4.45  3.96*   HEMOGLOBIN  12.3  12.0  10.7*   HEMATOCRIT  38.6  37.6  34.0*   MCV  85.8  84.5  85.9   MCH  27.3  27.0  27.0   MCHC  31.9*  31.9*  31.5*   RDW  43.7  43.7  45.1   PLATELETCT  303  307  290   MPV  11.1  10.8  11.0     Recent Labs      10/05/18   2238  10/06/18   0305  10/07/18   0424   SODIUM  136  136  137   POTASSIUM  5.0  4.5  3.4*   CHLORIDE  105  103  109   CO2  19*  23  20   GLUCOSE  103*  110*  70   BUN  57*  56*  40*   CREATININE  1.87*  1.82*  1.37   CALCIUM  8.6  8.8  8.1*                   Imaging  US-EXTREMITY VENOUS LOWER UNILAT LEFT   Final Result      No evidence of left lower extremity deep venous thrombosis.      CT-ABDOMEN-PELVIS W/O   Final Result         1.  Large quantity of stool in the colon compatible with severe constipation. Distal rectal wall thickening suggests component of proctocolitis.   2.  Atherosclerosis      Note: Diagnostic sensitivity of this examination is significantly diminished due to artifacts, noncontrast technique, and poor contrast differentiation  between structures.      DX-ABDOMEN COMPLETE WITH AP OR PA CXR   Final Result      Moderate colonic distention. This may represent ileus. Partial obstruction is not excluded.      Moderate to large amount of colonic stool.      No acute cardiopulmonary process is identified.           Assessment/Plan  * Severe sepsis (HCC)- (present on admission)   Assessment & Plan    This is severe sepsis with the following associated acute organ dysfunction(s): acute kidney failure.   Sepsis has been ruled out.  Stool for C. difficile was negative.  No evidence for infection, stop ceftriaxone and observe.  Patient has severe obstipation with stercoral colitis.          Diarrhea with dehydration   Assessment & Plan    Due to overflow diarrhea from severe obstipation.  Continue IV fluids, treating obstipation and stercoral colitis.        Obstipation   Assessment & Plan    Causing overflow diarrhea and dehdyration.  Secondary to recent use of narcotics in the setting of a hip fracture  Fleets enema did produce some results, milk of molasses enema today, magnesium citrate, bowel program going forward.  Avoid pain medication, daily lactulose.        Lactic acidosis   Assessment & Plan    Resolved with fluids, due to dehydration.        Hyperkalemia- (present on admission)   Assessment & Plan    IV fluids,  Recheck bmp now.  If elevated we will utilize D50W+insulin.           Acute renal failure superimposed on stage 3 chronic kidney disease (HCC)- (present on admission)   Assessment & Plan    Improving with fluids, GFR is up to 36, continue IV fluids and monitor BMP.        Delirium superimposed on dementia- (present on admission)   Assessment & Plan    Improving, treat obstipation, frequent orientation.        Anemia of chronic disease- (present on admission)   Assessment & Plan    Iron levels very low, less than 10.  Total body iron replacement ordered, iron sucrose x  5 days.        Callus of foot- (present on admission)    Assessment & Plan    Usual wound care.             VTE prophylaxis: heparin.

## 2018-10-07 NOTE — CARE PLAN
Problem: Safety  Goal: Will remain free from injury  Outcome: PROGRESSING AS EXPECTED  Hourly rounding.  Non-skid socks. Bed locked & in low position. Personal belongings and call light  within reach. .      Problem: Skin Integrity  Goal: Risk for impaired skin integrity will decrease  Outcome: PROGRESSING AS EXPECTED  Turned & repositioned every 2 hours,kept dry and clean. place pillows on bony prominences to prevent skin breakdown.

## 2018-10-07 NOTE — PROGRESS NOTES
Bedside report given to  Carlene ROLLINS.Pt  Resting in the bed.Safety precautions in place and all the lines remain patent.

## 2018-10-07 NOTE — ASSESSMENT & PLAN NOTE
Improved nurses not noting constant diarrhea.  Hard stool obtained with fleets and milk and molasses enema, start MiraLAX daily, avoid pain medication.  Granddaughter reports patient has been off pain medication since August.

## 2018-10-07 NOTE — PROGRESS NOTES
Pt up to commode after enema. Granddaughter in room. Spoke to her about proper technique when cleaning her grandmother after urination. Verbalized understanding. CNA in room with pt.

## 2018-10-07 NOTE — PROGRESS NOTES
Bedside report received from Diana ROLLINS. Pt resting in bed, RR even and unlabored. Safety precautions in place, call light within reach. Bed alarm on.

## 2018-10-07 NOTE — PROGRESS NOTES
0700 am Patient received lying down in bed with eyes closed, IV WNL infusing without any difficulties.     0900 am Patient stable in bed with [facility] at side, order for enema, kitchen called to get milk and molassess.    1100 am Patient in bed stable and in no distress, bedpan given.    1200 pm Enema with milk and molasses given, patient tolerated procedure, a small amount of hard stool came out, will monitor for more BM's.

## 2018-10-07 NOTE — CARE PLAN
Problem: Fluid Volume:  Goal: Will maintain balanced intake and output  Outcome: PROGRESSING AS EXPECTED  Discussed use of pain scale, call light, medications and nonpharmacologic ways to control pain. Whiteboard updated, POC discussed.    Problem: Safety  Goal: Will remain free from falls  Outcome: PROGRESSING AS EXPECTED  Room uncluttered, IV pump on side of bed closest to bathroom. Pt educated to call for assistance and to sit at edge of bed before standing. Slipper socks on feet.

## 2018-10-08 PROBLEM — E87.5 HYPERKALEMIA: Status: RESOLVED | Noted: 2018-01-01 | Resolved: 2018-01-01

## 2018-10-08 PROBLEM — E87.6 HYPOKALEMIA: Status: ACTIVE | Noted: 2018-01-01

## 2018-10-08 NOTE — FLOWSHEET NOTE
10/08/18 1050   Events/Summary/Plan   Events/Summary/Plan RN requested PRN TX.   Interdisciplinary Plan of Care-Goals (Indications)   Bronchodilator Indications Physical Exam / Hyperinflation / Wheezing (bronchospasm)   Interdisciplinary Plan of Care-Outcomes    Bronchodilator Outcome Diminished Wheezing and Volume of Air Movement Increased   Education   Education Yes - Pt. / Family has been Instructed in use of Respiratory Equipment;Yes - Pt. / Family has been Instructed in use of Respiratory Medications and Adverse Reactions   RT Assessment of Delivered Medications   Evaluation of Medication Delivery Daily Yes-- Pt /Family has been Instructed in use of Respiratory Medications and Adverse Reactions   SVN Group   #SVN Performed Yes   Given By: Mouthpiece   Date SVN Last Changed 10/08/18   Date SVN Next Change Due (Q 7 Days) 10/15/18   Respiratory WDL   Respiratory (WDL) X   Chest Exam   Respiration 20   Heart Rate (Monitored) 90   Breath Sounds   Pre/Post Intervention Pre Intervention Assessment  (upper airway wheeze.)   RUL Breath Sounds Expiratory Wheezes   RML Breath Sounds Expiratory Wheezes;Diminished   RLL Breath Sounds Diminished   BLANCHE Breath Sounds Clear   LLL Breath Sounds Clear;Diminished   Oximetry   #Pulse Oximetry (Single Determination) Yes   Oxygen   Pulse Oximetry 92 %   O2 Daily Delivery Respiratory  Room Air with O2 Available

## 2018-10-08 NOTE — PROGRESS NOTES
0730: Report received from night RN, POC discussed. Bed alarm.     0820: Assessment completed. Lines and gtts verified. POC discussed. Pt oriented to self only. Pt repositioned and set up for breakfast. Call light in reach and bed alarm on.     0850: Dr. Lenz at bedside, POC discussed. New orders placed and implemented. Ok to DC IVF.     1000: Pt tolerated lunch. Turned and repositioned. Bed alarm on and call light in reach.     1200: Pt eating lunch. Denies pain at this time. Bed alarm on and call light in reach.     1500: Scheduled medications given, pt tolerated well.     1915: Report given to AYO Young.

## 2018-10-08 NOTE — PROGRESS NOTES
1900: Bedside report received from AYO Stanley. Patient sitting up in bed, feeding self dinner. No questions/concerns/needs verbalized at this time. Falls precautions remain in place.    1950: Rounded on patient, sitting up in bed. Alert and oriented to self only. VSS and afebrile. SpO2 WNL on RA. Denies pain/discomfort at this time. IV patent and NS infusing at 100mL/hr. Unable to participate in plan of care discussion due to cognition. Needs addressed. Falls precautions in place. No further questions or concerns at this time. Will continue to monitor.

## 2018-10-08 NOTE — PROGRESS NOTES
1400 Patient turned and cleaned loose BM.    1600 Patient turned and cleaned loose BM.    1800 Patient turned and cleaned loose BM, no form BM as of now,all loose.

## 2018-10-08 NOTE — PROGRESS NOTES
[facility] Medicine Daily Progress Note    Date of Service  10/8/2018    Chief Complaint  89 y.o. female admitted 10/5/2018 with continuous diarrhea.    Hospital Course    89 y.o. Female recently admitted in July for left hip fracture s/p  left hip hemiarthroplasty on 7/14/2018,Then went to [facility] for rehabilitation. Presented 10/5/2018 to the ER for evaluation of diarrhea. Patient's granddaughter who is her Power of  is providing history as patient has dementia. Apparently after patient's discharge patient was taking a vegetable laxative. However the past several days her granddaughter noted foul-smelling loose persistent diarrhea, watery. The patient also has been having malodorous urine output.  CT scan of the abdomen showed a large amount of hard looking impacted stool in the colon with some proctocolitis.  Patient had been in the hospital earlier this summer due to a hip fracture and had been on pain medication, went to [facility] where she still received pain medication but the entire time her grand daughter states patient had been on stool softener specifically Senokot.      Interval Problem Update  Doing much better, belly soft, diarrhea has stopped.  Varying amounts of hard stool produced during enemas.  She is having a slight bit of wheezing.  Otherwise, alert and cooperative.  She remains confused which is her baseline.  Consultants/Specialty  None    Code Status  DNAR/DNI    Disposition  TBD, lives with granddaughter Lashonda     Review of Systems  Review of Systems   Unable to perform ROS: Dementia        Physical Exam  Temp:  [36.3 °C (97.3 °F)-36.8 °C (98.2 °F)] 36.6 °C (97.8 °F)  Pulse:  [] 107  Resp:  [18-20] 18  BP: ()/(61-87) 131/65    Physical Exam   Constitutional: She appears well-developed and well-nourished. No distress.   Patient seen and examined by me.   HENT:   Nose: Nose normal.   Mouth/Throat: Oropharynx is clear and moist. No oropharyngeal exudate.   Eyes: Conjunctivae are  normal. Right eye exhibits no discharge. Left eye exhibits no discharge. No scleral icterus.   Neck: No JVD present. No tracheal deviation present.   Cardiovascular: Normal rate, regular rhythm and normal heart sounds.    Pulmonary/Chest: Effort normal. No stridor. No respiratory distress. She has wheezes (Few scattered wheezes). She has no rales. She exhibits no tenderness.   Abdominal: Soft. Bowel sounds are normal. She exhibits no distension. There is no tenderness.   Musculoskeletal: She exhibits no edema or tenderness.   Neurological: She is alert. No cranial nerve deficit. She exhibits normal muscle tone.   Confused at baseline.   Skin: Skin is warm and dry. She is not diaphoretic. No pallor.   Psychiatric: She has a normal mood and affect. Her behavior is normal. Cognition and memory are impaired. She exhibits abnormal recent memory and abnormal remote memory.   Nursing note and vitals reviewed.      Fluids    Intake/Output Summary (Last 24 hours) at 10/08/18 1417  Last data filed at 10/08/18 1300   Gross per 24 hour   Intake             1685 ml   Output              100 ml   Net             1585 ml       Laboratory  Recent Labs      10/06/18   0305  10/07/18   0424  10/08/18   0412   WBC  9.9  10.5  9.4   RBC  4.45  3.96*  4.04*   HEMOGLOBIN  12.0  10.7*  10.9*   HEMATOCRIT  37.6  34.0*  35.4*   MCV  84.5  85.9  87.6   MCH  27.0  27.0  27.0   MCHC  31.9*  31.5*  30.8*   RDW  43.7  45.1  46.0   PLATELETCT  307  290  310   MPV  10.8  11.0  10.7     Recent Labs      10/06/18   0305  10/07/18   0424  10/08/18   0412   SODIUM  136  137  138   POTASSIUM  4.5  3.4*  3.4*   CHLORIDE  103  109  113*   CO2  23  20  20   GLUCOSE  110*  70  81   BUN  56*  40*  31*   CREATININE  1.82*  1.37  1.05   CALCIUM  8.8  8.1*  8.0*                   Imaging  US-EXTREMITY VENOUS LOWER UNILAT LEFT   Final Result      No evidence of left lower extremity deep venous thrombosis.      CT-ABDOMEN-PELVIS W/O   Final Result         1.   Large quantity of stool in the colon compatible with severe constipation. Distal rectal wall thickening suggests component of proctocolitis.   2.  Atherosclerosis      Note: Diagnostic sensitivity of this examination is significantly diminished due to artifacts, noncontrast technique, and poor contrast differentiation between structures.      DX-ABDOMEN COMPLETE WITH AP OR PA CXR   Final Result      Moderate colonic distention. This may represent ileus. Partial obstruction is not excluded.      Moderate to large amount of colonic stool.      No acute cardiopulmonary process is identified.           Assessment/Plan  * Severe sepsis (HCC)- (present on admission)   Assessment & Plan    This is severe sepsis with the following associated acute organ dysfunction(s): acute kidney failure.   Sepsis has been ruled out.  Stool for C. difficile was negative.  No evidence for infection, stop ceftriaxone and observe.  Patient has severe obstipation with stercoral colitis.          Diarrhea with dehydration   Assessment & Plan    Improved nurses not noting constant diarrhea.  Hard stool obtained with fleets and milk and molasses enema, start MiraLAX daily, avoid pain medication.  Granddaughter reports patient has been off pain medication since August.        Hypokalemia   Assessment & Plan    Oral replacement.        Obstipation   Assessment & Plan    Causing overflow diarrhea and dehdyration.  Secondary to recent use of narcotics in the setting of a hip fracture  Fleets enema did produce some results, milk of molasses enema today, magnesium citrate, bowel program going forward.  Avoid pain medication, daily lactulose.        Lactic acidosis   Assessment & Plan    Resolved with fluids, due to dehydration.        Acute renal failure superimposed on stage 3 chronic kidney disease (HCC)- (present on admission)   Assessment & Plan    Improved, GFR is up to 49.  Patient starting to get a little congested therefore stop IV fluids and  observe.        Delirium superimposed on dementia- (present on admission)   Assessment & Plan    Improving, treat obstipation, frequent orientation.  Not agitated.        Anemia of chronic disease- (present on admission)   Assessment & Plan    Iron levels very low, less than 10.  Total body iron replacement ordered, iron sucrose IV daily day 3 out of 5 days.        Callus of foot- (present on admission)   Assessment & Plan    Usual wound care.             VTE prophylaxis: heparin.

## 2018-10-08 NOTE — CARE PLAN
Problem: Safety  Goal: Will remain free from falls    Intervention: Implement fall precautions  Treaded slipper socks in place, bed in low position, pt near nurses station, bed alarm on and call light in reach. Pt will remain free from falls.       Problem: Knowledge Deficit  Goal: Knowledge of disease process/condition, treatment plan, diagnostic tests, and medications will improve    Intervention: Explain information regarding disease process/condition, treatment plan, diagnostic tests, and medications and document in education  POC discussed with pt. No family at bedside. Will speak with family when arrive.       Problem: Skin Integrity  Goal: Risk for impaired skin integrity will decrease    Intervention: Assess risk factors for impaired skin integrity and/or pressure ulcers  Turn and reposition pt every 2 hours, pillow for positioning. Heels elevated.

## 2018-10-08 NOTE — CARE PLAN
Problem: Bowel/Gastric:  Goal: Normal bowel function is maintained or improved  Outcome: PROGRESSING SLOWER THAN EXPECTED  BM x 1 overnight, but remains loose. No formed stool noted. Abdomen soft, nontender. Bowel sounds normoactive. Tolerating diet without N/V. NS continues at 100mL/hr.    Problem: Skin Integrity  Goal: Risk for impaired skin integrity will decrease  Outcome: PROGRESSING AS EXPECTED  No s/s of skin breakdown noted at this time. Patient turned/repositioned in bed q2h. Incontinence care and barrier paste provided. Heels floated off bed bilaterally.

## 2018-10-09 PROBLEM — E87.20 LACTIC ACIDOSIS: Status: RESOLVED | Noted: 2018-01-01 | Resolved: 2018-01-01

## 2018-10-09 NOTE — CARE PLAN
Problem: Safety  Goal: Will remain free from injury  Outcome: PROGRESSING AS EXPECTED  Pt educated to call for assistance. Toileted before medications. Personal belongings within reach, room uncluttered.    Problem: Knowledge Deficit  Goal: Knowledge of the prescribed therapeutic regimen will improve  Outcome: PROGRESSING AS EXPECTED  Pt educated on use of supplies, keeping skin clean and dry, medications and IV fluids. Lab tests explained.

## 2018-10-09 NOTE — DISCHARGE PLANNING
Anticipated Discharge Disposition: SNF    Action: SW spoke to pt's granddaughter, Lashonda, she would like her mother to transition to SNF, then will dc to a [facility].  SW will provide her a GH list that takes community based waiver.  Lashonda would like referrals sent to #1 [facility] and #2 [facility].  CAROLEE will faxed choice to [facility].     Barriers to Discharge: Pending acceptance to SNF.     Plan: SW will f/u on SNF referrals.

## 2018-10-09 NOTE — THERAPY
"Physical Therapy Evaluation completed.   Bed Mobility:  Supine to Sit: Moderate Assist  Transfers: Sit to Stand: Contact Guard Assist  Gait: Level Of Assist: Contact Guard Assist with Front-Wheel Walker       Plan of Care: Will benefit from Physical Therapy 3 times per week  Discharge Recommendations: Equipment: No Equipment Needed. Post-acute therapy Discharge to a transitional care facility for continued skilled therapy services.  89 y.o. female with dx of diarrhea, dehydration, obstipation, and KIERA.  Pt limited by generalized weakness, dementia, and L hip pain.  Pt refused gait with therapy, only to [facility] and back to bed.  Pt will benefit from PT services while in acute setting to improve safety with gait and transfers, as well as improved bed mobility.  Pt will likely require SNF placement upon dc from hospital.      See \"Rehab Therapy-Acute\" Patient Summary Report for complete documentation.     "

## 2018-10-09 NOTE — PROGRESS NOTES
Assessment complete, medicated per MAR. Discussed POC and pain in hip, allowed time to ask questions. Pt resting in bed, RR even and unlabored. Pt educated to call for assistance. Declines needs at this time. Safety precautions in place. Bed alarm on.

## 2018-10-09 NOTE — PROGRESS NOTES
[facility] Hospitalist Progress Note    Date of Service: 10/9/2018    Chief Complaint  89 y.o. female admitted 10/5/2018 with dementia who presented with diarrhea. She was found with KIERA and lactic acidosis. CT showed impacted stool with proctocolitis. Patient recently underwent hip surgery and was on pain medication. Patient received fleets enema, milk of mag.     Interval Problem Update  Patient is confused, appears comfortable    Consultants/Specialty  None    Disposition  SNF        Review of Systems   Unable to perform ROS: Dementia      Physical Exam  Laboratory/Imaging   Hemodynamics  Temp (24hrs), Av.5 °C (97.7 °F), Min:36.4 °C (97.5 °F), Max:36.6 °C (97.9 °F)   Temperature: 36.4 °C (97.6 °F)  Pulse  Av.2  Min: 60  Max: 112    Blood Pressure : 126/76      Respiratory      Respiration: 18, Pulse Oximetry: 97 %        RUL Breath Sounds: Diminished, RML Breath Sounds: Diminished, RLL Breath Sounds: Diminished, BLANCHE Breath Sounds: Diminished, LLL Breath Sounds: Diminished    Fluids    Intake/Output Summary (Last 24 hours) at 10/09/18 1739  Last data filed at 10/09/18 1339   Gross per 24 hour   Intake              720 ml   Output              500 ml   Net              220 ml       Nutrition  Orders Placed This Encounter   Procedures   • Diet Order Regular     Standing Status:   Standing     Number of Occurrences:   1     Order Specific Question:   Diet:     Answer:   Regular [1]     Physical Exam   Constitutional:   frail   HENT:   Head: Normocephalic.   Mouth/Throat: Oropharynx is clear and moist.   Eyes: Conjunctivae are normal. Right eye exhibits no discharge. Left eye exhibits no discharge.   Cardiovascular: Normal rate and regular rhythm.    Pulmonary/Chest: Effort normal. She has no wheezes.   Abdominal: Soft. There is no tenderness. There is no rebound and no guarding.   Musculoskeletal: She exhibits no edema.   Neurological: She is alert.   Disoriented to place and date, follows some commands   Skin:  Skin is warm and dry.   Nursing note and vitals reviewed.      Recent Labs      10/07/18   0424  10/08/18   0412   WBC  10.5  9.4   RBC  3.96*  4.04*   HEMOGLOBIN  10.7*  10.9*   HEMATOCRIT  34.0*  35.4*   MCV  85.9  87.6   MCH  27.0  27.0   MCHC  31.5*  30.8*   RDW  45.1  46.0   PLATELETCT  290  310   MPV  11.0  10.7     Recent Labs      10/07/18   0424  10/08/18   0412   SODIUM  137  138   POTASSIUM  3.4*  3.4*   CHLORIDE  109  113*   CO2  20  20   GLUCOSE  70  81   BUN  40*  31*   CREATININE  1.37  1.05   CALCIUM  8.1*  8.0*                      Assessment/Plan     * Severe sepsis (HCC)- (present on admission)   Assessment & Plan    This is severe sepsis with the following associated acute organ dysfunction(s): acute kidney failure.   Sepsis has been ruled out.  Stool for C. difficile was negative.  No evidence for infection, stop ceftriaxone and observe.  Patient has severe obstipation with stercoral colitis.          Diarrhea with dehydration   Assessment & Plan    Improved nurses not noting constant diarrhea.  Hard stool obtained with fleets and milk and molasses enema, start MiraLAX daily, avoid pain medication.  Granddaughter reports patient has been off pain medication since August.        Hypokalemia   Assessment & Plan    Oral replacement.        Obstipation   Assessment & Plan    Causing overflow diarrhea and dehdyration.  Secondary to recent use of narcotics in the setting of a hip fracture  Fleets enema did produce some results, milk of molasses enema today, magnesium citrate, bowel program going forward.  Avoid pain medication, daily lactulose.        Acute renal failure superimposed on stage 3 chronic kidney disease (HCC)- (present on admission)   Assessment & Plan    BUN/creat improved        Delirium superimposed on dementia- (present on admission)   Assessment & Plan    Improving, treat obstipation, frequent orientation.  Not agitated.        Anemia of chronic disease- (present on admission)    Assessment & Plan    Iron levels very low, less than 10.  Total body iron replacement ordered, iron sucrose IV ordered        Callus of foot- (present on admission)   Assessment & Plan    Usual wound care.          Quality-Core Measures   Reviewed items::  EKG reviewed, Labs reviewed, Medications reviewed and Radiology images reviewed  Rosa catheter::  No Rosa  DVT prophylaxis pharmacological::  Heparin

## 2018-10-09 NOTE — CARE PLAN
Problem: Discharge Barriers/Planning  Goal: Patient's continuum of care needs will be met    Intervention: Collaborate with Transitional Care Team and Interdisciplinary Team to meet discharge needs   trying to contact granddaughter to talk about discharge plan for home vs snf.      Problem: Urinary Elimination:  Goal: Ability to reestablish a normal urinary elimination pattern will improve    Intervention: Assist patient to sit on commode or toilet for voiding  Pt has been using bedside commode to void, has been continent.

## 2018-10-09 NOTE — PROGRESS NOTES
Report received from Hannah at bedside, pt awake for report.     0815-assisted CNA in turning to the left side, but pt had to use the restroom.  Assisted x1 to commode and new mepliplex applied to sacral area.  Pt had clear, yellow urine.  Pt placed supine to eat breakfast.  Morning assessment done at this time and pt able to state her name and part of her birthday, but no other orientation questions.  Pt did follow commands in getting up to the commode.    1130-PT at bedside and evaluated pt, pt unable to ambulate; see PT notes for details.     1245-attempted to use I-pad for translating but not interrupter available.      1430-Heparin SQ given and pt speaking in Austrian when answering questions about her birthday.

## 2018-10-09 NOTE — PROGRESS NOTES
Bedside report received from Francia ROLLINS. Pt resting in bed, RR even and unlabored. Safety precautions in place, call light within reach.

## 2018-10-09 NOTE — DISCHARGE PLANNING
Care Transition Team Assessment  Pt discussed in IDT rounds yesterday.  Pt has a supportive family and will return home.  Pt will likely dc home today with no needs.  SW will remain available for dc needs as they arise.     Information Source Chart   Orientation : Disoriented to Event, Disoriented to Place, Disoriented to Time  Information Given By: Other (Comments) (Chart)  Who is responsible for making decisions for patient? : POA  Name(s) of Primary Decision Maker: Reyna uRssell    Elopement Risk  Legal Hold: No  Ambulatory or Self Mobile in Wheelchair: No-Not an Elopement Risk  Elopement Risk: Not at Risk for Elopement    Interdisciplinary Discharge Planning  Patient or legal guardian wants to designate a caregiver (see row info): No    Discharge Preparedness  What is your plan after discharge?: Home with help  What are your discharge supports?: Other (comment) (Granddaughter )    Finances  Financial Barriers to Discharge: No  Prescription Coverage: Yes    Vision / Hearing Impairment  Right Eye Vision: Impaired  Left Eye Vision: Impaired    Advance Directive  Advance Directive?: POLST    Domestic Abuse  Have you ever been the victim of abuse or violence?: No    Discharge Risks or Barriers  Discharge risks or barriers?: No    Anticipated Discharge Information  Anticipated discharge disposition: Home

## 2018-10-10 PROBLEM — R65.20 SEVERE SEPSIS (HCC): Status: RESOLVED | Noted: 2018-01-01 | Resolved: 2018-01-01

## 2018-10-10 PROBLEM — K59.00 OBSTIPATION: Status: RESOLVED | Noted: 2018-01-01 | Resolved: 2018-01-01

## 2018-10-10 PROBLEM — E87.6 HYPOKALEMIA: Status: RESOLVED | Noted: 2018-01-01 | Resolved: 2018-01-01

## 2018-10-10 PROBLEM — R79.89 ELEVATED TROPONIN: Status: RESOLVED | Noted: 2018-01-01 | Resolved: 2018-01-01

## 2018-10-10 PROBLEM — R19.7 DIARRHEA WITH DEHYDRATION: Status: RESOLVED | Noted: 2018-01-01 | Resolved: 2018-01-01

## 2018-10-10 PROBLEM — A41.9 SEVERE SEPSIS (HCC): Status: RESOLVED | Noted: 2018-01-01 | Resolved: 2018-01-01

## 2018-10-10 NOTE — DISCHARGE SUMMARY
Discharge Summary    CHIEF COMPLAINT ON ADMISSION  Chief Complaint   Patient presents with   • Diarrhea     x 2 days   • Weakness       Reason for Admission  diarrea     CODE STATUS  DNAR/DNI    HPI & HOSPITAL COURSE  This is a 89 y.o. female here with dementia who presented with diarrhea. She was found with KIERA and lactic acidosis. She was admitted for subserosal for possible abdominal infection and started on. IV Zosyn and by mouth vancomycin. CT showed impacted stool with proctocolitis. Patient had recently underwent hip surgery and was receiving narcotic pain medication. She was as she was given fleets enema, milk of magnesia. She was able to have good bowel movements. Stool was negative on culture and negative for C. Diff and antibiotics were stopped.  Her aching and lactic acidosis resolved with IV fluids.  Rectal she was noted to have anemia with iron deficiency and was given no fever. I prescribed her oral iron on discharge. She had hypokalemia with ongoing repletion  She was evaluated by PT OT who recommended SNF. She required no pain medication while inpatient. She was tolerating her diet well.       Therefore, she is discharged in good and stable condition to skilled nursing facility.    The patient met 2-midnight criteria for an inpatient stay at the time of discharge.      FOLLOW UP ITEMS POST DISCHARGE  F/u anemia and hypokalemia   DISCHARGE DIAGNOSES  Principal Problem (Resolved):    Severe sepsis (HCC) POA: Yes  Active Problems:    Callus of foot POA: Yes    Anemia of chronic disease POA: Yes    Delirium superimposed on dementia POA: Yes    Acute renal failure superimposed on stage 3 chronic kidney disease (HCC) POA: Yes    Iron deficiency anemia POA: Yes  Resolved Problems:    Diarrhea with dehydration POA: Unknown    Hyperkalemia POA: Yes    Lactic acidosis POA: Unknown    Elevated troponin POA: Unknown    Obstipation POA: Unknown    Hypokalemia POA: Unknown      FOLLOW UP  [facility]  ([facility])  [address] [place] [address]  [phone]          MEDICATIONS ON DISCHARGE     Medication List      START taking these medications      Instructions   ferrous sulfate 325 (65 Fe) MG tablet   Take 1 Tab by mouth every day.  Dose:  325 mg     potassium chloride SA 10 MEQ Tbcr  Commonly known as:  K-DUR   Take 1 Tab by mouth every day.  Dose:  10 mEq        CONTINUE taking these medications      Instructions   acetaminophen 325 MG Tabs  Commonly known as:  TYLENOL   Take 2 Tabs by mouth every 6 hours as needed (Mild Pain; (Pain scale 1-3); Temp greater than 100.5 F).  Dose:  650 mg     aspirin 81 MG Chew chewable tablet  Commonly known as:  ASA   Take 1 Tab by mouth 2 Times a Day.  Dose:  81 mg     magnesium hydroxide 400 MG/5ML Susp  Commonly known as:  MILK OF MAGNESIA   Take 30 mL by mouth 1 time daily as needed (if polyethylene glycol ineffective after 24 hours).  Dose:  30 mL     ondansetron 4 MG Tbdp  Commonly known as:  ZOFRAN ODT   Take 1 Tab by mouth every four hours as needed (give PO if IV route is unavailable. May give per feeding tube.).  Dose:  4 mg     polyethylene glycol/lytes Pack  Commonly known as:  MIRALAX   Take 1 Packet by mouth every day.  Dose:  17 g     senna-docusate 8.6-50 MG Tabs  Commonly known as:  PERICOLACE or SENOKOT S   Take 2 Tabs by mouth 2 Times a Day.  Dose:  2 Tab        STOP taking these medications    bisacodyl 10 MG Supp  Commonly known as:  DULCOLAX     heparin 5000 UNIT/ML Soln            Allergies  No Known Allergies    DIET  Orders Placed This Encounter   Procedures   • Diet Order Regular     Standing Status:   Standing     Number of Occurrences:   1     Order Specific Question:   Diet:     Answer:   Regular [1]       ACTIVITY  As tolerated.  Weight bearing as tolerated    LINES, DRAINS, AND WOUNDS  This is an automated list. Peripheral IVs will be removed prior to discharge.  Peripheral IV 10/06/18 20 G Right Forearm (Active)   Site Assessment  Clean;Dry;Intact 10/10/2018  9:00 AM   Dressing Type Transparent 10/10/2018  9:00 AM   Line Status Saline locked 10/10/2018  9:00 AM   Dressing Status Clean;Dry;Intact 10/10/2018  9:00 AM   Dressing Intervention N/A 10/9/2018  9:10 PM   Date Primary Tubing Changed 10/05/18 10/8/2018  8:20 AM   Date Secondary Tubing Changed 10/06/18 10/7/2018  7:50 PM   NEXT Primary Tubing Change  10/08/18 10/8/2018  8:20 AM   NEXT Secondary Tubing Change  10/07/18 10/7/2018  7:50 PM   Infiltration Grading ([facility] 0 10/9/2018  9:10 PM   Phlebitis Scale ([facility] Only) 0 10/9/2018  9:10 PM       Wound 10/05/18 Other (comment) Sacrum (Active)   Wound Image   10/6/2018 12:00 AM   Site Assessment Fragile 10/9/2018  9:10 PM   Bria-wound Assessment Intact 10/9/2018  9:10 PM   Drainage Amount None 10/9/2018  9:10 PM   Periwound Protectant Moisture Barrier 10/8/2018  9:00 PM   Dressing Options Mepilex 10/9/2018  8:00 AM   Dressing Changed New 10/9/2018  8:00 AM   Dressing Change Frequency As Needed 10/9/2018  8:00 AM   NEXT Weekly Photo (Inpatient Only) 10/12/18 10/6/2018 12:00 AM       Wound 10/05/18 Other (comment) Foot;Heel;Toe (Comment which one) (Active)   Wound Image      10/6/2018 12:00 AM   Site Assessment Yellow;Tan 10/9/2018  9:10 PM   Bria-wound Assessment Fragile;Red;Purple 10/9/2018  9:10 PM   Drainage Amount None 10/9/2018  9:10 PM   Dressing Options Open to Air 10/9/2018  8:00 AM   NEXT Weekly Photo (Inpatient Only) 10/12/18 10/6/2018 12:00 AM       Wound 10/05/18 Other (comment) Foot (Active)   Wound Image     10/6/2018 12:00 AM   Site Assessment [place] 10/9/2018  9:10 PM   Bria-wound Assessment Fragile 10/9/2018  9:10 PM   Drainage Amount None 10/9/2018  9:10 PM   Dressing Options Open to Air 10/9/2018  8:00 AM   NEXT Weekly Photo (Inpatient Only) 10/12/18 10/6/2018 12:00 AM       Surgical Incision  Incision Left Hip (Active)       Peripheral IV 10/06/18 20 G Right Forearm (Active)   Site Assessment Clean;Dry;Intact  10/10/2018  9:00 AM   Dressing Type Transparent 10/10/2018  9:00 AM   Line Status Saline locked 10/10/2018  9:00 AM   Dressing Status Clean;Dry;Intact 10/10/2018  9:00 AM   Dressing Intervention N/A 10/9/2018  9:10 PM   Date Primary Tubing Changed 10/05/18 10/8/2018  8:20 AM   Date Secondary Tubing Changed 10/06/18 10/7/2018  7:50 PM   NEXT Primary Tubing Change  10/08/18 10/8/2018  8:20 AM   NEXT Secondary Tubing Change  10/07/18 10/7/2018  7:50 PM   Infiltration Grading ([facility]) 0 10/9/2018  9:10 PM   Phlebitis Scale ([facility] Only) 0 10/9/2018  9:10 PM               MENTAL STATUS ON TRANSFER  Level of Consciousness: Alert  Orientation : Disoriented to Event, Disoriented to Place, Disoriented to Time  Speech: Speech Clear    CONSULTATIONS  None    PROCEDURES  US-EXTREMITY VENOUS LOWER UNILAT LEFT   Final Result      No evidence of left lower extremity deep venous thrombosis.      CT-ABDOMEN-PELVIS W/O   Final Result         1.  Large quantity of stool in the colon compatible with severe constipation. Distal rectal wall thickening suggests component of proctocolitis.   2.  Atherosclerosis      Note: Diagnostic sensitivity of this examination is significantly diminished due to artifacts, noncontrast technique, and poor contrast differentiation between structures.      DX-ABDOMEN COMPLETE WITH AP OR PA CXR   Final Result      Moderate colonic distention. This may represent ileus. Partial obstruction is not excluded.      Moderate to large amount of colonic stool.      No acute cardiopulmonary process is identified.            LABORATORY  Lab Results   Component Value Date    SODIUM 141 10/10/2018    POTASSIUM 4.1 10/10/2018    CHLORIDE 111 10/10/2018    CO2 23 10/10/2018    GLUCOSE 83 10/10/2018    BUN 16 10/10/2018    CREATININE 0.93 10/10/2018        Lab Results   Component Value Date    WBC 9.4 10/08/2018    HEMOGLOBIN 10.9 (L) 10/08/2018    HEMATOCRIT 35.4 (L) 10/08/2018    PLATELETCT 310 10/08/2018         Total time of the discharge process exceeds 32 minutes.

## 2018-10-10 NOTE — PROGRESS NOTES
Pt sitting up in bed trying to move blankets. Assisted in straightening blankets. Pt refuses the light off and requests the TV to be on music. Bed alarm on, declines needs at this time.

## 2018-10-10 NOTE — CARE PLAN
Problem: Venous Thromboembolism (VTW)/Deep Vein Thrombosis (DVT) Prevention:  Goal: Patient will participate in Venous Thrombosis (VTE)/Deep Vein Thrombosis (DVT)Prevention Measures  Pt educated to the use of anticoagulants and agrees to administration of medication, declines SCD's.     Problem: Mobility  Goal: Risk for activity intolerance will decrease  Outcome: PROGRESSING AS EXPECTED  Pt encouraged to move arms and legs while in bed. Active ROM demonstrated. Encouraged to perform ADL's.

## 2018-10-10 NOTE — PROGRESS NOTES
Bedside report received from Augustine ROLLINS. Pt resting in bed, requesting to use commode, RR even and unlabored. CNA in room assisting pt.

## 2018-10-10 NOTE — DISCHARGE PLANNING
Received Choice form at 10/9 8560  Agency/Facility Name: [facility] (1) [facility] (2)  Referral sent per Choice form @ 10/10 1315

## 2018-10-10 NOTE — PROGRESS NOTES
Daughter at bedside about 1600 and met with Katja  for poc and referral signed for choice.    Report given to Hannah at bedside and pt assisted to the commode.

## 2018-10-10 NOTE — DISCHARGE PLANNING
Received Transport Form @ 1712  Spoke to Coty @ [facility]    Transport is scheduled for 10/10 @1800 going to [address] [place] [address]comb [facility] [place]. [place] LOGAN.

## 2018-10-10 NOTE — DISCHARGE PLANNING
Agency/Facility Name: [facility], [facility]  Spoke To: Vania Muñiz  Outcome: Patient has been accepted at both facilities.

## 2018-10-10 NOTE — DISCHARGE PLANNING
Anticipated Discharge Disposition: [facility]    Action: SW completed tranfer packet and COBRA form and will provide to bedside RN.  SW called and left a phone message Lashonda, patient's granddaughter updating her on discharge time.     Barriers to Discharge: None    Plan: Provide packet to Bedside RN and attempt to call granddaughter.

## 2018-10-10 NOTE — CARE PLAN
Problem: Safety  Goal: Will remain free from injury  Outcome: PROGRESSING AS EXPECTED  AAOx1, but not impulsive. Bed alarm is known. Verbalized understanding of need to call for assistance  Goal: Will remain free from falls  Outcome: PROGRESSING AS EXPECTED      Problem: Infection  Goal: Will remain free from infection  Outcome: PROGRESSING AS EXPECTED  Lab values WNL. Afebrile. Confused as baseline.    Problem: Bowel/Gastric:  Goal: Normal bowel function is maintained or improved  Outcome: PROGRESSING AS EXPECTED  Improved. No loose stool/BM in 24 hrs per report    Problem: Skin Integrity  Goal: Risk for impaired skin integrity will decrease  Outcome: PROGRESSING AS EXPECTED  Reinforced Q2 repositioning. Patient is able to turn.

## 2018-10-10 NOTE — PROGRESS NOTES
Pt sitting on commode. Medicated per MAR. Eyes closed RR even and unlabored when entering room. Declines additional needs at this time.

## 2018-10-10 NOTE — PROGRESS NOTES
Assessment complete, medicated per MAR. Discussed POC and ROM, allowed time to ask questions. Pt resting in bed, RR even and unlabored. Pt educated to call for assistance. Declines needs at this time. Safety precautions in place.

## 2018-10-11 NOTE — DISCHARGE INSTRUCTIONS
Discharge Instructions    Discharged to other by medical transportation with escort. Discharged via wheelchair, hospital escort: Yes.  Special equipment needed: Wheelchair    Be sure to schedule a follow-up appointment with your primary care doctor or any specialists as instructed.     Discharge Plan:   Diet Plan: Discussed  Activity Level: Discussed  Confirmed Follow up Appointment: No (Comments) (to [facility])  Confirmed Symptoms Management: Discussed  Medication Reconciliation Updated: Yes  Pneumococcal Vaccine Administered/Refused:  (cant consent, AAOx1, pls ff/up with granddaughter)  Influenza Vaccine Indication:  (ff/up with granddaughter)    I understand that a diet low in cholesterol, fat, and sodium is recommended for good health. Unless I have been given specific instructions below for another diet, I accept this instruction as my diet prescription.   Other diet: regular    Special Instructions: None    · Is patient discharged on Warfarin / Coumadin?   No     Depression / Suicide Risk    As you are discharged from this [facility] facility, it is important to learn how to keep safe from harming yourself.    Recognize the warning signs:  · Abrupt changes in personality, positive or negative- including increase in energy   · Giving away possessions  · Change in eating patterns- significant weight changes-  positive or negative  · Change in sleeping patterns- unable to sleep or sleeping all the time   · Unwillingness or inability to communicate  · Depression  · Unusual sadness, discouragement and loneliness  · Talk of wanting to die  · Neglect of personal appearance   · Rebelliousness- reckless behavior  · Withdrawal from people/activities they love  · Confusion- inability to concentrate     If you or a loved one observes any of these behaviors or has concerns about self-harm, here's what you can do:  · Talk about it- your feelings and reasons for harming yourself  · Remove any means that you might use to  hurt yourself (examples: pills, rope, extension cords, firearm)  · Get professional help from the community (Mental Health, Substance Abuse, psychological counseling)  · Do not be alone:Call your Safe Contact- someone whom you trust who will be there for you.  · Call your local CRISIS HOTLINE 006-8190 or 893-112-4337  · Call your local Children's Mobile Crisis Response Team [place] (569) 288-0479 or www.[employer]  · Call the toll free National Suicide Prevention Hotlines   · National Suicide Prevention Lifeline 327-612-NGUY (6888)  · National Hope Line Network 800-SUICIDE (854-1898)

## 2018-10-11 NOTE — DISCHARGE PLANNING
Anticipated Discharge Disposition: [facility]    Action: Received a VM from pt's granddaughter, Lashonda providing verbal consent to transfer the pt to [facility].     Barriers to Discharge: None    Plan: DC to [facility].

## 2018-10-11 NOTE — DISCHARGE PLANNING
Agency/Facility Name: [facility]  Spoke To: Robinson  Outcome: Stated that they do not yet have a PASRR as of yet, LSW Ivonne notified.

## 2018-10-13 PROBLEM — G40.901 STATUS EPILEPTICUS (HCC): Status: ACTIVE | Noted: 2018-01-01

## 2018-10-13 PROBLEM — J96.01 ACUTE RESPIRATORY FAILURE WITH HYPOXIA (HCC): Status: ACTIVE | Noted: 2018-01-01

## 2018-10-13 PROBLEM — R56.9 SEIZURE (HCC): Status: ACTIVE | Noted: 2018-01-01

## 2018-10-13 PROBLEM — F03.90 DEMENTIA (HCC): Status: ACTIVE | Noted: 2018-01-01

## 2018-10-13 NOTE — DISCHARGE PLANNING
CAROLEE responded to acute medical pt. Pt ANITHA BOWER from [facility] where it was reported that the pt began having seizures. CAROLEE obtained pts LC facesheet that indicated daughter, Lashonda Nickerson ([phone]), was NOK and emergency contact. CAROLEE contacted Lashonda and updated her on pts location. Lashonda spoke with the ERP regarding pts status, and requested that pt remain a full code. Upon arrival, CAROLEE escorted Lashonda to the family room where an update and emotional support was provided. CAROLEE addressed all needs and will continue to provide support.     CAROLEE escorted Lashonda to bedside where ERP and RN will provide medical updates. SS to remain available.

## 2018-10-13 NOTE — ED PROVIDER NOTES
"ED Provider Note    CHIEF COMPLAINT  Chief Complaint   Patient presents with   • Seizure   • ALOC       HPI  Rene Becerril is a 89 y.o. female who presents for evaluation of seizure, patient is generally healthy, she does not have severe ongoing medical conditions other than dementia and takes minimal medications, at her nursing home today was noted to have seizures so EMS was called.  According to EMS she received 1 dose of Valium intramuscularly, she never returned to baseline.  The exact duration of seizure seems unclear, no history of seizure.  Initially she was called as a STEMI alert due to some abnormal EKG findings.  No further history is available at this time although the nursing home staff reports she was in her normal state of health this morning.    REVIEW OF SYSTEMS  Unobtainable secondary to clinical condition    PAST MEDICAL HISTORY  Past Medical History:   Diagnosis Date   • Arthritis    • Bronchitis    • Cataract    • Delirium superimposed on dementia 7/17/2018   • Fall    • Hemorrhoids    • Renal disorder    • Severe sepsis (HCC) 10/5/2018       FAMILY HISTORY  No family history on file.    SOCIAL HISTORY  Social History   Substance Use Topics   • Smoking status: Never Smoker   • Smokeless tobacco: Never Used   • Alcohol use No       SURGICAL HISTORY  Past Surgical History:   Procedure Laterality Date   • HIP HEMIARTHROPLASTY Left 7/15/2018    Procedure: HIP HEMIARTHROPLASTY;  Surgeon: Deven Oakley M.D.;  Location: SURGERY [facility];  Service: Orthopedics   • OTHER     • OTHER      Cataracts 2013       CURRENT MEDICATIONS  I personally reviewed the medication list in the charting documentation.     ALLERGIES  No Known Allergies    MEDICAL RECORD  I have reviewed patient's medical record and pertinent results are listed above.      PHYSICAL EXAM  VITAL SIGNS: Pulse (!) 111   Resp 18   Ht 1.6 m (5' 3\")   Wt 57.2 kg (126 lb)   SpO2 (!) 78%   BMI 22.32 kg/m²    Constitutional: " Completely unresponsive, elderly and frail  HENT: Mucus membranes moist.  Superficial lacerations to the left lateral tongue with some dried blood in the oropharynx  Eyes: Pupils are symmetric, reactive  Neck: Supple.   Cardiovascular: Tachycardic, regular.   Thorax & Lungs: Normal respirations with clear lungs  Abdomen: Soft, nondistended  Skin: Warm, dry. No rash appreciated  Extremities/Musculoskeletal: Bilateral peripheral edema in the lower extremities with some overlying erythema, seems symmetric.  Toe has some erythema involving the left arm focally over the lateral part of the elbow  Neurologic: Unresponsive, minimal but seemingly symmetric movement to painful stimulus of the extremities    DIAGNOSTIC STUDIES / PROCEDURES    EKG  12 Lead EKG interpreted by me to show:    Rate 114  Rhythm: Normal sinus rhythm  Axis: Normal  CA and QRS Intervals: Normal  T waves: No acute changes  ST segments: No acute changes  Ectopy: None.    My impression of this EKG: Does not indicate ischemia or arrythmia at this time.      LABS  Results for orders placed or performed during the hospital encounter of 10/13/18   CBC WITH DIFFERENTIAL   Result Value Ref Range    WBC 8.7 4.8 - 10.8 K/uL    RBC 4.20 4.20 - 5.40 M/uL    Hemoglobin 11.3 (L) 12.0 - 16.0 g/dL    Hematocrit 36.8 (L) 37.0 - 47.0 %    MCV 87.6 81.4 - 97.8 fL    MCH 26.9 (L) 27.0 - 33.0 pg    MCHC 30.7 (L) 33.6 - 35.0 g/dL    RDW 46.8 35.9 - 50.0 fL    Platelet Count 300 164 - 446 K/uL    MPV 10.5 9.0 - 12.9 fL    Neutrophils-Polys 86.00 (H) 44.00 - 72.00 %    Lymphocytes 8.40 (L) 22.00 - 41.00 %    Monocytes 3.70 0.00 - 13.40 %    Eosinophils 0.20 0.00 - 6.90 %    Basophils 0.70 0.00 - 1.80 %    Immature Granulocytes 1.00 (H) 0.00 - 0.90 %    Nucleated RBC 0.00 /100 WBC    Neutrophils (Absolute) 7.45 (H) 2.00 - 7.15 K/uL    Lymphs (Absolute) 0.73 (L) 1.00 - 4.80 K/uL    Monos (Absolute) 0.32 0.00 - 0.85 K/uL    Eos (Absolute) 0.02 0.00 - 0.51 K/uL    Baso (Absolute)  0.06 0.00 - 0.12 K/uL    Immature Granulocytes (abs) 0.09 0.00 - 0.11 K/uL    NRBC (Absolute) 0.00 K/uL   COMP METABOLIC PANEL   Result Value Ref Range    Sodium 138 135 - 145 mmol/L    Potassium 4.1 3.6 - 5.5 mmol/L    Chloride 104 96 - 112 mmol/L    Co2 16 (L) 20 - 33 mmol/L    Anion Gap 18.0 (H) 0.0 - 11.9    Glucose 147 (H) 65 - 99 mg/dL    Bun 20 8 - 22 mg/dL    Creatinine 1.06 0.50 - 1.40 mg/dL    Calcium 8.5 8.5 - 10.5 mg/dL    AST(SGOT) 40 12 - 45 U/L    ALT(SGPT) 24 2 - 50 U/L    Alkaline Phosphatase 68 30 - 99 U/L    Total Bilirubin 0.5 0.1 - 1.5 mg/dL    Albumin 3.1 (L) 3.2 - 4.9 g/dL    Total Protein 6.2 6.0 - 8.2 g/dL    Globulin 3.1 1.9 - 3.5 g/dL    A-G Ratio 1.0 g/dL   TROPONIN   Result Value Ref Range    Troponin I 0.15 (H) 0.00 - 0.04 ng/mL   PROTHROMBIN TIME   Result Value Ref Range    PT 14.9 (H) 12.0 - 14.6 sec    INR 1.15 (H) 0.87 - 1.13   APTT   Result Value Ref Range    APTT 29.3 24.7 - 36.0 sec   URINALYSIS CULTURE, IF INDICATED   Result Value Ref Range    Color Yellow     Character Clear     Specific Gravity 1.020 <1.035    Ph 6.5 5.0 - 8.0    Glucose Negative Negative mg/dL    Ketones Negative Negative mg/dL    Protein Negative Negative mg/dL    Bilirubin Negative Negative    Urobilinogen, Urine 0.2 Negative    Nitrite Negative Negative    Leukocyte Esterase Negative Negative    Occult Blood Negative Negative    Micro Urine Req see below    Triglycerides Starting now and then Every 3 Days   Result Value Ref Range    Triglycerides 102 0 - 149 mg/dL   ESTIMATED GFR   Result Value Ref Range    GFR If  59 (A) >60 mL/min/1.73 m 2    GFR If Non African American 49 (A) >60 mL/min/1.73 m 2   EKG   Result Value Ref Range    Report       [facility]    Test Date:  2018-10-13  Pt Name:    RENA MCCORMICK                 Department: [facility]  MRN:        [id number]                      Room:       [facility]  Gender:     Female                       Technician: INOCENCIA  :        [id number]                    Requested By:GILDA MARK  Order #:    [id number]                    Reading MD:    Measurements  Intervals                                Axis  Rate:       112                          P:          231  CA:         157                          QRS:        -57  QRSD:       112                          T:          44  QT:         368  QTc:        503    Interpretive Statements  SINUS OR ECTOPIC ATRIAL TACHYCARDIA  NONSPECIFIC IVCD WITH LAD  CONSIDER INFERIOR INFARCT  ANTERIOR INFARCT, AGE INDETERMINATE  Compared to ECG 10/13/2018 12:57:09  Intraventricular conduction delay now present  Sinus tachycardia no longer present  First degree AV block no longer present  Prolonged QT interval no longer present  Myocardial infarct finding  still present           RADIOLOGY  CT-HEAD W/O   Final Result         1.  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      2.  No parenchymal hemorrhage or brain swelling or edema is noted.      Findings are consistent with atrophy.  Decreased attenuation in the periventricular white matter likely indicates microvascular ischemic disease.      DX-CHEST-PORTABLE (1 VIEW)   Final Result         1.  No new infiltrates or consolidations identified.      2.  Endotracheal tube and NG tube now noted in place.      [id number] VIEW    (Results Pending)         Intubation  Date/Time: 10/13/2018 2:03 PM  Performed by: ISABEL GUERRERO  Authorized by: ISABEL GUERRERO     Consent:     Consent obtained:  Verbal (From granddaughter/POA)    Risks discussed:  Aspiration, bleeding, death and hypoxia    Alternatives discussed:  Delayed treatment, no treatment, alternative treatment and observation  Pre-procedure details:     Patient status:  Unresponsive    Mallampati score:  II    Pretreatment meds: Etomadate.    Paralytics:  Succinylcholine  Procedure details:     Preoxygenation:  Nasal cannula    CPR in progress: no       Intubation method:  Oral    Oral intubation technique:  Direct    Laryngoscope blade:  Mac 3    Tube size (mm):  7.5    Tube type:  Cuffed    Number of attempts:  2    Ventilation between attempts: no      Cricoid pressure: no      Tube visualized through cords: yes    Placement assessment:     Breath sounds:  Equal and absent over the epigastrium  Post-procedure details:     Patient tolerance of procedure:  Tolerated well, no immediate complications          COURSE & MEDICAL DECISION MAKING  I have reviewed any medical record information, laboratory studies and radiographic results as noted above.  Differential diagnoses includes: Status epilepticus, intracranial hemorrhage, electrolyte abnormalities, anemia          Encounter Summary: This is a 89 y.o. female with apparent new onset seizure, status epilepticus that she never returned to baseline, she presents unresponsive and, after discussion over the phone with the patient's granddaughter who is the POA, she was intubated for airway protection and acute respiratory failure with hypoxia, initially was quite hypoxic and tachycardic, transiently hypotensive prior to the intubation, we are prepared with push dose pressor but actually her blood pressure rebounded well after the intubation.  There was no evidence of active seizing during her arrival here.  Will obtain head CT and blood work, urinalysis, will then reevaluate ------ head CT reveals chronic hygromas but nothing acute, I discussed the case with the neurologist on-call, also discussed the case with eICU physician and the internal medicine physician.    HYDRATION: Based on the patient's presentation of Inability to take oral fluids and Tachycardia the patient was given IV fluids. IV Hydration was used becasue oral hydration was not adequate alone. Upon recheck following hydration, the patient was .   Improved    CRITICAL CARE  The very real possibilty of a deterioration of this patient's condition required  the highest level of my preparedness for sudden, emergent intervention.  I provided critical care services, which included medication orders, frequent reevaluations of the patient's condition and response to treatment, ordering and reviewing test results, and discussing the case with various consultants.  The critical care time associated with the care of the patient was 39 minutes. Review chart for interventions. This time is exclusive of any other billable procedures.     DISPOSITION: Admit in critical condition      FINAL IMPRESSION  1. Status epilepticus (HCC)    2. New onset seizure (HCC)    3. Acute respiratory failure with hypoxia (HCC)    4. Benign extra-axial hygroma           This dictation was created using voice recognition software. The accuracy of the dictation is limited to the abilities of the software. I expect there may be some errors of grammar and possibly content. The nursing notes were reviewed and certain aspects of this information were incorporated into this note.    Electronically signed by: Trenton Diaz, 10/13/2018 1:51 PM

## 2018-10-13 NOTE — ED NOTES
Rounded on pt, answered family questions, addressed needs, ensured call light within reach. Provided emotional support for pt and family/ POA

## 2018-10-13 NOTE — ED NOTES
Med rec complete per pt's MAR from [facility]  Pt discharged from [facility] to [facility] on 10/10/18  Allergies reviewed - NKDA  No ABX noted on MAR

## 2018-10-13 NOTE — ED TRIAGE NOTES
89 y/o female bib ambulance from [facility] for evaluation of a possible STEMI. STEMI pre-alert called in by paramedics who were initially called to [facility] for evaluation of new onset of seizures at aprox 1240. Paramedics report pt was actively seizing upon their arrival to pt bedside, pt was given 5 mg versed in route to ED, she remained unconscious throughout transport to the ED. Upon arrival to trauma south an EKG was done, STEMI was cancelled and pt was moved to room 7. Pt was intubated by ERP using 10 mg Etomidate and 100mg Succinylcholine.

## 2018-10-14 NOTE — PROGRESS NOTES
[facility] Hospitalist Progress Note    Date of Service: 10/14/2018    Chief Complaint  89 y.o. female admitted 10/13/2018 with seizures    Interval Problem Update  Ms. Becerril has a history of apparent subdural hematomas that had a seizure at [facility] thus was transferred to [facility] for evaluation. She had been given 5 mg IV versed. She required intubation due to airway protection. A CT of the head revealed bilateral subdural hygromas without hemorrhage.  She is on propofol at 20 this morning. Her granddaughter is at bedside and we discussed her grandmother's condition at length. She is awake and following. Continuous EEG monitor is ongoing.  Consultants/Specialty  Critical Care. I discussed her condition with Dr. Holguin on ICU Hot Rounds.   Neurology   Disposition  ICU        Review of Systems   Unable to perform ROS: Intubated      Physical Exam  Laboratory/Imaging   Hemodynamics  Temp (24hrs), Av.7 °C (98.1 °F), Min:36.7 °C (98.1 °F), Max:36.7 °C (98.1 °F)   Temperature: 36.7 °C (98.1 °F), Monitored Temp: 37.3 °C (99.1 °F)  Pulse  Av.4  Min: 30  Max: 117 Heart Rate (Monitored): (!) 104  NIBP: (!) 77/58      Respiratory  Rider Vent Mode: APVCMV, Rate (breaths/min): 18, PEEP/CPAP: 8, PEEP/CPAP: 8, FiO2: 30, P Peak (PIP): 22, P MEAN: 12   Respiration: 16, Pulse Oximetry: 100 %        RUL Breath Sounds: Clear, RML Breath Sounds: Diminished, RLL Breath Sounds: Diminished, BLANCHE Breath Sounds: Clear, LLL Breath Sounds: Diminished    Fluids    Intake/Output Summary (Last 24 hours) at 10/14/18 0717  Last data filed at 10/14/18 0600   Gross per 24 hour   Intake          2444.33 ml   Output              480 ml   Net          1964.33 ml       Nutrition  Orders Placed This Encounter   Procedures   • Diet NPO     Standing Status:   Standing     Number of Occurrences:   1     Order Specific Question:   Type:     Answer:   Now [1]     Order Specific Question:   Restrict to:     Answer:   Strict [1]     Physical Exam    Constitutional: No distress.   HENT:   EEG scalp electrodes are on.   Cardiovascular: Normal rate and regular rhythm.    Pulmonary/Chest:   Vent, good air movement.  No rhonchi   Abdominal: Soft. She exhibits no distension. There is no tenderness.   Genitourinary:   Genitourinary Comments: Rosa catheter   Musculoskeletal: She exhibits edema. She exhibits no tenderness.   SCDs bilaterally   Neurological:   She opens her eyes and follows simple commands and appears to nod appropriately   Skin: She is not diaphoretic.   Nursing note and vitals reviewed.      Recent Labs      10/13/18   1302  10/14/18   0439   WBC  8.7  9.4   RBC  4.20  3.73*   HEMOGLOBIN  11.3*  10.4*   HEMATOCRIT  36.8*  31.4*   MCV  87.6  84.2   MCH  26.9*  27.9   MCHC  30.7*  33.1*   RDW  46.8  43.5   PLATELETCT  300  294   MPV  10.5  10.2     Recent Labs      10/13/18   1302  10/13/18   1938  10/14/18   0439   SODIUM  138  139  139   POTASSIUM  4.1  4.1  3.8   CHLORIDE  104  108  109   CO2  16*  17*  20   GLUCOSE  147*  112*  90   BUN  20  20  19   CREATININE  1.06  0.87  0.77   CALCIUM  8.5  8.5  8.3*     Recent Labs      10/13/18   1302   APTT  29.3   INR  1.15*         Recent Labs      10/13/18   1302  10/13/18   1938   TRIGLYCERIDE  102  96          Assessment/Plan     * Seizure (HCC)- (present on admission)   Assessment & Plan    New onset seizures.  -incidentally has b/L subdural hygromas, could be sequelae of prior SDH's  -intubated for airway protection  -will need continuous EEG and sedation holiday to assess neurological function  -IV keppra 500 mg BID  -no clear evidence of infection at this time  Neurology consulted.  Titrate propofol drip for sedation and escalate if she has seizure activity.         Acute respiratory failure with hypoxia (HCC)- (present on admission)   Assessment & Plan    Requiring intubation for airway protection.  Pulmonology consulted.  Spontaneous breathing trials as tolerated.         Dementia- (present on  admission)   Assessment & Plan    Reported hx of        Elevated troponin- (present on admission)   Assessment & Plan    -no clear acute changes on EKG, continue trend, likely stress reaction and demand?        CKD (chronic kidney disease) stage 3, GFR 30-59 ml/min ([facility])- (present on admission)   Assessment & Plan    Check BMP in the morning        Anemia of chronic disease- (present on admission)   Assessment & Plan    Hb 10          Quality-Core Measures   Reviewed items::  Labs reviewed, Medications reviewed and Radiology images reviewed  Rosa catheter::  Unconscious / Sedated Patient on a Ventilator  DVT: hold until MRI brain due to subdural hygromas.  DVT prophylaxis - mechanical:  SCDs  Assessed for rehabilitation services:  Patient unable to tolerate rehabilitation therapeutic regimen

## 2018-10-14 NOTE — CONSULTS
"CC:  \" Seizures\"    Date of Admission: 10/13/2018    Today's Date: 10/14/18    Consulting Physician: Edgar Olivas       HPI:    Rene Becerril is a 89 y.o. female with a unclear history; apparently she was recovering at [facility], and they did call EMS for STEMI call, and at arrival she was having a seizure like and required intubation for air protection  Came intubated to ER here, not with any clinical seizures.  On propofol, and Keppra.  Per granddaughter who provides the information.   The patient has been very sick with some nausea diarrhea this past week. Patient has been declining since July when she had a pelvic fracture procedure done.   Granddaughter was unaware the patient had Afib, but Afib was noted in the ICU last night by the night nurse.   Hx of mild dementia.   No hx of seizure or strokes.         ROS:   Cannot be obtained. Patient intubated.       Past Medical History:   Past Medical History:   Diagnosis Date   • Status epilepticus (HCC) 10/13/2018   • Severe sepsis (HCC) 10/5/2018   • Delirium superimposed on dementia 7/17/2018   • Arthritis    • Bronchitis    • Cataract    • Fall    • Hemorrhoids    • Renal disorder        Past Surgical History:   Past Surgical History:   Procedure Laterality Date   • HIP HEMIARTHROPLASTY Left 7/15/2018    Procedure: HIP HEMIARTHROPLASTY;  Surgeon: Deven Oakley M.D.;  Location: SURGERY [facility];  Service: Orthopedics   • OTHER     • OTHER      Cataracts 2013       Social History:   Social History     Social History   • Marital status:      Spouse name: N/A   • Number of children: N/A   • Years of education: N/A     Occupational History   • Not on file.     Social History Main Topics   • Smoking status: Never Smoker   • Smokeless tobacco: Never Used   • Alcohol use No   • Drug use: No   • Sexual activity: Not on file     Other Topics Concern   • Not on file     Social History Narrative   • No narrative on file       Family History: " No family history on file.    Allergies: No Known Allergies      Current Facility-Administered Medications:   •  potassium phosphates 15 mmol in  mL ivpb, 15 mmol, Intravenous, Once, Gomez Holguin M.D.  •  magnesium sulfate IVPB premix 2 g, 2 g, Intravenous, Once, Gomez Holguin M.D.  •  Respiratory Care per Protocol, , Nebulization, Continuous RT, Edgar Moody M.D.  •  NS infusion, , Intravenous, Continuous, Edgar Moody M.D., Last Rate: 100 mL/hr at 10/14/18 0540  •  ondansetron (ZOFRAN) syringe/vial injection 4 mg, 4 mg, Intravenous, Q4HRS PRN, Edgar Moody M.D.  •  ondansetron (ZOFRAN ODT) dispertab 4 mg, 4 mg, Oral, Q4HRS PRN, Edgar Moody M.D.  •  LORazepam (ATIVAN) injection 4 mg, 4 mg, Intravenous, Q10 MIN PRN, Edgar Moody M.D.  •  Respiratory Care per Protocol, , Nebulization, Continuous RT, Gomez Holguin M.D.  •  famotidine (PEPCID) tablet 20 mg, 20 mg, Oral, Q12HRS **OR** famotidine (PEPCID) injection 20 mg, 20 mg, Intravenous, Q12HRS, Gomez Holguin M.D., 20 mg at 10/14/18 0536  •  senna-docusate (PERICOLACE or SENOKOT S) 8.6-50 MG per tablet 2 Tab, 2 Tab, Oral, BID, Stopped at 10/13/18 1900 **AND** polyethylene glycol/lytes (MIRALAX) PACKET 1 Packet, 1 Packet, Oral, QDAY PRN **AND** magnesium hydroxide (MILK OF MAGNESIA) suspension 30 mL, 30 mL, Oral, QDAY PRN **AND** bisacodyl (DULCOLAX) suppository 10 mg, 10 mg, Rectal, QDAY PRN, Gomez Holguin M.D.  •  MD Alert...ICU Electrolyte Replacement per Pharmacy, , Other, pharmacy to dose, Gomez Holguin M.D.  •  fentaNYL (SUBLIMAZE) injection 25 mcg, 25 mcg, Intravenous, Q HOUR PRN, 25 mcg at 10/14/18 0052 **OR** fentaNYL (SUBLIMAZE) injection 50 mcg, 50 mcg, Intravenous, Q HOUR PRN, 50 mcg at 10/13/18 1922 **OR** fentaNYL (SUBLIMAZE) injection 100 mcg, 100 mcg, Intravenous, Q HOUR PRN, Gomez Holguin M.D.  •  MD Alert...PROPOFOL CRITICAL CARE PROTOCOL 1 Each, 1 Each, Other, PRN, Gomez Holguin M.D.  •   ipratropium-albuterol (DUONEB) nebulizer solution, 3 mL, Nebulization, Q2HRS PRN (RT), Gomez Holguin M.D.  •  ipratropium-albuterol (DUONEB) nebulizer solution, 3 mL, Nebulization, Q4HRS (RT), Gomez Holguin M.D., 3 mL at 10/14/18 0656  •  levETIRAcetam (KEPPRA) 500 mg in  mL IVPB, 500 mg, Intravenous, Q12HRS, Gomez Holguin M.D., Stopped at 10/14/18 0551  •  propofol (DIPRIVAN) injection, 0-80 mcg/kg/min, Intravenous, Continuous, Last Rate: 6.9 mL/hr at 10/14/18 0536, 20 mcg/kg/min at 10/14/18 0536 **AND** Triglycerides Starting now and then Every 3 Days, , , Every 3 Days (0300), Gomez Holguin M.D.  •  pneumococcal 13-Briana Conj Vacc (PREVNAR 13) syringe 0.5 mL, 0.5 mL, Intramuscular, Once PRN, Gomez Holguin M.D.      PHYSICAL EXAM    Vitals:    10/14/18 0600 10/14/18 0650 10/14/18 0700 10/14/18 0800   Pulse: (!) 106  89 (!) 118   Resp: 16  18 17   Temp:       SpO2: 100% 100% 100% 100%   Weight:       Height:           Constitutional:     Head/Neck: Intubated. NCAT. no meningismus neg kernig neg brudzinski. No obvious mass or heard bruit. No tender arteries or lost pulses. No rash of head or neck.      Cardiovascular: Regular, rhythmic    Pulmonary: Normal breath sounds    Extremities: Normal inspection, No edema, normal pulses    Skin: Warm, dry, intact. No rashes observed head/neck or body  No stigmata    Eyes/Funduscopic: normal pupils, and conjunctiva    Psych:unable, intubated    Mental Status: intubated, sedated, Kyle EO:1 VR:1 MR: 5=7 when sedation was stopped for 5 min    Cranial Nerves: limited due to sedation  CN II-XII No abn to facial grimacing noted, tongue was visualized central , no tongue biting in front of tongue. Pupillary reflex + 4 , pupils post surgical smaller size No afferent pupillary defect. No nystagmus.       Motor:  Mobilize spontaneously all four fairly symmetric at least 2/5     Sensory: n/\a  Coordination:n/a    DTR's: +1 ;      Babinski: bilateral    Gait/Station:  N/A           Labs:  Recent Labs      10/13/18   1302  10/14/18   0439   WBC  8.7  9.4   RBC  4.20  3.73*   HEMOGLOBIN  11.3*  10.4*   HEMATOCRIT  36.8*  31.4*   MCV  87.6  84.2   MCH  26.9*  27.9   MCHC  30.7*  33.1*   RDW  46.8  43.5   PLATELETCT  300  294   MPV  10.5  10.2     Recent Labs      10/13/18   1302  10/13/18   1938  10/14/18   0439   SODIUM  138  139  139   POTASSIUM  4.1  4.1  3.8   CHLORIDE  104  108  109   CO2  16*  17*  20   GLUCOSE  147*  112*  90   BUN  20  20  19   CREATININE  1.06  0.87  0.77   CALCIUM  8.5  8.5  8.3*     Recent Labs      10/13/18   1302   APTT  29.3   INR  1.15*         Recent Labs      10/13/18   1938  10/14/18   0040  10/14/18   0439   TROPONINI  1.40*  1.22*  1.07*     Recent Labs      10/13/18   1302  10/13/18   1938   TRIGLYCERIDE  102  96     Recent Labs      10/13/18   1302  10/13/18   1938  10/14/18   0439   SODIUM  138  139  139   POTASSIUM  4.1  4.1  3.8   CHLORIDE  104  108  109   CO2  16*  17*  20   GLUCOSE  147*  112*  90   BUN  20  20  19     Recent Labs      10/13/18   1302  10/13/18   1938  10/14/18   0040  10/14/18   0439   SODIUM  138  139   --   139   POTASSIUM  4.1  4.1   --   3.8   CHLORIDE  104  108   --   109   CO2  16*  17*   --   20   BUN  20  20   --   19   CREATININE  1.06  0.87   --   0.77   MAGNESIUM   --    --    --   1.8   PHOSPHORUS   --    --   1.3*  1.2*   CALCIUM  8.5  8.5   --   8.3*     Recent Labs      10/13/18   1302   APTT  29.3   INR  1.15*     No results found for this or any previous visit.           Imaging: neuroimaging reviewed and directly visualized by me  DX-CHEST-PORTABLE (1 VIEW)   Final Result         1.  Bilateral dependent pulmonary infiltrates.      [id number] VIEW   Final Result      Above average stool volume      CT-HEAD W/O   Final Result         1.  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      2.  No parenchymal hemorrhage or brain swelling or edema  is noted.      Findings are consistent with atrophy.  Decreased attenuation in the periventricular white matter likely indicates microvascular ischemic disease.      DX-CHEST-PORTABLE (1 VIEW)   Final Result         1.  No new infiltrates or consolidations identified.      2.  Endotracheal tube and NG tube now noted in place.      EC-ECHOCARDIOGRAM COMPLETE W/ CONT    (Results Pending)       Assessment/Plan:    Acute Encephalopathy: Unclear clinical evolution from STEMI call to intubation for seizures.  Patient has been sick since July with some unspecific poss infection source; shall have a low threshold for LP   Found with A FIB, and hypotensive, this could be a risk factor for stroke.  MRI brain to be done; and then connect to Video EEG.  Continue Keppra for now, with goal of extubation after testing is completed.    Dementia is a risk factor for seizures  Possible withdrawals from hospital medications? Narcotics in previous admissions for hip pain .  Received Zosyn for ? Infection /sepsis last admit and that could cause seizures.    Will follow.  She needs at least ASA for now for paroxsymal A FIB, and she cardiology consultation but will defer this to the hospitalist.    Status Epilepticus: diagnosed on arrival; unclear, but on therapeutic coma now and Keppra.  Will see EEG result to support.      Olga Donovan M.D.    Clinical Professor, [employer]  Diplomate, Neurology , Vascular Neurology, Neurophysiology

## 2018-10-14 NOTE — PROGRESS NOTES
Removed yellow ring from patient's left hand, placed in specimen cup and labeled with patient's label. Specimen cup given to Lashonda (granddaughter), at bedside.

## 2018-10-14 NOTE — ASSESSMENT & PLAN NOTE
EEG on 10/22 is improved with no seizures  MRI on 10/17 with multiple acute bilateral supratentorial lacunar infarcts

## 2018-10-14 NOTE — PROCEDURES
Vascular Access Team    Date of Insertion: 10/14/18  Arm Circumference: n/a  Line Length: 8cm  Line Size: 20g  Vein Occupancy %: 32  Reason for Midline: Critical care, serial lab draws  Labs: WBC 14.0, , INR 1.15, BUN 19, Cr 0.78, GFR >60    Orders confirmed, vessel patency confirmed with ultrasound. Risks and benefits of procedure explained to patient and education regarding line associated bloodstream infections provided. Questions answered.     PowerGlide Midline placed in LUE per MD order with ultrasound guidance. 20g, 8 cm line placed in brachial vein after 1 attempt(s).  Catheter inserted with good blood return. Secured with 0cm external from insertion site.  Flushed without resistance with 10 mL 0.9% normal saline. Midline secured with Biopatch and Tegaderm.     Midline placement is confirmed by nurse using ultrasound and ability to flush and draw blood. Midline is appropriate for use at this time.  No X-ray is needed for placement confirmation. Pt tolerated procedure well.  Patient condition relayed to unit RN or ordering physician via this post procedure note in the EMR.      BARD PowerGlide Midline ref # [id number], Lot # [id number]

## 2018-10-14 NOTE — CONSULTS
DATE OF SERVICE:  10/13/2018    PULMONARY CRITICAL CARE CONSULTATION    REQUESTING PHYSICIAN:  Trenton Diaz MD    REASON FOR REQUEST:  Acute hypoxic respiratory failure, new onset seizure.    HISTORY OF PRESENT ILLNESS:  All information was taken from chart review as   well as sign-out and the patient's granddaughter at bedside.  She is on full   mechanical ventilatory support.  The patient appears to be a 89-year-old   female with known past medical history of dementia, previous hip arthroplasty,   who was recently admitted to [facility] from 10/05 to 10/10 with severe   sepsis secondary to gastrointestinal source with stool impaction.  After   patient's symptoms resolved, she was transferred to [facility] and has been   there for the past few days recovering.  The patient's granddaughter went to   visit with her and noted that she was in her relatively normal state earlier   today; however, shortly after she became less responsive and appeared to have   seizure activity.  EMS was called and patient was given a dose of Valium and   brought in to the ER.  She was not protecting her airway very well and   subsequently required intubation and mechanical ventilatory support.  No   further information at this present time currently available.    ALLERGIES:  No known drug allergies.    OUTPATIENT MEDICATIONS:  Per EPIC indicate potassium, ferrous sulfate,   Tylenol, senna, docusate, MiraLax, and aspirin.    PAST MEDICAL HISTORY:  As indicated above in the HPI.    SOCIAL HISTORY:  Unable to obtain.    FAMILY HISTORY:  Unable to obtain.    REVIEW OF SYSTEMS:  Unable to obtain.    PHYSICAL EXAMINATION:  VITAL SIGNS:  She is afebrile, pulse rate 111, blood pressure 110/70, satting   100% on 50% FiO2.  GENERAL:  On full mechanical ventilatory support comfortable and sedate.  HEENT:  Normocephalic, atraumatic.  Anicteric.  CARDIOVASCULAR:  Tachycardic rate, intact pulses.  RESPIRATORY:  Diminished, otherwise no  wheezes, rales, or rhonchi.  ABDOMEN:  Soft, nondistended.  EXTREMITIES:  With 2+ edema on the left, 1+ edema on the right.  NEUROLOGIC:  Unable to assess given level of sedation.  PSYCHIATRIC:  Unable to assess.    RESULTS:  White count 8, H and H of 11 and 36, platelets 300.  Sodium 138,   potassium 4.1, chloride 104, bicarbonate 16, anion gap 18, glucose 147, BUN   20, creatinine 1.06, AST 40, ALT 24, alkaline phosphatase 68, troponin 0.15.    INR 1.15, PTT of 29.  Urinalysis was unremarkable.    IMAGING:  Head CT per report indicates chronic bilateral subdural hygromas   appeared to be present, but no acute extraaxial hemorrhage noted.  No   parenchymal hemorrhage or brain swelling or edema noted.  Chest x-ray shows   low lung volumes, slight rotation versus scoliotic changes and convoluted   airway.  Moderate amount of bowel air noted.  No obvious free intraperitoneal   air.    ASSESSMENT:  1.  New onset seizure.  2.  Acute hypoxic respiratory failure secondary to above.  3.  Anion gap metabolic acidosis.  4.  Recent admission 10/05 to 10/10 [facility] for sepsis from   gastrointestinal source.  5.  Dementia -- unknown severity.  6.  Mildly elevated troponin.  7.  Incomplete medical database.    PLAN:  Again, this is a 89-year-old female with above indicated history and   presentation, currently on full mechanical ventilatory support after she was   in a postictal state, unable to protect her airway.  Hemodynamically at   present, she appears intact.  We will maintain on full mechanical ventilatory   support at this time.  We will start her on Keppra for her recent seizure.  No   current evidence of underlying CNS infection; however, we are really   uncertain to the etiology to this new ictal activity.  She will be on   propofol.  We will get an EEG, again maintain on Keppra.  Given her anion gap   metabolic acidosis, suspect may be related to seizure activity.  We will   repeat a BMP to evaluate if this has  since resolved as well as check lactic   acid.  If not, further considerations to a secondary process, which may   include an infectious etiology, should be entertained.  We will further check   an additional troponin to ensure that there is no significant increase from   the 0.15, get an EKG to ensure no obvious underlying cardiac ischemia.  If   infectious process is concerning, LP may be a consideration given the new   seizure activity; however, per granddaughter, there were no significant   clinical findings or symptoms that were concerning of a primary CNS process   from an infectious etiology.  After discussion with her, we will maintain on   full mechanical ventilatory support at this time; however, she will be DNR and   no chest compressions should she have cardiac arrest.  If sepsis becomes a   concern, abdominal source should be considered given her recent   hospitalization, ongoing moderate bowel air noted on chest x-ray.  At present,   her abdomen is soft, nondistended with normoactive bowel sounds and does not   appear to be grossly abnormal.    Prognosis is guarded.    Total critical care time not including billable procedures is 40 minutes.       ____________________________________     MD SONYA Escalera / BRII    DD:  10/13/2018 18:44:13  DT:  10/13/2018 20:55:55    D#:  [id number]  Job#:  [id number]

## 2018-10-14 NOTE — CARE PLAN
Problem: Venous Thromboembolism (VTW)/Deep Vein Thrombosis (DVT) Prevention:  Goal: Patient will participate in Venous Thrombosis (VTE)/Deep Vein Thrombosis (DVT)Prevention Measures  Outcome: PROGRESSING AS EXPECTED  SCDs in place on bilateral calves. Lovenox added to MAR.    Problem: Skin Integrity  Goal: Risk for impaired skin integrity will decrease  Outcome: PROGRESSING AS EXPECTED  Q2 hour turns, moving all medical devices.

## 2018-10-14 NOTE — CARE PLAN
Problem: Ventilation Defect:  Goal: Ability to achieve and maintain unassisted ventilation or tolerate decreased levels of ventilator support    Intervention: Manage ventilation therapy by monitoring diagnostic test results  Adult Ventilation Update    Total Vent Days: 2    Patient Lines/Drains/Airways Status    Active Airway     Name: Placement date: Placement time: Site: Days:    Airway ETT Oral 7.5 10/13/18   1320   Oral   less than 1            Static Compliance (ml / cm H2O): 23 (10/14/18 0257)    Patient failed trials because of Barriers to Wean: Other (Comments);No Order (10/13/18 1322)  Barriers to SBT    Length of Weaning Trial                    Mobility  Level of Mobility: Level IV (10/14/18 0200)  Activity Performed: Unable to mobilize (10/14/18 0200)  Reason Not Mobilized: Bed rest (10/14/18 0200)    Events/Summary/Plan: Fio2 50% post ABG (10/13/18 1940)

## 2018-10-14 NOTE — H&P
Hospital Medicine History & Physical Note    Date of Service  10/13/2018    Primary Care Physician  Christie Gerard D.O.    Consultants  Critical care    Code Status  No chest compressions    Chief Complaint  Possible seizures at [facility]    History of Presenting Illness  89 y.o. female who presented 10/13/2018 with above medical issues. ALl history is obtained from the patient's chart and family since the patient is currently intubated and sedated. SHe was just recently at this hospital for nausea and vomiting and diarrhea, of which infection was ruled out. She was found to have profound weakness and was sent to the [facility] to further rehab. APparently she was doing ok but today started having seizures and was sent to this ER. While here in the ER she had an EKG that was concerning for STEMI, but this code was deactivated. Patient continued to be altered and have seizure activitiy, therefore she was intubated for airway protection.  Daugther claims she has had no prior head trauma and no prior surgeries. Apparently the only new medications she has been on have been potassium replacement. No prior history of toxin or ETOH abuse either.      Review of Systems  Review of Systems   Unable to perform ROS: Acuity of condition       Past Medical History   has a past medical history of Arthritis; Bronchitis; Cataract; Delirium superimposed on dementia (7/17/2018); Fall; Hemorrhoids; Renal disorder; Severe sepsis (HCC) (10/5/2018); and Status epilepticus ([facility]) (10/13/2018). She also has no past medical history of Arrhythmia; Asthma; At risk for sleep apnea; Back pain; Blood clotting disorder (HCC); Cancer (HCC); Congestive heart failure (HCC); COPD (chronic obstructive pulmonary disease) ([facility]); Diabetes (HCC); Dialysis patient ([facility]); Disorder of thyroid; Glaucoma; Gynecological disorder; Heart murmur; Heart valve disease; Hemorrhagic disorder ([facility]); Hypertension; Indigestion; Jaundice; Myocardial infarct ([facility]);  Pacemaker; Pneumonia; Rheumatic fever; Stroke (HCC); or Urinary incontinence.    Surgical History   has a past surgical history that includes other; other; and hip hemiarthroplasty (Left, 7/15/2018).     Family History  Unclear given patients clinical status     Social History   reports that she has never smoked. She has never used smokeless tobacco. She reports that she does not drink alcohol or use drugs.    Allergies  No Known Allergies    Medications  Prior to Admission Medications   Prescriptions Last Dose Informant Patient Reported? Taking?   acetaminophen (TYLENOL) 325 MG Tab 10/11/2018 at 0135 MAR from Other Facility No No   Sig: Take 2 Tabs by mouth every 6 hours as needed (Mild Pain; (Pain scale 1-3); Temp greater than 100.5 F).   aspirin 81 MG tablet 10/13/2018 at 0800 MAR from Other Facility Yes Yes   Sig: Take 81 mg by mouth every day.   ferrous sulfate 325 (65 Fe) MG tablet 10/13/2018 at 0800 MAR from Other Facility No No   Sig: Take 1 Tab by mouth every day.   polyethylene glycol/lytes (MIRALAX) Pack 10/13/2018 at 0800 MAR from Other Facility No No   Sig: Take 1 Packet by mouth every day.   potassium chloride SA (K-DUR) 10 MEQ Tab CR 10/13/2018 at 0800 MAR from Other Facility No No   Sig: Take 1 Tab by mouth every day.   senna-docusate (PERICOLACE OR SENOKOT S) 8.6-50 MG Tab 10/13/2018 at 0800 MAR from Other Facility No No   Sig: Take 2 Tabs by mouth 2 Times a Day.      Facility-Administered Medications: None       Physical Exam  Temp:  [36.7 °C (98.1 °F)] 36.7 °C (98.1 °F)  Pulse:  [] 113  Resp:  [15-24] 18    Physical Exam   Constitutional: She appears well-developed and well-nourished. No distress.   Intubated, sedated, serosanguinous fluid emanating from the mouth   HENT:   Head: Normocephalic and atraumatic.   Mouth/Throat: No oropharyngeal exudate.   Eyes: Pupils are equal, round, and reactive to light. No scleral icterus.   Neck: Normal range of motion. Neck supple. No thyromegaly  present.   Cardiovascular: Normal rate, regular rhythm, normal heart sounds and intact distal pulses.    No murmur heard.  Pulmonary/Chest: Effort normal and breath sounds normal. No respiratory distress. She has no wheezes.   ETT in place   Abdominal: Soft. Bowel sounds are normal. She exhibits no distension. There is no tenderness.   Musculoskeletal: Normal range of motion. She exhibits no edema or tenderness.   Neurological: She is alert. No cranial nerve deficit.   Can follow simple command such as move her fingers and toes   Skin: Skin is warm and dry. No rash noted.   Psychiatric: She has a normal mood and affect.   Nursing note and vitals reviewed.      Laboratory:  Recent Labs      10/13/18   1302   WBC  8.7   RBC  4.20   HEMOGLOBIN  11.3*   HEMATOCRIT  36.8*   MCV  87.6   MCH  26.9*   MCHC  30.7*   RDW  46.8   PLATELETCT  300   MPV  10.5     Recent Labs      10/13/18   1302  10/13/18   1938   SODIUM  138  139   POTASSIUM  4.1  4.1   CHLORIDE  104  108   CO2  16*  17*   GLUCOSE  147*  112*   BUN  20  20   CREATININE  1.06  0.87   CALCIUM  8.5  8.5     Recent Labs      10/13/18   1302  10/13/18   1938   ALTSGPT  24   --    ASTSGOT  40   --    ALKPHOSPHAT  68   --    TBILIRUBIN  0.5   --    GLUCOSE  147*  112*     Recent Labs      10/13/18   1302   APTT  29.3   INR  1.15*         Recent Labs      10/13/18   1302  10/13/18   1938   TRIGLYCERIDE  102  96     Recent Labs      10/13/18   1302  10/13/18   1938   TROPONINI  0.15*  1.40*       Urinalysis:    Recent Labs      10/13/18   1558   SPECGRAVITY  1.020   GLUCOSEUR  Negative   KETONES  Negative   NITRITE  Negative   LEUKESTERAS  Negative        Imaging:  [id number] VIEW   Final Result      Above average stool volume      CT-HEAD W/O   Final Result         1.  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      2.  No parenchymal hemorrhage or brain swelling or edema is noted.      Findings are  consistent with atrophy.  Decreased attenuation in the periventricular white matter likely indicates microvascular ischemic disease.      DX-CHEST-PORTABLE (1 VIEW)   Final Result         1.  No new infiltrates or consolidations identified.      2.  Endotracheal tube and NG tube now noted in place.      DX-CHEST-PORTABLE (1 VIEW)    (Results Pending)         Assessment/Plan:  I anticipate this patient will require at least two midnights for appropriate medical management, necessitating inpatient admission.    * Seizure (HCC)- (present on admission)   Assessment & Plan    -unclear etiology, no prior history  -no recent toxin exposure or substance abuse, doubt WD seizures  -incidentally has b/L subdural hygromas, could be sequelae of prior SDH's  -intubated for airway protection  -will need continuous EEG and sedation holiday to assess neurological function  -IV keppra 500 mg BID  -no clear evidence of infection at this time  -would consult neurology in the AM  -repeat CTH stat for any change in neurological condition, consider MRI brain as well        Dementia- (present on admission)   Assessment & Plan    -intubated        Acute respiratory failure with hypoxia (HCC)- (present on admission)   Assessment & Plan    -intubated for airway protection, pulm is following        Elevated troponin- (present on admission)   Assessment & Plan    -no clear acute changes on EKG, continue trend, likely stress reaction and demand?        CKD (chronic kidney disease) stage 3, GFR 30-59 ml/min ([facility])- (present on admission)   Assessment & Plan    -at her baseline, monitor kidney function closely        Anemia of chronic disease- (present on admission)   Assessment & Plan    -at her baseline, no e/o overt bleeding at this time            VTE prophylaxis: SCD's

## 2018-10-14 NOTE — ASSESSMENT & PLAN NOTE
new onset seizures.  CT head reveals subdural hygromas, could be sequelae of prior subdural hematomas.  No acute pathology on MRI of brain  Neurology consulting  Depakote stopped due to elevated ammonia levels  Continue eslicarbazepine 400mg qHS, Keppra 1500mg BID

## 2018-10-14 NOTE — ASSESSMENT & PLAN NOTE
Initially due to to seizures and MRSA pneumonia  Monitor daily ABG, chest x-ray, labs, vitals and strict I's and O's  Reintubated 10/26 2 2 increasing oxygen demands  Vent as per critical care

## 2018-10-14 NOTE — PROGRESS NOTES
Tech from Lab called with critical result of Troponin of 1.40 at 2112. Critical lab result read back to tech.   Dr. Son notified of critical lab result at 2120.  Critical lab result read back by Dr. Son.

## 2018-10-14 NOTE — PROCEDURES
VIDEO ELECTROENCEPHALOGRAM / EPILEPSY MONITORING UNIT REPORT      Referring provider: Dr. Martinez.     DOS:   10/14/2018 - 10/15/2018 (total recording for 19 hours and 19 minutes).       INDICATION:  Rene Becerril 89 y.o. female presenting with seizures.     CURRENT ANTIEPILEPTIC REGIMEN: Propofol infusion, Levetiracetam 500 mg q 12 hrs.      TECHNIQUE: A 30-channel, 24 hrs video electroencephalogram (VEEG) was performed in accordance with the international 10-20 system. This digital study was reviewed in bipolar and referential montages. The recording examined a sedated and/or encephalopathic patient.     DESCRIPTION OF THE RECORD:  During the awake state, background shows symmetrical 5-6 Hz theta activity posteriorly with amplitude of 70 mV.  During the sleep and sedated state, background shows diffuse high-amplitude 4-5 Hz delta activity.  Symmetrical high-amplitude sleep spindles and vertex sharp activities were seen in the central leads.    ACTIVATION PROCEDURES:   Not performed.     ICTAL AND/OR INTERICTAL FINDINGS:   Frequent frontal sharps and triphasic waves, becoming often periodic but no clear suggestion of seizures.     EVENT(S):  None.     EKG: sampling review of EKG recording demonstrated sinus rhythm.       INTERPRETATION:   This is an abnormal 24 hours video electroencephalogram recording in the awake, drowsy and sleep state.  A moderate encephalopathy is suggested. Frequent frontal sharps and triphasic waves, becoming often periodic but no clear suggestion of seizures. The findings suggest large areas of underlying cortical irritability and possibly structural abnormality. No events or seizures were captured during the study. Clinical and radiological correlation is recommended.    Updates provided to Dr. Messi Velazquez MD   Epilepsy and Neurodiagnostics.   Clinical  of Neurology [facility].   Diplomate in Neurology,  Epilepsy, and Electrodiagnostic Medicine.   Office: [phone]  Fax: [phone]

## 2018-10-14 NOTE — ED NOTES
Rounded on pt and family, answered questions, addressed needs, ensured call light within reach. Provided emotional support for pt and family.

## 2018-10-14 NOTE — PROGRESS NOTES
Patient transferred on monitor to ICU with ACLS RN, RN and RT on ventilator. Oriented family to unit. Patient in stable condition.

## 2018-10-14 NOTE — CARE PLAN
Problem: Safety  Goal: Will remain free from injury  Bed in lowest position, appropriate signs in place, call light in reach, patient wearing treaded socks, restraint orders verified, restraints adjusted to prevent CMS injury..     Problem: Skin Integrity  Goal: Risk for impaired skin integrity will decrease    Intervention: Assess and monitor skin integrity, appearance and/or temperature  Skin assessed at beginning of shift, pictures taken, appropriate documentation done. Patient turned q2 hours and devices repositioned to prevent pressure related injuries. Restraint order verified, restraints adjusted q2 hours to prevent CMS injury.

## 2018-10-14 NOTE — PROGRESS NOTES
Critical Care Progress Note    Date of admission  10/13/2018    Chief Complaint  89 y.o. female admitted 10/13/2018 with new onset sz, vdrf    Hospital Course    89-year-old   female with known past medical history of dementia, previous hip arthroplasty,   who was recently admitted to [facility] from 10/05 to 10/10 with severe   sepsis secondary to gastrointestinal source with stool impaction.  After   patient's symptoms resolved, she was transferred to [facility] and has been   there for the past few days recovering.  The patient's granddaughter went to   visit with her and noted that she was in her relatively normal state earlier   today; however, shortly after she became less responsive and appeared to have   seizure activity.  EMS was called and patient was given a dose of Valium and   brought in to the ER.  She was not protecting her airway very well and   subsequently required intubation and mechanical ventilatory support.  No   further information at this present time currently available.      Interval Problem Update  Reviewed last 24 hour events:  Tm 99.9  +1.9L over last 24hr  cxr with significant change  m 1.8  Trop 1.4->1.0  Abg 7.51/26/117/99 on 40%    Propofol @ 20  NS @ 100    Review of Systems  Review of Systems   Unable to perform ROS: Acuity of condition        Vital Signs for last 24 hours   Temp:  [36.7 °C (98.1 °F)] 36.7 °C (98.1 °F)  Pulse:  [] 106  Resp:  [15-24] 16    Hemodynamic parameters for last 24 hours       Vent Settings for last 24 hours  [place] Vent Mode: APVCMV  Rate (breaths/min):  [18] 18  PEEP/CPAP:  [8] 8  FiO2:  [] 30  P Peak (PIP):  [19-23] 23  P MEAN:  [10-14] 12    Physical Exam   Physical Exam   Constitutional: She appears well-developed and well-nourished.   HENT:   Head: Normocephalic and atraumatic.   Neck: No tracheal deviation present.   Cardiovascular: Intact distal pulses.    Tachy rate   Pulmonary/Chest: She has no wheezes. She has no rales.    Abdominal: Soft. There is no tenderness. There is no rebound.   Musculoskeletal: She exhibits no edema.   Neurological: No cranial nerve deficit.   Skin: Skin is warm and dry.   Psychiatric:   sedate   Nursing note and vitals reviewed.      Medications  Current Facility-Administered Medications   Medication Dose Route Frequency Provider Last Rate Last Dose   • Respiratory Care per Protocol   Nebulization Continuous RT Edgar Moody M.D.       • NS infusion   Intravenous Continuous Edgar Moody M.D. 100 mL/hr at 10/14/18 0540     • ondansetron (ZOFRAN) syringe/vial injection 4 mg  4 mg Intravenous Q4HRS PRN Edgar Moody M.D.       • ondansetron (ZOFRAN ODT) dispertab 4 mg  4 mg Oral Q4HRS PRN Edgar Moody M.D.       • LORazepam (ATIVAN) injection 4 mg  4 mg Intravenous Q10 MIN PRN Edgar Moody M.D.       • Respiratory Care per Protocol   Nebulization Continuous RT Gomez Holguin M.D.       • famotidine (PEPCID) tablet 20 mg  20 mg Oral Q12HRS Gomez Holguin M.D.        Or   • famotidine (PEPCID) injection 20 mg  20 mg Intravenous Q12HRS Gomez Holguin M.D.   20 mg at 10/14/18 0536   • senna-docusate (PERICOLACE or SENOKOT S) 8.6-50 MG per tablet 2 Tab  2 Tab Oral BID Gomez Holguin M.D.   Stopped at 10/13/18 1900    And   • polyethylene glycol/lytes (MIRALAX) PACKET 1 Packet  1 Packet Oral QDAY PRN Gomez Holguin M.D.        And   • magnesium hydroxide (MILK OF MAGNESIA) suspension 30 mL  30 mL Oral QDAY PRN Gomez Holguin M.D.        And   • bisacodyl (DULCOLAX) suppository 10 mg  10 mg Rectal QDAY PRN Gomez Holguin M.D.       • MD Alert...ICU Electrolyte Replacement per Pharmacy   Other pharmacy to dose Gomez Holguin M.D.       • fentaNYL (SUBLIMAZE) injection 25 mcg  25 mcg Intravenous Q HOUR PRN Gomez Holguin M.D.   25 mcg at 10/14/18 0052    Or   • fentaNYL (SUBLIMAZE) injection 50 mcg  50 mcg Intravenous Q HOUR PRN Gomez Holguin M.D.   50 mcg at 10/13/18 1922     Or   • fentaNYL (SUBLIMAZE) injection 100 mcg  100 mcg Intravenous Q HOUR PRN Gomez Holguin M.D.       • MD Yan...PROPOFOL CRITICAL CARE PROTOCOL 1 Each  1 Each Other PRN Gomez Holguin M.D.       • ipratropium-albuterol (DUONEB) nebulizer solution  3 mL Nebulization Q2HRS PRN (RT) Gomez Holguin M.D.       • ipratropium-albuterol (DUONEB) nebulizer solution  3 mL Nebulization Q4HRS (RT) Gomez Holguin M.D.   3 mL at 10/14/18 0656   • levETIRAcetam (KEPPRA) 500 mg in  mL IVPB  500 mg Intravenous Q12HRS Gomez Holguin M.D.   Stopped at 10/14/18 0551   • propofol (DIPRIVAN) injection  0-80 mcg/kg/min Intravenous Continuous Gomez Holguin M.D. 6.9 mL/hr at 10/14/18 0536 20 mcg/kg/min at 10/14/18 0536   • pneumococcal 13-Briana Conj Vacc (PREVNAR 13) syringe 0.5 mL  0.5 mL Intramuscular Once PRN Gomez Holguin M.D.           Fluids    Intake/Output Summary (Last 24 hours) at 10/14/18 0722  Last data filed at 10/14/18 0600   Gross per 24 hour   Intake          2444.33 ml   Output              480 ml   Net          1964.33 ml       Laboratory  Recent Results (from the past 48 hour(s))   CBC WITH DIFFERENTIAL    Collection Time: 10/13/18  1:02 PM   Result Value Ref Range    WBC 8.7 4.8 - 10.8 K/uL    RBC 4.20 4.20 - 5.40 M/uL    Hemoglobin 11.3 (L) 12.0 - 16.0 g/dL    Hematocrit 36.8 (L) 37.0 - 47.0 %    MCV 87.6 81.4 - 97.8 fL    MCH 26.9 (L) 27.0 - 33.0 pg    MCHC 30.7 (L) 33.6 - 35.0 g/dL    RDW 46.8 35.9 - 50.0 fL    Platelet Count 300 164 - 446 K/uL    MPV 10.5 9.0 - 12.9 fL    Neutrophils-Polys 86.00 (H) 44.00 - 72.00 %    Lymphocytes 8.40 (L) 22.00 - 41.00 %    Monocytes 3.70 0.00 - 13.40 %    Eosinophils 0.20 0.00 - 6.90 %    Basophils 0.70 0.00 - 1.80 %    Immature Granulocytes 1.00 (H) 0.00 - 0.90 %    Nucleated RBC 0.00 /100 WBC    Neutrophils (Absolute) 7.45 (H) 2.00 - 7.15 K/uL    Lymphs (Absolute) 0.73 (L) 1.00 - 4.80 K/uL    Monos (Absolute) 0.32 0.00 - 0.85 K/uL    Eos (Absolute) 0.02  0.00 - 0.51 K/uL    Baso (Absolute) 0.06 0.00 - 0.12 K/uL    Immature Granulocytes (abs) 0.09 0.00 - 0.11 K/uL    NRBC (Absolute) 0.00 K/uL   COMP METABOLIC PANEL    Collection Time: 10/13/18  1:02 PM   Result Value Ref Range    Sodium 138 135 - 145 mmol/L    Potassium 4.1 3.6 - 5.5 mmol/L    Chloride 104 96 - 112 mmol/L    Co2 16 (L) 20 - 33 mmol/L    Anion Gap 18.0 (H) 0.0 - 11.9    Glucose 147 (H) 65 - 99 mg/dL    Bun 20 8 - 22 mg/dL    Creatinine 1.06 0.50 - 1.40 mg/dL    Calcium 8.5 8.5 - 10.5 mg/dL    AST(SGOT) 40 12 - 45 U/L    ALT(SGPT) 24 2 - 50 U/L    Alkaline Phosphatase 68 30 - 99 U/L    Total Bilirubin 0.5 0.1 - 1.5 mg/dL    Albumin 3.1 (L) 3.2 - 4.9 g/dL    Total Protein 6.2 6.0 - 8.2 g/dL    Globulin 3.1 1.9 - 3.5 g/dL    A-G Ratio 1.0 g/dL   TROPONIN    Collection Time: 10/13/18  1:02 PM   Result Value Ref Range    Troponin I 0.15 (H) 0.00 - 0.04 ng/mL   PROTHROMBIN TIME    Collection Time: 10/13/18  1:02 PM   Result Value Ref Range    PT 14.9 (H) 12.0 - 14.6 sec    INR 1.15 (H) 0.87 - 1.13   APTT    Collection Time: 10/13/18  1:02 PM   Result Value Ref Range    APTT 29.3 24.7 - 36.0 sec   Triglycerides Starting now and then Every 3 Days    Collection Time: 10/13/18  1:02 PM   Result Value Ref Range    Triglycerides 102 0 - 149 mg/dL   ESTIMATED GFR    Collection Time: 10/13/18  1:02 PM   Result Value Ref Range    GFR If  59 (A) >60 mL/min/1.73 m 2    GFR If Non African American 49 (A) >60 mL/min/1.73 m 2   URINALYSIS CULTURE, IF INDICATED    Collection Time: 10/13/18  3:58 PM   Result Value Ref Range    Color Yellow     Character Clear     Specific Gravity 1.020 <1.035    Ph 6.5 5.0 - 8.0    Glucose Negative Negative mg/dL    Ketones Negative Negative mg/dL    Protein Negative Negative mg/dL    Bilirubin Negative Negative    Urobilinogen, Urine 0.2 Negative    Nitrite Negative Negative    Leukocyte Esterase Negative Negative    Occult Blood Negative Negative    Micro Urine Req see  below    EKG    Collection Time: 10/13/18  6:47 PM   Result Value Ref Range    Report       [facility]    Test Date:  2018-10-13  Pt Name:    RENA MCCORMICK                 Department: [facility]  MRN:        [id number]                      Room:       07  Gender:     Female                       Technician: INOCENCIA  :        [id number]                   Requested By:GILDA MARK  Order #:    [id number]                    Reading MD:    Measurements  Intervals                                Axis  Rate:       112                          P:          231  AL:         157                          QRS:        -57  QRSD:       112                          T:          44  QT:         368  QTc:        503    Interpretive Statements  SINUS OR ECTOPIC ATRIAL TACHYCARDIA  NONSPECIFIC IVCD WITH LAD  CONSIDER INFERIOR INFARCT  ANTERIOR INFARCT, AGE INDETERMINATE  Compared to ECG 10/13/2018 12:57:09  Intraventricular conduction delay now present  Sinus tachycardia no longer present  First degree AV block no longer present  Prolonged QT interval no longer present  Myocardial infarct finding  still present     LACTIC ACID    Collection Time: 10/13/18  7:38 PM   Result Value Ref Range    Lactic Acid 1.8 0.5 - 2.0 mmol/L   BASIC METABOLIC PANEL    Collection Time: 10/13/18  7:38 PM   Result Value Ref Range    Sodium 139 135 - 145 mmol/L    Potassium 4.1 3.6 - 5.5 mmol/L    Chloride 108 96 - 112 mmol/L    Co2 17 (L) 20 - 33 mmol/L    Glucose 112 (H) 65 - 99 mg/dL    Bun 20 8 - 22 mg/dL    Creatinine 0.87 0.50 - 1.40 mg/dL    Calcium 8.5 8.5 - 10.5 mg/dL    Anion Gap 14.0 (H) 0.0 - 11.9   TROPONIN    Collection Time: 10/13/18  7:38 PM   Result Value Ref Range    Troponin I 1.40 (H) 0.00 - 0.04 ng/mL   TRIGLYCERIDE    Collection Time: 10/13/18  7:38 PM   Result Value Ref Range    Triglycerides 96 0 - 149 mg/dL   ESTIMATED GFR    Collection Time: 10/13/18  7:38 PM   Result Value Ref Range    GFR If African American >60 >60 mL/min/1.73 m 2     GFR If Non African American >60 >60 mL/min/1.73 m 2   ISTAT ARTERIAL BLOOD GAS    Collection Time: 10/13/18  7:49 PM   Result Value Ref Range    Ph 7.500 7.400 - 7.500    Pco2 30.9 26.0 - 37.0 mmHg    Po2 295 (H) 64 - 87 mmHg    Tco2 25 20 - 33 mmol/L    S02 100 (H) 93 - 99 %    Hco3 24.1 17.0 - 25.0 mmol/L    BE 2 -4 - 3 mmol/L    Body Temp 99.7 F degrees    O2 Therapy 80 %    iPF Ratio 369     Ph Temp Tea 7.491 7.400 - 7.500    Pco2 Temp Co 31.8 26.0 - 37.0 mmHg    Po2 Temp Cor 298 (H) 64 - 87 mmHg    Specimen Arterial     Action Range Triggered NO     Inst. Qualified Patient YES    EKG    Collection Time: 10/14/18 12:25 AM   Result Value Ref Range    Report       [facility]    Test Date:  2018-10-14  Pt Name:    RENA MCCORMICK                 Department: [facility]  MRN:        [id number]                      Room:       [facility]  Gender:     Female                       Technician: CROW  :        [id number]                   Requested By:AGUSTO GARCIA JR  Order #:    [id number]                    Reading MD:    Measurements  Intervals                                Axis  Rate:       98                           P:          -8  DE:         168                          QRS:        -38  QRSD:       90                           T:          165  QT:         384  QTc:        491    Interpretive Statements  SINUS RHYTHM  SUPRAVENTRICULAR BIGEMINY  CONSIDER INFERIOR INFARCT  ANTERIOR INFARCT, AGE INDETERMINATE  Compared to ECG 10/13/2018 18:47:03  Atrial premature complex(es) now present  Intraventricular conduction delay no longer present  Myocardial infarct finding still present     TROPONIN    Collection Time: 10/14/18 12:40 AM   Result Value Ref Range    Troponin I 1.22 (H) 0.00 - 0.04 ng/mL   PHOSPHORUS    Collection Time: 10/14/18 12:40 AM   Result Value Ref Range    Phosphorus 1.3 (L) 2.5 - 4.5 mg/dL   CBC with Differential    Collection Time: 10/14/18  4:39 AM   Result Value Ref Range    WBC 9.4 4.8 - 10.8 K/uL     RBC 3.73 (L) 4.20 - 5.40 M/uL    Hemoglobin 10.4 (L) 12.0 - 16.0 g/dL    Hematocrit 31.4 (L) 37.0 - 47.0 %    MCV 84.2 81.4 - 97.8 fL    MCH 27.9 27.0 - 33.0 pg    MCHC 33.1 (L) 33.6 - 35.0 g/dL    RDW 43.5 35.9 - 50.0 fL    Platelet Count 294 164 - 446 K/uL    MPV 10.2 9.0 - 12.9 fL    Neutrophils-Polys 81.70 (H) 44.00 - 72.00 %    Lymphocytes 9.80 (L) 22.00 - 41.00 %    Monocytes 7.00 0.00 - 13.40 %    Eosinophils 0.20 0.00 - 6.90 %    Basophils 0.40 0.00 - 1.80 %    Immature Granulocytes 0.90 0.00 - 0.90 %    Nucleated RBC 0.00 /100 WBC    Neutrophils (Absolute) 7.67 (H) 2.00 - 7.15 K/uL    Lymphs (Absolute) 0.92 (L) 1.00 - 4.80 K/uL    Monos (Absolute) 0.66 0.00 - 0.85 K/uL    Eos (Absolute) 0.02 0.00 - 0.51 K/uL    Baso (Absolute) 0.04 0.00 - 0.12 K/uL    Immature Granulocytes (abs) 0.08 0.00 - 0.11 K/uL    NRBC (Absolute) 0.00 K/uL   Comp Metabolic Panel (CMP)    Collection Time: 10/14/18  4:39 AM   Result Value Ref Range    Sodium 139 135 - 145 mmol/L    Potassium 3.8 3.6 - 5.5 mmol/L    Chloride 109 96 - 112 mmol/L    Co2 20 20 - 33 mmol/L    Anion Gap 10.0 0.0 - 11.9    Glucose 90 65 - 99 mg/dL    Bun 19 8 - 22 mg/dL    Creatinine 0.77 0.50 - 1.40 mg/dL    Calcium 8.3 (L) 8.5 - 10.5 mg/dL    AST(SGOT) 34 12 - 45 U/L    ALT(SGPT) 23 2 - 50 U/L    Alkaline Phosphatase 58 30 - 99 U/L    Total Bilirubin 0.5 0.1 - 1.5 mg/dL    Albumin 2.9 (L) 3.2 - 4.9 g/dL    Total Protein 5.6 (L) 6.0 - 8.2 g/dL    Globulin 2.7 1.9 - 3.5 g/dL    A-G Ratio 1.1 g/dL   Magnesium    Collection Time: 10/14/18  4:39 AM   Result Value Ref Range    Magnesium 1.8 1.5 - 2.5 mg/dL   TROPONIN    Collection Time: 10/14/18  4:39 AM   Result Value Ref Range    Troponin I 1.07 (H) 0.00 - 0.04 ng/mL   PHOSPHORUS    Collection Time: 10/14/18  4:39 AM   Result Value Ref Range    Phosphorus 1.2 (L) 2.5 - 4.5 mg/dL   ESTIMATED GFR    Collection Time: 10/14/18  4:39 AM   Result Value Ref Range    GFR If African American >60 >60 mL/min/1.73 m 2     GFR If Non African American >60 >60 mL/min/1.73 m 2   ISTAT ARTERIAL BLOOD GAS    Collection Time: 10/14/18  4:54 AM   Result Value Ref Range    Ph 7.511 (H) 7.400 - 7.500    Pco2 26.0 26.0 - 37.0 mmHg    Po2 117 (H) 64 - 87 mmHg    Tco2 22 20 - 33 mmol/L    S02 99 93 - 99 %    Hco3 20.8 17.0 - 25.0 mmol/L    BE -1 -4 - 3 mmol/L    Body Temp 99.0 F degrees    O2 Therapy 40 %    iPF Ratio 293     Ph Temp Tea 7.508 (H) 7.400 - 7.500    Pco2 Temp Co 26.3 26.0 - 37.0 mmHg    Po2 Temp Cor 118 (H) 64 - 87 mmHg    Specimen Arterial     Action Range Triggered NO     Inst. Qualified Patient YES        Imaging  X-Ray:  I have personally reviewed the images and compared with prior images.    Assessment/Plan  * Seizure (HCC)- (present on admission)   Assessment & Plan    ? Etiology  Continue keppra  MRI brain pending  24 hr EEG  Minimize meds that lower sz threshold        Acute respiratory failure with hypoxia (HCC)- (present on admission)   Assessment & Plan    Intubated 10/13 for airway control  Continue full vent support  I am adjusting vent based on abg/pulm mechanics  Transition to precedex if no obvious sz activity  SBT when more awake/following  Monitor volume balance        Dementia- (present on admission)   Assessment & Plan    Reportedly mild        Elevated troponin- (present on admission)   Assessment & Plan    Check echo  Trending down  ? Demand         CKD (chronic kidney disease) stage 3, GFR 30-59 ml/min ([facility])- (present on admission)   Assessment & Plan    Monitor UO  Minimize nephrotoxic substances        Anemia of chronic disease- (present on admission)   Assessment & Plan    Conservative transfusion strategy             VTE:  Lovenox  Ulcer: H2 Antagonist  Lines: Rosa Catheter  Ongoing indication addressed    I have performed a physical exam and reviewed and updated ROS and Plan today (10/14/2018). In review of yesterday's note (10/13/2018), there are no changes except as documented above.     Discussed  patient condition and risk of morbidity and/or mortality with Hospitalist, RN, RT, Therapies and Pharmacy  The patient remains critically ill.  Critical care time = 34 minutes in directly providing and coordinating critical care and extensive data review.  No time overlap and excludes procedures.

## 2018-10-15 PROBLEM — Z66 DNR (DO NOT RESUSCITATE): Status: ACTIVE | Noted: 2018-01-01

## 2018-10-15 PROBLEM — I50.21 ACUTE SYSTOLIC HEART FAILURE (HCC): Status: ACTIVE | Noted: 2018-01-01

## 2018-10-15 NOTE — THERAPY
PT consult received, on hold per NRSG - Pt sedated and not appropriate for therapy.  Will re attempt as able/appropriate      Tamanna Braun PT/DPT  Pager# [phone]

## 2018-10-15 NOTE — CARE PLAN
Problem: Ventilation Defect:  Goal: Ability to achieve and maintain unassisted ventilation or tolerate decreased levels of ventilator support  Outcome: PROGRESSING AS EXPECTED  Adult Ventilation Update    Total Vent Days: 3    Patient Lines/Drains/Airways Status    Active Airway     Name: Placement date: Placement time: Site: Days:    Airway ETT Oral 7.5 10/13/18   1320   Oral   3              In the last 24 hours, the patient tolerated SBT for 2 hrs on settings of 10/8.             Plateau Pressure (Q Shift): 20 (10/14/18 2226)  Static Compliance (ml / cm H2O): 74 (10/15/18 0401)      Barriers to SBT Weaning Trial Stopped due to:: Apnea > 30 seconds X 4 (10/14/18 1813)  Length of Weaning Trial Length of Weaning Trial (Hours): 2 (10/14/18 1813)    Sputum/Suction   Cough: Productive (10/15/18 0220)  Sputum Amount: Unable to Evaluate (10/15/18 0220)  Sputum Color: Unable to Evaluate (10/15/18 0220)  Sputum Consistency: Unable to Evaluate (10/15/18 0220)    Mobility  Level of Mobility: Level IV (10/14/18 2000)  Activity Performed: Unable to mobilize (10/14/18 2000)  Reason Not Mobilized: Other (comment) (24 hour EEG in place.) (10/14/18 2000)    Events/Summary/Plan: pt is apvcmv 18/330/+8/30%, Duoneb Q4

## 2018-10-15 NOTE — DIETARY
"Nutrition services:  Nutrition Support Assessment     Day 2 of admit.  Rene Becerril is a 89 y.o. female with admitting DX of Status epilepticus.      Current problem list:  1. Seizure  2. Acute respiratory failure with hypoxia  3. Dementia  4. Elevated troponin  5. CKD (chronic kidney disease)  6. Anemia of chronic disease     Assessment:  Estimated Nutritional Needs: based on:   Height: 160 cm (5' 2.99\")  Weight: 57.3 kg (126 lb 5.2 oz)  Weight to Use in Calculations: 57.3 kg (126 lb 5.2 oz)  Ideal Body Weight: 52.2 kg (115 lb)  Percent Ideal Body Weight: 109.8  Body mass index is 22.38 kg/m².    Calculation/Equation:  MSJ x 1.2 = 1147 kcals/day; RMR per PSU (vent L/min: 6.3, Tmax/24 hrs: 36.7) = 1031 kcals/day  Total Calories / day: 1147 - 1318 (Calories / k - 23)  Total Grams Protein / day: 69 - 80 (Grams Protein / k.2 - 1.4)     Evaluation:   1. Pt admitted from SNF with seizure.  2. Pt is intubated.  3. Continuous EEG at bedside.   4. Hx of dementia.  5. No propofol running @ this time; RD will monitor propofol and adjust TF as needed.  6. Albumin 2.5, Phosphorus 2.4, CRP 5.30 (10/15)  7. Pertinent meds:  Aspirin, Lovenox, Pepcid, Pericolace     Recommendations/Plan:  1. Start Replete with Fiber @ 25 mL/hr and advance per protocol to goal rate of 50 mL/hr, providing 1200 kcals, 77 grams of protein and 996 mL of free water per day.  2. Fluids per MD.  3. RD to monitor wt and lab trends.  4. PO diet when safe/appropriate per SLP/MD and pt can adequately consume.    RD following.                "

## 2018-10-15 NOTE — FLOWSHEET NOTE
"Adult Ventilation Update    Total Vent Days: 3    Patient Lines/Drains/Airways Status    Active Airway     Name: Placement date: Placement time: Site: Days:    Airway ETT Oral 7.5 10/13/18   1320   Oral   2                             Plateau Pressure (Q Shift): 20 (10/14/18 2226)  Static Compliance (ml / cm H2O): 123 (10/15/18 1520)    Patient failed trials because of Barriers to Wean: Other (Comments);No Order (10/13/18 1322)  Barriers to SBT Weaning Trial Stopped due to:: Apnea > 30 seconds X 4 (10/14/18 1813)  Length of Weaning Trial Length of Weaning Trial (Hours): 2 (10/14/18 1813)                       Cough: Productive (10/15/18 1520)  Sputum Amount: Small (10/15/18 1520)  Sputum Color: Clear (10/15/18 1520)  Sputum Consistency: Thick;Thin (10/15/18 1520)    Mobility  Level of Mobility: Level IV (10/15/18 0800)  Activity Performed: Unable to mobilize (10/15/18 0800)  Reason Not Mobilized: Patient refused, education provided (pt shakes head \"no\", cont EEG in place) (10/15/18 0800)    Events/Summary/Plan: Pt. placed on SBT 10/8 and tolerating well.  RN and RT agree. (10/15/18 5778)    "

## 2018-10-15 NOTE — CARE PLAN
Problem: Pain Management  Goal: Pain level will decrease to patient's comfort goal    Intervention: Follow pain managment plan developed in collaboration with patient and Interdisciplinary Team  Patient assessed for pain q2 hours, using CPOT pain scale. Patient able to answer appropriately when asked regarding pain.       Problem: Safety - Medical Restraint  Goal: Remains free of injury from restraints (Restraint for Interference with Medical Device)  INTERVENTIONS:  1. Determine that other, less restrictive measures have been tried or would not be effective before applying the restraint  2. Evaluate the patient's condition at the time of restraint application  3. Inform patient/family regarding the reason for restraint  4. Q2H: Monitor safety, psychosocial status, comfort, nutrition and hydration     Outcome: PROGRESSING AS EXPECTED  Restraint orders verified, restraints readjusted q2 hours, ROM performed, patient and family notified of reason for restraints, and discontinuation criteria.

## 2018-10-15 NOTE — DISCHARGE PLANNING
Care Transition Team Assessment    Information for this assessment was compiled from a previous assessment on. 10/10/2018. Pt has granddaughter as to support pt during dc.     Information Source  Orientation : Unable to Assess  Information Given By: Other (Comments) (chart)  Who is responsible for making decisions for patient? : POA  Name(s) of Primary Decision Maker: Rene Becerril    Readmission Evaluation  Is this a readmission?: No    Elopement Risk  Legal Hold: No  Ambulatory or Self Mobile in Wheelchair: No-Not an Elopement Risk  Elopement Risk: Not at Risk for Elopement         Discharge Preparedness  What is your plan after discharge?: Uncertain - pending medical team collaboration  What are your discharge supports?: Child  Prior Functional Level: Needs Assist with Activities of Daily Living, Needs Assist with Medication Management  Difficulity with ADLs: None  Difficulity with IADLs: None    Functional Assesment  Prior Functional Level: Needs Assist with Activities of Daily Living, Needs Assist with Medication Management    Finances  Financial Barriers to Discharge: No  Prescription Coverage: Yes              Advance Directive  Advance Directive?: POLST    Domestic Abuse  Have you ever been the victim of abuse or violence?: No    Psychological Assessment  History of Substance Abuse: None  History of Psychiatric Problems: No  Non-compliant with Treatment: No  Newly Diagnosed Illness: Yes    Discharge Risks or Barriers  Discharge risks or barriers?: No  Patient risk factors: Vulnerable adult    Anticipated Discharge Information  Anticipated discharge disposition: Discharge needs currently unknown

## 2018-10-15 NOTE — PROGRESS NOTES
Critical Care Progress Note    Date of admission  10/13/2018    Chief Complaint  89 y.o. female admitted 10/13/2018 with new onset seizures.    Hospital Course    This lady was admitted to the ICU with seizures and respiratory failure.      Interval Problem Update  Reviewed last 24 hour events:      SR  TF at 25  Dex 0.4  Decrease Ve  Echo today  Vent 3  Increase keppra 1000 BID  Follows  NS 50      Review of Systems  Review of Systems   Unable to perform ROS: Acuity of condition        Vital Signs for last 24 hours   Temp:  [36.4 °C (97.6 °F)] 36.4 °C (97.6 °F)  Pulse:  [] 106  Resp:  [5-40] 40    Hemodynamic parameters for last 24 hours       Vent Settings for last 24 hours  Rider Vent Mode: APVCMV  Rate (breaths/min):  [18] 18  PEEP/CPAP:  [8] 8  FiO2:  [30] 30  P Peak (PIP):  [16-27] 17  P MEAN:  [9-13] 9    Physical Exam   Physical Exam   Constitutional:   On ventilator   HENT:   Head: Normocephalic and atraumatic.   Right Ear: External ear normal.   Left Ear: External ear normal.   Nose: Nose normal.   Mouth/Throat: Oropharynx is clear and moist.   Eyes: Pupils are equal, round, and reactive to light. Conjunctivae are normal. Right eye exhibits no discharge. Left eye exhibits no discharge. No scleral icterus.   Neck: Normal range of motion. Neck supple. No JVD present. No tracheal deviation present.   Cardiovascular: Intact distal pulses.  Exam reveals no gallop and no friction rub.    Sinus rhythm   Pulmonary/Chest: She has no wheezes. She has rales (Fine bibasilar crackles).   Abdominal: Soft. Bowel sounds are normal. She exhibits no distension. There is no tenderness. There is no rebound.   Tolerating enteral tube feedings   Musculoskeletal: She exhibits edema. She exhibits no tenderness.   No clubbing or cyanosis   Neurological:   Sedated.  Follows.  Moves all 4 extremities.   Skin: Skin is warm and dry. No rash noted. She is not diaphoretic. No erythema. No pallor.       Medications  Current  Facility-Administered Medications   Medication Dose Route Frequency Provider Last Rate Last Dose   • Pharmacy Consult: Enteral tube feeding - review meds/change route/product selection   Other PRN Paolo Son Jr., D.O.       • aspirin (ASA) tablet 325 mg  325 mg Oral DAILY Paolo Son Jr., D.O.   325 mg at 10/15/18 0510   • dexmedetomidine (PRECEDEX) 400 mcg/100mL NS premix infusion  0.1-1.5 mcg/kg/hr Intravenous Continuous Gomez Holguin M.D. 4.3 mL/hr at 10/15/18 0325 0.3 mcg/kg/hr at 10/15/18 0325   • Influenza Vaccine High-Dose pf injection 0.5 mL  0.5 mL Intramuscular Once Nathaniel Rosenberg M.D.   Stopped at 10/14/18 0945   • famotidine (PEPCID) injection 20 mg  20 mg Intravenous DAILY Gomez Holguin M.D.        Or   • famotidine (PEPCID) tablet 20 mg  20 mg Oral DAILY Gomez Holguin M.D.   20 mg at 10/15/18 0510   • phosphorus (K-PHOS-NEUTRAL, PHOSPHA 250 NEUTRAL) per tablet 2 Tab  2 Tab Oral TID Gomez Holguin M.D.   2 Tab at 10/15/18 0510   • enoxaparin (LOVENOX) inj 40 mg  40 mg Subcutaneous DAILY Gomez Holguin M.D.   40 mg at 10/15/18 0510   • NS infusion   Intravenous Continuous Gomez Holguin M.D. 50 mL/hr at 10/15/18 0000     • ondansetron (ZOFRAN) syringe/vial injection 4 mg  4 mg Intravenous Q4HRS PRN Edgar Moody M.D.       • ondansetron (ZOFRAN ODT) dispertab 4 mg  4 mg Oral Q4HRS PRN Edgar Moody M.D.       • LORazepam (ATIVAN) injection 4 mg  4 mg Intravenous Q10 MIN PRN Edgar Moody M.D.       • Respiratory Care per Protocol   Nebulization Continuous RT Gomez Holguin M.D.       • senna-docusate (PERICOLACE or SENOKOT S) 8.6-50 MG per tablet 2 Tab  2 Tab Oral BID Gomez Holguin M.D.   2 Tab at 10/15/18 0510    And   • polyethylene glycol/lytes (MIRALAX) PACKET 1 Packet  1 Packet Oral QDAY PRN Gomez Holguin M.D.        And   • magnesium hydroxide (MILK OF MAGNESIA) suspension 30 mL  30 mL Oral QDAY PRN Gomez Holguin M.D.        And   • bisacodyl  (DULCOLAX) suppository 10 mg  10 mg Rectal QDAY PRN Gomez Holguin M.D.       • MD Alert...ICU Electrolyte Replacement per Pharmacy   Other pharmacy to dose Gomez Holguin M.D.       • fentaNYL (SUBLIMAZE) injection 25 mcg  25 mcg Intravenous Q HOUR PRN Gomez Holguin M.D.   25 mcg at 10/14/18 0052    Or   • fentaNYL (SUBLIMAZE) injection 50 mcg  50 mcg Intravenous Q HOUR PRN Gomez Holguin M.D.   50 mcg at 10/13/18 1922    Or   • fentaNYL (SUBLIMAZE) injection 100 mcg  100 mcg Intravenous Q HOUR PRN Gomez Holguin M.D.       • ipratropium-albuterol (DUONEB) nebulizer solution  3 mL Nebulization Q2HRS PRN (RT) Gomez Holguin M.D.       • ipratropium-albuterol (DUONEB) nebulizer solution  3 mL Nebulization Q4HRS (RT) Gomez Holguin M.D.   3 mL at 10/15/18 0220   • levETIRAcetam (KEPPRA) 500 mg in  mL IVPB  500 mg Intravenous Q12HRS Gomez Holguin M.D. 400 mL/hr at 10/15/18 0513 500 mg at 10/15/18 0513   • propofol (DIPRIVAN) injection  0-80 mcg/kg/min Intravenous Continuous Gomez Holguin M.D.   Stopped at 10/14/18 1014   • pneumococcal 13-Briana Conj Vacc (PREVNAR 13) syringe 0.5 mL  0.5 mL Intramuscular Once PRN Gomez Holguin M.D.           Fluids    Intake/Output Summary (Last 24 hours) at 10/15/18 0547  Last data filed at 10/15/18 0200   Gross per 24 hour   Intake          2284.94 ml   Output              305 ml   Net          1979.94 ml       Laboratory  Recent Results (from the past 48 hour(s))   CBC WITH DIFFERENTIAL    Collection Time: 10/13/18  1:02 PM   Result Value Ref Range    WBC 8.7 4.8 - 10.8 K/uL    RBC 4.20 4.20 - 5.40 M/uL    Hemoglobin 11.3 (L) 12.0 - 16.0 g/dL    Hematocrit 36.8 (L) 37.0 - 47.0 %    MCV 87.6 81.4 - 97.8 fL    MCH 26.9 (L) 27.0 - 33.0 pg    MCHC 30.7 (L) 33.6 - 35.0 g/dL    RDW 46.8 35.9 - 50.0 fL    Platelet Count 300 164 - 446 K/uL    MPV 10.5 9.0 - 12.9 fL    Neutrophils-Polys 86.00 (H) 44.00 - 72.00 %    Lymphocytes 8.40 (L) 22.00 - 41.00 %    Monocytes  3.70 0.00 - 13.40 %    Eosinophils 0.20 0.00 - 6.90 %    Basophils 0.70 0.00 - 1.80 %    Immature Granulocytes 1.00 (H) 0.00 - 0.90 %    Nucleated RBC 0.00 /100 WBC    Neutrophils (Absolute) 7.45 (H) 2.00 - 7.15 K/uL    Lymphs (Absolute) 0.73 (L) 1.00 - 4.80 K/uL    Monos (Absolute) 0.32 0.00 - 0.85 K/uL    Eos (Absolute) 0.02 0.00 - 0.51 K/uL    Baso (Absolute) 0.06 0.00 - 0.12 K/uL    Immature Granulocytes (abs) 0.09 0.00 - 0.11 K/uL    NRBC (Absolute) 0.00 K/uL   COMP METABOLIC PANEL    Collection Time: 10/13/18  1:02 PM   Result Value Ref Range    Sodium 138 135 - 145 mmol/L    Potassium 4.1 3.6 - 5.5 mmol/L    Chloride 104 96 - 112 mmol/L    Co2 16 (L) 20 - 33 mmol/L    Anion Gap 18.0 (H) 0.0 - 11.9    Glucose 147 (H) 65 - 99 mg/dL    Bun 20 8 - 22 mg/dL    Creatinine 1.06 0.50 - 1.40 mg/dL    Calcium 8.5 8.5 - 10.5 mg/dL    AST(SGOT) 40 12 - 45 U/L    ALT(SGPT) 24 2 - 50 U/L    Alkaline Phosphatase 68 30 - 99 U/L    Total Bilirubin 0.5 0.1 - 1.5 mg/dL    Albumin 3.1 (L) 3.2 - 4.9 g/dL    Total Protein 6.2 6.0 - 8.2 g/dL    Globulin 3.1 1.9 - 3.5 g/dL    A-G Ratio 1.0 g/dL   TROPONIN    Collection Time: 10/13/18  1:02 PM   Result Value Ref Range    Troponin I 0.15 (H) 0.00 - 0.04 ng/mL   PROTHROMBIN TIME    Collection Time: 10/13/18  1:02 PM   Result Value Ref Range    PT 14.9 (H) 12.0 - 14.6 sec    INR 1.15 (H) 0.87 - 1.13   APTT    Collection Time: 10/13/18  1:02 PM   Result Value Ref Range    APTT 29.3 24.7 - 36.0 sec   Triglycerides Starting now and then Every 3 Days    Collection Time: 10/13/18  1:02 PM   Result Value Ref Range    Triglycerides 102 0 - 149 mg/dL   ESTIMATED GFR    Collection Time: 10/13/18  1:02 PM   Result Value Ref Range    GFR If  59 (A) >60 mL/min/1.73 m 2    GFR If Non African American 49 (A) >60 mL/min/1.73 m 2   URINALYSIS CULTURE, IF INDICATED    Collection Time: 10/13/18  3:58 PM   Result Value Ref Range    Color Yellow     Character Clear     Specific Gravity  1.020 <1.035    Ph 6.5 5.0 - 8.0    Glucose Negative Negative mg/dL    Ketones Negative Negative mg/dL    Protein Negative Negative mg/dL    Bilirubin Negative Negative    Urobilinogen, Urine 0.2 Negative    Nitrite Negative Negative    Leukocyte Esterase Negative Negative    Occult Blood Negative Negative    Micro Urine Req see below    EKG    Collection Time: 10/13/18  6:47 PM   Result Value Ref Range    Report       [facility]    Test Date:  2018-10-13  Pt Name:    RENA MCCORMICK                 Department: [facility]  MRN:        [id number]                      Room:       [facility]  Gender:     Female                       Technician: INOCENCIA  :        [id number]                   Requested By:GILDA MARK  Order #:    [id number]                    Reading MD: Amirah Lemos    Measurements  Intervals                                Axis  Rate:       112                          P:          231  NJ:         157                          QRS:        -57  QRSD:       112                          T:          44  QT:         368  QTc:        503    Interpretive Statements  ATRIAL TACHYCARDIA  ILBBB  INFERIOR INFARCT, old  ANTERIOR INFARCT, old  PROLONGED QT INTERVAL  Compared to ECG 10/13/2018 12:57:09  No significant change    Electronically Signed On 10- 22:56:23 PDT by Amirah Lemos     LACTIC ACID    Collection Time: 10/13/18  7:38 PM   Result Value Ref Range    Lactic Acid 1.8 0.5 - 2.0 mmol/L   BASIC METABOLIC PANEL    Collection Time: 10/13/18  7:38 PM   Result Value Ref Range    Sodium 139 135 - 145 mmol/L    Potassium 4.1 3.6 - 5.5 mmol/L    Chloride 108 96 - 112 mmol/L    Co2 17 (L) 20 - 33 mmol/L    Glucose 112 (H) 65 - 99 mg/dL    Bun 20 8 - 22 mg/dL    Creatinine 0.87 0.50 - 1.40 mg/dL    Calcium 8.5 8.5 - 10.5 mg/dL    Anion Gap 14.0 (H) 0.0 - 11.9   TROPONIN    Collection Time: 10/13/18  7:38 PM   Result Value Ref Range    Troponin I 1.40 (H) 0.00 - 0.04 ng/mL   TRIGLYCERIDE    Collection Time: 10/13/18   7:38 PM   Result Value Ref Range    Triglycerides 96 0 - 149 mg/dL   ESTIMATED GFR    Collection Time: 10/13/18  7:38 PM   Result Value Ref Range    GFR If African American >60 >60 mL/min/1.73 m 2    GFR If Non African American >60 >60 mL/min/1.73 m 2   ISTAT ARTERIAL BLOOD GAS    Collection Time: 10/13/18  7:49 PM   Result Value Ref Range    Ph 7.500 7.400 - 7.500    Pco2 30.9 26.0 - 37.0 mmHg    Po2 295 (H) 64 - 87 mmHg    Tco2 25 20 - 33 mmol/L    S02 100 (H) 93 - 99 %    Hco3 24.1 17.0 - 25.0 mmol/L    BE 2 -4 - 3 mmol/L    Body Temp 99.7 F degrees    O2 Therapy 80 %    iPF Ratio 369     Ph Temp Tea 7.491 7.400 - 7.500    Pco2 Temp Co 31.8 26.0 - 37.0 mmHg    Po2 Temp Cor 298 (H) 64 - 87 mmHg    Specimen Arterial     Action Range Triggered NO     Inst. Qualified Patient YES    EKG    Collection Time: 10/14/18 12:25 AM   Result Value Ref Range    Report       [facility]    Test Date:  2018-10-14  Pt Name:    RENA MCCORMICK                 Department: [facility]  MRN:        [id number]                      Room:       R107  Gender:     Female                       Technician: CROW  :        [id number]                   Requested By:AGUSTO GARCIA JR  Order #:    [id number]                    Reading MD: Amirah Lemos    Measurements  Intervals                                Axis  Rate:       98                           P:          -8  AK:         168                          QRS:        -38  QRSD:       90                           T:          165  QT:         384  QTc:        491    Interpretive Statements  [facility]  [facility]  ANTERIOR INFARCT, old  Compared to ECG 10/13/2018 18:47:03  Probably afib no longer present  Intraventricular conduction delay no longer present      Electronically Signed On 10- 17:08:10 PDT by Amirah Lemos     TROPONIN    Collection Time: 10/14/18 12:40 AM   Result Value Ref Range    Troponin I 1.22 (H) 0.00 - 0.04 ng/mL   PHOSPHORUS    Collection Time:  10/14/18 12:40 AM   Result Value Ref Range    Phosphorus 1.3 (L) 2.5 - 4.5 mg/dL   CBC with Differential    Collection Time: 10/14/18  4:39 AM   Result Value Ref Range    WBC 9.4 4.8 - 10.8 K/uL    RBC 3.73 (L) 4.20 - 5.40 M/uL    Hemoglobin 10.4 (L) 12.0 - 16.0 g/dL    Hematocrit 31.4 (L) 37.0 - 47.0 %    MCV 84.2 81.4 - 97.8 fL    MCH 27.9 27.0 - 33.0 pg    MCHC 33.1 (L) 33.6 - 35.0 g/dL    RDW 43.5 35.9 - 50.0 fL    Platelet Count 294 164 - 446 K/uL    MPV 10.2 9.0 - 12.9 fL    Neutrophils-Polys 81.70 (H) 44.00 - 72.00 %    Lymphocytes 9.80 (L) 22.00 - 41.00 %    Monocytes 7.00 0.00 - 13.40 %    Eosinophils 0.20 0.00 - 6.90 %    Basophils 0.40 0.00 - 1.80 %    Immature Granulocytes 0.90 0.00 - 0.90 %    Nucleated RBC 0.00 /100 WBC    Neutrophils (Absolute) 7.67 (H) 2.00 - 7.15 K/uL    Lymphs (Absolute) 0.92 (L) 1.00 - 4.80 K/uL    Monos (Absolute) 0.66 0.00 - 0.85 K/uL    Eos (Absolute) 0.02 0.00 - 0.51 K/uL    Baso (Absolute) 0.04 0.00 - 0.12 K/uL    Immature Granulocytes (abs) 0.08 0.00 - 0.11 K/uL    NRBC (Absolute) 0.00 K/uL   Comp Metabolic Panel (CMP)    Collection Time: 10/14/18  4:39 AM   Result Value Ref Range    Sodium 139 135 - 145 mmol/L    Potassium 3.8 3.6 - 5.5 mmol/L    Chloride 109 96 - 112 mmol/L    Co2 20 20 - 33 mmol/L    Anion Gap 10.0 0.0 - 11.9    Glucose 90 65 - 99 mg/dL    Bun 19 8 - 22 mg/dL    Creatinine 0.77 0.50 - 1.40 mg/dL    Calcium 8.3 (L) 8.5 - 10.5 mg/dL    AST(SGOT) 34 12 - 45 U/L    ALT(SGPT) 23 2 - 50 U/L    Alkaline Phosphatase 58 30 - 99 U/L    Total Bilirubin 0.5 0.1 - 1.5 mg/dL    Albumin 2.9 (L) 3.2 - 4.9 g/dL    Total Protein 5.6 (L) 6.0 - 8.2 g/dL    Globulin 2.7 1.9 - 3.5 g/dL    A-G Ratio 1.1 g/dL   Magnesium    Collection Time: 10/14/18  4:39 AM   Result Value Ref Range    Magnesium 1.8 1.5 - 2.5 mg/dL   TROPONIN    Collection Time: 10/14/18  4:39 AM   Result Value Ref Range    Troponin I 1.07 (H) 0.00 - 0.04 ng/mL   PHOSPHORUS    Collection Time: 10/14/18  4:39 AM    Result Value Ref Range    Phosphorus 1.2 (L) 2.5 - 4.5 mg/dL   ESTIMATED GFR    Collection Time: 10/14/18  4:39 AM   Result Value Ref Range    GFR If African American >60 >60 mL/min/1.73 m 2    GFR If Non African American >60 >60 mL/min/1.73 m 2   ISTAT ARTERIAL BLOOD GAS    Collection Time: 10/14/18  4:54 AM   Result Value Ref Range    Ph 7.511 (H) 7.400 - 7.500    Pco2 26.0 26.0 - 37.0 mmHg    Po2 117 (H) 64 - 87 mmHg    Tco2 22 20 - 33 mmol/L    S02 99 93 - 99 %    Hco3 20.8 17.0 - 25.0 mmol/L    BE -1 -4 - 3 mmol/L    Body Temp 99.0 F degrees    O2 Therapy 40 %    iPF Ratio 293     Ph Temp Tea 7.508 (H) 7.400 - 7.500    Pco2 Temp Co 26.3 26.0 - 37.0 mmHg    Po2 Temp Cor 118 (H) 64 - 87 mmHg    Specimen Arterial     Action Range Triggered NO     Inst. Qualified Patient YES    AMMONIA    Collection Time: 10/14/18 11:05 AM   Result Value Ref Range    Ammonia 40 11 - 45 umol/L   VITAMIN B12    Collection Time: 10/14/18 11:05 AM   Result Value Ref Range    Vitamin B12 -True Cobalamin 960 (H) 211 - 911 pg/mL   BLOOD AND BODY FLUID EXPOSURE (EXPOSED- SOURCE PATIENT POS OR UNKNOWN)    Collection Time: 10/14/18 11:05 AM   Result Value Ref Range    WBC 14.0 (H) 4.8 - 10.8 K/uL    RBC 4.06 (L) 4.20 - 5.40 M/uL    Hemoglobin 11.1 (L) 12.0 - 16.0 g/dL    Hematocrit 34.5 (L) 37.0 - 47.0 %    MCV 85.0 81.4 - 97.8 fL    MCH 27.3 27.0 - 33.0 pg    MCHC 32.2 (L) 33.6 - 35.0 g/dL    RDW 45.1 35.9 - 50.0 fL    Platelet Count 254 164 - 446 K/uL    MPV 10.5 9.0 - 12.9 fL    Hep B Surface Antibody Quant 55.11 (H) 0.00 - 10.00 mIU/mL    Hepatitis B Surface Antigen Negative Negative    Hepatitis C Antibody Negative Negative    Hepatitis B Ccore Ab, Total Negative Negative    HIV Ag/Ab Combo Assay Non Reactive Non Reactive   COMP METABOLIC PANEL    Collection Time: 10/14/18 11:05 AM   Result Value Ref Range    Sodium 140 135 - 145 mmol/L    Potassium 4.1 3.6 - 5.5 mmol/L    Chloride 108 96 - 112 mmol/L    Co2 22 20 - 33 mmol/L     Anion Gap 10.0 0.0 - 11.9    Glucose 87 65 - 99 mg/dL    Bun 19 8 - 22 mg/dL    Creatinine 0.78 0.50 - 1.40 mg/dL    Calcium 8.3 (L) 8.5 - 10.5 mg/dL    AST(SGOT) 35 12 - 45 U/L    ALT(SGPT) 24 2 - 50 U/L    Alkaline Phosphatase 65 30 - 99 U/L    Total Bilirubin 0.5 0.1 - 1.5 mg/dL    Albumin 2.9 (L) 3.2 - 4.9 g/dL    Total Protein 5.8 (L) 6.0 - 8.2 g/dL    Globulin 2.9 1.9 - 3.5 g/dL    A-G Ratio 1.0 g/dL   ESTIMATED GFR    Collection Time: 10/14/18 11:05 AM   Result Value Ref Range    GFR If African American >60 >60 mL/min/1.73 m 2    GFR If Non African American >60 >60 mL/min/1.73 m 2   ISTAT ARTERIAL BLOOD GAS    Collection Time: 10/15/18  4:01 AM   Result Value Ref Range    Ph 7.404 7.400 - 7.500    Pco2 32.9 26.0 - 37.0 mmHg    Po2 97 (H) 64 - 87 mmHg    Tco2 22 20 - 33 mmol/L    S02 98 93 - 99 %    Hco3 20.6 17.0 - 25.0 mmol/L    BE -4 -4 - 3 mmol/L    Body Temp 36.7 C degrees    O2 Therapy 30 %    iPF Ratio 323     Ph Temp Tea 7.409 7.400 - 7.500    Pco2 Temp Co 32.5 26.0 - 37.0 mmHg    Po2 Temp Cor 95 (H) 64 - 87 mmHg    Specimen Arterial     Action Range Triggered NO     Inst. Qualified Patient YES    CBC with Differential    Collection Time: 10/15/18  5:07 AM   Result Value Ref Range    WBC 11.7 (H) 4.8 - 10.8 K/uL    RBC 3.52 (L) 4.20 - 5.40 M/uL    Hemoglobin 9.6 (L) 12.0 - 16.0 g/dL    Hematocrit 30.1 (L) 37.0 - 47.0 %    MCV 85.5 81.4 - 97.8 fL    MCH 27.3 27.0 - 33.0 pg    MCHC 31.9 (L) 33.6 - 35.0 g/dL    RDW 45.3 35.9 - 50.0 fL    Platelet Count 276 164 - 446 K/uL    MPV 10.5 9.0 - 12.9 fL    Neutrophils-Polys 87.70 (H) 44.00 - 72.00 %    Lymphocytes 4.30 (L) 22.00 - 41.00 %    Monocytes 7.00 0.00 - 13.40 %    Eosinophils 0.20 0.00 - 6.90 %    Basophils 0.20 0.00 - 1.80 %    Immature Granulocytes 0.60 0.00 - 0.90 %    Nucleated RBC 0.00 /100 WBC    Neutrophils (Absolute) 10.30 (H) 2.00 - 7.15 K/uL    Lymphs (Absolute) 0.51 (L) 1.00 - 4.80 K/uL    Monos (Absolute) 0.82 0.00 - 0.85 K/uL    Eos  (Absolute) 0.02 0.00 - 0.51 K/uL    Baso (Absolute) 0.02 0.00 - 0.12 K/uL    Immature Granulocytes (abs) 0.07 0.00 - 0.11 K/uL    NRBC (Absolute) 0.00 K/uL       Imaging  X-Ray:  I have personally reviewed the images and compared with prior images. and My impression is: Minimal bibasilar opacities    Assessment/Plan  * Acute respiratory failure with hypoxia (HCC)- (present on admission)   Assessment & Plan    Intubated 10/13 for airway protection  Continue vent support  SBT as tolerated  All appropriate ventilator bundles have been initiated        Seizure (HCC)- (present on admission)   Assessment & Plan    CT with small bilateral hygromas without mass-effect  Increase Keppra, 1000 mg every 12 hours        Acute systolic heart failure (HCC)   Assessment & Plan    EF 40%  Stop IV fluids  Force diuresis with furosemide        CKD (chronic kidney disease) stage 3, GFR 30-59 ml/min ([facility])- (present on admission)   Assessment & Plan    Monitor renal function  Avoid nephrotoxins        DNR (do not resuscitate)   Assessment & Plan    DNR status        Dementia- (present on admission)   Assessment & Plan    History of mild dementia        Elevated troponin- (present on admission)   Assessment & Plan    Due to demand ischemia        Anemia of chronic disease- (present on admission)   Assessment & Plan    Follow hemoglobin             VTE:  Lovenox  Ulcer: H2 Antagonist  Lines: Rosa Catheter  Ongoing indication addressed    I have performed a physical exam and reviewed and updated ROS and Plan today (10/15/2018). In review of yesterday's note (10/14/2018), there are no changes except as documented above.     I have assessed and reassessed her respiratory status with ventilator adjustments and spontaneous breathing trials, blood pressure, hemodynamics, cardiovascular status and neurologic status with titration of sedation.  She is at increased risk for worsening respiratory, cardiovascular and CNS system  dysfunction.    Discussed patient condition and risk of morbidity and/or mortality with Hospitalist, RN, RT, Pharmacy, Charge nurse / hot rounds and QA team  The patient remains critically ill.  Critical care time = 89 minutes in directly providing and coordinating critical care and extensive data review.  No time overlap and excludes procedures.    Trever Hilario MD  Pulmonary and Critical Care Medicine

## 2018-10-15 NOTE — PROCEDURES
VIDEO ELECTROENCEPHALOGRAM / EPILEPSY MONITORING UNIT REPORT        Referring provider: Dr. Martinez.      DOS:   10/15/2018 - 10/16/2018 (total recording for 23 hours and 33 minutes).      INDICATION:  Rene Becerril 89 y.o. female presenting with seizures.      CURRENT ANTIEPILEPTIC REGIMEN: Propofol infusion, Levetiracetam increased to 1000 mg q 8 hrs.      TECHNIQUE: A 30-channel, 24 hrs video electroencephalogram (VEEG) was performed in accordance with the international 10-20 system. This digital study was reviewed in bipolar and referential montages. The recording examined a sedated and/or encephalopathic patient.      DESCRIPTION OF THE RECORD:  During the awake state, background shows symmetrical 5-6 Hz theta activity posteriorly with amplitude of 70 mV.  During the sleep and sedated state, background shows diffuse high-amplitude 4-5 Hz delta activity.  Symmetrical high-amplitude sleep spindles and vertex sharp activities were seen in the central leads.     ACTIVATION PROCEDURES:   Not performed.      ICTAL AND/OR INTERICTAL FINDINGS:   Frequent frontal sharps (more so on the right) and triphasic waves. Sharps become intermittently periodic and also rhythmic with suggestion of recurrent brief electrographic seizures. One seizure at least had head jerking and mouth movements.       EKG: sampling review of EKG recording demonstrated sinus rhythm.         INTERPRETATION:   This is an abnormal 24 hours video electroencephalogram recording in the awake, drowsy and sleep state.  A moderate encephalopathy is suggested. Frequent frontal sharps (more so on the right frontal) and triphasic waves, becoming often periodic and rhythmic. Recurrent seizures were captured during the study, most were electrographic (non convulsive) but one was associated with head jerking and mouth movements. The findings suggest large areas of underlying cortical irritability and possibly structural abnormality. Clinical and radiological  correlation is recommended.     Updates provided to Dr. Messi Velazquez MD   Epilepsy and Neurodiagnostics.   Clinical  of Neurology [facility].   Diplomate in Neurology, Epilepsy, and Electrodiagnostic Medicine.   Office: [phone]  Fax: [phone]

## 2018-10-15 NOTE — THERAPY
OT orders received. Per RN, pt currently sedated and not appropriate for tx. Will hold OT eval until appropriate and able.     Viola De La Cruz, OTR/L  Pager: [phone]

## 2018-10-15 NOTE — PROGRESS NOTES
[facility] Hospitalist Progress Note    Date of Service: 10/15/2018    Chief Complaint  89 y.o. female admitted 10/13/2018 with seizures    Interval Problem Update  Ms. Becerirl has a history of apparent subdural hematomas that had a seizure at [facility] thus was transferred to [facility] for evaluation. She had been given 5 mg IV versed. She required intubation due to airway protection. A CT of the head revealed bilateral subdural hygromas without hemorrhage.  She is on propofol at 20 this morning. Her granddaughter is at bedside and we discussed her grandmother's condition at length. She is awake and following. Continuous EEG monitor is ongoing.   Consultants/Specialty  Critical Care. I discussed her condition with Dr. Hilario on ICU Hot Rounds.   Neurology. I discussed with Dr. Vivar this morning.   Disposition  ICU        Review of Systems   Unable to perform ROS: Intubated      Physical Exam  Laboratory/Imaging   Hemodynamics  Temp (24hrs), Av.6 °C (97.8 °F), Min:36.4 °C (97.6 °F), Max:36.7 °C (98 °F)   Temperature: 36.7 °C (98 °F), Monitored Temp: 36.6 °C (97.9 °F)  Pulse  Av.1  Min: 30  Max: 120 Heart Rate (Monitored): 100  NIBP: (!) 88/54      Respiratory  Rider Vent Mode: APVCMV, Rate (breaths/min): 18, PEEP/CPAP: 8, PEEP/CPAP: 8, FiO2: 30, P Peak (PIP): 17, P MEAN: 9   Respiration: 18, Pulse Oximetry: 95 %     Work Of Breathing / Effort: Vented  RUL Breath Sounds: Clear, RML Breath Sounds: Diminished, RLL Breath Sounds: Diminished, BLANCHE Breath Sounds: Clear, LLL Breath Sounds: Diminished    Fluids    Intake/Output Summary (Last 24 hours) at 10/15/18 0702  Last data filed at 10/15/18 0600   Gross per 24 hour   Intake          2616.36 ml   Output              540 ml   Net          2076.36 ml       Nutrition  Orders Placed This Encounter   Procedures   • Diet NPO     Standing Status:   Standing     Number of Occurrences:   1     Order Specific Question:   Type:     Answer:   Now [1]     Order Specific  Question:   Restrict to:     Answer:   Strict [1]     Physical Exam   Constitutional: No distress.   HENT:   EEG scalp electrodes are on.  cortrak nares   Neck: Normal range of motion.   Cardiovascular: Normal rate and regular rhythm.    Pulmonary/Chest:   Vent, good air movement.  No rhonchi   Abdominal: Soft. She exhibits no distension. There is no tenderness.   Genitourinary:   Genitourinary Comments: Rosa catheter   Musculoskeletal: She exhibits edema. She exhibits no tenderness.   SCDs bilaterally   Neurological:   She opens her eyes and moves both sides with equal strength.   Skin: No rash noted. She is not diaphoretic. There is pallor.   Nursing note and vitals reviewed.      Recent Labs      10/14/18   0439  10/14/18   1105  10/15/18   0507   WBC  9.4  14.0*  11.7*   RBC  3.73*  4.06*  3.52*   HEMOGLOBIN  10.4*  11.1*  9.6*   HEMATOCRIT  31.4*  34.5*  30.1*   MCV  84.2  85.0  85.5   MCH  27.9  27.3  27.3   MCHC  33.1*  32.2*  31.9*   RDW  43.5  45.1  45.3   PLATELETCT  294  254  276   MPV  10.2  10.5  10.5     Recent Labs      10/13/18   1938  10/14/18   0439  10/14/18   1105   SODIUM  139  139  140   POTASSIUM  4.1  3.8  4.1   CHLORIDE  108  109  108   CO2  17*  20  22   GLUCOSE  112*  90  87   BUN  20  19  19   CREATININE  0.87  0.77  0.78   CALCIUM  8.5  8.3*  8.3*     Recent Labs      10/13/18   1302   APTT  29.3   INR  1.15*         Recent Labs      10/13/18   1302  10/13/18   1938   TRIGLYCERIDE  102  96          Assessment/Plan     * Seizure (HCC)- (present on admission)   Assessment & Plan    New onset seizures.  CT head reveals subdural hygromas, could be sequelae of prior subdural hematomas.  She was intubated for airway protection  She has had continuous EEG in the ICU  IV keppra 1,000 mg BID with a gram load x 1  Neurology consulted.  Titrate precedex drip for sedation and escalate if she has seizure activity.         Acute respiratory failure with hypoxia (HCC)- (present on admission)    Assessment & Plan    Requiring intubation for airway protection.  Pulmonology consulted.  Spontaneous breathing trials as tolerated.         Dementia- (present on admission)   Assessment & Plan    Reported hx of        Elevated troponin- (present on admission)   Assessment & Plan    -no clear acute changes on EKG, continue trend, likely stress reaction and demand?        CKD (chronic kidney disease) stage 3, GFR 30-59 ml/min ([facility])- (present on admission)   Assessment & Plan    Cr has normalized.        Anemia of chronic disease- (present on admission)   Assessment & Plan    Hb 10          Quality-Core Measures   Reviewed items::  Labs reviewed, Medications reviewed and Radiology images reviewed  Rosa catheter::  Unconscious / Sedated Patient on a Ventilator  DVT: hold until MRI brain due to subdural hygromas.  DVT prophylaxis - mechanical:  SCDs  Assessed for rehabilitation services:  Patient unable to tolerate rehabilitation therapeutic regimen

## 2018-10-15 NOTE — CARE PLAN
Problem: Safety  Goal: Will remain free from injury  Outcome: PROGRESSING AS EXPECTED  Bed is locked and in lowest position with treaded socks on and call light within reach. Patient educated regarding safety. Bed alarm on. B/L wrist restraints in place with active order. Restraints applied correctly. No signs of injury related to restraints    Problem: Knowledge Deficit  Goal: Knowledge of disease process/condition, treatment plan, diagnostic tests, and medications will improve  Outcome: PROGRESSING SLOWER THAN EXPECTED  Pt and family at bedside educated on POC, current medications and doses and disease process. All questions answered.

## 2018-10-15 NOTE — PROGRESS NOTES
CC- SDH, STATUS EPILEPTICUS    S- FOLLOW COMMANDS NOW.  No other complaints.     O-   Allergies: No Known Allergies      Current Facility-Administered Medications:   •  Pharmacy Consult: Enteral tube feeding - review meds/change route/product selection, , Other, PRN, Paolo Son Jr., D.O.  •  levETIRAcetam (KEPPRA) 1,000 mg in  mL IVPB, 1,000 mg, Intravenous, Q12HRS, Nathaniel Rosenberg M.D.  •  Influenza Vaccine High-Dose pf injection 0.5 mL, 0.5 mL, Intramuscular, Once PRN, Nathaniel Rosenberg M.D.  •  [START ON 10/16/2018] aspirin (ASA) chewable tab 81 mg, 81 mg, Per NG Tube, DAILY, Trever Hilario M.D.  •  [START ON 10/16/2018] famotidine (PEPCID) tablet 20 mg, 20 mg, Feeding Tube, DAILY **OR** [DISCONTINUED] famotidine (PEPCID) injection 20 mg, 20 mg, Intravenous, DAILY, Gomez Holguin M.D.  •  senna-docusate (PERICOLACE or SENOKOT S) 8.6-50 MG per tablet 2 Tab, 2 Tab, Feeding Tube, BID **AND** polyethylene glycol/lytes (MIRALAX) PACKET 1 Packet, 1 Packet, Feeding Tube, QDAY PRN **AND** magnesium hydroxide (MILK OF MAGNESIA) suspension 30 mL, 30 mL, Feeding Tube, QDAY PRN **AND** bisacodyl (DULCOLAX) suppository 10 mg, 10 mg, Rectal, QDAY PRN, Trever Hilario M.D.  •  dexmedetomidine (PRECEDEX) 400 mcg/100mL NS premix infusion, 0.1-1.5 mcg/kg/hr, Intravenous, Continuous, Gomez Holguin M.D., Last Rate: 2.9 mL/hr at 10/15/18 1205, 0.2 mcg/kg/hr at 10/15/18 1205  •  enoxaparin (LOVENOX) inj 40 mg, 40 mg, Subcutaneous, DAILY, Gomez Holguin M.D., 40 mg at 10/15/18 0510  •  NS infusion, , Intravenous, Continuous, Gomez Holguin M.D., Last Rate: 50 mL/hr at 10/15/18 0000  •  ondansetron (ZOFRAN) syringe/vial injection 4 mg, 4 mg, Intravenous, Q4HRS PRN, Edgar Moody M.D.  •  ondansetron (ZOFRAN ODT) dispertab 4 mg, 4 mg, Oral, Q4HRS PRN, Edgar Moody M.D.  •  LORazepam (ATIVAN) injection 4 mg, 4 mg, Intravenous, Q10 MIN PRN, Edgar Moody M.D.  •  Respiratory Care per Protocol, ,  Nebulization, Continuous RT, Gomez Holguin M.D.  •  MD Alert...ICU Electrolyte Replacement per Pharmacy, , Other, pharmacy to dose, Gomez Holguin M.D.  •  fentaNYL (SUBLIMAZE) injection 25 mcg, 25 mcg, Intravenous, Q HOUR PRN, 25 mcg at 10/15/18 1023 **OR** fentaNYL (SUBLIMAZE) injection 50 mcg, 50 mcg, Intravenous, Q HOUR PRN, 50 mcg at 10/13/18 1922 **OR** fentaNYL (SUBLIMAZE) injection 100 mcg, 100 mcg, Intravenous, Q HOUR PRN, Gomez Holguin M.D.  •  ipratropium-albuterol (DUONEB) nebulizer solution, 3 mL, Nebulization, Q2HRS PRN (RT), Gomez Holguin M.D.  •  ipratropium-albuterol (DUONEB) nebulizer solution, 3 mL, Nebulization, Q4HRS (RT), Gomez Holguin M.D., 3 mL at 10/15/18 1142  •  propofol (DIPRIVAN) injection, 0-80 mcg/kg/min, Intravenous, Continuous, Stopped at 10/14/18 1014 **AND** Triglycerides Starting now and then Every 3 Days, , , Every 3 Days (0300), Gomez Holguin M.D.  •  pneumococcal 13-Briana Conj Vacc (PREVNAR 13) syringe 0.5 mL, 0.5 mL, Intramuscular, Once PRN, Gomez Holguin M.D.      PHYSICAL EXAM    Vitals:    10/15/18 1000 10/15/18 1100 10/15/18 1132 10/15/18 1200   Pulse: 97 93  95   Resp: 15 13  17   Temp:       SpO2: 92% 96% 97% 97%   Weight:       Height:           Head/Neck: NCAT. no meningismus neg kernig neg brudzinski. No obvious mass or heard bruit. No tender arteries or lost pulses. No rash of head or neck.    Skin: Warm, dry, intact. No rashes observed head/neck or body    Eyes/Funduscopic:  unable    Mental Status: Awake, alert, PER RN FOLLOW COMMANDS OFF SEDATION.    Cranial Nerves:  CN II-XII intact. PERRL 3MM.   No afferent pupillary defect. EOM full. VF full. No nystagmus.       Motor:  bulk & tone wnl.  intact, no abn mvmts.       Sensory: symmetric to pain    Coordination: dysmetria absent    DTR's: intact/sym. no clonus. Toes mute.    Gait/Station: n/a fall concern      Labs:  Recent Labs      10/14/18   0439  10/14/18   1105  10/15/18   0507   WBC  9.4   14.0*  11.7*   RBC  3.73*  4.06*  3.52*   HEMOGLOBIN  10.4*  11.1*  9.6*   HEMATOCRIT  31.4*  34.5*  30.1*   MCV  84.2  85.0  85.5   MCH  27.9  27.3  27.3   MCHC  33.1*  32.2*  31.9*   RDW  43.5  45.1  45.3   PLATELETCT  294  254  276   MPV  10.2  10.5  10.5     Recent Labs      10/14/18   0439  10/14/18   1105  10/15/18   0507   SODIUM  139  140  137   POTASSIUM  3.8  4.1  3.7   CHLORIDE  109  108  108   CO2  20  22  17*   GLUCOSE  90  87  96   BUN  19  19  16   CREATININE  0.77  0.78  0.89   CALCIUM  8.3*  8.3*  7.6*     Recent Labs      10/13/18   1302   APTT  29.3   INR  1.15*         Recent Labs      10/13/18   1938  10/14/18   0040  10/14/18   0439   TROPONINI  1.40*  1.22*  1.07*     Recent Labs      10/13/18   1302  10/13/18   1938  10/15/18   0507   TRIGLYCERIDE  102  96  75     Recent Labs      10/14/18   0439  10/14/18   1105  10/15/18   0507   SODIUM  139  140  137   POTASSIUM  3.8  4.1  3.7   CHLORIDE  109  108  108   CO2  20  22  17*   GLUCOSE  90  87  96   BUN  19  19  16     Recent Labs      10/14/18   0040  10/14/18   0439  10/14/18   1105  10/15/18   0507   SODIUM   --   139  140  137   POTASSIUM   --   3.8  4.1  3.7   CHLORIDE   --   109  108  108   CO2   --   20  22  17*   BUN   --   19  19  16   CREATININE   --   0.77  0.78  0.89   MAGNESIUM   --   1.8   --   2.0   PHOSPHORUS  1.3*  1.2*   --   2.4*   CALCIUM   --   8.3*  8.3*  7.6*     Recent Labs      10/13/18   1302   APTT  29.3   INR  1.15*     No results found for this or any previous visit.           Imaging: neuroimaging reviewed and directly visualized by me  EC-ECHOCARDIOGRAM COMPLETE W/ CONT   Final Result      DX-CHEST-PORTABLE (1 VIEW)   Final Result         1.  Bilateral dependent pulmonary infiltrates, similar to prior.      IR-US GUIDED PIV   Final Result    Ultrasound-guided PERIPHERAL IV INSERTION performed by    qualified nursing staff as above.            DX-ABDOMEN FOR TUBE PLACEMENT   Final Result      Feeding tube placement  with the tip projecting over the proximal stomach      DX-CHEST-PORTABLE (1 VIEW)   Final Result         1.  Bilateral dependent pulmonary infiltrates.      [id number] VIEW   Final Result      Above average stool volume      CT-HEAD W/O   Final Result         1.  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      2.  No parenchymal hemorrhage or brain swelling or edema is noted.      Findings are consistent with atrophy.  Decreased attenuation in the periventricular white matter likely indicates microvascular ischemic disease.      DX-CHEST-PORTABLE (1 VIEW)   Final Result         1.  No new infiltrates or consolidations identified.      2.  Endotracheal tube and NG tube now noted in place.      MR-BRAIN-W/O    (Results Pending)   EC-ECHOCARDIOGRAM COMPLETE W/O CONT    (Results Pending)       Assessment/Plan:    STATUS EPILEPTICUS, RESOLVED. EEG ABN, SHARPS NO SZS  SDH BILAT CHRONIC, SZ RISK   AFIB    INC TO LEV 1G Q12  R/O TOXIC/METABOLIC SZ RISKS, RECEIVED ZOSYN POSSIBLE TRIGGER  NSG RE SDH  NO ANTITHROMBOTIC UNTIL NSG CLEARS FOR SAME WITH SDH   MRI BRAIN      Juan Manuel Vivar M.D.  , Neurohospitalist & Stroke  Clinical Professor, [employer]  Diplomate, Neurology & Neuroimaging

## 2018-10-16 PROBLEM — E83.39 HYPOPHOSPHATEMIA: Status: ACTIVE | Noted: 2018-01-01

## 2018-10-16 NOTE — PROGRESS NOTES
[facility] Hospitalist Progress Note    Date of Service: 10/16/2018    Chief Complaint  89 y.o. female admitted 10/13/2018 with seizures  Ms. Becerril has a history of apparent subdural hematomas that had a seizure at [facility] thus was transferred to [facility] for evaluation.  She required intubation due to airway protection. A CT of the head revealed bilateral subdural hygromas without hemorrhage.    Interval Problem Update      On continuous EEG was seizures   Follows   Off sedation  SR-ST   SBP   Generalized edema  TF at goal  Tmax 100.2  +5.3 L since admit  Keppra increased this am  VD#4   EF 40%  MRI pending            Consultants/Specialty  Critical Care.  Neurology      Disposition  ICU        Review of Systems   Unable to perform ROS: Intubated      Physical Exam  Laboratory/Imaging   Hemodynamics  No data recorded.   Monitored Temp: (P) 38 °C (100.4 °F)  Pulse  Av.1  Min: 30  Max: 120 Heart Rate (Monitored): (!) (P) 109  NIBP: (P) 107/74      Respiratory  Rider Vent Mode: APVCMV, Rate (breaths/min): 12, PEEP/CPAP: 8, PEEP/CPAP: 8, FiO2: 30, P Peak (PIP): 18, P MEAN: 11   Respiration: (!) (P) 32, Pulse Oximetry: (P) 97 %     Work Of Breathing / Effort: (P) Vented  RUL Breath Sounds: (P) Coarse Crackles, RML Breath Sounds: (P) Coarse Crackles;Diminished, RLL Breath Sounds: (P) Diminished, BLANCHE Breath Sounds: (P) Coarse Crackles, LLL Breath Sounds: (P) Coarse Crackles;Diminished    Fluids    Intake/Output Summary (Last 24 hours) at 10/16/18 0920  Last data filed at 10/16/18 0800   Gross per 24 hour   Intake          3290.87 ml   Output             1635 ml   Net          1655.87 ml       Nutrition  Orders Placed This Encounter   Procedures   • Diet NPO     Standing Status:   Standing     Number of Occurrences:   1     Order Specific Question:   Type:     Answer:   Now [1]     Order Specific Question:   Restrict to:     Answer:   Strict [1]     Physical Exam   Constitutional: She appears  well-developed and well-nourished.   HENT:   Head: Normocephalic and atraumatic.   Right Ear: External ear normal.   Left Ear: External ear normal.   Mouth/Throat: No oropharyngeal exudate.   Eyes: Conjunctivae are normal. Right eye exhibits no discharge. Left eye exhibits no discharge. No scleral icterus.   Neck: Neck supple. No JVD present. No tracheal deviation present.   Cardiovascular: Regular rhythm.  Exam reveals no gallop and no friction rub.    No murmur heard.  Tachycardia   Pulmonary/Chest: Effort normal and breath sounds normal. No stridor. No respiratory distress. She has no wheezes. She has no rales. She exhibits no tenderness.   Intubated   Abdominal: Soft. Bowel sounds are normal. She exhibits no distension. There is no tenderness. There is no rebound.   Musculoskeletal: She exhibits edema. She exhibits no tenderness.   Neurological: No cranial nerve deficit. She exhibits normal muscle tone.   Follows commands in all extremities   Skin: Skin is warm and dry. She is not diaphoretic. No cyanosis. Nails show no clubbing.   Psychiatric: She has a normal mood and affect.   Nursing note and vitals reviewed.      Recent Labs      10/14/18   1105  10/15/18   0507  10/16/18   0430   WBC  14.0*  11.7*  12.3*   RBC  4.06*  3.52*  3.57*   HEMOGLOBIN  11.1*  9.6*  9.8*   HEMATOCRIT  34.5*  30.1*  30.7*   MCV  85.0  85.5  86.0   MCH  27.3  27.3  27.5   MCHC  32.2*  31.9*  31.9*   RDW  45.1  45.3  45.6   PLATELETCT  254  276  266   MPV  10.5  10.5  10.6     Recent Labs      10/14/18   1105  10/15/18   0507  10/16/18   0430   SODIUM  140  137  141   POTASSIUM  4.1  3.7  3.9   CHLORIDE  108  108  109   CO2  22  17*  21   GLUCOSE  87  96  141*   BUN  19  16  18   CREATININE  0.78  0.89  0.78   CALCIUM  8.3*  7.6*  7.8*     Recent Labs      10/13/18   1302   APTT  29.3   INR  1.15*         Recent Labs      10/13/18   1302  10/13/18   1938  10/15/18   0507   TRIGLYCERIDE  102  96  75          Assessment/Plan     * Acute  respiratory failure with hypoxia (HCC)- (present on admission)   Assessment & Plan    Secondary to seizures  Vent management per critical care discussed with Dr. Hilario        Seizure ([facility])- (present on admission)   Assessment & Plan    New onset seizures.  CT head reveals subdural hygromas, could be sequelae of prior subdural hematomas.  She was intubated for airway protection  On continuous EEG  Neurology following  Keppra dose increased and valproate added  Follow-up on MRI brain        CKD (chronic kidney disease) stage 3, GFR 30-59 ml/min ([facility])- (present on admission)   Assessment & Plan     stable        Hypophosphatemia   Assessment & Plan    Replete and monitor         Dementia- (present on admission)   Assessment & Plan    Reported hx of        Elevated troponin- (present on admission)   Assessment & Plan    Likely demand ischemia in setting of recurrent seizures  EF 40%  She will need further workup when more clinically stable        Anemia of chronic disease- (present on admission)   Assessment & Plan     hemoglobin 9.8 stable            Overall prognosis guarded  Discussed with patient's grandson at bedside, nursing staff, pharmacist and critical care  Quality-Core Measures   Reviewed items::  Labs reviewed, Medications reviewed and Radiology images reviewed  Rosa catheter::  Unconscious / Sedated Patient on a Ventilator  DVT prophylaxis pharmacological::  Enoxaparin (Lovenox)  DVT prophylaxis - mechanical:  SCDs  Assessed for rehabilitation services:  Patient unable to tolerate rehabilitation therapeutic regimen

## 2018-10-16 NOTE — HEART FAILURE PROGRAM
Cardiovascular Nurse Navigator () Advanced Heart Failure Program Inpatient Progress Note:     Chief Complaint: seizures    Please note that patient was diagnosed with HF by Intensivist, Hospitalist not including in notes. Echo on 10/15 shows EF of 40%. Patient was noted to be in afib upon admission, EKG on 10/14 shows sinus. No monitor summaries appreciated in notes but heart rates have been 100-109 overnight.    Patient has no prior encounters with [facility] cardiology inpatient or out and it would appear that heart failure is a new diagnosis for her. Please consider a cardiology consult for this new diagnosis.    Thank you and please call with questions, Tamanna

## 2018-10-16 NOTE — PROCEDURES
VIDEO ELECTROENCEPHALOGRAM / EPILEPSY MONITORING UNIT REPORT        Referring provider: Dr. Martinez.      DOS:   10/16/2018 - 10/17/2018 (total recording for 23 hours and 22 minutes).      INDICATION:  Rene Becerril 89 y.o. female presenting with seizures.      CURRENT ANTIEPILEPTIC REGIMEN: Propofol infusion, increased Levetiracetam to 1250 mg q 8 hrs, Valproic Acid added at 500 mg q 8 hrs.      TECHNIQUE: A 30-channel, 24 hrs video electroencephalogram (VEEG) was performed in accordance with the international 10-20 system. This digital study was reviewed in bipolar and referential montages. The recording examined a sedated and/or encephalopathic patient.      DESCRIPTION OF THE RECORD:  During the awake state, background shows symmetrical 5-6 Hz theta activity posteriorly with amplitude of 70 mV.  During the sleep and sedated state, background shows diffuse high-amplitude 4-5 Hz delta activity.  Symmetrical high-amplitude sleep spindles and vertex sharp activities were seen in the central leads.     ACTIVATION PROCEDURES:   Not performed.      ICTAL AND/OR INTERICTAL FINDINGS:   Frequent frontal sharps (more so on the right) and triphasic waves. Sharps become intermittently periodic (right PLEDS) and also rhythmic with suggestion of recurrent clinical and electrographic seizures. Some of the seizures associated with subtle head jerking and mouth movements.       EKG: sampling review of EKG recording demonstrated sinus rhythm.         INTERPRETATION:   This is an abnormal 24 hours video electroencephalogram recording in the awake, drowsy and sleep state.  A moderate encephalopathy is suggested. Frequent frontal sharps (more so on the right frontal) and triphasic waves, becoming often periodic (right PLEDS) and rhythmic. Recurrent seizures were captured during the study, most were electrographic (non convulsive) but several were associated with subtle head jerking and mouth movements. The findings suggest large areas  of underlying cortical irritability and possibly structural abnormality. It appears seizures arising from the right hemisphere. Despite further adjustments in antiepileptics, the study has worsen when compared to prior recording. Clinical and radiological correlation is recommended.     Updates provided to Dr. Messi Velazquez MD   Epilepsy and Neurodiagnostics.   Clinical  of Neurology [facility] [place] of Medicine.   Diplomate in Neurology, Epilepsy, and Electrodiagnostic Medicine.   Office: [phone]  Fax: [phone]

## 2018-10-16 NOTE — WOUND TEAM
[facility]  Inpatient Services  Initial Wound and Skin Care Evaluation    Admission Date:  10/13/2018   HPI, PMH, SH: Reviewed  Unit where seen by Wound Team: [id number]    WOUND CONSULT RELATED TO: sacrum, feet    SUBJECTIVE:  intubated     Self Report / Pain Level: no s/s of distress      OBJECTIVE: on LATRICE bed, heels floated off pillows, mepilex drsg in place  WOUND TYPE, LOCATION, CHARACTERISTICS (Pressure ulcers: location, stage, POA or date identified)  POA DTI sacrococcygeal area pressure injury  Pressure Injury Stage DTPI   Site Assessment Red;Light purple   Bria-wound Assessment Hyperpigmented   Margins Defined edges   Wound Length (cm) 8 cm   Wound Width (cm) 8 cm   Wound Surface Area (cm^2) 64 cm^2   Drainage Amount None   Cleansing Normal Saline Irrigation   Periwound Protectant Not Applicable   Dressing Options Mepilex   Dressing Cleansing/Solutions Not Applicable   Dressing Status Clean;Dry;Intact   Dressing Change Frequency Every 72 hrs   NEXT Dressing Change  10/16/18   NEXT Weekly Photo (Inpatient Only) 10/20/18   Odor None    Pulses Thready;Not palpable    Exposed Structures None    Tissue Type and Percentage 100% intact red purple   Signs and Symptoms of Infection: None     Vascular:  Wounds not r/t vascular problem    Lab Values:    WBC:       WBC   Date/Time Value Ref Range Status   10/15/2018 05:07 AM 11.7 (H) 4.8 - 10.8 K/uL Final     AIC:      Lab Results   Component Value Date/Time    HBA1C 5.7 (H) 07/14/2018 02:51 PM          Culture:  na    INTERVENTIONS BY WOUND TEAM: pt turned to R side, peeled back mepilex, viewed and palpated skin, reapplied same drsg. Positioned R side lying.   Dressing Options: Mepilex    Interdisciplinary consultation:   With Nursing;With Patient     EVALUATION: Pt has POA DTI. She also has multiple callus to medial both feet  R: 2 wnds 2 x 2 cm, L: 1.5 x 2 cm.  L plantar heel with circular callus 3 x 2.5 cm.  L 5th toe with 0.4 x 0.4 cm dried blood  blister. Her L medial shin has a circular discoloration 4 x 5.5 cm. Both feet are hyperemic, maynor with very slow cap refill. Heels are soft but blanching.     Factors affecting wound healing: decreased mobility, age, anemia, dementia, CKD  Goals:  Maintain intact skin until DTI reveals      NURSING PLAN OF CARE ORDERS (X):    Dressing changes: See Dressing Care orders:     Skin care: See Skin Care orders: x  Rectal tube care: See Rectal Tube Care orders:   Other orders:    RSKIN: CURRENT (X) ORDERED (O)  Q shift Mendoza:  X  Q shift pressure point assessments:  X  Pressure redistribution mattress        Waffle overlay  LATRICE  x    Bariatric LATRICE      Bariatric foam        Heel float boots       Heels floated off pillows    x  Barrier wipes  x    Barrier Cream      Barrier paste      Sacral silicone dressing   x   Silicone O2 tubing      Anchorfast    x  Trach with Optifoam split foam       Waffle cushion      Rectal tube or BMS      Antifungal tx    Turn q 2 hours   x  Up to chair     Ambulate   PT/OT     Dietician  x    PO     TF o  TPN   NPO 2  # days   Other         WOUND TEAM PLAN OF CARE (X):   NPWT change 3 x week:        Dressing changes by wound team:       Follow up as needed:  X if wound worsens     Other (explain):    Anticipated discharge plans (X):  SNF:           Home Care:           Outpatient Wound Center:            Self Care:            Other:  tbd

## 2018-10-16 NOTE — DOCUMENTATION QUERY
DOCUMENTATION QUERY    PROVIDERS: Please select “Cosign w/ note” to reply to query.    To better represent the severity of illness of your patient, please review the following information and exercise your independent professional judgment in responding to this query.     Deep Tissue Injury of sacrococcygeal area, POA is documented in the Wound Care RN note. Based upon the clinical findings, risk factors, and treatment, can you specify whether you agree with this assessment?    • Agree with Wound Care RN assessment  • Disagree with Wound Care RN assessment (document your clinical findings)  • Other explanation of clinical findings  • Unable to determine    The medical record reflects the following:   Clinical Findings 10/15 Wound Care RN assessment:  POA DTI sacrococcygeal area pressure injury  Site: red; light purple  Periwound: hyperpigmented  Length: 8 cm  Width: 8 cm  Surface area: 62cm^2   Treatment Wound Team consult  Wound Team interventions: cleansed with NS and mepilex applied  Turn every 2 hours   Risk Factors Dementia  Advanced age  Seizures  Mechanical ventilation  CKD  Anemia    Location within medical record Progress Notes     Thank you,   Sita Doll, RN, BSN  Clinical   [phone]

## 2018-10-16 NOTE — PROGRESS NOTES
Pt had two seizures per alarm on EEG.    One was gross upper body jerking that lasted about 15 seconds, unable to treat with ativan as it was complete upon return.    The second seizure had zero body jerking.     Patient able to follow commands after seizures.    Neurology on call paged at [phone]. Waiting for response    0014: repaged neuro service since no response / page back. Currently waiting for MD response    0035 repaged neuro service.    0040 received call back from do stovall. 500mg bolus of keppra ordered and keppra increased to 1000mg q 8 hours ordered

## 2018-10-16 NOTE — PROGRESS NOTES
CC- F/U FOR SDH, EPILEPSY/SEIZURES    S- SZ THIS AM PER RN.  No other complaints.     O-   Allergies: No Known Allergies      Current Facility-Administered Medications:   •  phosphorus (K-PHOS-NEUTRAL, PHOSPHA 250 NEUTRAL) per tablet 2 Tab, 2 Tab, Oral, TID, Trever Hilario M.D., 2 Tab at 10/16/18 0816  •  levETIRAcetam (KEPPRA) 1,250 mg in  mL IVPB, 1,250 mg, Intravenous, Q8HR, Juan Manuel Vivar M.D.  •  valproate (DEPACON) 1,000 mg in  mL IVPB, 1,000 mg, Intravenous, Once, Juan Manuel Vivar M.D.  •  valproate (DEPACON) 500 mg in D5W 50 mL IVPB, 500 mg, Intravenous, Q8HRS, Juan Manuel Vivar M.D.  •  Pharmacy Consult: Enteral tube feeding - review meds/change route/product selection, , Other, PRN, Paolo Son Jr., D.O.  •  Influenza Vaccine High-Dose pf injection 0.5 mL, 0.5 mL, Intramuscular, Once PRN, Nathaniel Rosenberg M.D.  •  aspirin (ASA) chewable tab 81 mg, 81 mg, Per NG Tube, DAILY, Trever Hilario M.D., 81 mg at 10/16/18 0605  •  famotidine (PEPCID) tablet 20 mg, 20 mg, Feeding Tube, DAILY, 20 mg at 10/16/18 0605 **OR** [DISCONTINUED] famotidine (PEPCID) injection 20 mg, 20 mg, Intravenous, DAILY, Gomez Holguin M.D.  •  senna-docusate (PERICOLACE or SENOKOT S) 8.6-50 MG per tablet 2 Tab, 2 Tab, Feeding Tube, BID, 2 Tab at 10/16/18 0605 **AND** polyethylene glycol/lytes (MIRALAX) PACKET 1 Packet, 1 Packet, Feeding Tube, QDAY PRN, 1 Packet at 10/16/18 0819 **AND** magnesium hydroxide (MILK OF MAGNESIA) suspension 30 mL, 30 mL, Feeding Tube, QDAY PRN **AND** bisacodyl (DULCOLAX) suppository 10 mg, 10 mg, Rectal, QDAY PRN, Trever Hilario M.D.  •  furosemide (LASIX) injection 20 mg, 20 mg, Intravenous, Q12HRS, Trever Hilario M.D., 20 mg at 10/16/18 0606  •  potassium bicarbonate (KLYTE) effervescent tablet 25 mEq, 25 mEq, Oral, BID, Trever Hilario M.D., 25 mEq at 10/16/18 0606  •  dexmedetomidine (PRECEDEX) 400 mcg/100mL NS premix infusion, 0.1-1.5 mcg/kg/hr,  Intravenous, Continuous, Gomez Holguin M.D., Stopped at 10/15/18 1547  •  enoxaparin (LOVENOX) inj 40 mg, 40 mg, Subcutaneous, DAILY, Gomez Holguin M.D., 40 mg at 10/16/18 0606  •  NS infusion, , Intravenous, Continuous, Trever Hilario M.D., Last Rate: 10 mL/hr at 10/15/18 2227  •  ondansetron (ZOFRAN) syringe/vial injection 4 mg, 4 mg, Intravenous, Q4HRS PRN, Edgar Moody M.D.  •  ondansetron (ZOFRAN ODT) dispertab 4 mg, 4 mg, Oral, Q4HRS PRN, Edgar Moody M.D.  •  LORazepam (ATIVAN) injection 4 mg, 4 mg, Intravenous, Q10 MIN PRN, Edgar Moody M.D.  •  Respiratory Care per Protocol, , Nebulization, Continuous RT, Gomez Holguin M.D.  •  MD Alert...ICU Electrolyte Replacement per Pharmacy, , Other, pharmacy to dose, Gomez Holguin M.D.  •  fentaNYL (SUBLIMAZE) injection 25 mcg, 25 mcg, Intravenous, Q HOUR PRN, 25 mcg at 10/16/18 0812 **OR** fentaNYL (SUBLIMAZE) injection 50 mcg, 50 mcg, Intravenous, Q HOUR PRN, 50 mcg at 10/13/18 1922 **OR** fentaNYL (SUBLIMAZE) injection 100 mcg, 100 mcg, Intravenous, Q HOUR PRN, Gomez Holguin M.D.  •  ipratropium-albuterol (DUONEB) nebulizer solution, 3 mL, Nebulization, Q2HRS PRN (RT), Gomez Holguin M.D.  •  ipratropium-albuterol (DUONEB) nebulizer solution, 3 mL, Nebulization, Q4HRS (RT), Gomez Holguin M.D., 3 mL at 10/16/18 0700  •  propofol (DIPRIVAN) injection, 0-80 mcg/kg/min, Intravenous, Continuous, Stopped at 10/14/18 1014 **AND** Triglycerides Starting now and then Every 3 Days, , , Every 3 Days (0300), Gomez Holguin M.D.  •  pneumococcal 13-Briana Conj Vacc (PREVNAR 13) syringe 0.5 mL, 0.5 mL, Intramuscular, Once PRN, Gomez Holguin M.D.      PHYSICAL EXAM    Vitals:    10/16/18 0600 10/16/18 0700 10/16/18 0701 10/16/18 0800   Pulse: (!) 108 (!) 108  (!) (P) 109   Resp: 12 12  (!) (P) 32   Temp:       SpO2: 97% 97% 97% (P) 97%   Weight:       Height:           Head/Neck: NCAT. no meningismus neg kernig neg brudzinski. No  obvious mass or heard bruit. No tender arteries or lost pulses. No rash of head or neck.    Skin: Warm, dry, intact. No rashes observed head/neck or body    Eyes/Funduscopic: unable    Mental Status: Awake, alert, oriented PERSON AND GRANDSON AT BEDSIDE. command follow intact. No neglect/extinction. Attention and concentration GOOD.     Cranial Nerves:  CN II-XII intact. PERRL 3MM.   No afferent pupillary defect. EOM full. VF full. No nystagmus.       Motor:  bulk & tone wnl.  intact, no abn mvmts.       Sensory: symmetric to sharp     Coordination: dysmetria absent    DTR's: intact/sym. no clonus. Toes mute.    Gait/Station: n/a fall concern      Labs:  Recent Labs      10/14/18   1105  10/15/18   0507  10/16/18   0430   WBC  14.0*  11.7*  12.3*   RBC  4.06*  3.52*  3.57*   HEMOGLOBIN  11.1*  9.6*  9.8*   HEMATOCRIT  34.5*  30.1*  30.7*   MCV  85.0  85.5  86.0   MCH  27.3  27.3  27.5   MCHC  32.2*  31.9*  31.9*   RDW  45.1  45.3  45.6   PLATELETCT  254  276  266   MPV  10.5  10.5  10.6     Recent Labs      10/14/18   1105  10/15/18   0507  10/16/18   0430   SODIUM  140  137  141   POTASSIUM  4.1  3.7  3.9   CHLORIDE  108  108  109   CO2  22  17*  21   GLUCOSE  87  96  141*   BUN  19  16  18   CREATININE  0.78  0.89  0.78   CALCIUM  8.3*  7.6*  7.8*     Recent Labs      10/13/18   1302   APTT  29.3   INR  1.15*         Recent Labs      10/13/18   1938  10/14/18   0040  10/14/18   0439   TROPONINI  1.40*  1.22*  1.07*     Recent Labs      10/13/18   1302  10/13/18   1938  10/15/18   0507   TRIGLYCERIDE  102  96  75     Recent Labs      10/14/18   1105  10/15/18   0507  10/16/18   0430   SODIUM  140  137  141   POTASSIUM  4.1  3.7  3.9   CHLORIDE  108  108  109   CO2  22  17*  21   GLUCOSE  87  96  141*   BUN  19  16  18     Recent Labs      10/14/18   0439  10/14/18   1105  10/15/18   0507  10/16/18   0430   SODIUM  139  140  137  141   POTASSIUM  3.8  4.1  3.7  3.9   CHLORIDE  109  108  108  109   CO2  20  22  17*   21   BUN  19  19  16  18   CREATININE  0.77  0.78  0.89  0.78   MAGNESIUM  1.8   --   2.0  2.0   PHOSPHORUS  1.2*   --   2.4*  1.4*   CALCIUM  8.3*  8.3*  7.6*  7.8*     Recent Labs      10/13/18   1302   APTT  29.3   INR  1.15*     No results found for this or any previous visit.           Imaging: neuroimaging reviewed and directly visualized by me  DX-CHEST-PORTABLE (1 VIEW)   Final Result         1.  Bilateral dependent pulmonary infiltrates, similar to prior.         EC-ECHOCARDIOGRAM COMPLETE W/ CONT   Final Result      DX-CHEST-PORTABLE (1 VIEW)   Final Result         1.  Bilateral dependent pulmonary infiltrates, similar to prior.      IR-US GUIDED PIV   Final Result    Ultrasound-guided PERIPHERAL IV INSERTION performed by    qualified nursing staff as above.            DX-ABDOMEN FOR TUBE PLACEMENT   Final Result      Feeding tube placement with the tip projecting over the proximal stomach      DX-CHEST-PORTABLE (1 VIEW)   Final Result         1.  Bilateral dependent pulmonary infiltrates.      CE-ADDPEGR-2 VIEW   Final Result      Above average stool volume      CT-HEAD W/O   Final Result         1.  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      2.  No parenchymal hemorrhage or brain swelling or edema is noted.      Findings are consistent with atrophy.  Decreased attenuation in the periventricular white matter likely indicates microvascular ischemic disease.      DX-CHEST-PORTABLE (1 VIEW)   Final Result         1.  No new infiltrates or consolidations identified.      2.  Endotracheal tube and NG tube now noted in place.      MR-BRAIN-W/O    (Results Pending)   EC-ECHOCARDIOGRAM COMPLETE W/O CONT    (Results Pending)       Assessment/Plan:    STATUS EPILEPTICUS, EEG ABN SMALL SZS  SDH BILAT CHRONIC, SZ RISK   AFIB     INC TO LEV 1250 Q8 IV FIRST DOSE NOW  VPA 1G IV LOAD BOLUS  NOW, THEN 500 Q8  R/O TOXIC/METABOLIC SZ RISKS, RECEIVED  ZOSYN POSSIBLE TRIGGER  NSG RE SDH  NO ANTITHROMBOTIC UNTIL NSG CLEARS FOR SAME WITH SDH   MRI BRAIN  Repeat UA with CRP^, INFECTION LARSEN COULD NEED LP IF NO SOURCE FOUND; AVOID ABX THAT LOWER SZ THRESHOLD  FOLLOW DEPAKOATE LEVELS IN AM DAILY    Patient is critically ill based on: intubation, titrate of IV medication,  IV bolus    I personally provided approximately 35 minutes of total critical care time outside of time spent on separately billable/documented procedures. Time includes: review of laboratory data, review of radiology studies, discussion with consultants, discussion with family/patient, monitoring for potential decompensation.  Interventions were performed as documented above.        Juan Manuel Vivar M.D.  , Neurohospitalist & Stroke  Clinical Professor, [employer]  Diplomate, Neurology & Neuroimaging

## 2018-10-16 NOTE — CARE PLAN
Problem: Communication  Goal: The ability to communicate needs accurately and effectively will improve  Outcome: PROGRESSING SLOWER THAN EXPECTED  Intubated, family able to translate when present    Problem: Safety  Goal: Will remain free from injury  Outcome: PROGRESSING AS EXPECTED  Restraints in place      Problem: Infection  Goal: Will remain free from infection  Outcome: PROGRESSING AS EXPECTED  Monitoring for s/s of infectin      Problem: Bowel/Gastric:  Goal: Normal bowel function is maintained or improved  Outcome: PROGRESSING AS EXPECTED  Tube feeds    Problem: Fluid Volume:  Goal: Will maintain balanced intake and output  Outcome: PROGRESSING AS EXPECTED      Problem: Pain Management  Goal: Pain level will decrease to patient's comfort goal  Outcome: PROGRESSING AS EXPECTED  Monitoring for s/s of pain    Problem: Safety - Medical Restraint  Goal: Remains free of injury from restraints (Restraint for Interference with Medical Device)  INTERVENTIONS:  1. Determine that other, less restrictive measures have been tried or would not be effective before applying the restraint  2. Evaluate the patient's condition at the time of restraint application  3. Inform patient/family regarding the reason for restraint  4. Q2H: Monitor safety, psychosocial status, comfort, nutrition and hydration     Outcome: PROGRESSING AS EXPECTED      Problem: Skin Integrity  Goal: Risk for impaired skin integrity will decrease  Outcome: PROGRESSING SLOWER THAN EXPECTED  Wound consult/ orders turn q 2

## 2018-10-16 NOTE — CARE PLAN
Problem: Ventilation Defect:  Goal: Ability to achieve and maintain unassisted ventilation or tolerate decreased levels of ventilator support  Outcome: PROGRESSING AS EXPECTED  Adult Ventilation Update    Total Vent Days: 4    Patient Lines/Drains/Airways Status    Active Airway     Name: Placement date: Placement time: Site: Days:    Airway ETT Oral 7.5 10/13/18   1320   Oral   4              In the last 24 hours, the patient tolerated SBT for 3 hrs 30 mins on settings of 10/8.             Plateau Pressure (Q Shift): 14 (10/15/18 1916)  Static Compliance (ml / cm H2O): 37 (10/16/18 0428)      Length of Weaning Trial Length of Weaning Trial (Hours): 3.5 (3 hrs 30 mins) (10/15/18 1916)      Sputum/Suction   Cough: Productive (10/16/18 0400)  Sputum Amount: Small (10/16/18 0400)  Sputum Color: Yellow (10/16/18 0400)  Sputum Consistency: Thick;Thin (10/16/18 0400)    Mobility  Level of Mobility: Level III (10/15/18 2015)  Activity Performed: Unable to mobilize (10/15/18 1916)  Pt Calls for Assistance: No (10/15/18 2000)  Reason Not Mobilized:  (continueous eeg) (10/15/18 2000)    Events/Summary/Plan: pt is on apvcmv 12/330+8/30% getting Duoneb Q4

## 2018-10-16 NOTE — CARE PLAN
Problem: Bowel/Gastric:  Goal: Will not experience complications related to bowel motility    Intervention: Implement Bowel Protocol, if applicable  Bowel protocol advanced today.       Problem: Pain Management  Goal: Pain level will decrease to patient's comfort goal    Intervention: Follow pain managment plan developed in collaboration with patient and Interdisciplinary Team  Pt medicated for pain prn per MAR, CPOT used to assess pain, pt also able to nod yes/no when asked about pain.

## 2018-10-17 PROBLEM — E72.20 HYPERAMMONEMIA (HCC): Status: ACTIVE | Noted: 2018-01-01

## 2018-10-17 PROBLEM — R79.82 CRP ELEVATED: Status: ACTIVE | Noted: 2018-01-01

## 2018-10-17 PROBLEM — E83.42 HYPOMAGNESEMIA: Status: ACTIVE | Noted: 2018-01-01

## 2018-10-17 PROBLEM — I48.0 PAROXYSMAL ATRIAL FIBRILLATION (HCC): Status: ACTIVE | Noted: 2018-01-01

## 2018-10-17 PROBLEM — K59.01 SLOW TRANSIT CONSTIPATION: Status: ACTIVE | Noted: 2018-01-01

## 2018-10-17 NOTE — CARE PLAN
Problem: Ventilation Defect:  Goal: Ability to achieve and maintain unassisted ventilation or tolerate decreased levels of ventilator support    Intervention: Support and monitor invasive and noninvasive mechanical ventilation  Adult Ventilation Update    Total Vent Days: 4  Vent:  x 12, 30% +8    Patient Lines/Drains/Airways Status    Active Airway     Name: Placement date: Placement time: Site: Days:    Airway ETT Oral 7.5 10/13/18   1320   Oral   4              Mobility  Activity Performed: Unable to mobilize (continuous EEG)     Events/Summary/Plan: Patient stable on vent. No changes. No SBT.

## 2018-10-17 NOTE — CARE PLAN
Problem: Nutritional:  Goal: Nutrition support tolerated and meeting greater than 85% of estimated needs  Outcome: MET Date Met: 10/17/18  TF @ goal:  Replete with Fiber running @ goal rate of 50 mL/hr.

## 2018-10-17 NOTE — CARE PLAN
Problem: Infection  Goal: Will remain free from infection  Outcome: PROGRESSING AS EXPECTED  New fever tonight, blood cultures ordered    Problem: Bowel/Gastric:  Goal: Normal bowel function is maintained or improved  Outcome: PROGRESSING AS EXPECTED  Increasing PRN stool softeners    Problem: Fluid Volume:  Goal: Will maintain balanced intake and output  Outcome: PROGRESSING SLOWER THAN EXPECTED  Lasix ordred/scheduled    Problem: Pain Management  Goal: Pain level will decrease to patient's comfort goal  Outcome: PROGRESSING AS EXPECTED  Fentanyl for pain      Problem: Skin Integrity  Goal: Risk for impaired skin integrity will decrease  Outcome: PROGRESSING SLOWER THAN EXPECTED  multpile wounds, wound care consult    Problem: Mobility  Goal: Risk for activity intolerance will decrease  Outcome: PROGRESSING AS EXPECTED  Passive ROM with ECG present

## 2018-10-17 NOTE — PROGRESS NOTES
CC- SDH, STATUS EPILEPTICUS    S- NONE.  No other complaints.     O-   Allergies: No Known Allergies      Current Facility-Administered Medications:   •  valproate (DEPACON) 750 mg in  mL IVPB, 750 mg, Intravenous, Q8HRS, Juan Manuel Vivar M.D., Last Rate: 100 mL/hr at 10/17/18 1342, 750 mg at 10/17/18 1342  •  LORazepam (ATIVAN) injection 1-4 mg, 1-4 mg, Intravenous, Q10 MIN PRN, Trever Hilario M.D., 2 mg at 10/17/18 1218  •  levOCARNitine (CARNITOR) 1 GM/10ML solution 500 mg, 500 mg, Feeding Tube, BID WITH MEALS, Trever Hilario M.D., 500 mg at 10/17/18 1108  •  senna-docusate (PERICOLACE or SENOKOT S) 8.6-50 MG per tablet 2 Tab, 2 Tab, Feeding Tube, BID **AND** polyethylene glycol/lytes (MIRALAX) PACKET 1 Packet, 1 Packet, Feeding Tube, BID **AND** magnesium hydroxide (MILK OF MAGNESIA) suspension 30 mL, 30 mL, Feeding Tube, QDAY PRN, 30 mL at 10/17/18 1352 **AND** bisacodyl (DULCOLAX) suppository 10 mg, 10 mg, Rectal, QDAY PRN, Trever Hilario M.D.  •  phosphorus (K-PHOS-NEUTRAL, PHOSPHA 250 NEUTRAL) per tablet 2 Tab, 2 Tab, Oral, TID, Trever Hilario M.D., 2 Tab at 10/17/18 1054  •  levETIRAcetam (KEPPRA) 1,250 mg in  mL IVPB, 1,250 mg, Intravenous, Q8HR, Juan Manuel Vivar M.D., Stopped at 10/17/18 1109  •  acetaminophen (TYLENOL) tablet 650 mg, 650 mg, Oral, Q6HRS PRN, 650 mg at 10/16/18 2140 **OR** acetaminophen (TYLENOL) suppository 650 mg, 650 mg, Rectal, Q4HRS PRN, Nestor Mirzoyan, M.D.  •  Pharmacy Consult: Enteral tube feeding - review meds/change route/product selection, , Other, PRN, Paolo Son Jr., D.O.  •  Influenza Vaccine High-Dose pf injection 0.5 mL, 0.5 mL, Intramuscular, Once PRN, Nathaniel Rosenberg M.D.  •  aspirin (ASA) chewable tab 81 mg, 81 mg, Per NG Tube, DAILY, Trever Hilario M.D., 81 mg at 10/17/18 0504  •  famotidine (PEPCID) tablet 20 mg, 20 mg, Feeding Tube, DAILY, 20 mg at 10/17/18 0504 **OR** [DISCONTINUED] famotidine (PEPCID) injection  20 mg, 20 mg, Intravenous, DAILY, Gomez Holguin M.D.  •  furosemide (LASIX) injection 20 mg, 20 mg, Intravenous, Q12HRS, Trevre Hilario M.D., 20 mg at 10/17/18 0505  •  potassium bicarbonate (KLYTE) effervescent tablet 25 mEq, 25 mEq, Oral, BID, Trever Hilario M.D., 25 mEq at 10/17/18 0504  •  dexmedetomidine (PRECEDEX) 400 mcg/100mL NS premix infusion, 0.1-1.5 mcg/kg/hr, Intravenous, Continuous, Gomez Holguin M.D., Stopped at 10/15/18 1547  •  enoxaparin (LOVENOX) inj 40 mg, 40 mg, Subcutaneous, DAILY, Gomez Holguin M.D., 40 mg at 10/17/18 0504  •  NS infusion, , Intravenous, Continuous, Trever Hilario M.D., Last Rate: 10 mL/hr at 10/15/18 2227  •  ondansetron (ZOFRAN) syringe/vial injection 4 mg, 4 mg, Intravenous, Q4HRS PRN, Edgar Moody M.D.  •  ondansetron (ZOFRAN ODT) dispertab 4 mg, 4 mg, Oral, Q4HRS PRN, Edgar Moody M.D.  •  Respiratory Care per Protocol, , Nebulization, Continuous RT, Gomez Holguin M.D.  •  MD Alert...ICU Electrolyte Replacement per Pharmacy, , Other, pharmacy to dose, Gomez Holguin M.D.  •  fentaNYL (SUBLIMAZE) injection 25 mcg, 25 mcg, Intravenous, Q HOUR PRN, 25 mcg at 10/16/18 2046 **OR** fentaNYL (SUBLIMAZE) injection 50 mcg, 50 mcg, Intravenous, Q HOUR PRN, 50 mcg at 10/17/18 1218 **OR** fentaNYL (SUBLIMAZE) injection 100 mcg, 100 mcg, Intravenous, Q HOUR PRN, Gomez Holguin M.D.  •  ipratropium-albuterol (DUONEB) nebulizer solution, 3 mL, Nebulization, Q2HRS PRN (RT), Gomez Holguin M.D.  •  ipratropium-albuterol (DUONEB) nebulizer solution, 3 mL, Nebulization, Q4HRS (RT), Gomez Holguin M.D., 3 mL at 10/17/18 1044  •  pneumococcal 13-Briana Conj Vacc (PREVNAR 13) syringe 0.5 mL, 0.5 mL, Intramuscular, Once PRN, Gomez Holguin M.D.      PHYSICAL EXAM    Vitals:    10/17/18 0500 10/17/18 0600 10/17/18 0638 10/17/18 1046   Pulse: (!) 113 (!) 116     Resp: (!) 27 15     Temp:       SpO2: 97% 98% 98% 96%   Weight:       Height:            Head/Neck: NCAT. no meningismus neg kernig neg brudzinski. No obvious mass or heard bruit. No tender arteries or lost pulses. No rash of head or neck.    Skin: Warm, dry, intact. No rashes observed head/neck or body    Eyes/Funduscopic: unable    Mental Status: sedated    Cranial Nerves:  PERRL 3mm sluggish. No ocular deviation.     Motor:   None. no abn mvmts.  bulk & tone wnl.      Sensory: none    Coordination: unable    DTR's: damp intact/sym. no clonus. Toes mute.    Gait/Station: n/a       Labs:  Recent Labs      10/15/18   0507  10/16/18   0430  10/17/18   0300   WBC  11.7*  12.3*  10.2   RBC  3.52*  3.57*  3.66*   HEMOGLOBIN  9.6*  9.8*  9.8*   HEMATOCRIT  30.1*  30.7*  31.8*   MCV  85.5  86.0  86.9   MCH  27.3  27.5  26.8*   MCHC  31.9*  31.9*  30.8*   RDW  45.3  45.6  47.7   PLATELETCT  276  266  231   MPV  10.5  10.6  10.7     Recent Labs      10/15/18   0507  10/16/18   0430  10/17/18   0300   SODIUM  137  141  140   POTASSIUM  3.7  3.9  4.1   CHLORIDE  108  109  105   CO2  17*  21  25   GLUCOSE  96  141*  131*   BUN  16  18  22   CREATININE  0.89  0.78  0.88   CALCIUM  7.6*  7.8*  8.1*                 Recent Labs      10/15/18   0507   TRIGLYCERIDE  75     Recent Labs      10/15/18   0507  10/16/18   0430  10/17/18   0300   SODIUM  137  141  140   POTASSIUM  3.7  3.9  4.1   CHLORIDE  108  109  105   CO2  17*  21  25   GLUCOSE  96  141*  131*   BUN  16  18  22     Recent Labs      10/15/18   0507  10/16/18   0430  10/17/18   0300   SODIUM  137  141  140   POTASSIUM  3.7  3.9  4.1   CHLORIDE  108  109  105   CO2  17*  21  25   BUN  16  18  22   CREATININE  0.89  0.78  0.88   MAGNESIUM  2.0  2.0  1.8   PHOSPHORUS  2.4*  1.4*  1.9*   CALCIUM  7.6*  7.8*  8.1*         No results found for this or any previous visit.           Imaging: neuroimaging reviewed and directly visualized by me  MR-BRAIN-W/O   Final Result      1.  Bilateral holohemispheric chronic subdural fluid collections.There is no mass  effect or midline shift.   2.  Multifocal acute bilateral supratentorial lacunar infarcts.   3.  Mild chronic microvascular ischemic disease.   4.  Mild cerebral atrophy.   5.  Mild diffuse atherosclerotic disease.      DX-CHEST-PORTABLE (1 VIEW)   Final Result         1.  Bilateral dependent pulmonary infiltrates, similar to prior.   2.  Large quantity of stool in the colon suggests changes of constipation.            DX-CHEST-PORTABLE (1 VIEW)   Final Result         1.  Bilateral dependent pulmonary infiltrates, similar to prior.         EC-ECHOCARDIOGRAM COMPLETE W/ CONT   Final Result      DX-CHEST-PORTABLE (1 VIEW)   Final Result         1.  Bilateral dependent pulmonary infiltrates, similar to prior.      IR-US GUIDED PIV   Final Result    Ultrasound-guided PERIPHERAL IV INSERTION performed by    qualified nursing staff as above.            DX-ABDOMEN FOR TUBE PLACEMENT   Final Result      Feeding tube placement with the tip projecting over the proximal stomach      DX-CHEST-PORTABLE (1 VIEW)   Final Result         1.  Bilateral dependent pulmonary infiltrates.      [id number] VIEW   Final Result      Above average stool volume      CT-HEAD W/O   Final Result         1.  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      2.  No parenchymal hemorrhage or brain swelling or edema is noted.      Findings are consistent with atrophy.  Decreased attenuation in the periventricular white matter likely indicates microvascular ischemic disease.      DX-CHEST-PORTABLE (1 VIEW)   Final Result         1.  No new infiltrates or consolidations identified.      2.  Endotracheal tube and NG tube now noted in place.      EC-ECHOCARDIOGRAM COMPLETE W/O CONT    (Results Pending)     EEG  INTERPRETATION:   This is an abnormal 24 hours video electroencephalogram recording in the awake, drowsy and sleep state.  A moderate encephalopathy is suggested. Frequent frontal  sharps (more so on the right frontal) and triphasic waves, becoming often periodic (right PLEDS) and rhythmic. Recurrent seizures were captured during the study, most were electrographic (non convulsive) but several were associated with subtle head jerking and mouth movements. The findings suggest large areas of underlying cortical irritability and possibly structural abnormality. It appears seizures arising from the right hemisphere. Despite further adjustments in antiepileptics, the study has worsen when compared to prior recording. Clinical and radiological correlation is recommended.      Assessment/Plan:    STATUS EPILEPTICUS, EEG ABN SMALL SZS  SDH BILAT CHRONIC, SZ RISK   AFIB     LEV 1250 Q8 IV  VPA 1G IV LOAD BOLUS  NOW, THEN 750 Q8, level therapeutic can go up  R/O TOXIC/METABOLIC SZ RISKS, RECEIVED ZOSYN POSSIBLE TRIGGER  NSG RE SDH  NO ANTITHROMBOTIC UNTIL NSG CLEARS FOR SAME WITH SDH   Repeat UA with CRP^, BCX PEND, INFECTION LARSEN COULD NEED LP IF NO SOURCE FOUND; AVOID ABX THAT LOWER SZ THRESHOLD  FOLLOW DEPAKOATE LEVELS IN AM DAILY     Patient is critically ill based on: intubation, titrate of IV medication,  IV bolus     I personally provided approximately 35 minutes of total critical care time outside of time spent on separately billable/documented procedures. Time includes: review of laboratory data, review of radiology studies, discussion with consultants, discussion with family/patient, monitoring for potential decompensation.  Interventions were performed as documented above.          Juan Manuel Vivar M.D.  , Neurohospitalist & Stroke  Clinical Professor, [employer]  Diplomate, Neurology & Neuroimaging

## 2018-10-17 NOTE — ASSESSMENT & PLAN NOTE
Likely secondary to valproic acid. Depakote dc'd   and possible hepatic congestion component as EF:40% on echo.

## 2018-10-17 NOTE — PROCEDURES
VIDEO ELECTROENCEPHALOGRAM / EPILEPSY MONITORING UNIT REPORT        Referring provider: Dr. Martinez.      DOS:   10/17/2018 - 10/18/2018 (total recording for 19 hours and 25 minutes).      INDICATION:  Rene Becerril 89 y.o. female presenting with seizures.      CURRENT ANTIEPILEPTIC REGIMEN: Propofol infusion, Levetiracetam 1250 mg q 8 hrs, increased Valproic Acid added at 750 mg q 8 hrs.      TECHNIQUE: A 30-channel, 24 hrs video electroencephalogram (VEEG) was performed in accordance with the international 10-20 system. This digital study was reviewed in bipolar and referential montages. The recording examined a sedated and/or encephalopathic patient.      DESCRIPTION OF THE RECORD:  During the awake state, background shows symmetrical 5-6 Hz theta activity posteriorly with amplitude of 70 mV.  During the sleep and sedated state, background shows diffuse high-amplitude 4-5 Hz delta activity.  Symmetrical high-amplitude sleep spindles and vertex sharp activities were seen in the central leads.     ACTIVATION PROCEDURES:   Not performed.      ICTAL AND/OR INTERICTAL FINDINGS:   Continuous right frontal sharps. These become frequently intermittently periodic (right PLEDS). No clear seizures.      EKG: sampling review of EKG recording demonstrated a sinus arhythmia.         INTERPRETATION:   This is an abnormal 24 hours video electroencephalogram recording in the awake, drowsy and sleep state.  A moderate encephalopathy is suggested. Continuous right frontal sharps, becoming intermittently and frequently periodic (right PLEDS). No clear evidence for seizures during this recording. The study has improved, but the patient remains at a significant increased risk for recurrent seizures. The findings suggest large areas of underlying cortical irritability and possibly structural abnormality. Clinical and radiological correlation is recommended.     Updates provided to Dr. Messi Velazquez MD   Epilepsy  and Neurodiagnostics.   Clinical  of Neurology [facility].   Diplomate in Neurology, Epilepsy, and Electrodiagnostic Medicine.   Office: [phone]  Fax: [phone]

## 2018-10-17 NOTE — PROGRESS NOTES
Lashonda called but this RN had to end phone call due to interdisciplinary rounds. Return phone call to Lashonda after round completion, no answer. Message left that this RN would be happy to answer all her questions but am unable to do so via voicemail.

## 2018-10-17 NOTE — THERAPY
PT orders received. Pt remains inappropriate for PT evaluation at this time. Current orders will be DC'ed. Please reorder PT EVAL once pt is medically stable and cleared to mobilize. Thanks    Madison Peguero, PT, DPT Pager: [phone]

## 2018-10-17 NOTE — PROGRESS NOTES
Critical Care Progress Note    Date of admission  10/13/2018    Chief Complaint  89 y.o. female admitted 10/13/2018 with new onset seizures.    Hospital Course    This lady was admitted to the ICU with seizures and respiratory failure.      Interval Problem Update  Reviewed last 24 hour events:      Sz last night - Ativan  Not following now  ST  Edema 3+  TF at 50 (goal)  Vent 5  Reload VPA and increase dose  Follow ammonia  levocarnitine  Replete Mg and PO4  Cont Lasix  Give albumin      Review of Systems  Review of Systems   Unable to perform ROS: Acuity of condition        Vital Signs for last 24 hours   Pulse:  [] 110  Resp:  [12-32] 20    Hemodynamic parameters for last 24 hours       Vent Settings for last 24 hours  Rider Vent Mode: APVCMV  Rate (breaths/min):  [12] 12  PEEP/CPAP:  [8] 8  FiO2:  [30] 30  P Peak (PIP):  [18-24] 21  P MEAN:  [10-14] 11    Physical Exam   Physical Exam   Constitutional:   On ventilator   HENT:   Head: Normocephalic.   Right Ear: External ear normal.   Left Ear: External ear normal.   Nose: Nose normal.   Mouth/Throat: Oropharynx is clear and moist.   Eyes: Pupils are equal, round, and reactive to light. Right eye exhibits no discharge. Left eye exhibits no discharge. No scleral icterus.   Neck: Normal range of motion. Neck supple. No JVD present. No tracheal deviation present.   Cardiovascular: Intact distal pulses.  Exam reveals no gallop.    No murmur heard.  Sinus rhythm   Pulmonary/Chest: She has no wheezes. She has rales (Scattered coarse crackles).   Abdominal: Soft. Bowel sounds are normal. She exhibits no distension. There is no tenderness. There is no rebound.   Tolerating enteral tube feedings   Musculoskeletal: She exhibits edema. She exhibits no tenderness.   No clubbing or cyanosis   Neurological:   Sedated.  Does not follow.   Skin: Skin is warm and dry. No rash noted. She is not diaphoretic. No erythema.       Medications  Current Facility-Administered  Medications   Medication Dose Route Frequency Provider Last Rate Last Dose   • phosphorus (K-PHOS-NEUTRAL, PHOSPHA 250 NEUTRAL) per tablet 2 Tab  2 Tab Oral TID Trever Hilario M.D.   2 Tab at 10/17/18 0504   • levETIRAcetam (KEPPRA) 1,250 mg in  mL IVPB  1,250 mg Intravenous Q8HR Juan Manuel Vivar M.D.   Stopped at 10/17/18 0303   • valproate (DEPACON) 500 mg in D5W 50 mL IVPB  500 mg Intravenous Q8HRS Juan Manuel Vivar M.D. 50 mL/hr at 10/17/18 0515 500 mg at 10/17/18 0515   • acetaminophen (TYLENOL) tablet 650 mg  650 mg Oral Q6HRS PRN Nestor Pelletier M.D.   650 mg at 10/16/18 2140    Or   • acetaminophen (TYLENOL) suppository 650 mg  650 mg Rectal Q4HRS PRN Nestor Pelletier M.D.       • Pharmacy Consult: Enteral tube feeding - review meds/change route/product selection   Other PRN Paolo Son Jr., D.O.       • Influenza Vaccine High-Dose pf injection 0.5 mL  0.5 mL Intramuscular Once PRN Nathaniel Rosenberg M.D.       • aspirin (ASA) chewable tab 81 mg  81 mg Per NG Tube DAILY Trever Hilario M.D.   81 mg at 10/17/18 0504   • famotidine (PEPCID) tablet 20 mg  20 mg Feeding Tube DAILY Trever Hilario M.D.   20 mg at 10/17/18 0504   • senna-docusate (PERICOLACE or SENOKOT S) 8.6-50 MG per tablet 2 Tab  2 Tab Feeding Tube BID Trever Hilario M.D.   2 Tab at 10/17/18 0504    And   • polyethylene glycol/lytes (MIRALAX) PACKET 1 Packet  1 Packet Feeding Tube QDAY PRN Trever Hilario M.D.   1 Packet at 10/16/18 0819    And   • magnesium hydroxide (MILK OF MAGNESIA) suspension 30 mL  30 mL Feeding Tube QDAY PRN Trever Hilario M.D.        And   • bisacodyl (DULCOLAX) suppository 10 mg  10 mg Rectal QDAY PRN Trever Hilario M.D.       • furosemide (LASIX) injection 20 mg  20 mg Intravenous Q12HRS Trever Hilario M.D.   20 mg at 10/17/18 0505   • potassium bicarbonate (KLYTE) effervescent tablet 25 mEq  25 mEq Oral BID Trever Hilario M.D.   25 mEq at  10/17/18 0504   • dexmedetomidine (PRECEDEX) 400 mcg/100mL NS premix infusion  0.1-1.5 mcg/kg/hr Intravenous Continuous Gomez Holguin M.D.   Stopped at 10/15/18 1547   • enoxaparin (LOVENOX) inj 40 mg  40 mg Subcutaneous DAILY Gomez Holguin M.D.   40 mg at 10/17/18 0504   • NS infusion   Intravenous Continuous Trever Hilario M.D. 10 mL/hr at 10/15/18 2227     • ondansetron (ZOFRAN) syringe/vial injection 4 mg  4 mg Intravenous Q4HRS PRN Edgar Moody M.D.       • ondansetron (ZOFRAN ODT) dispertab 4 mg  4 mg Oral Q4HRS PRN Edgar Moody M.D.       • LORazepam (ATIVAN) injection 4 mg  4 mg Intravenous Q10 MIN PRN Edgar Moody M.D.   4 mg at 10/16/18 2255   • Respiratory Care per Protocol   Nebulization Continuous RT Gomez Holguin M.D.       • MD Alert...ICU Electrolyte Replacement per Pharmacy   Other pharmacy to dose Gomez Holguin M.D.       • fentaNYL (SUBLIMAZE) injection 25 mcg  25 mcg Intravenous Q HOUR PRN Gomez Holguin M.D.   25 mcg at 10/16/18 2046    Or   • fentaNYL (SUBLIMAZE) injection 50 mcg  50 mcg Intravenous Q HOUR PRN Gomez Holguin M.D.   50 mcg at 10/13/18 1922    Or   • fentaNYL (SUBLIMAZE) injection 100 mcg  100 mcg Intravenous Q HOUR PRN Gomez Holguin M.D.       • ipratropium-albuterol (DUONEB) nebulizer solution  3 mL Nebulization Q2HRS PRN (RT) Gomez Holguin M.D.       • ipratropium-albuterol (DUONEB) nebulizer solution  3 mL Nebulization Q4HRS (RT) Gomez Holguin M.D.   3 mL at 10/17/18 0239   • propofol (DIPRIVAN) injection  0-80 mcg/kg/min Intravenous Continuous Gomez Holguin M.D.   Stopped at 10/14/18 1014   • pneumococcal 13-Briana Conj Vacc (PREVNAR 13) syringe 0.5 mL  0.5 mL Intramuscular Once PRN Gomez Holguin M.D.           Fluids    Intake/Output Summary (Last 24 hours) at 10/17/18 0552  Last data filed at 10/17/18 0200   Gross per 24 hour   Intake             2895 ml   Output             2615 ml   Net              280 ml        Laboratory  Recent Results (from the past 48 hour(s))   EC-ECHOCARDIOGRAM COMPLETE W/ CONT    Collection Time: 10/15/18  9:30 AM   Result Value Ref Range    Eject.Frac. MOD BP 50.13     Eject.Frac. MOD 4C 50.85     Eject.Frac. MOD 2C 53.23     Left Ventrical Ejection Fraction 40    ISTAT ARTERIAL BLOOD GAS    Collection Time: 10/16/18  4:28 AM   Result Value Ref Range    Ph 7.448 7.400 - 7.500    Pco2 35.2 26.0 - 37.0 mmHg    Po2 92 (H) 64 - 87 mmHg    Tco2 25 20 - 33 mmol/L    S02 98 93 - 99 %    Hco3 24.3 17.0 - 25.0 mmol/L    BE 1 -4 - 3 mmol/L    Body Temp 100.8 F degrees    O2 Therapy 30 %    iPF Ratio 307     Ph Temp Tea 7.429 7.400 - 7.500    Pco2 Temp Co 37.1 (H) 26.0 - 37.0 mmHg    Po2 Temp Cor 100 (H) 64 - 87 mmHg    Specimen Arterial     Action Range Triggered NO     Inst. Qualified Patient YES    CBC with Differential    Collection Time: 10/16/18  4:30 AM   Result Value Ref Range    WBC 12.3 (H) 4.8 - 10.8 K/uL    RBC 3.57 (L) 4.20 - 5.40 M/uL    Hemoglobin 9.8 (L) 12.0 - 16.0 g/dL    Hematocrit 30.7 (L) 37.0 - 47.0 %    MCV 86.0 81.4 - 97.8 fL    MCH 27.5 27.0 - 33.0 pg    MCHC 31.9 (L) 33.6 - 35.0 g/dL    RDW 45.6 35.9 - 50.0 fL    Platelet Count 266 164 - 446 K/uL    MPV 10.6 9.0 - 12.9 fL    Neutrophils-Polys 82.70 (H) 44.00 - 72.00 %    Lymphocytes 5.70 (L) 22.00 - 41.00 %    Monocytes 10.60 0.00 - 13.40 %    Eosinophils 0.10 0.00 - 6.90 %    Basophils 0.20 0.00 - 1.80 %    Immature Granulocytes 0.70 0.00 - 0.90 %    Nucleated RBC 0.00 /100 WBC    Neutrophils (Absolute) 10.21 (H) 2.00 - 7.15 K/uL    Lymphs (Absolute) 0.70 (L) 1.00 - 4.80 K/uL    Monos (Absolute) 1.31 (H) 0.00 - 0.85 K/uL    Eos (Absolute) 0.01 0.00 - 0.51 K/uL    Baso (Absolute) 0.02 0.00 - 0.12 K/uL    Immature Granulocytes (abs) 0.09 0.00 - 0.11 K/uL    NRBC (Absolute) 0.00 K/uL   Basic Metabolic Panel (BMP)    Collection Time: 10/16/18  4:30 AM   Result Value Ref Range    Sodium 141 135 - 145 mmol/L    Potassium 3.9 3.6 -  5.5 mmol/L    Chloride 109 96 - 112 mmol/L    Co2 21 20 - 33 mmol/L    Glucose 141 (H) 65 - 99 mg/dL    Bun 18 8 - 22 mg/dL    Creatinine 0.78 0.50 - 1.40 mg/dL    Calcium 7.8 (L) 8.5 - 10.5 mg/dL    Anion Gap 11.0 0.0 - 11.9   Magnesium    Collection Time: 10/16/18  4:30 AM   Result Value Ref Range    Magnesium 2.0 1.5 - 2.5 mg/dL   Phosphorus    Collection Time: 10/16/18  4:30 AM   Result Value Ref Range    Phosphorus 1.4 (L) 2.5 - 4.5 mg/dL   CRP QUANTITIVE (NON-CARDIAC)    Collection Time: 10/16/18  4:30 AM   Result Value Ref Range    Stat C-Reactive Protein 5.80 (H) 0.00 - 0.75 mg/dL   PREALBUMIN    Collection Time: 10/16/18  4:30 AM   Result Value Ref Range    Pre-Albumin 6.0 (L) 18.0 - 38.0 mg/dL   ESTIMATED GFR    Collection Time: 10/16/18  4:30 AM   Result Value Ref Range    GFR If African American >60 >60 mL/min/1.73 m 2    GFR If Non African American >60 >60 mL/min/1.73 m 2   AMMONIA    Collection Time: 10/17/18  3:00 AM   Result Value Ref Range    Ammonia 70 (H) 11 - 45 umol/L   CBC with Differential    Collection Time: 10/17/18  3:00 AM   Result Value Ref Range    WBC 10.2 4.8 - 10.8 K/uL    RBC 3.66 (L) 4.20 - 5.40 M/uL    Hemoglobin 9.8 (L) 12.0 - 16.0 g/dL    Hematocrit 31.8 (L) 37.0 - 47.0 %    MCV 86.9 81.4 - 97.8 fL    MCH 26.8 (L) 27.0 - 33.0 pg    MCHC 30.8 (L) 33.6 - 35.0 g/dL    RDW 47.7 35.9 - 50.0 fL    Platelet Count 231 164 - 446 K/uL    MPV 10.7 9.0 - 12.9 fL    Neutrophils-Polys 77.00 (H) 44.00 - 72.00 %    Lymphocytes 11.80 (L) 22.00 - 41.00 %    Monocytes 9.50 0.00 - 13.40 %    Eosinophils 0.40 0.00 - 6.90 %    Basophils 0.50 0.00 - 1.80 %    Immature Granulocytes 0.80 0.00 - 0.90 %    Nucleated RBC 0.00 /100 WBC    Neutrophils (Absolute) 7.84 (H) 2.00 - 7.15 K/uL    Lymphs (Absolute) 1.20 1.00 - 4.80 K/uL    Monos (Absolute) 0.97 (H) 0.00 - 0.85 K/uL    Eos (Absolute) 0.04 0.00 - 0.51 K/uL    Baso (Absolute) 0.05 0.00 - 0.12 K/uL    Immature Granulocytes (abs) 0.08 0.00 - 0.11 K/uL     NRBC (Absolute) 0.00 K/uL   Basic Metabolic Panel (BMP)    Collection Time: 10/17/18  3:00 AM   Result Value Ref Range    Sodium 140 135 - 145 mmol/L    Potassium 4.1 3.6 - 5.5 mmol/L    Chloride 105 96 - 112 mmol/L    Co2 25 20 - 33 mmol/L    Glucose 131 (H) 65 - 99 mg/dL    Bun 22 8 - 22 mg/dL    Creatinine 0.88 0.50 - 1.40 mg/dL    Calcium 8.1 (L) 8.5 - 10.5 mg/dL    Anion Gap 10.0 0.0 - 11.9   Magnesium    Collection Time: 10/17/18  3:00 AM   Result Value Ref Range    Magnesium 1.8 1.5 - 2.5 mg/dL   Phosphorus    Collection Time: 10/17/18  3:00 AM   Result Value Ref Range    Phosphorus 1.9 (L) 2.5 - 4.5 mg/dL   VALPROIC ACID    Collection Time: 10/17/18  3:00 AM   Result Value Ref Range    Valproic Acid 90.2 50.0 - 100.0 ug/mL   ESTIMATED GFR    Collection Time: 10/17/18  3:00 AM   Result Value Ref Range    GFR If African American >60 >60 mL/min/1.73 m 2    GFR If Non African American >60 >60 mL/min/1.73 m 2   ISTAT ARTERIAL BLOOD GAS    Collection Time: 10/17/18  4:25 AM   Result Value Ref Range    Ph 7.436 7.400 - 7.500    Pco2 42.5 (H) 26.0 - 37.0 mmHg    Po2 88 (H) 64 - 87 mmHg    Tco2 30 20 - 33 mmol/L    S02 97 93 - 99 %    Hco3 28.6 (H) 17.0 - 25.0 mmol/L    BE 4 (H) -4 - 3 mmol/L    Body Temp 97.8 F degrees    O2 Therapy 30 %    iPF Ratio 293     Ph Temp Tea 7.442 7.400 - 7.500    Pco2 Temp Co 41.7 (H) 26.0 - 37.0 mmHg    Po2 Temp Cor 86 64 - 87 mmHg    Specimen Arterial     Action Range Triggered NO     Inst. Qualified Patient YES        Imaging  X-Ray:  I have personally reviewed the images and compared with prior images. and My impression is: Unchanged right lower lobe opacification    Assessment/Plan  * Acute respiratory failure with hypoxia (HCC)- (present on admission)   Assessment & Plan    Intubated 10/13  All appropriate ventilator bundles have been initiated  Continue full vent support        Seizure (HCC)- (present on admission)   Assessment & Plan    CT with small bilateral hygromas without  mass-effect  Seizures are increased in frequency  Continuous video EEG  Continue Keppra, 1250 mg every 8 hours  Increase valproic acid, 750 mg every 8 hours  Ammonia level is increased - monitor closely - start levocarnitine 500 mg twice daily  MRI on 10/17 with multiple acute bilateral supratentorial lacunar infarcts        Acute systolic heart failure (HCC)   Assessment & Plan    EF 40%  Continue Lasix, 20 mg IV every 12 hours        CKD (chronic kidney disease) stage 3, GFR 30-59 ml/min ([facility])- (present on admission)   Assessment & Plan    Monitor renal function  Avoid nephrotoxins        Hypophosphatemia   Assessment & Plan    Replete phosphorus        Hypomagnesemia   Assessment & Plan    Replete magnesium        DNR (do not resuscitate)   Assessment & Plan    DNR status        Dementia- (present on admission)   Assessment & Plan    History of mild dementia        Elevated troponin- (present on admission)   Assessment & Plan    Due to demand ischemia             VTE:  Lovenox  Ulcer: H2 Antagonist  Lines: Rosa Catheter  Ongoing indication addressed    I have performed a physical exam and reviewed and updated ROS and Plan today (10/17/2018). In review of yesterday's note (10/16/2018), there are no changes except as documented above.     I have assessed and reassessed her respiratory status with ventilator adjustments, blood pressure, hemodynamics, cardiovascular status and neurologic status.  She is at increased risk for worsening respiratory, cardiovascular and CNS system dysfunction.    Discussed patient condition and risk of morbidity and/or mortality with Hospitalist, RN, RT, Pharmacy, Charge nurse / hot rounds and QA team  The patient remains critically ill.  Critical care time = 88 minutes in directly providing and coordinating critical care and extensive data review.  No time overlap and excludes procedures.    Trever Hilario MD  Pulmonary and Critical Care Medicine

## 2018-10-17 NOTE — PROGRESS NOTES
Notified Dr. Espitia and Dr. Hilario of pt's A-fib, frequent PACs. Also pt was given 2mg of Ativan for MRI, following unsuccessful attempt with Fentanyl to keep pt still. No new orders received.

## 2018-10-17 NOTE — CARE PLAN
Problem: Bowel/Gastric:  Goal: Normal bowel function is maintained or improved    Intervention: Collaborate with Interdisciplinary Team for optimal positioning for bowel evacuation  Chest xray revealed possible dilated bowel. No adequate BM since admission. History of hospitalization for stool impaction in recent past.  Milk and Molasses enema ordered to promote bowel evacuation.       Problem: Skin Integrity  Goal: Risk for impaired skin integrity will decrease    Intervention: Assess and monitor skin integrity, appearance and/or temperature  EEG leads removed for MRI. Skin tears occurred during removal. Pictures taken and wound consult ordered per protocol. Will continue to monitor.

## 2018-10-17 NOTE — PROGRESS NOTES
Phone conversation with Moira Gallego expressed she really wanted to speak with Neurologist regarding plan of care. Page sent to Dr. Vivar @ [phone] with specific message to please call Moira

## 2018-10-17 NOTE — WOUND TEAM
[facility]  Inpatient Services  Wound and Skin Care Progress Note    Admission Date:  10/13/2018   HPI, PMH, SH: Reviewed  Unit where seen by Wound Team: [id number]    WOUND TEAM FOLLOW UP: wounds to head    SUBJECTIVE:  intubated    Self Report / Pain Level: no s/s of distress      OBJECTIVE: on LATRICE bed, has continuous EEG   WOUND TYPE, LOCATION, CHARACTERISTICS (Pressure ulcers: location, stage, POA or date identified)  Tape stripping/chemical burn   Site Assessment Dry;Clean;Red;Pink   Bria-wound Assessment Clean;Intact   Margins Attached edges   Wound Length (cm) 0.4 cm   Wound Width (cm) 0.7 cm   Wound Depth (cm) 0.1 cm   Wound Surface Area (cm^2) 0.28 cm^2   Drainage Amount None   Cleansing Normal Saline Irrigation   Dressing Options Open to Air   NEXT Weekly Photo (Inpatient Only) 10/24/18   Odor None    Exposed Structures None    Tissue Type and Percentage 100% red; R cheek scabbed      Vascular:  Wounds not r/t vascular problem    Lab Values:    WBC:       WBC   Date/Time Value Ref Range Status   10/17/2018 03:00 AM 10.2 4.8 - 10.8 K/uL Final     AIC:      Lab Results   Component Value Date/Time    HBA1C 5.7 (H) 07/14/2018 02:51 PM        Culture:  na    INTERVENTIONS BY WOUND TEAM: viewed and assessed wounds to cheek and head that don't have EEG leads in place. All are MCKENNA.   Dressing Options: Open to Air    Interdisciplinary consultation:   With Nursing    EVALUATION: multiple partial thickness wounds to head temple, forehead, cheeks from chemical and tape stripping from removal of leads. R cheek with scab, forehead and temples are blanching.     Factors affecting wound healing: decreased mobility, age, anemia, dementia, CKD  Goals:  Decreased wound area and depth weekly     NURSING PLAN OF CARE:    Dressing changes: Continue previous Dressing Maintenance orders:        See new Dressing Maintenance orders:       Skin care: See Skin Care orders:        Rectal tube care: See Rectal Tube Care  orders:      Other orders:     Leave MCKENNA      WOUND TEAM PLAN OF CARE (X):   NPWT change 3 x week:        Dressing changes:       Follow up as needed:   If wounds worsen    Other:

## 2018-10-17 NOTE — PROGRESS NOTES
[facility] Hospitalist Progress Note    Date of Service: 10/17/2018    Chief Complaint  89 y.o. female admitted 10/13/2018 with seizures  Ms. Becerril has a history of apparent subdural hematomas that had a seizure at [facility] thus was transferred to [facility] for evaluation.  She required intubation due to airway protection. A CT of the head revealed bilateral subdural hygromas without hemorrhage.    Interval Problem Update      Following command until 11pm whne had seizures and received 4mg of lorazepam   Withdraws x4  3+ edema  TF at goal  Tmax 100.4   UO 2.9 l +5.1 L since admit  VD#5   VPA increased  NH3 70         Consultants/Specialty  Critical Care.  Neurology      Disposition  ICU        Review of Systems   Unable to perform ROS: Intubated      Physical Exam  Laboratory/Imaging   Hemodynamics  No data recorded.   Monitored Temp: 36.9 °C (98.4 °F)  Pulse  Av.6  Min: 30  Max: 120 Heart Rate (Monitored): 97  NIBP: 105/62      Respiratory  Rider Vent Mode: APVCMV, Rate (breaths/min): 12, PEEP/CPAP: 8, PEEP/CPAP: 8, FiO2: 30, P Peak (PIP): 20, P MEAN: 10   Respiration: 15, Pulse Oximetry: 98 %     Work Of Breathing / Effort: Vented  RUL Breath Sounds: Diminished, RML Breath Sounds: Diminished;Fine Crackles, RLL Breath Sounds: Diminished, BLANCHE Breath Sounds: Clear;Diminished, LLL Breath Sounds: Diminished    Fluids    Intake/Output Summary (Last 24 hours) at 10/17/18 0922  Last data filed at 10/17/18 0645   Gross per 24 hour   Intake          2284.17 ml   Output             2290 ml   Net            -5.83 ml       Nutrition  Orders Placed This Encounter   Procedures   • Diet NPO     Standing Status:   Standing     Number of Occurrences:   1     Order Specific Question:   Type:     Answer:   Now [1]     Order Specific Question:   Restrict to:     Answer:   Strict [1]     Physical Exam   Constitutional: She appears well-developed and well-nourished.   HENT:   Head: Normocephalic and atraumatic.   Mouth/Throat: No  oropharyngeal exudate.   Eyes: Pupils are equal, round, and reactive to light. Right eye exhibits no discharge. Left eye exhibits no discharge. No scleral icterus.   Neck: Neck supple. No JVD present. No tracheal deviation present.   Cardiovascular: Normal rate.  An irregular rhythm present. Exam reveals no gallop and no friction rub.    No murmur heard.  Pulmonary/Chest: Effort normal. No respiratory distress. She has decreased breath sounds. She has no wheezes. She has rhonchi. She has no rales. She exhibits no tenderness.   Intubated   Abdominal: Soft. Bowel sounds are normal. She exhibits distension. There is no tenderness. There is no rebound and no guarding.   Musculoskeletal: She exhibits edema. She exhibits no tenderness.   Neurological: No cranial nerve deficit. She exhibits normal muscle tone.   Lethargic does not follow command( patient received sedation for MRI)   Skin: Skin is warm and dry. She is not diaphoretic. No cyanosis or erythema. Nails show no clubbing.   Psychiatric: She is slowed. She is noncommunicative.   Nursing note and vitals reviewed.      Recent Labs      10/15/18   0507  10/16/18   0430  10/17/18   0300   WBC  11.7*  12.3*  10.2   RBC  3.52*  3.57*  3.66*   HEMOGLOBIN  9.6*  9.8*  9.8*   HEMATOCRIT  30.1*  30.7*  31.8*   MCV  85.5  86.0  86.9   MCH  27.3  27.5  26.8*   MCHC  31.9*  31.9*  30.8*   RDW  45.3  45.6  47.7   PLATELETCT  276  266  231   MPV  10.5  10.6  10.7     Recent Labs      10/15/18   0507  10/16/18   0430  10/17/18   0300   SODIUM  137  141  140   POTASSIUM  3.7  3.9  4.1   CHLORIDE  108  109  105   CO2  17*  21  25   GLUCOSE  96  141*  131*   BUN  16  18  22   CREATININE  0.89  0.78  0.88   CALCIUM  7.6*  7.8*  8.1*             Recent Labs      10/15/18   0507   TRIGLYCERIDE  75          Assessment/Plan     * Acute respiratory failure with hypoxia (HCC)- (present on admission)   Assessment & Plan    Secondary to seizures  Vent management per CC        Seizure (HCC)-  (present on admission)   Assessment & Plan    New onset seizures.  CT head reveals subdural hygromas, could be sequelae of prior subdural hematomas.  No acute pathology on MRI of brain  She was intubated for airway protection  On continuous EEG  Neurology following  Continue Keppra and valproate per neurology        CKD (chronic kidney disease) stage 3, GFR 30-59 ml/min (HCC)- (present on admission)   Assessment & Plan     Stable  Continue to monitor        Paroxysmal atrial fibrillation (HCC)   Assessment & Plan    We will add low-dose metoprolol  Telemetry monitoring  Patient not candidate for chronic anticoagulation given chronic subdural fluid collections        CRP elevated   Assessment & Plan    ? Etiology  Check u/a f/u on blood cx        Slow transit constipation   Assessment & Plan    Bowel protocol  Milk of molasses enema        Hyperammonemia (HCC)   Assessment & Plan    Likely secondary to valproate    Start l-carnitine and monitor        Hypophosphatemia   Assessment & Plan    Improved continue to replete and monitor        Hypomagnesemia   Assessment & Plan    Replete and monitor levels        Dementia- (present on admission)   Assessment & Plan    Reported hx of        Elevated troponin- (present on admission)   Assessment & Plan    Likely demand ischemia in setting of recurrent seizures  EF 40%  She will need further workup when more clinically stable depending on her recovery        Anemia of chronic disease- (present on admission)   Assessment & Plan    Hemoglobin 9.8, stable   no signs of active bleeding            Overall prognosis guarded  Quality-Core Measures   Reviewed items::  Labs reviewed, Medications reviewed and Radiology images reviewed  Rosa catheter::  Unconscious / Sedated Patient on a Ventilator  DVT prophylaxis pharmacological::  Enoxaparin (Lovenox)  DVT prophylaxis - mechanical:  SCDs  Ulcer Prophylaxis::  Yes  Assessed for rehabilitation services:  Patient unable to tolerate  rehabilitation therapeutic regimen

## 2018-10-17 NOTE — THERAPY
Pt continues to be inappropriate for OT eval. Will d/c current OT orders. Please re-order when pt is appropriate and able to participate in therapy.    Viola De La Cruz, OTR/L  Pager: [phone]

## 2018-10-17 NOTE — PROGRESS NOTES
Pt had multiple short witnessed seizures 4mg ativan given dr tristan called, new orders have been placed.

## 2018-10-18 PROBLEM — J98.11 ATELECTASIS: Status: ACTIVE | Noted: 2018-01-01

## 2018-10-18 NOTE — CARE PLAN
Problem: Safety  Goal: Will remain free from injury  Outcome: PROGRESSING AS EXPECTED  Remains restrained    Problem: Infection  Goal: Will remain free from infection  Outcome: PROGRESSING AS EXPECTED  Monitor for s/s of infection      Problem: Knowledge Deficit  Goal: Knowledge of disease process/condition, treatment plan, diagnostic tests, and medications will improve  Outcome: PROGRESSING SLOWER THAN EXPECTED  Waiting for family to speak with neurologist    Problem: Pain Management  Goal: Pain level will decrease to patient's comfort goal  Outcome: PROGRESSING AS EXPECTED  Monitoring for s/s of pain      Problem: Respiratory:  Goal: Respiratory status will improve  Outcome: PROGRESSING AS EXPECTED  Remains ventilated no changes

## 2018-10-18 NOTE — CARE PLAN
Problem: Communication  Goal: The ability to communicate needs accurately and effectively will improve    Intervention: Educate patient and significant other/support system about the plan of care, procedures, treatments, medications and allow for questions  Patient's white board is updated. Family is updated on plan of care. All questions were answered. Dr. Vivar asked to contact family regarding POC.      Problem: Skin Integrity  Goal: Risk for impaired skin integrity will decrease    Intervention: Assess risk factors for impaired skin integrity and/or pressure ulcers  Skin assessed, Q2 turns, Mepilex placed, pt kept dry and clean.

## 2018-10-18 NOTE — CARE PLAN
Problem: Ventilation Defect:  Goal: Ability to achieve and maintain unassisted ventilation or tolerate decreased levels of ventilator support    Intervention: Support and monitor invasive and noninvasive mechanical ventilation  Adult Ventilation Update    Total Vent Days: 5  Vent: 12 x 330 +8 30%    Patient Lines/Drains/Airways Status    Active Airway     Name: Placement date: Placement time: Site: Days:    Airway ETT Oral 7.5 10/13/18   1320   Oral   4              Mobility  Activity Performed: Unable to mobilize     Events/Summary/Plan: No change. Pt stable on vent. MRI today.

## 2018-10-18 NOTE — PROGRESS NOTES
CC- SDH, STATUS EPILEPTICUS    S- NONE, NODDED TO DAUGHTER AT BEDSIDE YEST PM.  No other complaints.     O-   Allergies: No Known Allergies      Current Facility-Administered Medications:   •  norepinephrine (LEVOPHED) 8 mg in  mL Infusion, 0-30 mcg/min, Intravenous, Continuous, Nestor Pelletier M.D., Stopped at 10/18/18 0600  •  valproate (DEPACON) 750 mg in  mL IVPB, 750 mg, Intravenous, Q8HRS, Juan Manuel Vivar M.D., Stopped at 10/18/18 0643  •  LORazepam (ATIVAN) injection 1-4 mg, 1-4 mg, Intravenous, Q10 MIN PRN, Trever Hilario M.D., 2 mg at 10/17/18 1218  •  levOCARNitine (CARNITOR) 1 GM/10ML solution 500 mg, 500 mg, Feeding Tube, BID WITH MEALS, Trever Hilario M.D., 500 mg at 10/18/18 0840  •  senna-docusate (PERICOLACE or SENOKOT S) 8.6-50 MG per tablet 2 Tab, 2 Tab, Feeding Tube, BID, 2 Tab at 10/18/18 0543 **AND** polyethylene glycol/lytes (MIRALAX) PACKET 1 Packet, 1 Packet, Feeding Tube, BID, 1 Packet at 10/18/18 0542 **AND** magnesium hydroxide (MILK OF MAGNESIA) suspension 30 mL, 30 mL, Feeding Tube, QDAY PRN, 30 mL at 10/17/18 1352 **AND** bisacodyl (DULCOLAX) suppository 10 mg, 10 mg, Rectal, QDAY PRN, Trever Hilario M.D.  •  acetaminophen (TYLENOL) tablet 650 mg, 650 mg, Feeding Tube, Q6HRS PRN **OR** acetaminophen (TYLENOL) suppository 650 mg, 650 mg, Rectal, Q4HRS PRN, Trever Hilario M.D.  •  albumin human 25% solution 25 g, 25 g, Intravenous, Q6HRS, Trever Hilario M.D., Last Rate: 150 mL/hr at 10/18/18 0542, 25 g at 10/18/18 0542  •  metoprolol (LOPRESSOR) tablet 12.5 mg, 12.5 mg, Oral, TWICE DAILY, Tono Jaimes M.D., Stopped at 10/18/18 0600  •  levETIRAcetam (KEPPRA) 1,250 mg in  mL IVPB, 1,250 mg, Intravenous, Q8HR, Juan Manuel Vivar M.D., Stopped at 10/18/18 0229  •  Pharmacy Consult: Enteral tube feeding - review meds/change route/product selection, , Other, PRN, Paolo Son Jr., D.O.  •  aspirin (ASA) chewable tab 81 mg, 81  mg, Per NG Tube, DAILY, Trever Hilario M.D., 81 mg at 10/18/18 0543  •  famotidine (PEPCID) tablet 20 mg, 20 mg, Feeding Tube, DAILY, 20 mg at 10/18/18 0547 **OR** [DISCONTINUED] famotidine (PEPCID) injection 20 mg, 20 mg, Intravenous, DAILY, Gomez Holguin M.D.  •  furosemide (LASIX) injection 20 mg, 20 mg, Intravenous, Q12HRS, Trever Hilario M.D., 20 mg at 10/18/18 0542  •  potassium bicarbonate (KLYTE) effervescent tablet 25 mEq, 25 mEq, Oral, BID, Trever Hilario M.D., 25 mEq at 10/18/18 0542  •  dexmedetomidine (PRECEDEX) 400 mcg/100mL NS premix infusion, 0.1-1.5 mcg/kg/hr, Intravenous, Continuous, Gomez Holguin M.D., Stopped at 10/15/18 1547  •  enoxaparin (LOVENOX) inj 40 mg, 40 mg, Subcutaneous, DAILY, Gomez Holguin M.D., 40 mg at 10/18/18 0542  •  NS infusion, , Intravenous, Continuous, Trever Hilario M.D., Last Rate: 10 mL/hr at 10/15/18 2227  •  ondansetron (ZOFRAN) syringe/vial injection 4 mg, 4 mg, Intravenous, Q4HRS PRN, Edgar Moody M.D.  •  ondansetron (ZOFRAN ODT) dispertab 4 mg, 4 mg, Oral, Q4HRS PRN, Edgar Moody M.D.  •  Respiratory Care per Protocol, , Nebulization, Continuous RT, Gomez Holguin M.D.  •  MD Alert...ICU Electrolyte Replacement per Pharmacy, , Other, pharmacy to dose, Gomez Holguin M.D.  •  fentaNYL (SUBLIMAZE) injection 25 mcg, 25 mcg, Intravenous, Q HOUR PRN, 25 mcg at 10/16/18 2046 **OR** fentaNYL (SUBLIMAZE) injection 50 mcg, 50 mcg, Intravenous, Q HOUR PRN, 50 mcg at 10/17/18 1218 **OR** fentaNYL (SUBLIMAZE) injection 100 mcg, 100 mcg, Intravenous, Q HOUR PRN, Gomez Holguin M.D.  •  ipratropium-albuterol (DUONEB) nebulizer solution, 3 mL, Nebulization, Q2HRS PRN (RT), Gomez Holguin M.D.  •  ipratropium-albuterol (DUONEB) nebulizer solution, 3 mL, Nebulization, Q4HRS (RT), Gomez Holguin M.D., 3 mL at 10/18/18 0636      PHYSICAL EXAM    Vitals:    10/18/18 0640 10/18/18 0700 10/18/18 0800 10/18/18 0900   Pulse:  97  (!) 112 (!) 111   Resp:  18 13 12   Temp:       SpO2: 98% 98% 98% 99%   Weight:       Height:           Head/Neck: NCAT. no meningismus neg kernig neg brudzinski. No obvious mass or heard bruit. No tender arteries or lost pulses. No rash of head or neck.    Skin: Warm, dry, intact. No rashes observed head/neck or body    Eyes/Funduscopic: unable    Mental Status: sedated    Cranial Nerves:  PERRL 2mm sluggish. No ocular deviation.     Motor:   None. no abn mvmts.  bulk & tone wnl.      Sensory: none    Coordination: unable    DTR's: damp intact/sym. no clonus. Toes mute.    Gait/Station: n/a         Labs:  Recent Labs      10/16/18   0430  10/17/18   0300  10/18/18   0610   WBC  12.3*  10.2  7.4   RBC  3.57*  3.66*  3.46*   HEMOGLOBIN  9.8*  9.8*  9.6*   HEMATOCRIT  30.7*  31.8*  29.8*   MCV  86.0  86.9  86.1   MCH  27.5  26.8*  27.7   MCHC  31.9*  30.8*  32.2*   RDW  45.6  47.7  46.4   PLATELETCT  266  231  195   MPV  10.6  10.7  11.0     Recent Labs      10/16/18   0430  10/17/18   0300  10/18/18   0610   SODIUM  141  140  141   POTASSIUM  3.9  4.1  4.7   CHLORIDE  109  105  103   CO2  21 25  28   GLUCOSE  141*  131*  107*   BUN  18  22  26*   CREATININE  0.78  0.88  0.88   CALCIUM  7.8*  8.1*  8.7                     Recent Labs      10/16/18   0430  10/17/18   0300  10/18/18   0610   SODIUM  141  140  141   POTASSIUM  3.9  4.1  4.7   CHLORIDE  109  105  103   CO2  21 25  28   GLUCOSE  141*  131*  107*   BUN  18  22  26*     Recent Labs      10/16/18   0430  10/17/18   0300  10/18/18   0610   SODIUM  141  140  141   POTASSIUM  3.9  4.1  4.7   CHLORIDE  109  105  103   CO2  21  25  28   BUN  18  22  26*   CREATININE  0.78  0.88  0.88   MAGNESIUM  2.0  1.8  2.3   PHOSPHORUS  1.4*  1.9*  2.6   CALCIUM  7.8*  8.1*  8.7         No results found for this or any previous visit.           Imaging: neuroimaging reviewed and directly visualized by me  DX-CHEST-PORTABLE (1 VIEW)   Final Result         1.  Bilateral  dependent pulmonary infiltrates, similar to prior.   2.  Large quantity of stool in the colon suggests changes of constipation.               MR-BRAIN-W/O   Final Result      1.  Bilateral holohemispheric chronic subdural fluid collections.There is no mass effect or midline shift.   2.  Multifocal acute bilateral supratentorial lacunar infarcts.   3.  Mild chronic microvascular ischemic disease.   4.  Mild cerebral atrophy.   5.  Mild diffuse atherosclerotic disease.      DX-CHEST-PORTABLE (1 VIEW)   Final Result         1.  Bilateral dependent pulmonary infiltrates, similar to prior.   2.  Large quantity of stool in the colon suggests changes of constipation.            DX-CHEST-PORTABLE (1 VIEW)   Final Result         1.  Bilateral dependent pulmonary infiltrates, similar to prior.         EC-ECHOCARDIOGRAM COMPLETE W/ CONT   Final Result      DX-CHEST-PORTABLE (1 VIEW)   Final Result         1.  Bilateral dependent pulmonary infiltrates, similar to prior.      IR-US GUIDED PIV   Final Result    Ultrasound-guided PERIPHERAL IV INSERTION performed by    qualified nursing staff as above.            DX-ABDOMEN FOR TUBE PLACEMENT   Final Result      Feeding tube placement with the tip projecting over the proximal stomach      DX-CHEST-PORTABLE (1 VIEW)   Final Result         1.  Bilateral dependent pulmonary infiltrates.      [id number] VIEW   Final Result      Above average stool volume      CT-HEAD W/O   Final Result         1.  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      2.  No parenchymal hemorrhage or brain swelling or edema is noted.      Findings are consistent with atrophy.  Decreased attenuation in the periventricular white matter likely indicates microvascular ischemic disease.      DX-CHEST-PORTABLE (1 VIEW)   Final Result         1.  No new infiltrates or consolidations identified.      2.  Endotracheal tube and NG tube now noted in  place.      EC-ECHOCARDIOGRAM COMPLETE W/O CONT    (Results Pending)         INTERPRETATION:   This is an abnormal 24 hours video electroencephalogram recording in the awake, drowsy and sleep state.  A moderate encephalopathy is suggested. Continuous right frontal sharps, becoming intermittently and frequently periodic (right PLEDS). No clear evidence for seizures during this recording. The study has improved, but the patient remains at a significant increased risk for recurrent seizures. The findings suggest large areas of underlying cortical irritability and possibly structural abnormality. Clinical and radiological correlation is recommended.     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 Trend ammonia determine if other cause. Replete carnitine/coq10.  R/O TOXIC/METABOLIC SZ RISKS, RECEIVED ZOSYN POSSIBLE TRIGGER 10/6 STARTED- WAS NON TOXIC PER DAUGHTER DOING GREAT NO COMPLAINTS WAS VERY STRESSED AFTER SHE LEFT HER UNWARNED  COULD BE 2ND TRIGGER  NSG RE SDHygromas  NO ANTITHROMBOTIC UNTIL NSG CLEARS FOR SAME WITH SDH   Repeat UA with CRP^, BCX PEND, INFECTION LARSEN COULD NEED LP IF NO SOURCE FOUND- sputum/urine/bcx; AVOID ABX THAT LOWER SZ THRESHOLD  FOLLOW DEPAKOATE LEVELS IN AM DAILY          Juan Manuel Vivar M.D.  , Neurohospitalist & Stroke  Clinical Professor, [employer]  Diplomate, Neurology & Neuroimaging

## 2018-10-18 NOTE — CARE PLAN
Problem: Ventilation Defect:  Goal: Ability to achieve and maintain unassisted ventilation or tolerate decreased levels of ventilator support  Outcome: PROGRESSING AS EXPECTED    Intervention: Support and monitor invasive and noninvasive mechanical ventilation  Adult Ventilation Update    Total Vent Days: 6  APVcmv 12/330/8/35%    Patient Lines/Drains/Airways Status    Active Airway     Name: Placement date: Placement time: Site: Days:    Airway ETT Oral 7.5 10/13/18   1320   Oral   5              Cough: Productive (10/18/18 0228)  Sputum Amount: Small (10/18/18 0228)  Sputum Color: Tan;Clear (10/18/18 0228)  Sputum Consistency: Thick (10/18/18 0228)    Mobility  Level of Mobility: Level I (10/17/18 1600)  Activity Performed: Unable to mobilize (10/17/18 1826)  Pt Calls for Assistance: No (10/15/18 2000)  Reason Not Mobilized:  (eeg) (10/17/18 1826)

## 2018-10-18 NOTE — PALLIATIVE CARE
Palliative Care follow-up  PC RN met with granddaughter Lashonda and pt's son in conference room. Lashonda explained that her father is primarily [place] speaking but does understand english. Discussed their understanding of clinical picture, Lashonda explained that her understanding from the neurologist is that pt is clinically improving and that pt should be extubated in the next day or two. Lashonda expressed feeling hopeful that pt is improving.     PC RN discussed pt not following commands and now showing purposeful movements. Discussed the difference between pain reflex movement and purposeful movement. Discussed watching pt's clinical picture and working on extubating pt, but if pt's mentation does not improve there is concern about pt's ability to protect airway. Discussed tracheostomy, long term and short term ventilator support. Lashonda verbalized understanding, she will discuss with family. Lashonda would also like to watch pt's clinical picture the next few days to see if she improves, and she will discuss with family and when the time comes they will make decisions about trach.     Lashonda is going to go home to drop her son and her father off and will return with pt's AD. PC RN discussed reviewing document and discussing pt's expressed wishes. Lashonda expressed appreciation of the discussion and understands that at this point decisions are more focused on quality of life and she will discuss with family.    Pt's son acknowledged understanding of conversation and he does not have any questions or comments to make.     Active listening, validation, education, and emotional support provided.     Updated:   Dr. Justin Jaimes, Bedside RN    Plan:   Continue to support pt and family, continue GOC discussion.    Thank you for allowing Palliative Care to participate in this patient's care. Please feel free to call [phone] with any questions or concerns.

## 2018-10-18 NOTE — PROGRESS NOTES
Gauri from Lab called with critical result of critically elevated valproic acid at 133.0 at 0952. Critical lab result read back to Gauri.   Fritz pharmacist and Dr. Vivar notified of critical lab result at 0954.  Critical lab result read back by Dr. Vivar.

## 2018-10-18 NOTE — PROCEDURES
Date of Procedure:  10/18/2018    Title of Procedure:  Diagnostic and therapeutic flexible fiberoptic bronchoscopy with bronchoalveolar lavage    Indication for Procedure:   Atelectasis    Post-procedure Diagnoses:    1.  Normal endobronchial anatomy  2.  No endobronchial tumor identified  3.  Moderate amount of thick yellow secretions seen, right lung greater than left lung.  All secretions suctioned until clear.    Narrative:    The patient was sedated, intubated and ventilated at the time of this procedure.  The flexible fiberoptic bronchoscope was inserted through the lumen of the endotracheal tube and advanced into the distal trachea without difficulty.  The airways were examined to the subsegmental bronchus level bilaterally.    The endobronchial anatomy was normal.  No tumor was identified.    There was a moderate amount of thick yellow secretions seen bilaterally.  There were more secretions in the right lung than the left lung.  All the secretions were suctioned until clear.    Bronchoalveolar lavage was carried out in the basilar segments of the right lower lobe using the standard technique with good fluid return.    Bronchoalveolar lavage fluid from the right lower lobe is submitted to the laboratory for cytology, gram stain, culture and sensitivity, acid fast bacilli smear and culture and fungal culture.    The patient tolerated the procedure quite nicely.  No complications were apparent.  The heart rate and rhythm, blood pressure and oxygenation saturation were continuously monitored.      Trever Hilario MD  Pulmonary and Critical Care Medicine

## 2018-10-18 NOTE — PROGRESS NOTES
Critical Care Progress Note    Date of admission  10/13/2018    Chief Complaint  89 y.o. female admitted 10/13/2018 with new onset seizures.    Hospital Course    This lady was admitted to the ICU with seizures and respiratory failure.      Interval Problem Update  Reviewed last 24 hour events:      EEG  No Sz last night  Now in AF  NE off  Grimaces and withdraws  Vent 6  Decrease Lasix to 20 daily  Stop K      Review of Systems  Review of Systems   Unable to perform ROS: Acuity of condition        Vital Signs for last 24 hours   Pulse:  [] 112  Resp:  [12-21] 12    Hemodynamic parameters for last 24 hours       Vent Settings for last 24 hours  [place] Vent Mode: APVCMV  Rate (breaths/min):  [12] 12  PEEP/CPAP:  [8] 8  FiO2:  [30-50] 30  P Peak (PIP):  [20-25] 25  P MEAN:  [10-12] 11    Physical Exam   Physical Exam   Constitutional:   On ventilator   HENT:   Head: Normocephalic.   Right Ear: External ear normal.   Left Ear: External ear normal.   Mouth/Throat: No oropharyngeal exudate.   Eyes: Pupils are equal, round, and reactive to light. Right eye exhibits no discharge. Left eye exhibits no discharge. No scleral icterus.   Neck: Neck supple. No JVD present. No tracheal deviation present.   Cardiovascular: Intact distal pulses.  Exam reveals no friction rub.    No murmur heard.  Sinus rhythm   Pulmonary/Chest: She has no wheezes. She has rales (Coarse crackles bilaterally).   Abdominal: Soft. Bowel sounds are normal. She exhibits no distension. There is no tenderness. There is no guarding.   Tolerating enteral tube feedings   Musculoskeletal: She exhibits edema. She exhibits no tenderness.   No clubbing or cyanosis   Neurological:   Sedated.  Withdraws in all 4 extremities.   Skin: Skin is warm and dry. She is not diaphoretic. No erythema. No pallor.       Medications  Current Facility-Administered Medications   Medication Dose Route Frequency Provider Last Rate Last Dose   • norepinephrine (LEVOPHED) 8 mg  in  mL Infusion  0-30 mcg/min Intravenous Continuous Nestor Pelletier M.D.       • valproate (DEPACON) 750 mg in  mL IVPB  750 mg Intravenous Q8HRS Juan Manuel Vivar M.D. 100 mL/hr at 10/18/18 0543 750 mg at 10/18/18 0543   • LORazepam (ATIVAN) injection 1-4 mg  1-4 mg Intravenous Q10 MIN PRN Trever Hilario M.D.   2 mg at 10/17/18 1218   • levOCARNitine (CARNITOR) 1 GM/10ML solution 500 mg  500 mg Feeding Tube BID WITH MEALS Trever Hilario M.D.   500 mg at 10/17/18 1753   • polyethylene glycol/lytes (MIRALAX) PACKET 1 Packet  1 Packet Feeding Tube BID Trever Hilario M.D.   1 Packet at 10/18/18 0542    And   • senna-docusate (PERICOLACE or SENOKOT S) 8.6-50 MG per tablet 2 Tab  2 Tab Feeding Tube BID Trever Hilario M.D.   2 Tab at 10/18/18 0543    And   • magnesium hydroxide (MILK OF MAGNESIA) suspension 30 mL  30 mL Feeding Tube QDAY PRN Trever Hilario M.D.   30 mL at 10/17/18 1352    And   • bisacodyl (DULCOLAX) suppository 10 mg  10 mg Rectal QDAY PRN Trever Hilario M.D.       • acetaminophen (TYLENOL) tablet 650 mg  650 mg Feeding Tube Q6HRS PRN Trever Hilario M.D.        Or   • acetaminophen (TYLENOL) suppository 650 mg  650 mg Rectal Q4HRS PRN Trever Hilario M.D.       • albumin human 25% solution 25 g  25 g Intravenous Q6HRS Trever Hilario M.D. 150 mL/hr at 10/18/18 0542 25 g at 10/18/18 0542   • metoprolol (LOPRESSOR) tablet 12.5 mg  12.5 mg Oral TWICE DAILY Tono Jaimes M.D.   Stopped at 10/18/18 0600   • levETIRAcetam (KEPPRA) 1,250 mg in  mL IVPB  1,250 mg Intravenous Q8HR Juan Manuel Vivar M.D.   Stopped at 10/18/18 1399   • Pharmacy Consult: Enteral tube feeding - review meds/change route/product selection   Other PRN Paolo Son Jr., D.O.       • aspirin (ASA) chewable tab 81 mg  81 mg Per NG Tube DAILY Trever Hilario M.D.   81 mg at 10/18/18 9899   • famotidine (PEPCID) tablet 20 mg  20 mg Feeding  Tube DAILY Trever Hilario M.D.   20 mg at 10/17/18 0504   • furosemide (LASIX) injection 20 mg  20 mg Intravenous Q12HRS Trever Hilario M.D.   20 mg at 10/18/18 0542   • potassium bicarbonate (KLYTE) effervescent tablet 25 mEq  25 mEq Oral BID Trever Hilario M.D.   25 mEq at 10/18/18 0542   • dexmedetomidine (PRECEDEX) 400 mcg/100mL NS premix infusion  0.1-1.5 mcg/kg/hr Intravenous Continuous Gomez Holguin M.D.   Stopped at 10/15/18 1547   • enoxaparin (LOVENOX) inj 40 mg  40 mg Subcutaneous DAILY Gomez Holguin M.D.   40 mg at 10/18/18 0542   • NS infusion   Intravenous Continuous Trever Hilario M.D. 10 mL/hr at 10/15/18 2227     • ondansetron (ZOFRAN) syringe/vial injection 4 mg  4 mg Intravenous Q4HRS PRN Edgar Moody M.D.       • ondansetron (ZOFRAN ODT) dispertab 4 mg  4 mg Oral Q4HRS PRN Edgar Moody M.D.       • Respiratory Care per Protocol   Nebulization Continuous RT Gomez Holguin M.D.       • MD Alert...ICU Electrolyte Replacement per Pharmacy   Other pharmacy to dose Gomez Holguin M.D.       • fentaNYL (SUBLIMAZE) injection 25 mcg  25 mcg Intravenous Q HOUR PRN Gomez Holguin M.D.   25 mcg at 10/16/18 2046    Or   • fentaNYL (SUBLIMAZE) injection 50 mcg  50 mcg Intravenous Q HOUR PRN Gomez Holguin M.D.   50 mcg at 10/17/18 1218    Or   • fentaNYL (SUBLIMAZE) injection 100 mcg  100 mcg Intravenous Q HOUR PRN Gomez Holguin M.D.       • ipratropium-albuterol (DUONEB) nebulizer solution  3 mL Nebulization Q2HRS PRN (RT) Gomez Holguin M.D.       • ipratropium-albuterol (DUONEB) nebulizer solution  3 mL Nebulization Q4HRS (RT) Gomez Holguin M.D.   3 mL at 10/18/18 0227       Fluids    Intake/Output Summary (Last 24 hours) at 10/18/18 0546  Last data filed at 10/17/18 1800   Gross per 24 hour   Intake          1799.59 ml   Output             1100 ml   Net           699.59 ml       Laboratory  Recent Results (from the past 48 hour(s))   BLOOD  CULTURE    Collection Time: 10/16/18 10:09 PM   Result Value Ref Range    Significant Indicator NEG     Source BLD     Site PERIPHERAL     Blood Culture       No Growth    Note: Blood cultures are incubated for 5 days and  are monitored continuously.Positive blood cultures  are called to the RN and reported as soon as  they are identified.     BLOOD CULTURE    Collection Time: 10/16/18 10:10 PM   Result Value Ref Range    Significant Indicator NEG     Source BLD     Site PERIPHERAL     Blood Culture       No Growth    Note: Blood cultures are incubated for 5 days and  are monitored continuously.Positive blood cultures  are called to the RN and reported as soon as  they are identified.     AMMONIA    Collection Time: 10/17/18  3:00 AM   Result Value Ref Range    Ammonia 70 (H) 11 - 45 umol/L   CBC with Differential    Collection Time: 10/17/18  3:00 AM   Result Value Ref Range    WBC 10.2 4.8 - 10.8 K/uL    RBC 3.66 (L) 4.20 - 5.40 M/uL    Hemoglobin 9.8 (L) 12.0 - 16.0 g/dL    Hematocrit 31.8 (L) 37.0 - 47.0 %    MCV 86.9 81.4 - 97.8 fL    MCH 26.8 (L) 27.0 - 33.0 pg    MCHC 30.8 (L) 33.6 - 35.0 g/dL    RDW 47.7 35.9 - 50.0 fL    Platelet Count 231 164 - 446 K/uL    MPV 10.7 9.0 - 12.9 fL    Neutrophils-Polys 77.00 (H) 44.00 - 72.00 %    Lymphocytes 11.80 (L) 22.00 - 41.00 %    Monocytes 9.50 0.00 - 13.40 %    Eosinophils 0.40 0.00 - 6.90 %    Basophils 0.50 0.00 - 1.80 %    Immature Granulocytes 0.80 0.00 - 0.90 %    Nucleated RBC 0.00 /100 WBC    Neutrophils (Absolute) 7.84 (H) 2.00 - 7.15 K/uL    Lymphs (Absolute) 1.20 1.00 - 4.80 K/uL    Monos (Absolute) 0.97 (H) 0.00 - 0.85 K/uL    Eos (Absolute) 0.04 0.00 - 0.51 K/uL    Baso (Absolute) 0.05 0.00 - 0.12 K/uL    Immature Granulocytes (abs) 0.08 0.00 - 0.11 K/uL    NRBC (Absolute) 0.00 K/uL   Basic Metabolic Panel (BMP)    Collection Time: 10/17/18  3:00 AM   Result Value Ref Range    Sodium 140 135 - 145 mmol/L    Potassium 4.1 3.6 - 5.5 mmol/L    Chloride 105  96 - 112 mmol/L    Co2 25 20 - 33 mmol/L    Glucose 131 (H) 65 - 99 mg/dL    Bun 22 8 - 22 mg/dL    Creatinine 0.88 0.50 - 1.40 mg/dL    Calcium 8.1 (L) 8.5 - 10.5 mg/dL    Anion Gap 10.0 0.0 - 11.9   Magnesium    Collection Time: 10/17/18  3:00 AM   Result Value Ref Range    Magnesium 1.8 1.5 - 2.5 mg/dL   Phosphorus    Collection Time: 10/17/18  3:00 AM   Result Value Ref Range    Phosphorus 1.9 (L) 2.5 - 4.5 mg/dL   VALPROIC ACID    Collection Time: 10/17/18  3:00 AM   Result Value Ref Range    Valproic Acid 90.2 50.0 - 100.0 ug/mL   ESTIMATED GFR    Collection Time: 10/17/18  3:00 AM   Result Value Ref Range    GFR If African American >60 >60 mL/min/1.73 m 2    GFR If Non African American >60 >60 mL/min/1.73 m 2   ISTAT ARTERIAL BLOOD GAS    Collection Time: 10/17/18  4:25 AM   Result Value Ref Range    Ph 7.436 7.400 - 7.500    Pco2 42.5 (H) 26.0 - 37.0 mmHg    Po2 88 (H) 64 - 87 mmHg    Tco2 30 20 - 33 mmol/L    S02 97 93 - 99 %    Hco3 28.6 (H) 17.0 - 25.0 mmol/L    BE 4 (H) -4 - 3 mmol/L    Body Temp 97.8 F degrees    O2 Therapy 30 %    iPF Ratio 293     Ph Temp Tea 7.442 7.400 - 7.500    Pco2 Temp Co 41.7 (H) 26.0 - 37.0 mmHg    Po2 Temp Cor 86 64 - 87 mmHg    Specimen Arterial     Action Range Triggered NO     Inst. Qualified Patient YES    ISTAT ARTERIAL BLOOD GAS    Collection Time: 10/18/18  4:03 AM   Result Value Ref Range    Ph 7.492 7.400 - 7.500    Pco2 43.1 (H) 26.0 - 37.0 mmHg    Po2 76 64 - 87 mmHg    Tco2 34 (H) 20 - 33 mmol/L    S02 96 93 - 99 %    Hco3 33.0 (H) 17.0 - 25.0 mmol/L    BE 9 (H) -4 - 3 mmol/L    Body Temp 98.6 F degrees    O2 Therapy 30 %    iPF Ratio 253     Ph Temp Tea 7.492 7.400 - 7.500    Pco2 Temp Co 43.1 (H) 26.0 - 37.0 mmHg    Po2 Temp Cor 76 64 - 87 mmHg    Specimen Arterial     Action Range Triggered NO     Inst. Qualified Patient YES        Imaging  X-Ray:  I have personally reviewed the images and compared with prior images. and My impression is: Increased right  lower lobe opacification    Assessment/Plan  * Acute respiratory failure with hypoxia (HCC)- (present on admission)   Assessment & Plan    Intubated 10/13  Continue full vent support  All appropriate ventilator bundles have been initiated        Seizure (HCC)- (present on admission)   Assessment & Plan    CT with small bilateral hygromas without mass-effect  EEG shows improvement in seizures  Continuous video EEG in place  Continue valproic acid, 750 mg every 8 hours  Continue Keppra, 1250 mg every 8 hours  Continue levocarnitine, 500 mg twice daily and monitor ammonia levels  MRI on 10/17 with multiple acute bilateral supratentorial lacunar infarcts        Acute systolic heart failure (HCC)   Assessment & Plan    EF 40%  Decrease Lasix, 20 mg IV daily        CKD (chronic kidney disease) stage 3, GFR 30-59 ml/min ([facility])- (present on admission)   Assessment & Plan    Avoid nephrotoxins  Monitor renal function        Paroxysmal atrial fibrillation (HCC)   Assessment & Plan    Optimize magnesium and potassium  Check thyroid function        Hyperammonemia (HCC)   Assessment & Plan    Due to valproic acid  Levocarnitine, 500 mg twice daily  Monitor        Hypophosphatemia   Assessment & Plan    Resolved after phosphorus repletion        Hypomagnesemia   Assessment & Plan    Resolved after magnesium repletion        DNR (do not resuscitate)   Assessment & Plan    DNR status        Dementia- (present on admission)   Assessment & Plan    History of mild dementia        Elevated troponin- (present on admission)   Assessment & Plan    Due to demand ischemia             VTE:  Lovenox  Ulcer: H2 Antagonist  Lines: Rosa Catheter  Ongoing indication addressed    I have performed a physical exam and reviewed and updated ROS and Plan today (10/18/2018). In review of yesterday's note (10/17/2018), there are no changes except as documented above.     I have assessed and reassessed her respiratory status with ventilator adjustments,  blood pressure, hemodynamics, neurologic status and cardiovascular status.  She is at increased risk for worsening CNS, respiratory and cardiovascular system dysfunction.    Discussed patient condition and risk of morbidity and/or mortality with Hospitalist, Family, RN, RT, Pharmacy, Charge nurse / hot rounds and QA team  The patient remains critically ill.  Critical care time = 90 minutes in directly providing and coordinating critical care and extensive data review.  No time overlap and excludes procedures.    Trever Hilario MD  Pulmonary and Critical Care Medicine

## 2018-10-18 NOTE — CONSULTS
Reason for PC Consult: Advance Care Planning    Consulted by:  Dr. Hilario    Assessment:  General:   89 year old female admitted for status epilepticus on 10/13/18. Pt has a history of dementia, arthritis, bronchitis, cataract, falls, hemorrhoids, and renal disorder. Pt was transferred from [facility] after pt had a new onset seizure. Pt was intubated in the field and then transported to [facility].     Dyspnea: No, 94% on 35% FiO2 via ETT  Last BM: 10/18/18    Pain: Unable to determine    Depression: Unable to determine   Dementia: Unable to Determine       Spiritual:  Is Rastafari or spirituality important for coping with this illness? Unable to determine    Has a  or spiritual provider visit been requested? Unable to determine    Palliative Performance Scale: 10%    Advance Directive: None on File  DPOA: None on File, LEROY Lashonda Russell ([phone])   POLST: Yes    Code Status: DNR (Intubation OK)      Outcome:  PC RN visited pt at bedside, pt intubated and sedated with continuous EEG going. Discussed with bedside RN Roland, appreciate updates.     Called Lashonda, she is driving and would like a return call later today.     Updated:   Bedside RN, Dr. Hilario    Plan:   Discuss GOC with granddaughter Lashonda, inquire about advanced directive.     Recommendations: I do not recommend an ethics or hospice consult at this time because GOC have not been established.    Thank you for allowing Palliative Care to participate in this patient's care. Please feel free to call [phone] with any questions or concerns.

## 2018-10-18 NOTE — PROCEDURES
VIDEO ELECTROENCEPHALOGRAM / EPILEPSY MONITORING UNIT REPORT        Referring provider: Dr. Martinez.      DOS:   10/18/2018 - 10/19/2018 (total recording for 23 hours and 45 minutes).      INDICATION:  Rene Becerril 89 y.o. female presenting with seizures.      CURRENT ANTIEPILEPTIC REGIMEN: Propofol infusion, Levetiracetam 1250 mg q 8 hrs, Valproic Acid 750 mg q 8 hrs.      TECHNIQUE: A 30-channel, 24 hrs video electroencephalogram (VEEG) was performed in accordance with the international 10-20 system. This digital study was reviewed in bipolar and referential montages. The recording examined a sedated and/or encephalopathic patient.      DESCRIPTION OF THE RECORD:  During the awake state, background shows symmetrical 5-6 Hz theta activity posteriorly with amplitude of 70 mV.  During the sleep and sedated state, background shows diffuse high-amplitude 4-5 Hz delta activity.  Symmetrical high-amplitude sleep spindles and vertex sharp activities were seen in the central leads.     ACTIVATION PROCEDURES:   Not performed.      ICTAL AND/OR INTERICTAL FINDINGS:   Continuous right frontal sharps. These become frequently intermittently periodic (right PLEDS) and also briefly rhythmic, however no clear seizures.      EKG: sampling review of EKG recording demonstrated a sinus arhythmia.         INTERPRETATION:   This is an abnormal 24 hours video electroencephalogram recording in the awake, drowsy and sleep state.  A moderate encephalopathy is suggested. Continuous right frontal sharps, becoming intermittently and frequently periodic (right PLEDS) and also briefly rhythmic at times, however there was no clear evidence for seizures during this recording. The study is stable from yesterday's, but the patient remains at a significant increased risk for recurrent seizures. The findings suggest large areas of underlying cortical irritability and possibly structural abnormality. Clinical and radiological correlation is  recommended.     Updates provided to Dr. Messi Velazquez MD   Epilepsy and Neurodiagnostics.   Clinical  of Neurology [facility].   Diplomate in Neurology, Epilepsy, and Electrodiagnostic Medicine.   Office: [phone]  Fax: [phone]

## 2018-10-18 NOTE — PROGRESS NOTES
[facility] Hospitalist Progress Note    Date of Service: 10/18/2018    Chief Complaint  89 y.o. female admitted 10/13/2018 with seizures  Ms. Becerril has a history of apparent subdural hematomas that had a seizure at [facility] thus was transferred to [facility] for evaluation.  She required intubation due to airway protection. A CT of the head revealed bilateral subdural hygromas without hemorrhage.    Interval Problem Update    No seizures overnight  SBP 90's  AFib 110's   TF at goal  Small BM   Withdraws in all extr  VD#6           Consultants/Specialty  Critical Care.  Neurology      Disposition  ICU        Review of Systems   Unable to perform ROS: Intubated      Physical Exam  Laboratory/Imaging   Hemodynamics  No data recorded.   Monitored Temp: 36.7 °C (98.1 °F)  Pulse  Av.8  Min: 30  Max: 120 Heart Rate (Monitored): (!) 109  NIBP: (!) 90/53      Respiratory  [place] Vent Mode: APVCMV, Rate (breaths/min): 12, PEEP/CPAP: 8, PEEP/CPAP: 8, FiO2: 30, P Peak (PIP): 24, P MEAN: 11   Respiration: 12, Pulse Oximetry: 99 %     Work Of Breathing / Effort: Vented  RUL Breath Sounds: Diminished, RML Breath Sounds: Diminished, RLL Breath Sounds: Diminished, BLANCHE Breath Sounds: Diminished, LLL Breath Sounds: Diminished    Fluids    Intake/Output Summary (Last 24 hours) at 10/18/18 0922  Last data filed at 10/18/18 0600   Gross per 24 hour   Intake          3877.08 ml   Output             1750 ml   Net          2127.08 ml       Nutrition  Orders Placed This Encounter   Procedures   • Diet NPO     Standing Status:   Standing     Number of Occurrences:   1     Order Specific Question:   Type:     Answer:   Now [1]     Order Specific Question:   Restrict to:     Answer:   Strict [1]     Physical Exam   Constitutional: She appears well-developed and well-nourished.   HENT:   Head: Normocephalic and atraumatic.   Right Ear: External ear normal.   Mouth/Throat: No oropharyngeal exudate.   Eyes: Pupils are equal, round, and reactive  to light. Conjunctivae are normal. Right eye exhibits no discharge. Left eye exhibits no discharge. No scleral icterus.   Neck: Neck supple. No JVD present. No tracheal deviation present.   Cardiovascular: Normal rate.  An irregular rhythm present. Exam reveals no gallop and no friction rub.    No murmur heard.  Pulmonary/Chest: Effort normal. No stridor. No respiratory distress. She has decreased breath sounds. She has no rhonchi. She has rales. She exhibits no tenderness.   Intubated   Abdominal: Soft. Bowel sounds are normal. She exhibits no distension. There is no tenderness. There is no rebound.   Musculoskeletal: She exhibits edema. She exhibits no tenderness.   Neurological: No cranial nerve deficit. She exhibits normal muscle tone.   Does not follow commands  Withdraws to pain in all extremities   Skin: Skin is warm and dry. She is not diaphoretic. No cyanosis or erythema. Nails show no clubbing.   Psychiatric: She is slowed. Cognition and memory are impaired.   Nursing note and vitals reviewed.      Recent Labs      10/16/18   0430  10/17/18   0300  10/18/18   0610   WBC  12.3*  10.2  7.4   RBC  3.57*  3.66*  3.46*   HEMOGLOBIN  9.8*  9.8*  9.6*   HEMATOCRIT  30.7*  31.8*  29.8*   MCV  86.0  86.9  86.1   MCH  27.5  26.8*  27.7   MCHC  31.9*  30.8*  32.2*   RDW  45.6  47.7  46.4   PLATELETCT  266  231  195   MPV  10.6  10.7  11.0     Recent Labs      10/16/18   0430  10/17/18   0300  10/18/18   0610   SODIUM  141  140  141   POTASSIUM  3.9  4.1  4.7   CHLORIDE  109  105  103   CO2  21  25  28   GLUCOSE  141*  131*  107*   BUN  18  22  26*   CREATININE  0.78  0.88  0.88   CALCIUM  7.8*  8.1*  8.7                      Assessment/Plan     * Acute respiratory failure with hypoxia (HCC)- (present on admission)   Assessment & Plan    Secondary to seizures    Vent management per critical care discussed with  bronchoscopy done today follow[place]          Seizure (HCC)- (present on admission)    Assessment & Plan    New onset seizures.  CT head reveals subdural hygromas, could be sequelae of prior subdural hematomas.  No acute pathology on MRI of brain  She was intubated for airway protection  On continuous EEG  Discussed with neurology  Continue Keppra and valproate        Atelectasis   Assessment & Plan    Status post bronchoscopy        CKD (chronic kidney disease) stage 3, GFR 30-59 ml/min (HCC)- (present on admission)   Assessment & Plan     Stable  Continue to monitor        Paroxysmal atrial fibrillation (HCC)   Assessment & Plan    Metoprolol as tolerated  Continue low-dose aspirin patient not candidate for chronic anticoagulation          CRP elevated   Assessment & Plan    Etiology not entirely clear follow-up on BAL results  UA negative  No leukocytosis        Slow transit constipation   Assessment & Plan    Bowel protocol           Hyperammonemia (HCC)   Assessment & Plan    Likely secondary to valproate    Continue l-carnitine        DNR (do not resuscitate)   Assessment & Plan    Family met with palliative care today and would like to continue current level of care        Dementia- (present on admission)   Assessment & Plan    Reported hx of        Elevated troponin- (present on admission)   Assessment & Plan    Likely demand ischemia in setting of recurrent seizures  EF 40%  She will need further workup when more clinically stable depending on her recovery        Anemia of chronic disease- (present on admission)   Assessment & Plan    No signs of bleeding hemoglobin stable continue to monitor          Overall prognosis guarded    Quality-Core Measures   Reviewed items::  Labs reviewed, Medications reviewed and Radiology images reviewed  Rosa catheter::  Unconscious / Sedated Patient on a Ventilator  DVT prophylaxis pharmacological::  Enoxaparin (Lovenox)  DVT prophylaxis - mechanical:  SCDs  Ulcer Prophylaxis::  Yes  Assessed for rehabilitation services:  Patient unable to tolerate  rehabilitation therapeutic regimen

## 2018-10-19 PROBLEM — I99.8 LIMB ISCHEMIA: Status: ACTIVE | Noted: 2018-01-01

## 2018-10-19 PROBLEM — J18.9 PNEUMONIA DUE TO INFECTIOUS ORGANISM: Status: ACTIVE | Noted: 2018-01-01

## 2018-10-19 PROBLEM — J15.212 PNEUMONIA DUE TO METHICILLIN RESISTANT STAPHYLOCOCCUS AUREUS (HCC): Status: ACTIVE | Noted: 2018-01-01

## 2018-10-19 NOTE — PROCEDURES
VIDEO ELECTROENCEPHALOGRAM / EPILEPSY MONITORING UNIT REPORT        Referring provider: Dr. Martinez.      DOS:   10/19/2018 - 10/20/2018 (total recording for 23 hours and 16 minutes).      INDICATION:  Rene Becerril 89 y.o. female presenting with seizures.      CURRENT ANTIEPILEPTIC REGIMEN: Levetiracetam 1250 mg q 8 hrs, Valproic Acid lowered to 750 mg q 12 hrs, Eslicarbazepine 400 mg qhs was added.      TECHNIQUE: A 30-channel, 24 hrs video electroencephalogram (VEEG) was performed in accordance with the international 10-20 system. This digital study was reviewed in bipolar and referential montages. The recording examined a sedated and/or encephalopathic patient.      DESCRIPTION OF THE RECORD:  During the awake state, background shows symmetrical 5-6 Hz theta activity posteriorly with amplitude of 70 mV.  During the sleep and sedated state, background shows diffuse high-amplitude 4-5 Hz delta activity.  Symmetrical high-amplitude sleep spindles and vertex sharp activities were seen in the central leads.     ACTIVATION PROCEDURES:   Not performed.      ICTAL AND/OR INTERICTAL FINDINGS:   Continuous right frontal sharps. These become frequently intermittently periodic (right PLEDS) and also briefly rhythmic, however no clear seizures.      EKG: sampling review of EKG recording demonstrated a sinus arhythmia.         INTERPRETATION:   This is an abnormal 24 hours video electroencephalogram recording in the awake, drowsy and sleep state.  A moderate encephalopathy is suggested. Continuous right frontal sharps, becoming intermittently and frequently periodic (right PLEDS) and also briefly rhythmic at times, however there was no clear evidence for seizures during this recording. The study is stable from yesterday's, but the patient remains at a significant increased risk for recurrent seizures. The findings suggest large areas of underlying cortical irritability and possibly structural abnormality. Clinical and  radiological correlation is recommended.     Updates provided to Dr. Messi Velazquez MD   Epilepsy and Neurodiagnostics.   Clinical  of Neurology [facility].   Diplomate in Neurology, Epilepsy, and Electrodiagnostic Medicine.   Office: [phone]  Fax: [phone]

## 2018-10-19 NOTE — ASSESSMENT & PLAN NOTE
Staph aureus in sputum  Sensitivities pending  Start Zyvox, 600 mg twice daily until sensitivities available

## 2018-10-19 NOTE — PROGRESS NOTES
MD Justin Jaimes notified of increase in blue color in bilateral feet and unable to get doppler pulse on R pedal and R Post Tib.     MD At bedside to doppler, unable to hear pulse via doppler on R. Pedal and R. Post tib.

## 2018-10-19 NOTE — PROGRESS NOTES
[facility] Hospitalist Progress Note    Date of Service: 10/19/2018    Chief Complaint  89 y.o. female admitted 10/13/2018 with seizures  Ms. Becerril has a history of apparent subdural hematomas that had a seizure at [facility] thus was transferred to [facility] for evaluation.  She required intubation due to airway protection. A CT of the head revealed bilateral subdural hygromas without hemorrhage.    Interval Problem Update      Followed commands  for family last night  TF at goal  BM last night  AFib 100-120  BP stable  On c EEG  VD#7   BAL culture MRSA    Cold RLE            Consultants/Specialty  Critical Care.  Neurology      Disposition  ICU        Review of Systems   Unable to perform ROS: Intubated      Physical Exam  Laboratory/Imaging   Hemodynamics  No data recorded.   Monitored Temp: 37 °C (98.6 °F)  Pulse  Av.4  Min: 30  Max: 121 Heart Rate (Monitored): (!) 107  NIBP: (!) 95/68      Respiratory  Rider Vent Mode: APVCMV, Rate (breaths/min): 12, PEEP/CPAP: 8, PEEP/CPAP: 8, FiO2: 40, P Peak (PIP): 21, P MEAN: 14   Respiration: 12, Pulse Oximetry: 97 %     Work Of Breathing / Effort: Vented  RUL Breath Sounds: Diminished, RML Breath Sounds: Diminished, RLL Breath Sounds: Diminished, BLANCHE Breath Sounds: Diminished, LLL Breath Sounds: Diminished    Fluids    Intake/Output Summary (Last 24 hours) at 10/19/18 0930  Last data filed at 10/19/18 0600   Gross per 24 hour   Intake           2632.5 ml   Output             1590 ml   Net           1042.5 ml       Nutrition  Orders Placed This Encounter   Procedures   • Diet NPO     Standing Status:   Standing     Number of Occurrences:   1     Order Specific Question:   Type:     Answer:   Now [1]     Order Specific Question:   Restrict to:     Answer:   Strict [1]     Physical Exam   Constitutional: She appears well-developed and well-nourished.   HENT:   Head: Normocephalic and atraumatic.   Right Ear: External ear normal.   Left Ear: External ear normal.    Mouth/Throat: No oropharyngeal exudate.   cortrak in place   Eyes: Pupils are equal, round, and reactive to light. Right eye exhibits no discharge. Left eye exhibits no discharge. No scleral icterus.   Neck: Neck supple. No JVD present. No tracheal deviation present.   Cardiovascular: Normal rate.  An irregular rhythm present. Exam reveals no friction rub.    No murmur heard.  Pulmonary/Chest: Effort normal. No respiratory distress. She has decreased breath sounds. She has no rhonchi. She has rales. She exhibits no tenderness.   Intubated   Abdominal: Soft. Bowel sounds are normal. She exhibits no distension. There is no tenderness. There is no rebound.   Musculoskeletal: She exhibits edema. She exhibits no tenderness.   Right foot cold with delayed capillary refill and nonpalpable pulses   Neurological: No cranial nerve deficit. She exhibits normal muscle tone.   Withdraws to pain does not follow command on my exam   Skin: Skin is dry. No rash noted. She is not diaphoretic. There is cyanosis. No erythema. Nails show no clubbing.   Psychiatric: She is slowed. Cognition and memory are impaired.   Nursing note and vitals reviewed.      Recent Labs      10/17/18   0300  10/18/18   0610  10/19/18   0405   WBC  10.2  7.4  9.4   RBC  3.66*  3.46*  3.23*   HEMOGLOBIN  9.8*  9.6*  8.8*   HEMATOCRIT  31.8*  29.8*  28.5*   MCV  86.9  86.1  88.2   MCH  26.8*  27.7  27.2   MCHC  30.8*  32.2*  30.9*   RDW  47.7  46.4  48.7   PLATELETCT  231  195  243   MPV  10.7  11.0  10.4     Recent Labs      10/17/18   0300  10/18/18   0610  10/19/18   0405   SODIUM  140  141  140   POTASSIUM  4.1  4.7  3.7   CHLORIDE  105  103  102   CO2  25  28  31   GLUCOSE  131*  107*  105*   BUN  22  26*  27*   CREATININE  0.88  0.88  0.87   CALCIUM  8.1*  8.7  9.0                      Assessment/Plan     * Acute respiratory failure with hypoxia (HCC)- (present on admission)   Assessment & Plan    Secondary to seizures and pneumonia    Vent management  per critical care discussed with  Dr Hinton            Seizure ([facility])- (present on admission)   Assessment & Plan    New onset seizures.  CT head reveals subdural hygromas, could be sequelae of prior subdural hematomas.  No acute pathology on MRI of brain  She was intubated for airway protection  Neurology following continue Keppra and valproate dose decreased  Remains on continuous EEG  Discussed with Dr. Vivar        CKD (chronic kidney disease) stage 3, GFR 30-59 ml/min ([facility])- (present on admission)   Assessment & Plan    stable        Pneumonia due to methicillin resistant Staphylococcus aureus ([facility])   Assessment & Plan    Will start on Zyvox complete 5-day course        Limb ischemia   Assessment & Plan    Preliminary report right anterior tibial artery distal occlusion patent right posterior tibial artery  We will start patient on heparin drip  We will consult vascular surgery        Slow transit constipation   Assessment & Plan    Will start on GoLYTELY        Paroxysmal atrial fibrillation ([facility])   Assessment & Plan    We will start anticoagulation given limb ischemia metoprolol as tolerated            Hyperammonemia ([facility])   Assessment & Plan    Likely secondary to valproic acid continue l-carnitine and monitor        Hypophosphatemia   Assessment & Plan    Replete and monitor        Dementia- (present on admission)   Assessment & Plan    Reported hx of        Hypokalemia- (present on admission)   Assessment & Plan    Replete and monitor        Elevated troponin- (present on admission)   Assessment & Plan    Likely demand ischemia in setting of recurrent seizures  EF 40%  She will need further workup when more clinically stable depending on her recovery        Anemia of chronic disease- (present on admission)   Assessment & Plan    No signs of active bleeding continue to monitor          Overall prognosis guarded    Quality-Core Measures   Reviewed items::  Labs reviewed, Medications reviewed and  Radiology images reviewed  Rosa catheter::  Unconscious / Sedated Patient on a Ventilator  DVT prophylaxis pharmacological::  Enoxaparin (Lovenox)  DVT prophylaxis - mechanical:  SCDs  Ulcer Prophylaxis::  Yes  Assessed for rehabilitation services:  Patient unable to tolerate rehabilitation therapeutic regimen

## 2018-10-19 NOTE — CARE PLAN
Problem: Safety  Goal: Will remain free from falls  Outcome: PROGRESSING AS EXPECTED  Fall interventions in place. Bed in lowest position, bed alarm on, appropriate sign placed, staff educated on mobility and risk for falls.     Problem: Infection  Goal: Will remain free from infection  Outcome: PROGRESSING AS EXPECTED   Family educated to use hand sanitzer upon entrance and exit of patient room and throughout hospital.     Daily AM Labs being monitored for increase WBC count.     continious hour temperature checks via bonner and monitoring for vital signs for cues to possible infection    Bonner being cleaned every shift with soap and water.     Problem: Safety - Medical Restraint  Goal: Remains free of injury from restraints (Restraint for Interference with Medical Device)  INTERVENTIONS:  1. Determine that other, less restrictive measures have been tried or would not be effective before applying the restraint  2. Evaluate the patient's condition at the time of restraint application  3. Inform patient/family regarding the reason for restraint  4. Q2H: Monitor safety, psychosocial status, comfort, nutrition and hydration     Outcome: PROGRESSING AS EXPECTED  Restraint assessment throughout entire shirt. Pt and family educated about restraint safety and use during mechanical ventilation and on sedation. Pt. Turned every 2 hours. Skin assessments completed underneath restraints. Additional safety measures in place: bed locked in lowest position, bed alarm on.

## 2018-10-19 NOTE — CARE PLAN
Problem: Ventilation Defect:  Goal: Ability to achieve and maintain unassisted ventilation or tolerate decreased levels of ventilator support    Intervention: Support and monitor invasive and noninvasive mechanical ventilation  Adult Ventilation Update    Total Vent Days: 6  Vent: 12x330 +8 35%    Patient Lines/Drains/Airways Status    Active Airway     Name: Placement date: Placement time: Site: Days:    Airway ETT Oral 7.5 10/13/18   1320   Oral   6                Mobilize:  Activity Performed: Unable to mobilize      Events/Summary/Plan: Bronchoscopy done today. Pt stable on vent. Pt still unresponsive.

## 2018-10-19 NOTE — RESPIRATORY CARE
Adult Ventilation Update    Total Vent Days: 7  RR12 / Vt330 / +8 / 30%    Patient Lines/Drains/Airways Status    Active Airway     Name: Placement date: Placement time: Site: Days:    Airway ETT Oral 7.5 10/13/18   1320   Oral   7              No SBT today     Plateau Pressure (Q Shift): 17 (10/19/18 0217)  Static Compliance (ml / cm H2O): 24 (10/19/18 0432)

## 2018-10-19 NOTE — HEART FAILURE PROGRAM
Cardiovascular Nurse Navigator () Advanced Heart Failure Program Inpatient Progress Note:    Dispo is uncertain at this time. Therefore, no f/u appointments scheduled. Pt remains intubated, palliative following.    Pt will need a 7 calendar day f/u if she goes home without hospice.    Hospital schedulers are here seven days a week, [phone] and can be reached at extension [phone], they will handle the seven calendar day follow up appointment for you if you call them!    Thank you, and please call Tamanna with questions, M-F.

## 2018-10-19 NOTE — CARE PLAN
Problem: Safety  Goal: Will remain free from injury  Outcome: PROGRESSING AS EXPECTED  Restraints checked q2h, 3 bed rails up, bed alarm on, room close to nursing station.    Problem: Pain Management  Goal: Pain level will decrease to patient's comfort goal  Outcome: PROGRESSING AS EXPECTED  RASS -2, CPOT 0.      Problem: Safety:  Goal: Will remain free from injury  Outcome: PROGRESSING AS EXPECTED  Restraints checked q2h, 3 bed rails up, bed alarm on, room close to nursing station.

## 2018-10-19 NOTE — DISCHARGE PLANNING
Received Choice form at 2932  Agency/Facility Name: [facility]   Referral pending Order.

## 2018-10-19 NOTE — CARE PLAN
Problem: Ventilation Defect:  Goal: Ability to achieve and maintain unassisted ventilation or tolerate decreased levels of ventilator support  Outcome: PROGRESSING SLOWER THAN EXPECTED    Intervention: Support and monitor invasive and noninvasive mechanical ventilation  Adult Ventilation Update    Total Vent Days: 7    APVCMV  12  330  +8  50%    Patient Lines/Drains/Airways Status    Active Airway     Name: Placement date: Placement time: Site: Days:    Airway ETT Oral 7.5@20 10/13/18   1320   Oral   6                 Plateau Pressure (Q Shift): 23 (10/19/18 0731)  Static Compliance (ml / cm H2O): 13 (10/19/18 1531)    Patient failed trials because of Barriers to Wean: Other (Comments) (cont. EEG procedure in place) (10/19/18 0731)    Events/Summary/Plan: Fio2 increased to 50%

## 2018-10-19 NOTE — PROGRESS NOTES
Critical Care Progress Note    Date of admission  10/13/2018    Chief Complaint  89 y.o. female admitted 10/13/2018 with new onset seizures.    Hospital Course    This lady was admitted to the ICU with seizures and respiratory failure.      Interval Problem Update  Reviewed last 24 hour events:      Continuous EEG  Followed yest for family  No sedation  Withdraws in all 4  AF  Replete K and PO4  Vent 7      Review of Systems  Review of Systems   Unable to perform ROS: Acuity of condition        Vital Signs for last 24 hours   Pulse:  [] 112  Resp:  [0-24] 12    Hemodynamic parameters for last 24 hours       Vent Settings for last 24 hours  [place] Vent Mode: APVCMV  Rate (breaths/min):  [12] 12  PEEP/CPAP:  [8] 8  FiO2:  [30] 30  P Peak (PIP):  [18-25] 25  P MEAN:  [10-12] 11    Physical Exam   Physical Exam   Constitutional:   On ventilator   HENT:   Head: Normocephalic.   Right Ear: External ear normal.   Left Ear: External ear normal.   Mouth/Throat: Oropharynx is clear and moist.   Eyes: Pupils are equal, round, and reactive to light. Conjunctivae are normal. Right eye exhibits no discharge. Left eye exhibits no discharge. No scleral icterus.   Neck: Normal range of motion. No JVD present. No tracheal deviation present.   Cardiovascular: Intact distal pulses.  Exam reveals no gallop and no friction rub.    Sinus rhythm   Pulmonary/Chest: She has no wheezes. She has rales (Scattered coarse crackles bilaterally).   Abdominal: Soft. Bowel sounds are normal. She exhibits no distension. There is no tenderness. There is no rebound.   Tolerating enteral tube feedings   Musculoskeletal: She exhibits edema. She exhibits no tenderness.   No clubbing or cyanosis   Neurological:   Does not follow for me.  Withdraws in all 4 extremities.   Skin: Skin is warm and dry. No rash noted. She is not diaphoretic. No erythema.       Medications  Current Facility-Administered Medications   Medication Dose Route Frequency  Provider Last Rate Last Dose   • norepinephrine (LEVOPHED) 8 mg in  mL Infusion  0-30 mcg/min Intravenous Continuous Nestor Pelletier M.D.   Stopped at 10/18/18 0600   • furosemide (LASIX) injection 20 mg  20 mg Intravenous Q DAY Trever Hilario M.D.       • valproate (DEPACON) 750 mg in  mL IVPB  750 mg Intravenous Q8HRS Juan Manuel Vivar M.D.   Stopped at 10/18/18 2303   • LORazepam (ATIVAN) injection 1-4 mg  1-4 mg Intravenous Q10 MIN PRN Trever Hilario M.D.   2 mg at 10/17/18 1218   • levOCARNitine (CARNITOR) 1 GM/10ML solution 500 mg  500 mg Feeding Tube BID WITH MEALS Trever Hilario M.D.   500 mg at 10/18/18 1723   • polyethylene glycol/lytes (MIRALAX) PACKET 1 Packet  1 Packet Feeding Tube BID Trever Hilario M.D.   1 Packet at 10/18/18 1723    And   • senna-docusate (PERICOLACE or SENOKOT S) 8.6-50 MG per tablet 2 Tab  2 Tab Feeding Tube BID Trever Hilario M.D.   2 Tab at 10/18/18 1723    And   • magnesium hydroxide (MILK OF MAGNESIA) suspension 30 mL  30 mL Feeding Tube QDAY PRN Trever Hilario M.D.   30 mL at 10/17/18 1352    And   • bisacodyl (DULCOLAX) suppository 10 mg  10 mg Rectal QDAY PRN Trever Hilario M.D.   10 mg at 10/18/18 1516   • acetaminophen (TYLENOL) tablet 650 mg  650 mg Feeding Tube Q6HRS PRN Trever Hilario M.D.        Or   • acetaminophen (TYLENOL) suppository 650 mg  650 mg Rectal Q4HRS PRN Trever Hilario M.D.       • metoprolol (LOPRESSOR) tablet 12.5 mg  12.5 mg Oral TWICE DAILY Tono Jaimes M.D.   12.5 mg at 10/18/18 1723   • levETIRAcetam (KEPPRA) 1,250 mg in  mL IVPB  1,250 mg Intravenous Q8HR Juan Manuel Vivar M.D.   Stopped at 10/19/18 0218   • Pharmacy Consult: Enteral tube feeding - review meds/change route/product selection   Other PRN Paolo Son Jr., D.O.       • aspirin (ASA) chewable tab 81 mg  81 mg Per NG Tube DAILY Trever Hilario M.D.   81 mg at 10/18/18 6335   •  famotidine (PEPCID) tablet 20 mg  20 mg Feeding Tube DAILY Trever Hilario M.D.   20 mg at 10/18/18 0547   • dexmedetomidine (PRECEDEX) 400 mcg/100mL NS premix infusion  0.1-1.5 mcg/kg/hr Intravenous Continuous Gomez Holguin M.D.   Stopped at 10/15/18 1547   • enoxaparin (LOVENOX) inj 40 mg  40 mg Subcutaneous DAILY Gomez Holguin M.D.   40 mg at 10/18/18 0542   • NS infusion   Intravenous Continuous Trever Hilario M.D. 10 mL/hr at 10/18/18 1400     • ondansetron (ZOFRAN) syringe/vial injection 4 mg  4 mg Intravenous Q4HRS PRN Edgar Moody M.D.       • ondansetron (ZOFRAN ODT) dispertab 4 mg  4 mg Oral Q4HRS PRN Edgar Moody M.D.       • Respiratory Care per Protocol   Nebulization Continuous RT Gomez Holguin M.D.       • MD Alert...ICU Electrolyte Replacement per Pharmacy   Other pharmacy to dose Gomez Holguin M.D.       • fentaNYL (SUBLIMAZE) injection 25 mcg  25 mcg Intravenous Q HOUR PRN Gomez Holguin M.D.   25 mcg at 10/16/18 2046    Or   • fentaNYL (SUBLIMAZE) injection 50 mcg  50 mcg Intravenous Q HOUR PRN Gomez Holguin M.D.   50 mcg at 10/17/18 1218    Or   • fentaNYL (SUBLIMAZE) injection 100 mcg  100 mcg Intravenous Q HOUR PRN Gomez Holguin M.D.       • ipratropium-albuterol (DUONEB) nebulizer solution  3 mL Nebulization Q2HRS PRN (RT) Gomez Holguin M.D.       • ipratropium-albuterol (DUONEB) nebulizer solution  3 mL Nebulization Q4HRS (RT) Gomez Holguin M.D.   3 mL at 10/19/18 0217       Fluids    Intake/Output Summary (Last 24 hours) at 10/19/18 0556  Last data filed at 10/19/18 0400   Gross per 24 hour   Intake          4724.16 ml   Output             2990 ml   Net          1734.16 ml       Laboratory  Recent Results (from the past 48 hour(s))   ISTAT ARTERIAL BLOOD GAS    Collection Time: 10/18/18  4:03 AM   Result Value Ref Range    Ph 7.492 7.400 - 7.500    Pco2 43.1 (H) 26.0 - 37.0 mmHg    Po2 76 64 - 87 mmHg    Tco2 34 (H) 20 - 33 mmol/L    S02 96  93 - 99 %    Hco3 33.0 (H) 17.0 - 25.0 mmol/L    BE 9 (H) -4 - 3 mmol/L    Body Temp 98.6 F degrees    O2 Therapy 30 %    iPF Ratio 253     Ph Temp Tea 7.492 7.400 - 7.500    Pco2 Temp Co 43.1 (H) 26.0 - 37.0 mmHg    Po2 Temp Cor 76 64 - 87 mmHg    Specimen Arterial     Action Range Triggered NO     Inst. Qualified Patient YES    CBC with Differential    Collection Time: 10/18/18  6:10 AM   Result Value Ref Range    WBC 7.4 4.8 - 10.8 K/uL    RBC 3.46 (L) 4.20 - 5.40 M/uL    Hemoglobin 9.6 (L) 12.0 - 16.0 g/dL    Hematocrit 29.8 (L) 37.0 - 47.0 %    MCV 86.1 81.4 - 97.8 fL    MCH 27.7 27.0 - 33.0 pg    MCHC 32.2 (L) 33.6 - 35.0 g/dL    RDW 46.4 35.9 - 50.0 fL    Platelet Count 195 164 - 446 K/uL    MPV 11.0 9.0 - 12.9 fL    Neutrophils-Polys 83.10 (H) 44.00 - 72.00 %    Lymphocytes 8.00 (L) 22.00 - 41.00 %    Monocytes 7.50 0.00 - 13.40 %    Eosinophils 0.50 0.00 - 6.90 %    Basophils 0.10 0.00 - 1.80 %    Immature Granulocytes 0.80 0.00 - 0.90 %    Nucleated RBC 0.00 /100 WBC    Neutrophils (Absolute) 6.16 2.00 - 7.15 K/uL    Lymphs (Absolute) 0.59 (L) 1.00 - 4.80 K/uL    Monos (Absolute) 0.56 0.00 - 0.85 K/uL    Eos (Absolute) 0.04 0.00 - 0.51 K/uL    Baso (Absolute) 0.01 0.00 - 0.12 K/uL    Immature Granulocytes (abs) 0.06 0.00 - 0.11 K/uL    NRBC (Absolute) 0.00 K/uL   Basic Metabolic Panel (BMP)    Collection Time: 10/18/18  6:10 AM   Result Value Ref Range    Sodium 141 135 - 145 mmol/L    Potassium 4.7 3.6 - 5.5 mmol/L    Chloride 103 96 - 112 mmol/L    Co2 28 20 - 33 mmol/L    Glucose 107 (H) 65 - 99 mg/dL    Bun 26 (H) 8 - 22 mg/dL    Creatinine 0.88 0.50 - 1.40 mg/dL    Calcium 8.7 8.5 - 10.5 mg/dL    Anion Gap 10.0 0.0 - 11.9   Magnesium    Collection Time: 10/18/18  6:10 AM   Result Value Ref Range    Magnesium 2.3 1.5 - 2.5 mg/dL   Phosphorus    Collection Time: 10/18/18  6:10 AM   Result Value Ref Range    Phosphorus 2.6 2.5 - 4.5 mg/dL   VALPROIC ACID    Collection Time: 10/18/18  6:10 AM   Result  Value Ref Range    Valproic Acid 133.0 (HH) 50.0 - 100.0 ug/mL   ESTIMATED GFR    Collection Time: 10/18/18  6:10 AM   Result Value Ref Range    GFR If African American >60 >60 mL/min/1.73 m 2    GFR If Non African American >60 >60 mL/min/1.73 m 2   URINALYSIS    Collection Time: 10/18/18  1:45 PM   Result Value Ref Range    Color Yellow     Character Clear     Specific Gravity 1.013 <1.035    Ph 5.0 5.0 - 8.0    Glucose Negative Negative mg/dL    Ketones Negative Negative mg/dL    Protein Negative Negative mg/dL    Bilirubin Negative Negative    Urobilinogen, Urine 0.2 Negative    Nitrite Negative Negative    Leukocyte Esterase Negative Negative    Occult Blood Negative Negative    Micro Urine Req see below    CBC with Differential    Collection Time: 10/19/18  4:05 AM   Result Value Ref Range    WBC 9.4 4.8 - 10.8 K/uL    RBC 3.23 (L) 4.20 - 5.40 M/uL    Hemoglobin 8.8 (L) 12.0 - 16.0 g/dL    Hematocrit 28.5 (L) 37.0 - 47.0 %    MCV 88.2 81.4 - 97.8 fL    MCH 27.2 27.0 - 33.0 pg    MCHC 30.9 (L) 33.6 - 35.0 g/dL    RDW 48.7 35.9 - 50.0 fL    Platelet Count 243 164 - 446 K/uL    MPV 10.4 9.0 - 12.9 fL    Neutrophils-Polys 83.70 (H) 44.00 - 72.00 %    Lymphocytes 4.90 (L) 22.00 - 41.00 %    Monocytes 10.20 0.00 - 13.40 %    Eosinophils 0.30 0.00 - 6.90 %    Basophils 0.20 0.00 - 1.80 %    Immature Granulocytes 0.70 0.00 - 0.90 %    Nucleated RBC 0.00 /100 WBC    Neutrophils (Absolute) 7.85 (H) 2.00 - 7.15 K/uL    Lymphs (Absolute) 0.46 (L) 1.00 - 4.80 K/uL    Monos (Absolute) 0.96 (H) 0.00 - 0.85 K/uL    Eos (Absolute) 0.03 0.00 - 0.51 K/uL    Baso (Absolute) 0.02 0.00 - 0.12 K/uL    Immature Granulocytes (abs) 0.07 0.00 - 0.11 K/uL    NRBC (Absolute) 0.00 K/uL   Basic Metabolic Panel (BMP)    Collection Time: 10/19/18  4:05 AM   Result Value Ref Range    Sodium 140 135 - 145 mmol/L    Potassium 3.7 3.6 - 5.5 mmol/L    Chloride 102 96 - 112 mmol/L    Co2 31 20 - 33 mmol/L    Glucose 105 (H) 65 - 99 mg/dL    Bun 27  (H) 8 - 22 mg/dL    Creatinine 0.87 0.50 - 1.40 mg/dL    Calcium 9.0 8.5 - 10.5 mg/dL    Anion Gap 7.0 0.0 - 11.9   Magnesium    Collection Time: 10/19/18  4:05 AM   Result Value Ref Range    Magnesium 2.1 1.5 - 2.5 mg/dL   Phosphorus    Collection Time: 10/19/18  4:05 AM   Result Value Ref Range    Phosphorus 2.1 (L) 2.5 - 4.5 mg/dL   TSH WITH REFLEX TO FT4    Collection Time: 10/19/18  4:05 AM   Result Value Ref Range    TSH 1.270 0.380 - 5.330 uIU/mL   ESTIMATED GFR    Collection Time: 10/19/18  4:05 AM   Result Value Ref Range    GFR If African American >60 >60 mL/min/1.73 m 2    GFR If Non African American >60 >60 mL/min/1.73 m 2       Imaging  X-Ray:  I have personally reviewed the images and compared with prior images. and My impression is: Unchanged right lower lobe opacification    Assessment/Plan  * Acute respiratory failure with hypoxia (HCC)- (present on admission)   Assessment & Plan    Intubated 10/13  All appropriate ventilator bundles have been initiated  Continue vent support        Pneumonia due to infectious organism   Assessment & Plan    Staph aureus in sputum  Sensitivities pending  Start Zyvox, 600 mg twice daily until sensitivities available        Seizure (HCC)- (present on admission)   Assessment & Plan    CT with small bilateral hygromas without mass-effect  EEG shows improvement in seizures  Continuous video EEG in place  Decreased valproic acid, 750 mg every 12 hours  Continue Keppra, 1250 mg every 8 hours  Start Aptiom, 400 mg at bedtime  Continue levocarnitine, 500 mg twice daily and monitor ammonia levels  MRI on 10/17 with multiple acute bilateral supratentorial lacunar infarcts        Acute systolic heart failure (HCC)   Assessment & Plan    EF 40%  Continue Lasix, 20 mg IV daily        CKD (chronic kidney disease) stage 3, GFR 30-59 ml/min ([facility])- (present on admission)   Assessment & Plan    Monitor renal function  Avoid nephrotoxins        Paroxysmal atrial fibrillation (HCC)    Assessment & Plan    Optimize magnesium and potassium  TSH is normal        Hyperammonemia (HCC)   Assessment & Plan    Due to valproic acid  Continue levocarnitine, 500 mg twice daily        Hypophosphatemia   Assessment & Plan    Replete phosphorus        DNR (do not resuscitate)   Assessment & Plan    DNR status        Dementia- (present on admission)   Assessment & Plan    History of mild dementia        Hypokalemia- (present on admission)   Assessment & Plan    Replete potassium        Elevated troponin- (present on admission)   Assessment & Plan    Due to demand ischemia             VTE:  Lovenox  Ulcer: H2 Antagonist  Lines: Rosa Catheter  Ongoing indication addressed    I have performed a physical exam and reviewed and updated ROS and Plan today (10/19/2018). In review of yesterday's note (10/18/2018), there are no changes except as documented above.     I have assessed and reassessed her respiratory status with ventilator adjustments, blood pressure, hemodynamics, cardiovascular status and neurologic status.  She is at increased risk for worsening respiratory and CNS system dysfunction.    Discussed patient condition and risk of morbidity and/or mortality with Hospitalist, RN, RT, Pharmacy, Charge nurse / hot rounds and QA team  The patient remains critically ill.  Critical care time = 88 minutes in directly providing and coordinating critical care and extensive data review.  No time overlap and excludes procedures.    Trever Hilario MD  Pulmonary and Critical Care Medicine

## 2018-10-19 NOTE — PROGRESS NOTES
Jose Carlos from Lab called with critical result of Valproic acid at 1038. Critical lab result read back to Jose Carlos.   Dr. Hilario notified of critical lab result at 1040.  Critical lab result read back by Dr. Hilario.    Per MD Hilario update Neurology.      1200: Page out to MD Vivar with Neurology  1300: re page out to MD Vivar with Neurology      1351: Return paged from Bambi BRICH for MD Vivar with neurology. Per NP she will check with Dr. Vivar and page back if there are any changes.

## 2018-10-19 NOTE — PROGRESS NOTES
CC- SDH, STATUS EPILEPTICUS    S- NONE.  No other complaints.     O-   Allergies: No Known Allergies      Current Facility-Administered Medications:   •  valproate (DEPACON) 750 mg in  mL IVPB, 750 mg, Intravenous, Q12HRS, Juan Manuel Vivar M.D.  •  eslicarbazepine (APTIOM) tablet 400 mg, 400 mg, Oral, QHS, Juan Manuel Vivar M.D.  •  phosphorus (K-PHOS-NEUTRAL, PHOSPHA 250 NEUTRAL) per tablet 2 Tab, 2 Tab, Oral, Q4HRS, Trever Hilario M.D., 2 Tab at 10/19/18 1456  •  linezolid (ZYVOX) tablet 600 mg, 600 mg, Oral, Q12HRS, Trever Hilario M.D., 600 mg at 10/19/18 1459  •  MD Alert...HEPARIN WEIGHT BASED PROTOCOL Pharmacist to implement, , Other, PRN, Tono Jaimes M.D.  •  norepinephrine (LEVOPHED) 8 mg in  mL Infusion, 0-30 mcg/min, Intravenous, Continuous, Nestor Pelletier M.D., Stopped at 10/18/18 0600  •  furosemide (LASIX) injection 20 mg, 20 mg, Intravenous, Q DAY, Trever Hilario M.D., 20 mg at 10/19/18 0603  •  LORazepam (ATIVAN) injection 1-4 mg, 1-4 mg, Intravenous, Q10 MIN PRN, Trever Hilario M.D., 2 mg at 10/17/18 1218  •  levOCARNitine (CARNITOR) 1 GM/10ML solution 500 mg, 500 mg, Feeding Tube, BID WITH MEALS, Trever Hilario M.D., 500 mg at 10/19/18 0850  •  senna-docusate (PERICOLACE or SENOKOT S) 8.6-50 MG per tablet 2 Tab, 2 Tab, Feeding Tube, BID, Stopped at 10/19/18 0600 **AND** polyethylene glycol/lytes (MIRALAX) PACKET 1 Packet, 1 Packet, Feeding Tube, BID, Stopped at 10/19/18 0600 **AND** magnesium hydroxide (MILK OF MAGNESIA) suspension 30 mL, 30 mL, Feeding Tube, QDAY PRN, 30 mL at 10/17/18 1352 **AND** bisacodyl (DULCOLAX) suppository 10 mg, 10 mg, Rectal, QDAY PRN, Trever Hilario M.D., 10 mg at 10/18/18 1516  •  acetaminophen (TYLENOL) tablet 650 mg, 650 mg, Feeding Tube, Q6HRS PRN **OR** acetaminophen (TYLENOL) suppository 650 mg, 650 mg, Rectal, Q4HRS PRN, Trever Hilario M.D.  •  metoprolol (LOPRESSOR) tablet 12.5 mg, 12.5  mg, Oral, TWICE DAILY, Tono Jaimes M.D., 12.5 mg at 10/19/18 0611  •  levETIRAcetam (KEPPRA) 1,250 mg in  mL IVPB, 1,250 mg, Intravenous, Q8HR, Juan Manuel Vivar M.D., Stopped at 10/19/18 0915  •  Pharmacy Consult: Enteral tube feeding - review meds/change route/product selection, , Other, PRN, Paolo Son Jr., D.O.  •  aspirin (ASA) chewable tab 81 mg, 81 mg, Per NG Tube, DAILY, Trever Hilario M.D., 81 mg at 10/19/18 0610  •  famotidine (PEPCID) tablet 20 mg, 20 mg, Feeding Tube, DAILY, 20 mg at 10/19/18 0610 **OR** [DISCONTINUED] famotidine (PEPCID) injection 20 mg, 20 mg, Intravenous, DAILY, Gomez Holguin M.D.  •  dexmedetomidine (PRECEDEX) 400 mcg/100mL NS premix infusion, 0.1-1.5 mcg/kg/hr, Intravenous, Continuous, Gomez Holguin M.D., Stopped at 10/15/18 1547  •  NS infusion, , Intravenous, Continuous, Trever Hilario M.D., Last Rate: 10 mL/hr at 10/18/18 1400  •  ondansetron (ZOFRAN) syringe/vial injection 4 mg, 4 mg, Intravenous, Q4HRS PRN, Edgar Moody M.D.  •  ondansetron (ZOFRAN ODT) dispertab 4 mg, 4 mg, Oral, Q4HRS PRN, Edgar Moody M.D.  •  Respiratory Care per Protocol, , Nebulization, Continuous RT, Gomez Holguin M.D.  •  MD Alert...ICU Electrolyte Replacement per Pharmacy, , Other, pharmacy to dose, Gomez Holguin M.D.  •  fentaNYL (SUBLIMAZE) injection 25 mcg, 25 mcg, Intravenous, Q HOUR PRN, 25 mcg at 10/16/18 2046 **OR** fentaNYL (SUBLIMAZE) injection 50 mcg, 50 mcg, Intravenous, Q HOUR PRN, 50 mcg at 10/19/18 1507 **OR** fentaNYL (SUBLIMAZE) injection 100 mcg, 100 mcg, Intravenous, Q HOUR PRN, Gomez Holguin M.D.  •  ipratropium-albuterol (DUONEB) nebulizer solution, 3 mL, Nebulization, Q2HRS PRN (RT), Gomez Holguin M.D.  •  ipratropium-albuterol (DUONEB) nebulizer solution, 3 mL, Nebulization, Q4HRS (RT), Gomez Holguin M.D., 3 mL at 10/19/18 1530      PHYSICAL EXAM    Vitals:    10/19/18 1400 10/19/18 1500 10/19/18 1531 10/19/18 1600    Pulse: (!) 114 (!) 116  (!) 126   Resp: 19 14 18   Temp:       SpO2: 96% 91% 95% 93%   Weight:       Height:           Head/Neck: NCAT. no meningismus neg kernig neg brudzinski. No obvious mass or heard bruit. No tender arteries or lost pulses. No rash of head or neck.    Skin: Warm, dry, intact. No rashes observed head/neck or body    Eyes/Funduscopic: unable    Mental Status: sedated    Cranial Nerves:  PERRL 2mm sluggish. No ocular deviation.     Motor:   None. no abn mvmts.  bulk & tone wnl.      Sensory: none    Coordination: unable    DTR's: damp intact/sym. no clonus. Toes mute.    Gait/Station: n/a       Labs:  Recent Labs      10/17/18   0300  10/18/18   0610  10/19/18   0405   WBC  10.2  7.4  9.4   RBC  3.66*  3.46*  3.23*   HEMOGLOBIN  9.8*  9.6*  8.8*   HEMATOCRIT  31.8*  29.8*  28.5*   MCV  86.9  86.1  88.2   MCH  26.8*  27.7  27.2   MCHC  30.8*  32.2*  30.9*   RDW  47.7  46.4  48.7   PLATELETCT  231  195  243   MPV  10.7  11.0  10.4     Recent Labs      10/17/18   0300  10/18/18   0610  10/19/18   0405   SODIUM  140  141  140   POTASSIUM  4.1  4.7  3.7   CHLORIDE  105  103  102   CO2  25  28  31   GLUCOSE  131*  107*  105*   BUN  22  26*  27*   CREATININE  0.88  0.88  0.87   CALCIUM  8.1*  8.7  9.0                     Recent Labs      10/17/18   0300  10/18/18   0610  10/19/18   0405   SODIUM  140  141  140   POTASSIUM  4.1  4.7  3.7   CHLORIDE  105  103  102   CO2  25  28  31   GLUCOSE  131*  107*  105*   BUN  22  26*  27*     Recent Labs      10/17/18   0300  10/18/18   0610  10/19/18   0405   SODIUM  140  141  140   POTASSIUM  4.1  4.7  3.7   CHLORIDE  105  103  102   CO2  25  28  31   BUN  22  26*  27*   CREATININE  0.88  0.88  0.87   MAGNESIUM  1.8  2.3  2.1   PHOSPHORUS  1.9*  2.6  2.1*   CALCIUM  8.1*  8.7  9.0         No results found for this or any previous visit.           Imaging: neuroimaging reviewed and directly visualized by me  DX-CHEST-PORTABLE (1 VIEW)   Final Result         1.   Bilateral dependent pulmonary infiltrates, similar to prior.   2.  Large quantity of stool in the colon suggests changes of constipation.   3.  Trace right pleural effusion                  DX-CHEST-PORTABLE (1 VIEW)   Final Result         1.  Bilateral dependent pulmonary infiltrates, similar to prior.   2.  Large quantity of stool in the colon suggests changes of constipation.               MR-BRAIN-W/O   Final Result      1.  Bilateral holohemispheric chronic subdural fluid collections.There is no mass effect or midline shift.   2.  Multifocal acute bilateral supratentorial lacunar infarcts.   3.  Mild chronic microvascular ischemic disease.   4.  Mild cerebral atrophy.   5.  Mild diffuse atherosclerotic disease.      DX-CHEST-PORTABLE (1 VIEW)   Final Result         1.  Bilateral dependent pulmonary infiltrates, similar to prior.   2.  Large quantity of stool in the colon suggests changes of constipation.            DX-CHEST-PORTABLE (1 VIEW)   Final Result         1.  Bilateral dependent pulmonary infiltrates, similar to prior.         EC-ECHOCARDIOGRAM COMPLETE W/ CONT   Final Result      DX-CHEST-PORTABLE (1 VIEW)   Final Result         1.  Bilateral dependent pulmonary infiltrates, similar to prior.      IR-US GUIDED PIV   Final Result    Ultrasound-guided PERIPHERAL IV INSERTION performed by    qualified nursing staff as above.            DX-ABDOMEN FOR TUBE PLACEMENT   Final Result      Feeding tube placement with the tip projecting over the proximal stomach      DX-CHEST-PORTABLE (1 VIEW)   Final Result         1.  Bilateral dependent pulmonary infiltrates.      [id number] VIEW   Final Result      Above average stool volume      CT-HEAD W/O   Final Result         1.  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      2.  No parenchymal hemorrhage or brain swelling or edema is noted.      Findings are consistent with atrophy.  Decreased  attenuation in the periventricular white matter likely indicates microvascular ischemic disease.      DX-CHEST-PORTABLE (1 VIEW)   Final Result         1.  No new infiltrates or consolidations identified.      2.  Endotracheal tube and NG tube now noted in place.      US-SALLY SINGLE LEVEL BILAT    (Results Pending)       Assessment/Plan:    ENCEPHALOPATHY, HYPERAMMONEMIA NEW LIKELY DUE PARTLY TO VPA, STAPH SPUTUM  STATUS EPILEPTICUS, RESOLVED, NO HX OF SZS- RECEIVED ZOSYN POSSIBLE TRIGGER 10/6 STARTED- WAS NON TOXIC PER DAUGHTER DOING GREAT NO COMPLAINTS WAS VERY STRESSED AFTER SHE LEFT HER UNWARNED  COULD BE 2ND TRIGGER    SDH BILAT CHRONIC, SZ RISK, no acute findings on MRB    AFIB     LEV 1250 Q8 IV   Start aptiom 400 in AM, then 400 qHS   Q8, level SUPRAtherapeutic, will decrease to 750 q12. Trend ammonia, Replete carnitine/coq10. FOLLOW DEPAKOATE LEVELS IN AM DAILY  R/O TOXIC/METABOLIC SZ RISKS,   NSG RE SDHygromas  NO ANTITHROMBOTIC UNTIL NSG CLEARS FOR SAME WITH SDH   SOURCE FOUND- sputum; AVOID ABX THAT LOWER SZ THRESHOLD            Juan Manuel Vivar M.D.  , Neurohospitalist & Stroke  Clinical Professor, [employer]  Diplomate, Neurology & Neuroimaging

## 2018-10-20 PROBLEM — I95.9 HYPOTENSION: Status: ACTIVE | Noted: 2018-01-01

## 2018-10-20 PROBLEM — K59.01 SLOW TRANSIT CONSTIPATION: Status: RESOLVED | Noted: 2018-01-01 | Resolved: 2018-01-01

## 2018-10-20 NOTE — PROGRESS NOTES
Procedure Note    Date: 10/20/2018  Time:1.30pm.    Procedure: Central Venous Line placement  Site: RIJ  vein    Indication: Venous Access   Consent: Informed consent obtained from patient or designated decision maker after explaining the benefits/risks of the procedure including but not limited to bleeding, infection, nerve or other deep structure injury, pneumothorax/hemothorax, arrythmia, or death. Patient or surrogate expressed understanding and agreement and signed consent which can be found in the patient's chart.    Procedure: After obtaining consent, a time-out was performed. Appropriate site confirmed with ultrasound and patient positioned, prepped, and draped in sterile fashion. All those present wearing cap and mask and those physically participating remained adhering to sterile fashion with cap, mask, gloves, and gown. Patient sedated and intubated and so did not need lidocaine for anaesthesia. Vein localized and accessed using continuous ultrasound guidance and a 7 Fr triple  catheter placed using Seldinger technique. Able to aspirate dark, non-pulsatile blood through each lumen and sterile saline flushed easily before capping. Line secured and dressed in sterile fashion. Patient tolerated procedure well without any difficulties and remains in care of bedside nurse. CXR will be performed to confirm appropriate placement for internal jugular or subclavian CVLs.    EBL: minimal  Complications: None   CXR: confirms placement, centra line projects over the SVC

## 2018-10-20 NOTE — PROGRESS NOTES
Lab called with critical result of aptt at 126.0 at 1338. Critical lab result read back to lab.   This critical lab result is within parameters established by Dr. Welch and the heparin protocol for this patient.

## 2018-10-20 NOTE — PROCEDURES
Date of service:  10/20/2018    Title:  Central venous catheter placement - internal jugular vein    Indication: Hypotension    Narrative:    The right neck was prepped with chlorhexidine and draped in the usual sterile fashion.  1% Xylocaine solution was used for topical anesthesia.  A triple lumen central venous catheter was placed into the right internal jugular vein under ultrasound guidance using the technique described by Kenneth without difficulty or apparent complication.  The line was sutured into place and a sterile dressing was placed over the line.  All 3 ports flush and return venous blood easily.  The patient tolerated the procedure quite nicely.  No complications are apparent.  A STAT CXR is ordered to confirm placement.      Trever Hilario MD  Pulmonary and Critical Care Medicine

## 2018-10-20 NOTE — ASSESSMENT & PLAN NOTE
"Preliminary report  Occlusive thrombus in distal R LEIF.  Per prior notes \"discussed with Dr. Power from vascular surgery on 10/19/2018 he felt that this is likely chronic he did not feel that anticoagulation would be indicated at this time\"  stable continue to monitor  "

## 2018-10-20 NOTE — PROCEDURES
Date of Procedure:  10/20/2018    Title of Procedure:  Diagnostic and therapeutic flexible fiberoptic bronchoscopy with bronchoalveolar lavage    Indication for Procedure:   Atelectasis    Post-procedure Diagnoses:    1.  Normal endobronchial anatomy  2.  No endobronchial tumor identified  3.  Tortuous trachea  4.  Extrinsic compression of the airways in the right lower lobe  5.  Moderate amount of thick yellow secretions seen bilaterally.  All secretions suctioned until cleared.    Narrative:    The patient was sedated, intubated and ventilated at the time of this procedure.  The flexible fiberoptic bronchoscope was inserted through the lumen of the endotracheal tube and advanced into the distal trachea without difficulty.  The airways were examined to the subsegmental bronchus level bilaterally.    The endobronchial anatomy was normal.  No tumor was identified.    The trachea was tortuous.  There was extrinsic compression of the airways in the right lower lobe.  The overlying mucosa appeared normal.  There was a moderate amount of thick yellow secretions seen bilaterally.  All the secretions were suctioned until clear.    Bronchoalveolar lavage was carried out in the basilar segments of the right lower lobe using the standard technique with good fluid return.    Bronchoalveolar lavage fluid from the right lower lobe is submitted to the laboratory for gram stain, culture and sensitivity.    The patient tolerated the procedure quite nicely.  No complications were apparent.  The heart rate and rhythm, blood pressure and oxygenation saturation were continuously monitored.      Trever Hilario MD  Pulmonary and Critical Care Medicine

## 2018-10-20 NOTE — ASSESSMENT & PLAN NOTE
Midodrine 10mg TID   Pressor support with norepinephrine as needed to keep systolic blood pressure greater than 90 and map greater than 65  Monitor I/O's

## 2018-10-20 NOTE — PROGRESS NOTES
[facility] Hospitalist Progress Note    Date of Service: 10/20/2018    Chief Complaint  89 y.o. female admitted 10/13/2018 with seizures  Ms. Becerril has a history of apparent subdural hematomas that had a seizure at [facility] thus was transferred to [facility] for evaluation.  She required intubation due to airway protection. A CT of the head revealed bilateral subdural hygromas without hemorrhage.    Interval Problem Update      Does not follow command  Withdraws x4  SBP 77 after lasix this am receiving fluid bolus  multpile BM post prep  Heparin drip  VD#8 8/50%   Moderate secretions  Day  zyvox  VPA level 126   Nh3 33              Consultants/Specialty  Critical Care.  Neurology      Disposition  ICU        Review of Systems   Unable to perform ROS: Intubated      Physical Exam  Laboratory/Imaging   Hemodynamics  No data recorded.   Monitored Temp: 36.6 °C (97.9 °F)  Pulse  Av.9  Min: 30  Max: 129 Heart Rate (Monitored): (!) 107  NIBP: 102/55      Respiratory  Rider Vent Mode: APVCMV, Rate (breaths/min): 12, PEEP/CPAP: 8, PEEP/CPAP: 8, FiO2: 50, P Peak (PIP): 28, P MEAN: 11   Respiration: (!) 0, Pulse Oximetry: 97 %     Work Of Breathing / Effort: Vented  RUL Breath Sounds: Crackles, RML Breath Sounds: Crackles, RLL Breath Sounds: Crackles, BLANCHE Breath Sounds: Crackles, LLL Breath Sounds: Crackles    Fluids    Intake/Output Summary (Last 24 hours) at 10/20/18 0936  Last data filed at 10/20/18 0800   Gross per 24 hour   Intake           4759.1 ml   Output             1985 ml   Net           2774.1 ml       Nutrition  Orders Placed This Encounter   Procedures   • Diet NPO     Standing Status:   Standing     Number of Occurrences:   1     Order Specific Question:   Type:     Answer:   Now [1]     Order Specific Question:   Restrict to:     Answer:   Strict [1]     Physical Exam   Constitutional: She appears well-developed and well-nourished.   HENT:   Head: Normocephalic and atraumatic.   Mouth/Throat:  Oropharynx is clear and moist.   cortrak in place   Eyes: Pupils are equal, round, and reactive to light. Conjunctivae are normal. Right eye exhibits no discharge. Left eye exhibits no discharge. No scleral icterus.   Neck: Neck supple. No JVD present. No tracheal deviation present.   Cardiovascular: Normal rate.  An irregular rhythm present.   No murmur heard.  Pulmonary/Chest: Effort normal. No respiratory distress. She has decreased breath sounds. She has no wheezes. She has no rhonchi. She has rales in the right lower field. She exhibits no tenderness and no crepitus.   Intubated   Abdominal: Soft. Bowel sounds are normal. She exhibits no distension. There is no tenderness. There is no rebound and no guarding.   Musculoskeletal: She exhibits edema. She exhibits no tenderness.   Right foot cold with delayed capillary refill and nonpalpable pulses   Neurological: No cranial nerve deficit. She exhibits normal muscle tone.   Does not follow command  No focal deficits noted   Skin: Skin is warm and dry. No rash noted. She is not diaphoretic. No cyanosis or erythema. Nails show no clubbing.   Psychiatric: Cognition and memory are impaired.   Obtunded   Nursing note and vitals reviewed.      Recent Labs      10/18/18   0610  10/19/18   0405  10/20/18   0618   WBC  7.4  9.4  10.7   RBC  3.46*  3.23*  3.37*   HEMOGLOBIN  9.6*  8.8*  9.1*   HEMATOCRIT  29.8*  28.5*  29.5*   MCV  86.1  88.2  87.5   MCH  27.7  27.2  27.0   MCHC  32.2*  30.9*  30.8*   RDW  46.4  48.7  48.0   PLATELETCT  195  243  247   MPV  11.0  10.4  10.8     Recent Labs      10/18/18   0610  10/19/18   0405  10/20/18   0618   SODIUM  141  140  143   POTASSIUM  4.7  3.7  3.5*   CHLORIDE  103  102  98   CO2  28  31  35*   GLUCOSE  107*  105*  105*   BUN  26*  27*  29*   CREATININE  0.88  0.87  0.90   CALCIUM  8.7  9.0  8.7     Recent Labs      10/19/18   1735  10/19/18   2320  10/20/18   0618   APTT  34.3  29.7  32.9                  Assessment/Plan     *  Acute respiratory failure with hypoxia (HCC)- (present on admission)   Assessment & Plan    Secondary to seizures and MRSA pneumonia    Vent management per critical care discussed with  Dr Hinton              Seizure ([facility])- (present on admission)   Assessment & Plan    New onset seizures.  CT head reveals subdural hygromas, could be sequelae of prior subdural hematomas.  No acute pathology on MRI of brain  She was intubated for airway protection  Neurology following continue Keppra  VPA dose decreased given elevated levels   Neurology following discussed with Dr. 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    Report and patient discussed with Dr. Power from vascular surgery he felt that this is likely chronic he did not feel that anticoagulation would be indicated at this time based on this specific finding    Foot appears better perfused today continue close clinical monitoring  Given increased risk of bleeding with  history of hygromas and age and deconditioning and anemia will DC heparin drip        CRP elevated   Assessment & Plan    Likely secondary to pneumonia        Slow transit constipation   Assessment & Plan    Good response to GoLYTELY  Continue bowel protocol        Paroxysmal atrial fibrillation ([facility])   Assessment & Plan    DC heparin drip    metoprolol as tolerated          Hyperammonemia ([facility])   Assessment & Plan    Likely secondary to valproic acid  Ammonia levels improved continue l-carnitine and monitor        Hypophosphatemia    Assessment & Plan    Replete and monitor        Dementia- (present on admission)   Assessment & Plan    Reported hx of        Hypokalemia- (present on admission)   Assessment & Plan    Replete potassium and monitor        Elevated troponin- (present on admission)   Assessment & Plan    Likely demand ischemia in setting of recurrent seizures  EF 40%  She will need further workup when more clinically stable depending on her recovery  Diuresis as tolerated        Anemia of chronic disease- (present on admission)   Assessment & Plan    Hemoglobin 9.1 stable continue to monitor          Overall prognosis guarded  Family decided to continue current level of care with DNR status  Quality-Core Measures   Reviewed items::  Labs reviewed, Medications reviewed and Radiology images reviewed  Rosa catheter::  Unconscious / Sedated Patient on a Ventilator  DVT prophylaxis pharmacological::  Enoxaparin (Lovenox)  DVT prophylaxis - mechanical:  SCDs  Ulcer Prophylaxis::  Yes  Assessed for rehabilitation services:  Patient unable to tolerate rehabilitation therapeutic regimen

## 2018-10-20 NOTE — CARE PLAN
Problem: Safety  Goal: Will remain free from injury  Outcome: PROGRESSING AS EXPECTED  Restraints checked q2h, bed alarm on, continuous EEG in place, bed locked and in lowest position, 3 bed rails up, room close to nursing station.    Problem: Venous Thromboembolism (VTW)/Deep Vein Thrombosis (DVT) Prevention:  Goal: Patient will participate in Venous Thrombosis (VTE)/Deep Vein Thrombosis (DVT)Prevention Measures  Outcome: PROGRESSING AS EXPECTED  Patient started on Heparin drip today.     Problem: Bowel/Gastric:  Goal: Normal bowel function is maintained or improved  Outcome: PROGRESSING AS EXPECTED  Patient with numerous loose stools today, normoactive bowel sounds, soft belly upon palpation.    Problem: Safety:  Goal: Will remain free from injury  Outcome: PROGRESSING AS EXPECTED  Restraints checked q2h, bed alarm on, continuous EEG in place, bed locked and in lowest position, 3 bed rails up, room close to nursing station.

## 2018-10-20 NOTE — PROCEDURES
VIDEO ELECTROENCEPHALOGRAM / EPILEPSY MONITORING UNIT REPORT        Referring provider: Dr. Martinez.      DOS:   10/20/2018 - 10/21/2018 (total recording for 23 hours and 49 minutes).      INDICATION:  Rene Becerril 89 y.o. female presenting with seizures.      CURRENT ANTIEPILEPTIC REGIMEN: Levetiracetam 1250 mg q 8 hrs, Valproic Acid 750 mg q 12 hrs, and Eslicarbazepine 400 mg.      TECHNIQUE: A 30-channel, 24 hrs video electroencephalogram (VEEG) was performed in accordance with the international 10-20 system. This digital study was reviewed in bipolar and referential montages. The recording examined a sedated and/or encephalopathic patient.      DESCRIPTION OF THE RECORD:  During the awake state, background shows symmetrical 5-6 Hz theta activity posteriorly with amplitude of 70 mV.  During the sleep and sedated state, background shows diffuse high-amplitude 4-5 Hz delta activity.  Symmetrical high-amplitude sleep spindles and vertex sharp activities were seen in the central leads.     ACTIVATION PROCEDURES:   Not performed.      ICTAL AND/OR INTERICTAL FINDINGS:   Frequent right frontal sharps and intermittent right hemispheric slowing. Sharps become frequently and briefly periodic (right PLEDS), but without any clear indication of seizures.      EKG: sampling review of EKG recording demonstrated a sinus arhythmia.         INTERPRETATION:   This is an abnormal 24 hours video electroencephalogram recording in the awake, drowsy and sleep state.  A moderate encephalopathy is suggested. Frequent right frontal sharps and intermittent right hemispheric slowing. Sharps become frequently and briefly periodic (right PLEDS), but without any clear indication of seizures.  The study is improved from yesterday's, but the patient remains at a significant increased risk for recurrent seizures. The findings suggest large areas of underlying cortical irritability and structural abnormality. Clinical and radiological correlation  is recommended.     Updates provided to Dr. Messi Velazquez MD   Epilepsy and Neurodiagnostics.   Clinical  of Neurology [facility].   Diplomate in Neurology, Epilepsy, and Electrodiagnostic Medicine.   Office: [phone]  Fax: [phone]

## 2018-10-20 NOTE — RESPIRATORY CARE
Adult Ventilation Update    Total Vent Days: 8    Patient Lines/Drains/Airways Status    Active Airway     Name: Placement date: Placement time: Site: Days:    Airway ETT Oral 7.5 10/13/18   1320   Oral   8              No SBT     Plateau Pressure (Q Shift): 23 (10/19/18 0731)  Static Compliance (ml / cm H2O): 32 (10/20/18 0240)    Patient failed trials because of Barriers to Wean: Other (Comments) (cont. EEG procedure in place) (10/19/18 0731)  Barriers to SBT Weaning Trial Stopped due to:: Pt weaned for 1 hour and returned to rest settings per protocol (10/15/18 1916)  Length of Weaning Trial Length of Weaning Trial (Hours): 3.5 (3 hrs 30 mins) (10/15/18 1916)    Weaning as tolerated.

## 2018-10-20 NOTE — RESPIRATORY CARE
Adult Ventilation Update    Total Vent Days: 8    APVCMV  12  330  +8  50%    Patient Lines/Drains/Airways Status    Active Airway     Name: Placement date: Placement time: Site: Days:    Airway ETT Oral 7.5@22 10/13/18   1320   Oral   7              Plateau Pressure (Q Shift): 17 (10/20/18 1110)  Static Compliance (ml / cm H2O): 22 (10/20/18 1536)    Patient failed trials because of Barriers to Wean: Other (Comments) (cont. eeg procedure in place) (10/20/18 0705)    Cough: Productive (10/20/18 1600)  Sputum Amount: Moderate (10/20/18 1600)  Sputum Color: Bloody;Clear (10/20/18 1600)  Sputum Consistency: Thick;Thin (10/20/18 1600)    Mobility  Level of Mobility: Level I (10/20/18 1600)  Activity Performed: Unable to mobilize (10/20/18 1600)  Pt Calls for Assistance: No (10/20/18 1600)  Reason Not Mobilized:  (EEG) (10/20/18 1600)    Events/Summary/Plan: Bronchoscopy procedure performed today, ET tube advanced to 22 at the gum (from 20). No vent changes made

## 2018-10-20 NOTE — PROGRESS NOTES
Critical Care Progress Note    Date of admission  10/13/2018    Chief Complaint  89 y.o. female admitted 10/13/2018 with new onset seizures.    Hospital Course    This lady was admitted to the ICU with seizures and respiratory failure.      Interval Problem Update  Reviewed last 24 hour events:      Withdraws x 4  AF  Low BP with lasix - give fluid bolus  Hep gtt 1450  Zyvox 2/7  Vent 8  Stop Lasix      Review of Systems  Review of Systems   Unable to perform ROS: Acuity of condition        Vital Signs for last 24 hours   Pulse:  [] 110  Resp:  [12-19] 12    Hemodynamic parameters for last 24 hours       Vent Settings for last 24 hours  [place] Vent Mode: APVCMV  Rate (breaths/min):  [12] 12  PEEP/CPAP:  [8] 8  FiO2:  [40-60] 60  P Peak (PIP):  [19-35] 19  P MEAN:  [10-14] 10    Physical Exam   Physical Exam   Constitutional:   On ventilator   HENT:   Head: Normocephalic.   Right Ear: External ear normal.   Left Ear: External ear normal.   Mouth/Throat: No oropharyngeal exudate.   Eyes: Pupils are equal, round, and reactive to light. Right eye exhibits no discharge. Left eye exhibits no discharge. No scleral icterus.   Neck: Neck supple. No JVD present. No tracheal deviation present.   Cardiovascular: Intact distal pulses.  Exam reveals no gallop and no friction rub.    Sinus rhythm   Pulmonary/Chest: She has no wheezes. She has rales (Scattered crackles bilaterally).   Abdominal: Soft. Bowel sounds are normal. She exhibits no distension. There is no tenderness. There is no guarding.   Tolerating enteral tube feedings   Musculoskeletal: She exhibits edema. She exhibits no tenderness.   No clubbing or cyanosis   Neurological:   Withdraws in all 4 extremities.  Does not follow for me.   Skin: Skin is warm and dry. She is not diaphoretic. No erythema. No pallor.       Medications  Current Facility-Administered Medications   Medication Dose Route Frequency Provider Last Rate Last Dose   • valproate (DEPACON) 750  mg in  mL IVPB  750 mg Intravenous Q12HRS Juan Manuel Vivar M.D.   Stopped at 10/19/18 1826   • eslicarbazepine (APTIOM) tablet 400 mg  400 mg Oral QHS Juan Manuel Vivar M.D.   400 mg at 10/19/18 2131   • linezolid (ZYVOX) tablet 600 mg  600 mg Oral Q12HRS Trever Hilario M.D.   600 mg at 10/19/18 1459   • heparin injection 2,600 Units  2,600 Units Intravenous PRN Tono Jaimes M.D.   2,600 Units at 10/20/18 0023    And   • heparin infusion 25,000 units in 500 ml 0.45% nacl   Intravenous Continuous Tono Jaimes M.D. 25 mL/hr at 10/20/18 0027 1,250 Units/hr at 10/20/18 0027   • norepinephrine (LEVOPHED) 8 mg in  mL Infusion  0-30 mcg/min Intravenous Continuous Nestor Pelletier M.D.   Stopped at 10/18/18 0600   • furosemide (LASIX) injection 20 mg  20 mg Intravenous Q DAY Trever Hilario M.D.   20 mg at 10/19/18 0603   • LORazepam (ATIVAN) injection 1-4 mg  1-4 mg Intravenous Q10 MIN PRN Trever Hilario M.D.   2 mg at 10/17/18 1218   • levOCARNitine (CARNITOR) 1 GM/10ML solution 500 mg  500 mg Feeding Tube BID WITH MEALS Trever Hilario M.D.   500 mg at 10/19/18 1718   • polyethylene glycol/lytes (MIRALAX) PACKET 1 Packet  1 Packet Feeding Tube BID Trever Hilario M.D.   Stopped at 10/19/18 0600    And   • senna-docusate (PERICOLACE or SENOKOT S) 8.6-50 MG per tablet 2 Tab  2 Tab Feeding Tube BID Trever Hilario M.D.   Stopped at 10/19/18 0600    And   • magnesium hydroxide (MILK OF MAGNESIA) suspension 30 mL  30 mL Feeding Tube QDAY PRN Trever Hilario M.D.   30 mL at 10/17/18 1352    And   • bisacodyl (DULCOLAX) suppository 10 mg  10 mg Rectal QDAY PRN Trever Hilario M.D.   10 mg at 10/18/18 1516   • acetaminophen (TYLENOL) tablet 650 mg  650 mg Feeding Tube Q6HRS PRN Trever Hilario M.D.   650 mg at 10/19/18 1716    Or   • acetaminophen (TYLENOL) suppository 650 mg  650 mg Rectal Q4HRS PRN Trever Hilario M.D.       •  metoprolol (LOPRESSOR) tablet 12.5 mg  12.5 mg Oral TWICE DAILY Tono Jaimes M.D.   12.5 mg at 10/19/18 1716   • levETIRAcetam (KEPPRA) 1,250 mg in  mL IVPB  1,250 mg Intravenous Q8HR Juan Manuel Vivar M.D. 400 mL/hr at 10/20/18 0309 1,250 mg at 10/20/18 0309   • Pharmacy Consult: Enteral tube feeding - review meds/change route/product selection   Other PRN Paolo Son Jr., D.O.       • aspirin (ASA) chewable tab 81 mg  81 mg Per NG Tube DAILY Trever Hilario M.D.   81 mg at 10/19/18 0610   • famotidine (PEPCID) tablet 20 mg  20 mg Feeding Tube DAILY Trever Hilario M.D.   20 mg at 10/19/18 0610   • dexmedetomidine (PRECEDEX) 400 mcg/100mL NS premix infusion  0.1-1.5 mcg/kg/hr Intravenous Continuous Gomez Holguin M.D.   Stopped at 10/15/18 1547   • NS infusion   Intravenous Continuous Trever Hilario M.D. 10 mL/hr at 10/18/18 1400     • ondansetron (ZOFRAN) syringe/vial injection 4 mg  4 mg Intravenous Q4HRS PRN Edgar Moody M.D.       • ondansetron (ZOFRAN ODT) dispertab 4 mg  4 mg Oral Q4HRS PRN Edgar Moody M.D.       • Respiratory Care per Protocol   Nebulization Continuous RT Gomez Holguin M.D.       • MD Alert...ICU Electrolyte Replacement per Pharmacy   Other pharmacy to dose Gomez Holguin M.D.       • fentaNYL (SUBLIMAZE) injection 25 mcg  25 mcg Intravenous Q HOUR PRN Gomez Holguin M.D.   25 mcg at 10/16/18 2046    Or   • fentaNYL (SUBLIMAZE) injection 50 mcg  50 mcg Intravenous Q HOUR PRN Gomez Holguin M.D.   50 mcg at 10/20/18 0355    Or   • fentaNYL (SUBLIMAZE) injection 100 mcg  100 mcg Intravenous Q HOUR PRN Gomez Holguin M.D.       • ipratropium-albuterol (DUONEB) nebulizer solution  3 mL Nebulization Q2HRS PRN (RT) Gomez Holguin M.D.       • ipratropium-albuterol (DUONEB) nebulizer solution  3 mL Nebulization Q4HRS (RT) Gomez Holguin M.D.   3 mL at 10/20/18 0240       Fluids    Intake/Output Summary (Last 24 hours) at 10/20/18  0410  Last data filed at 10/20/18 0200   Gross per 24 hour   Intake           4779.1 ml   Output             1515 ml   Net           3264.1 ml       Laboratory  Recent Results (from the past 48 hour(s))   CBC with Differential    Collection Time: 10/18/18  6:10 AM   Result Value Ref Range    WBC 7.4 4.8 - 10.8 K/uL    RBC 3.46 (L) 4.20 - 5.40 M/uL    Hemoglobin 9.6 (L) 12.0 - 16.0 g/dL    Hematocrit 29.8 (L) 37.0 - 47.0 %    MCV 86.1 81.4 - 97.8 fL    MCH 27.7 27.0 - 33.0 pg    MCHC 32.2 (L) 33.6 - 35.0 g/dL    RDW 46.4 35.9 - 50.0 fL    Platelet Count 195 164 - 446 K/uL    MPV 11.0 9.0 - 12.9 fL    Neutrophils-Polys 83.10 (H) 44.00 - 72.00 %    Lymphocytes 8.00 (L) 22.00 - 41.00 %    Monocytes 7.50 0.00 - 13.40 %    Eosinophils 0.50 0.00 - 6.90 %    Basophils 0.10 0.00 - 1.80 %    Immature Granulocytes 0.80 0.00 - 0.90 %    Nucleated RBC 0.00 /100 WBC    Neutrophils (Absolute) 6.16 2.00 - 7.15 K/uL    Lymphs (Absolute) 0.59 (L) 1.00 - 4.80 K/uL    Monos (Absolute) 0.56 0.00 - 0.85 K/uL    Eos (Absolute) 0.04 0.00 - 0.51 K/uL    Baso (Absolute) 0.01 0.00 - 0.12 K/uL    Immature Granulocytes (abs) 0.06 0.00 - 0.11 K/uL    NRBC (Absolute) 0.00 K/uL   Basic Metabolic Panel (BMP)    Collection Time: 10/18/18  6:10 AM   Result Value Ref Range    Sodium 141 135 - 145 mmol/L    Potassium 4.7 3.6 - 5.5 mmol/L    Chloride 103 96 - 112 mmol/L    Co2 28 20 - 33 mmol/L    Glucose 107 (H) 65 - 99 mg/dL    Bun 26 (H) 8 - 22 mg/dL    Creatinine 0.88 0.50 - 1.40 mg/dL    Calcium 8.7 8.5 - 10.5 mg/dL    Anion Gap 10.0 0.0 - 11.9   Magnesium    Collection Time: 10/18/18  6:10 AM   Result Value Ref Range    Magnesium 2.3 1.5 - 2.5 mg/dL   Phosphorus    Collection Time: 10/18/18  6:10 AM   Result Value Ref Range    Phosphorus 2.6 2.5 - 4.5 mg/dL   VALPROIC ACID    Collection Time: 10/18/18  6:10 AM   Result Value Ref Range    Valproic Acid 133.0 (HH) 50.0 - 100.0 ug/mL   ESTIMATED GFR    Collection Time: 10/18/18  6:10 AM   Result  Value Ref Range    GFR If African American >60 >60 mL/min/1.73 m 2    GFR If Non African American >60 >60 mL/min/1.73 m 2   URINALYSIS    Collection Time: 10/18/18  1:45 PM   Result Value Ref Range    Color Yellow     Character Clear     Specific Gravity 1.013 <1.035    Ph 5.0 5.0 - 8.0    Glucose Negative Negative mg/dL    Ketones Negative Negative mg/dL    Protein Negative Negative mg/dL    Bilirubin Negative Negative    Urobilinogen, Urine 0.2 Negative    Nitrite Negative Negative    Leukocyte Esterase Negative Negative    Occult Blood Negative Negative    Micro Urine Req see below    AFB    Collection Time: 10/18/18  4:40 PM   Result Value Ref Range    Significant Indicator NEG     Source RESP     Site Quantitative BAL RLL     AFB Culture Culture in progress.     AFB Smear Results Testing in progress.    FUNGAL CULTURE    Collection Time: 10/18/18  4:40 PM   Result Value Ref Range    Significant Indicator NEG     Source RESP     Site Quantitative BAL RLL     Fungal Culture Culture in progress.    QUANT BRONCHIAL WASHING    Collection Time: 10/18/18  4:40 PM   Result Value Ref Range    Significant Indicator POS (POS)     Source RESP     Site Quantitative BAL RLL     Quantitative Bronch Washing (A)     Gram Stain Result (A)      Moderate WBCs.  Many Gram positive cocci.  <5% intracellular organisms.      Quantitative Bronch Washing (A)      Staphylococcus aureus  >100,000 cfu/mL  Methicillin resistant by screening method.     GRAM STAIN    Collection Time: 10/18/18  4:40 PM   Result Value Ref Range    Significant Indicator . (POS)     Source RESP     Site Quantitative BAL RLL     Gram Stain Result (A)      Moderate WBCs.  Many Gram positive cocci.  <5% intracellular organisms.     ACID FAST STAIN    Collection Time: 10/18/18  4:40 PM   Result Value Ref Range    Significant Indicator NEG     Source RESP     Site Quantitative BAL RLL     AFB Smear Results Testing in progress.    CBC with Differential    Collection  Time: 10/19/18  4:05 AM   Result Value Ref Range    WBC 9.4 4.8 - 10.8 K/uL    RBC 3.23 (L) 4.20 - 5.40 M/uL    Hemoglobin 8.8 (L) 12.0 - 16.0 g/dL    Hematocrit 28.5 (L) 37.0 - 47.0 %    MCV 88.2 81.4 - 97.8 fL    MCH 27.2 27.0 - 33.0 pg    MCHC 30.9 (L) 33.6 - 35.0 g/dL    RDW 48.7 35.9 - 50.0 fL    Platelet Count 243 164 - 446 K/uL    MPV 10.4 9.0 - 12.9 fL    Neutrophils-Polys 83.70 (H) 44.00 - 72.00 %    Lymphocytes 4.90 (L) 22.00 - 41.00 %    Monocytes 10.20 0.00 - 13.40 %    Eosinophils 0.30 0.00 - 6.90 %    Basophils 0.20 0.00 - 1.80 %    Immature Granulocytes 0.70 0.00 - 0.90 %    Nucleated RBC 0.00 /100 WBC    Neutrophils (Absolute) 7.85 (H) 2.00 - 7.15 K/uL    Lymphs (Absolute) 0.46 (L) 1.00 - 4.80 K/uL    Monos (Absolute) 0.96 (H) 0.00 - 0.85 K/uL    Eos (Absolute) 0.03 0.00 - 0.51 K/uL    Baso (Absolute) 0.02 0.00 - 0.12 K/uL    Immature Granulocytes (abs) 0.07 0.00 - 0.11 K/uL    NRBC (Absolute) 0.00 K/uL   Basic Metabolic Panel (BMP)    Collection Time: 10/19/18  4:05 AM   Result Value Ref Range    Sodium 140 135 - 145 mmol/L    Potassium 3.7 3.6 - 5.5 mmol/L    Chloride 102 96 - 112 mmol/L    Co2 31 20 - 33 mmol/L    Glucose 105 (H) 65 - 99 mg/dL    Bun 27 (H) 8 - 22 mg/dL    Creatinine 0.87 0.50 - 1.40 mg/dL    Calcium 9.0 8.5 - 10.5 mg/dL    Anion Gap 7.0 0.0 - 11.9   Magnesium    Collection Time: 10/19/18  4:05 AM   Result Value Ref Range    Magnesium 2.1 1.5 - 2.5 mg/dL   Phosphorus    Collection Time: 10/19/18  4:05 AM   Result Value Ref Range    Phosphorus 2.1 (L) 2.5 - 4.5 mg/dL   VALPROIC ACID    Collection Time: 10/19/18  4:05 AM   Result Value Ref Range    Valproic Acid 165.3 (HH) 50.0 - 100.0 ug/mL   TSH WITH REFLEX TO FT4    Collection Time: 10/19/18  4:05 AM   Result Value Ref Range    TSH 1.270 0.380 - 5.330 uIU/mL   ESTIMATED GFR    Collection Time: 10/19/18  4:05 AM   Result Value Ref Range    GFR If African American >60 >60 mL/min/1.73 m 2    GFR If Non  >60 >60  mL/min/1.73 m 2   ISTAT ARTERIAL BLOOD GAS    Collection Time: 10/19/18  4:34 AM   Result Value Ref Range    Ph 7.468 7.400 - 7.500    Pco2 49.8 (H) 26.0 - 37.0 mmHg    Po2 58 (L) 64 - 87 mmHg    Tco2 38 (H) 20 - 33 mmol/L    S02 91 (L) 93 - 99 %    Hco3 36.0 (H) 17.0 - 25.0 mmol/L    BE 11 (H) -4 - 3 mmol/L    Body Temp 98.6 F degrees    O2 Therapy 50 %    iPF Ratio 116     Ph Temp Tea 7.468 7.400 - 7.500    Pco2 Temp Co 49.8 (H) 26.0 - 37.0 mmHg    Po2 Temp Cor 58 (L) 64 - 87 mmHg    Specimen Arterial     Action Range Triggered NO     Inst. Qualified Patient YES    APTT    Collection Time: 10/19/18  5:35 PM   Result Value Ref Range    APTT 34.3 24.7 - 36.0 sec   APTT    Collection Time: 10/19/18 11:20 PM   Result Value Ref Range    APTT 29.7 24.7 - 36.0 sec       Imaging  X-Ray:  I have personally reviewed the images and compared with prior images. and My impression is: Increased right lower lobe opacification    Assessment/Plan  * Acute respiratory failure with hypoxia (HCC)- (present on admission)   Assessment & Plan    Intubated 10/13  Continue vent support  All appropriate ventilator bundles have been initiated        Pneumonia due to methicillin resistant Staphylococcus aureus (HCC)   Assessment & Plan    Continue Zyvox, 600 mg twice daily        Seizure (HCC)- (present on admission)   Assessment & Plan    CT with small bilateral hygromas without mass-effect  EEG shows improvement in seizures  Continuous video EEG in place  Continue valproic acid, 750 mg every 12 hours  Continue Keppra, 1250 mg every 8 hours  Continue Aptiom, 400 mg nightly  Valproic acid levels are high - continue levocarnitine, 500 mg twice daily and monitor ammonia levels  Ammonia level improved  MRI on 10/17 with multiple acute bilateral supratentorial lacunar infarcts        Limb ischemia   Assessment & Plan    Occlusion in right LEIF likely chronic  Stop heparin drip due to increased risk of intracranial hemorrhage  Prophylactic  heparin        Acute systolic heart failure (HCC)   Assessment & Plan    EF 40%  Stop Lasix with hypotension        CKD (chronic kidney disease) stage 3, GFR 30-59 ml/min (HCC)- (present on admission)   Assessment & Plan    Avoid nephrotoxins  Monitor renal function        Paroxysmal atrial fibrillation (HCC)   Assessment & Plan    Optimize magnesium and potassium  TSH is normal        Hyperammonemia (HCC)   Assessment & Plan    Due to valproic acid  Improved  Continue levocarnitine, 500 mg twice daily        Hypophosphatemia   Assessment & Plan    Resolved after phosphorus repletion        DNR (do not resuscitate)   Assessment & Plan    DNR status        Dementia- (present on admission)   Assessment & Plan    History of mild dementia        Hypokalemia- (present on admission)   Assessment & Plan    Replete potassium        Elevated troponin- (present on admission)   Assessment & Plan    Due to demand ischemia             VTE:  Heparin  Ulcer: H2 Antagonist  Lines: Central Line  Ongoing indication addressed and Rosa Catheter  Ongoing indication addressed    I have performed a physical exam and reviewed and updated ROS and Plan today (10/20/2018). In review of yesterday's note (10/19/2018), there are no changes except as documented above.     I have assessed and reassessed her respiratory status with ventilator adjustments, blood pressure, hemodynamics, neurologic status and cardiovascular status.  She is at increased risk for worsening respiratory and CNS system dysfunction.    Discussed patient condition and risk of morbidity and/or mortality with Hospitalist, Family, RN, RT, Pharmacy, Charge nurse / hot rounds and QA team  The patient remains critically ill.  Critical care time = 90 minutes in directly providing and coordinating critical care and extensive data review.  No time overlap and excludes procedures.    Trever Hilario MD  Pulmonary and Critical Care Medicine

## 2018-10-20 NOTE — CARE PLAN
Problem: Bowel/Gastric:  Goal: Will not experience complications related to bowel motility    Intervention: Implement interventions to promote bowel evacuation if inadequate bowel movements in past 48 hours  Aggressive bowel protocol, rectal trumpet.      Problem: Fluid Volume:  Goal: Will maintain balanced intake and output  Outcome: PROGRESSING SLOWER THAN EXPECTED  Monitor BP and administer fluids per MAR.

## 2018-10-21 PROBLEM — E27.40 ADRENAL INSUFFICIENCY (HCC): Status: ACTIVE | Noted: 2018-01-01

## 2018-10-21 NOTE — PROGRESS NOTES
Critical Care Progress Note    Date of admission  10/13/2018    Chief Complaint  89 y.o. female admitted 10/13/2018 with new onset seizures.    Hospital Course    This lady was admitted to the ICU with seizures and respiratory failure.      Interval Problem Update  Reviewed last 24 hour events:      More purposeful movements  AF  Replete K, PO4, Mg  NE 5  Vent 9  PEEP 8  Heparin SQ  Zyvox  Decrease Keppra 1500 q12  Decrease  q 12  albumin      Review of Systems  Review of Systems   Unable to perform ROS: Acuity of condition        Vital Signs for last 24 hours   Pulse:  [] 110  Resp:  [0-20] 13    Hemodynamic parameters for last 24 hours       Vent Settings for last 24 hours  [place] Vent Mode: APVCMV  Rate (breaths/min):  [12] 12  PEEP/CPAP:  [8] 8  FiO2:  [5-50] 5  P Peak (PIP):  [18-28] 21  P MEAN:  [10-14] 14    Physical Exam   Physical Exam   Constitutional:   On ventilator   HENT:   Head: Normocephalic.   Right Ear: External ear normal.   Left Ear: External ear normal.   Mouth/Throat: Oropharynx is clear and moist.   Eyes: Pupils are equal, round, and reactive to light. Right eye exhibits no discharge. Left eye exhibits no discharge. No scleral icterus.   Neck: Neck supple. No JVD present. No tracheal deviation present.   Cardiovascular: Intact distal pulses.  Exam reveals no gallop and no friction rub.    Atrial fibrillation   Pulmonary/Chest: She has no wheezes. She has rales (Coarse crackles bilaterally).   Abdominal: Soft. Bowel sounds are normal. She exhibits no distension. There is no tenderness. There is no rebound.   Tolerating enteral tube feedings   Musculoskeletal: She exhibits edema. She exhibits no tenderness.   No clubbing or cyanosis   Neurological:   Withdraws in all 4 extremities.  Does not follow for me.  Arouses more easily.   Skin: Skin is warm and dry. She is not diaphoretic. No erythema. No pallor.       Medications  Current Facility-Administered Medications   Medication  Dose Route Frequency Provider Last Rate Last Dose   • heparin injection 5,000 Units  5,000 Units Subcutaneous Q8HRS Tono Jaimes M.D.   5,000 Units at 10/20/18 2314   • valproate (DEPACON) 750 mg in  mL IVPB  750 mg Intravenous Q12HRS Juan Manuel Vivar M.D.   Stopped at 10/20/18 1827   • eslicarbazepine (APTIOM) tablet 400 mg  400 mg Oral QHS Juan Manuel Vivar M.D.   400 mg at 10/20/18 2050   • linezolid (ZYVOX) tablet 600 mg  600 mg Oral Q12HRS Trever Hilario M.D.   600 mg at 10/20/18 1738   • norepinephrine (LEVOPHED) 8 mg in  mL Infusion  0-30 mcg/min Intravenous Continuous Nestor Pelletier M.D. 5.6 mL/hr at 10/20/18 1729 3 mcg/min at 10/20/18 1729   • LORazepam (ATIVAN) injection 1-4 mg  1-4 mg Intravenous Q10 MIN PRN Trever Hilario M.D.   2 mg at 10/17/18 1218   • levOCARNitine (CARNITOR) 1 GM/10ML solution 500 mg  500 mg Feeding Tube BID WITH MEALS Trever Hilario M.D.   500 mg at 10/20/18 1738   • polyethylene glycol/lytes (MIRALAX) PACKET 1 Packet  1 Packet Feeding Tube BID Trever Hilario M.D.   Stopped at 10/19/18 0600    And   • senna-docusate (PERICOLACE or SENOKOT S) 8.6-50 MG per tablet 2 Tab  2 Tab Feeding Tube BID Trever Hilario M.D.   Stopped at 10/19/18 0600    And   • magnesium hydroxide (MILK OF MAGNESIA) suspension 30 mL  30 mL Feeding Tube QDAY PRN Trever Hilario M.D.   30 mL at 10/17/18 1352    And   • bisacodyl (DULCOLAX) suppository 10 mg  10 mg Rectal QDAY PRN Trever Hilario M.D.   10 mg at 10/18/18 1516   • acetaminophen (TYLENOL) tablet 650 mg  650 mg Feeding Tube Q6HRS PRN Trever Hilario M.D.   650 mg at 10/19/18 1716    Or   • acetaminophen (TYLENOL) suppository 650 mg  650 mg Rectal Q4HRS PRN Trever Hilario M.D.       • metoprolol (LOPRESSOR) tablet 12.5 mg  12.5 mg Oral TWICE DAILY Tono Jaimes M.D.   Stopped at 10/20/18 1800   • levETIRAcetam (KEPPRA) 1,250 mg in  mL IVPB  1,250 mg  Intravenous Q8HR Juan Manuel Vivar M.D.   Stopped at 10/21/18 0155   • Pharmacy Consult: Enteral tube feeding - review meds/change route/product selection   Other PRN Paolo Son Jr., D.O.       • aspirin (ASA) chewable tab 81 mg  81 mg Per NG Tube DAILY Trever Hilario M.D.   81 mg at 10/20/18 0612   • famotidine (PEPCID) tablet 20 mg  20 mg Feeding Tube DAILY Trever Hilario M.D.   20 mg at 10/20/18 0612   • NS infusion   Intravenous Continuous Trever Hilario M.D. 10 mL/hr at 10/18/18 1400     • ondansetron (ZOFRAN) syringe/vial injection 4 mg  4 mg Intravenous Q4HRS PRN Edgar Moody M.D.       • ondansetron (ZOFRAN ODT) dispertab 4 mg  4 mg Oral Q4HRS PRN Edgar Moody M.D.       • Respiratory Care per Protocol   Nebulization Continuous RT Gomez Holguin M.D.       • MD Alert...ICU Electrolyte Replacement per Pharmacy   Other pharmacy to dose Gomez Holguin M.D.       • fentaNYL (SUBLIMAZE) injection 25 mcg  25 mcg Intravenous Q HOUR PRN Gomez Holguin M.D.   25 mcg at 10/16/18 2046    Or   • fentaNYL (SUBLIMAZE) injection 50 mcg  50 mcg Intravenous Q HOUR PRN Gomez Holguin M.D.   50 mcg at 10/20/18 1311    Or   • fentaNYL (SUBLIMAZE) injection 100 mcg  100 mcg Intravenous Q HOUR PRN Gomez Holguin M.D.       • ipratropium-albuterol (DUONEB) nebulizer solution  3 mL Nebulization Q2HRS PRN (RT) Gomez Holguin M.D.       • ipratropium-albuterol (DUONEB) nebulizer solution  3 mL Nebulization Q4HRS (RT) Gomez Holguin M.D.   3 mL at 10/21/18 0311       Fluids    Intake/Output Summary (Last 24 hours) at 10/21/18 0406  Last data filed at 10/21/18 0200   Gross per 24 hour   Intake          3188.17 ml   Output             2650 ml   Net           538.17 ml       Laboratory  Recent Results (from the past 48 hour(s))   ISTAT ARTERIAL BLOOD GAS    Collection Time: 10/19/18  4:34 AM   Result Value Ref Range    Ph 7.468 7.400 - 7.500    Pco2 49.8 (H) 26.0 - 37.0 mmHg    Po2 58 (L)  64 - 87 mmHg    Tco2 38 (H) 20 - 33 mmol/L    S02 91 (L) 93 - 99 %    Hco3 36.0 (H) 17.0 - 25.0 mmol/L    BE 11 (H) -4 - 3 mmol/L    Body Temp 98.6 F degrees    O2 Therapy 50 %    iPF Ratio 116     Ph Temp Tea 7.468 7.400 - 7.500    Pco2 Temp Co 49.8 (H) 26.0 - 37.0 mmHg    Po2 Temp Cor 58 (L) 64 - 87 mmHg    Specimen Arterial     Action Range Triggered NO     Inst. Qualified Patient YES    APTT    Collection Time: 10/19/18  5:35 PM   Result Value Ref Range    APTT 34.3 24.7 - 36.0 sec   APTT    Collection Time: 10/19/18 11:20 PM   Result Value Ref Range    APTT 29.7 24.7 - 36.0 sec   ISTAT ARTERIAL BLOOD GAS    Collection Time: 10/20/18  4:39 AM   Result Value Ref Range    Ph 7.495 7.400 - 7.500    Pco2 51.0 (HH) 26.0 - 37.0 mmHg    Po2 132 (H) 64 - 87 mmHg    Tco2 41 (HH) 20 - 33 mmol/L    S02 99 93 - 99 %    Hco3 39.3 (H) 17.0 - 25.0 mmol/L    BE 14 (H) -4 - 3 mmol/L    Body Temp 98.1 F degrees    O2 Therapy 60 %    iPF Ratio 220     Ph Temp Tea 7.499 7.400 - 7.500    Pco2 Temp Co 50.4 (H) 26.0 - 37.0 mmHg    Po2 Temp Cor 131 (H) 64 - 87 mmHg    Specimen Arterial     Action Range Triggered YES     Inst. Qualified Patient YES    CBC with Differential    Collection Time: 10/20/18  6:18 AM   Result Value Ref Range    WBC 10.7 4.8 - 10.8 K/uL    RBC 3.37 (L) 4.20 - 5.40 M/uL    Hemoglobin 9.1 (L) 12.0 - 16.0 g/dL    Hematocrit 29.5 (L) 37.0 - 47.0 %    MCV 87.5 81.4 - 97.8 fL    MCH 27.0 27.0 - 33.0 pg    MCHC 30.8 (L) 33.6 - 35.0 g/dL    RDW 48.0 35.9 - 50.0 fL    Platelet Count 247 164 - 446 K/uL    MPV 10.8 9.0 - 12.9 fL    Nucleated RBC 0.00 /100 WBC    NRBC (Absolute) 0.00 K/uL    Neutrophils-Polys 84.00 (H) 44.00 - 72.00 %    Lymphocytes 5.60 (L) 22.00 - 41.00 %    Monocytes 10.40 0.00 - 13.40 %    Eosinophils 0.00 0.00 - 6.90 %    Basophils 0.00 0.00 - 1.80 %    Neutrophils (Absolute) 8.99 (H) 2.00 - 7.15 K/uL    Lymphs (Absolute) 0.60 (L) 1.00 - 4.80 K/uL    Monos (Absolute) 1.11 (H) 0.00 - 0.85 K/uL    Eos  (Absolute) 0.00 0.00 - 0.51 K/uL    Baso (Absolute) 0.00 0.00 - 0.12 K/uL    Anisocytosis 1+     Macrocytosis 1+    Basic Metabolic Panel (BMP)    Collection Time: 10/20/18  6:18 AM   Result Value Ref Range    Sodium 143 135 - 145 mmol/L    Potassium 3.5 (L) 3.6 - 5.5 mmol/L    Chloride 98 96 - 112 mmol/L    Co2 35 (H) 20 - 33 mmol/L    Glucose 105 (H) 65 - 99 mg/dL    Bun 29 (H) 8 - 22 mg/dL    Creatinine 0.90 0.50 - 1.40 mg/dL    Calcium 8.7 8.5 - 10.5 mg/dL    Anion Gap 10.0 0.0 - 11.9   Magnesium    Collection Time: 10/20/18  6:18 AM   Result Value Ref Range    Magnesium 2.0 1.5 - 2.5 mg/dL   Phosphorus    Collection Time: 10/20/18  6:18 AM   Result Value Ref Range    Phosphorus 2.6 2.5 - 4.5 mg/dL   VALPROIC ACID    Collection Time: 10/20/18  6:18 AM   Result Value Ref Range    Valproic Acid 126.1 (HH) 50.0 - 100.0 ug/mL   AMMONIA    Collection Time: 10/20/18  6:18 AM   Result Value Ref Range    Ammonia 33 11 - 45 umol/L   APTT    Collection Time: 10/20/18  6:18 AM   Result Value Ref Range    APTT 32.9 24.7 - 36.0 sec   ESTIMATED GFR    Collection Time: 10/20/18  6:18 AM   Result Value Ref Range    GFR If African American >60 >60 mL/min/1.73 m 2    GFR If Non African American 59 (A) >60 mL/min/1.73 m 2   PERIPHERAL SMEAR REVIEW    Collection Time: 10/20/18  6:18 AM   Result Value Ref Range    Peripheral Smear Review see below    PLATELET ESTIMATE    Collection Time: 10/20/18  6:18 AM   Result Value Ref Range    Plt Estimation Normal    MORPHOLOGY    Collection Time: 10/20/18  6:18 AM   Result Value Ref Range    RBC Morphology Present     Giant Platelets 3+     Ovalocytes 1+     Target Cells 1+    DIFFERENTIAL MANUAL    Collection Time: 10/20/18  6:18 AM   Result Value Ref Range    Manual Diff Status PERFORMED    APTT    Collection Time: 10/20/18 12:41 PM   Result Value Ref Range    APTT 126.0 (HH) 24.7 - 36.0 sec       Imaging  X-Ray:  I have personally reviewed the images and compared with prior images. and My  impression is: Unchanged right greater than left lower lobe opacification    Assessment/Plan  * Acute respiratory failure with hypoxia (HCC)- (present on admission)   Assessment & Plan    Intubated 10/13  All appropriate ventilator bundles have been initiated  Continue vent support        Pneumonia due to methicillin resistant Staphylococcus aureus ([facility])   Assessment & Plan    Continue Zyvox, 600 mg twice daily        Seizure ([facility])- (present on admission)   Assessment & Plan    CT with small bilateral hygromas without mass-effect  EEG shows improvement in seizures  Continuous video EEG in place  Decreased valproic acid, 500 mg every 12 hours  Decrease Keppra, 1500 mg every 12 hours  Continue Aptiom, 400 mg nightly  Valproic acid levels are high - continue levocarnitine, 500 mg twice daily and monitor ammonia levels  MRI on 10/17 with multiple acute bilateral supratentorial lacunar infarcts        Relative adrenal insufficiency ([facility])   Assessment & Plan    Start hydrocortisone, 50 mg IV every 6 hours        Hypotension   Assessment & Plan    Titrating norepinephrine to maintain MAP > 65        Limb ischemia   Assessment & Plan    Occlusion in right LEIF likely chronic  Continue prophylactic heparin        Acute systolic heart failure (HCC)   Assessment & Plan    EF 40%  Force diuresis with Diamox        CKD (chronic kidney disease) stage 3, GFR 30-59 ml/min ([facility])- (present on admission)   Assessment & Plan    Avoid nephrotoxins  Monitor renal function        Paroxysmal atrial fibrillation (HCC)   Assessment & Plan    Optimize magnesium and potassium  TSH is normal        Hyperammonemia ([facility])   Assessment & Plan    Resolved  Due to valproic acid  Continue levocarnitine, 500 mg twice daily        Hypomagnesemia   Assessment & Plan    Replete magnesium        Hypophosphatemia   Assessment & Plan    Replete phosphorus        DNR (do not resuscitate)   Assessment & Plan    DNR status        Dementia- (present on admission)    Assessment & Plan    History of mild dementia        Hypokalemia- (present on admission)   Assessment & Plan    Replete potassium        Elevated troponin- (present on admission)   Assessment & Plan    Due to demand ischemia             VTE:  Heparin  Ulcer: H2 Antagonist  Lines: Central Line  Ongoing indication addressed and Rosa Catheter  Ongoing indication addressed    I have performed a physical exam and reviewed and updated ROS and Plan today (10/21/2018). In review of yesterday's note (10/20/2018), there are no changes except as documented above.     I have assessed and reassessed her respiratory status with ventilator adjustments, hemodynamics, blood pressure, cardiovascular status with titration of a norepinephrine drip and neurologic status.  She is at increased risk for worsening CNS and respiratory system dysfunction.    Discussed patient condition and risk of morbidity and/or mortality with Hospitalist, RN, RT, Pharmacy, Charge nurse / hot rounds and QA team  The patient remains critically ill.  Critical care time = 87 minutes in directly providing and coordinating critical care and extensive data review.  No time overlap and excludes procedures.    Trever Hilario MD  Pulmonary and Critical Care Medicine

## 2018-10-21 NOTE — PROGRESS NOTES
CC- SDH, STATUS EPILEPTICUS    S- better per RN.  No other complaints.     O-   Allergies: No Known Allergies      Current Facility-Administered Medications:   •  heparin injection 5,000 Units, 5,000 Units, Subcutaneous, Q8HRS, Tono Jaimes M.D.  •  valproate (DEPACON) 750 mg in  mL IVPB, 750 mg, Intravenous, Q12HRS, Juan Manuel Vivar M.D., Last Rate: 100 mL/hr at 10/20/18 1727, 750 mg at 10/20/18 1727  •  eslicarbazepine (APTIOM) tablet 400 mg, 400 mg, Oral, QHS, Juan Manuel Vivar M.D., 400 mg at 10/19/18 2131  •  linezolid (ZYVOX) tablet 600 mg, 600 mg, Oral, Q12HRS, Trever Hilario M.D., 600 mg at 10/20/18 1738  •  norepinephrine (LEVOPHED) 8 mg in  mL Infusion, 0-30 mcg/min, Intravenous, Continuous, Nestor Pelletier M.D., Last Rate: 5.6 mL/hr at 10/20/18 1729, 3 mcg/min at 10/20/18 1729  •  furosemide (LASIX) injection 20 mg, 20 mg, Intravenous, Q DAY, Trever Hilario M.D., 20 mg at 10/20/18 0618  •  LORazepam (ATIVAN) injection 1-4 mg, 1-4 mg, Intravenous, Q10 MIN PRN, Trever Hilario M.D., 2 mg at 10/17/18 1218  •  levOCARNitine (CARNITOR) 1 GM/10ML solution 500 mg, 500 mg, Feeding Tube, BID WITH MEALS, Trever Hilario M.D., 500 mg at 10/20/18 1738  •  senna-docusate (PERICOLACE or SENOKOT S) 8.6-50 MG per tablet 2 Tab, 2 Tab, Feeding Tube, BID, Stopped at 10/19/18 0600 **AND** polyethylene glycol/lytes (MIRALAX) PACKET 1 Packet, 1 Packet, Feeding Tube, BID, Stopped at 10/19/18 0600 **AND** magnesium hydroxide (MILK OF MAGNESIA) suspension 30 mL, 30 mL, Feeding Tube, QDAY PRN, 30 mL at 10/17/18 1352 **AND** bisacodyl (DULCOLAX) suppository 10 mg, 10 mg, Rectal, QDAY PRN, Trever Hilario M.D., 10 mg at 10/18/18 1516  •  acetaminophen (TYLENOL) tablet 650 mg, 650 mg, Feeding Tube, Q6HRS PRN, 650 mg at 10/19/18 1716 **OR** acetaminophen (TYLENOL) suppository 650 mg, 650 mg, Rectal, Q4HRS PRN, Trever Hilario M.D.  •  metoprolol (LOPRESSOR) tablet 12.5 mg,  12.5 mg, Oral, TWICE DAILY, Tono Jaimes M.D., Stopped at 10/20/18 1800  •  levETIRAcetam (KEPPRA) 1,250 mg in  mL IVPB, 1,250 mg, Intravenous, Q8HR, Juan Manuel Vivar M.D., Stopped at 10/20/18 1132  •  Pharmacy Consult: Enteral tube feeding - review meds/change route/product selection, , Other, PRN, Paolo Son Jr., D.O.  •  aspirin (ASA) chewable tab 81 mg, 81 mg, Per NG Tube, DAILY, Trever Hilario M.D., 81 mg at 10/20/18 0612  •  famotidine (PEPCID) tablet 20 mg, 20 mg, Feeding Tube, DAILY, 20 mg at 10/20/18 0612 **OR** [DISCONTINUED] famotidine (PEPCID) injection 20 mg, 20 mg, Intravenous, DAILY, Gomez Holugin M.D.  •  NS infusion, , Intravenous, Continuous, Trever Hilario M.D., Last Rate: 10 mL/hr at 10/18/18 1400  •  ondansetron (ZOFRAN) syringe/vial injection 4 mg, 4 mg, Intravenous, Q4HRS PRN, Edgar Moody M.D.  •  ondansetron (ZOFRAN ODT) dispertab 4 mg, 4 mg, Oral, Q4HRS PRN, Edgar Moody M.D.  •  Respiratory Care per Protocol, , Nebulization, Continuous RT, Gomez Holguin M.D.  •  MD Alert...ICU Electrolyte Replacement per Pharmacy, , Other, pharmacy to dose, Gomez Holguin M.D.  •  fentaNYL (SUBLIMAZE) injection 25 mcg, 25 mcg, Intravenous, Q HOUR PRN, 25 mcg at 10/16/18 2046 **OR** fentaNYL (SUBLIMAZE) injection 50 mcg, 50 mcg, Intravenous, Q HOUR PRN, 50 mcg at 10/20/18 1311 **OR** fentaNYL (SUBLIMAZE) injection 100 mcg, 100 mcg, Intravenous, Q HOUR PRN, Gomez Holguin M.D.  •  ipratropium-albuterol (DUONEB) nebulizer solution, 3 mL, Nebulization, Q2HRS PRN (RT), Gomez Holguin M.D.  •  ipratropium-albuterol (DUONEB) nebulizer solution, 3 mL, Nebulization, Q4HRS (RT), Gomez Holguin M.D., 3 mL at 10/20/18 1411      PHYSICAL EXAM    Vitals:    10/20/18 1400 10/20/18 1412 10/20/18 1500 10/20/18 1536   Pulse: (!) 115  (!) 115    Resp: (!) 5  12    Temp:       SpO2: 100% 96% (!) 85% 90%   Weight:       Height:           Head/Neck: NCAT. no meningismus  neg kernig neg brudzinski. No obvious mass or heard bruit. No tender arteries or lost pulses. No rash of head or neck.     Skin: Warm, dry, intact. No rashes observed head/neck or body     Eyes/Funduscopic: unable     Mental Status: sedated     Cranial Nerves:  PERRL 2mm sluggish. No ocular deviation.            Motor:   None. no abn mvmts.  bulk & tone wnl.               Sensory: pain sym withdraw x4     Coordination: unable     DTR's: damp intact/sym. no clonus. Toes mute.     Gait/Station: n/a        Labs:  Recent Labs      10/18/18   0610  10/19/18   0405  10/20/18   0618   WBC  7.4  9.4  10.7   RBC  3.46*  3.23*  3.37*   HEMOGLOBIN  9.6*  8.8*  9.1*   HEMATOCRIT  29.8*  28.5*  29.5*   MCV  86.1  88.2  87.5   MCH  27.7  27.2  27.0   MCHC  32.2*  30.9*  30.8*   RDW  46.4  48.7  48.0   PLATELETCT  195  243  247   MPV  11.0  10.4  10.8     Recent Labs      10/18/18   0610  10/19/18   0405  10/20/18   0618   SODIUM  141  140  143   POTASSIUM  4.7  3.7  3.5*   CHLORIDE  103  102  98   CO2  28  31  35*   GLUCOSE  107*  105*  105*   BUN  26*  27*  29*   CREATININE  0.88  0.87  0.90   CALCIUM  8.7  9.0  8.7     Recent Labs      10/19/18   2320  10/20/18   0618  10/20/18   1241   APTT  29.7  32.9  126.0*                 Recent Labs      10/18/18   0610  10/19/18   0405  10/20/18   0618   SODIUM  141  140  143   POTASSIUM  4.7  3.7  3.5*   CHLORIDE  103  102  98   CO2  28  31  35*   GLUCOSE  107*  105*  105*   BUN  26*  27*  29*     Recent Labs      10/18/18   0610  10/19/18   0405  10/20/18   0618   SODIUM  141  140  143   POTASSIUM  4.7  3.7  3.5*   CHLORIDE  103  102  98   CO2  28  31  35*   BUN  26*  27*  29*   CREATININE  0.88  0.87  0.90   MAGNESIUM  2.3  2.1  2.0   PHOSPHORUS  2.6  2.1*  2.6   CALCIUM  8.7  9.0  8.7     Recent Labs      10/19/18   2320  10/20/18   0618  10/20/18   1241   APTT  29.7  32.9  126.0*     No results found for this or any previous visit.           Imaging: neuroimaging reviewed and  directly visualized by me  DX-CHEST-LIMITED (1 VIEW)   Final Result      Right perihilar and bibasilar opacities may represent asymmetric edema, atelectasis, pneumonia.      Right pleural effusion is likely present.      Right central line projects over the SVC. No pneumothorax.      Stable prominence of the cardiomediastinal silhouette.            DX-CHEST-PORTABLE (1 VIEW)   Final Result         1.  Bilateral dependent pulmonary infiltrates, similar to prior.   2.  Trace right pleural effusion                     US-SALLY SINGLE LEVEL BILAT   Final Result      US-EXTREMITY ARTERY LOWER UNILAT RIGHT   Final Result      DX-CHEST-PORTABLE (1 VIEW)   Final Result         1.  Bilateral dependent pulmonary infiltrates, similar to prior.   2.  Large quantity of stool in the colon suggests changes of constipation.   3.  Trace right pleural effusion                  DX-CHEST-PORTABLE (1 VIEW)   Final Result         1.  Bilateral dependent pulmonary infiltrates, similar to prior.   2.  Large quantity of stool in the colon suggests changes of constipation.               MR-BRAIN-W/O   Final Result      1.  Bilateral holohemispheric chronic subdural fluid collections.There is no mass effect or midline shift.   2.  Multifocal acute bilateral supratentorial lacunar infarcts.   3.  Mild chronic microvascular ischemic disease.   4.  Mild cerebral atrophy.   5.  Mild diffuse atherosclerotic disease.      DX-CHEST-PORTABLE (1 VIEW)   Final Result         1.  Bilateral dependent pulmonary infiltrates, similar to prior.   2.  Large quantity of stool in the colon suggests changes of constipation.            DX-CHEST-PORTABLE (1 VIEW)   Final Result         1.  Bilateral dependent pulmonary infiltrates, similar to prior.         EC-ECHOCARDIOGRAM COMPLETE W/ CONT   Final Result      DX-CHEST-PORTABLE (1 VIEW)   Final Result         1.  Bilateral dependent pulmonary infiltrates, similar to prior.      IR-US GUIDED PIV   Final Result     Ultrasound-guided PERIPHERAL IV INSERTION performed by    qualified nursing staff as above.            DX-ABDOMEN FOR TUBE PLACEMENT   Final Result      Feeding tube placement with the tip projecting over the proximal stomach      DX-CHEST-PORTABLE (1 VIEW)   Final Result         1.  Bilateral dependent pulmonary infiltrates.      [id number] VIEW   Final Result      Above average stool volume      CT-HEAD W/O   Final Result         1.  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      2.  No parenchymal hemorrhage or brain swelling or edema is noted.      Findings are consistent with atrophy.  Decreased attenuation in the periventricular white matter likely indicates microvascular ischemic disease.      DX-CHEST-PORTABLE (1 VIEW)   Final Result         1.  No new infiltrates or consolidations identified.      2.  Endotracheal tube and NG tube now noted in place.      EEG IMPROVED, SEE REPORT    Assessment/Plan:    ENCEPHALOPATHY, HYPERAMMONEMIA NEW LIKELY DUE PARTLY TO VPA NOW CORRECTED, STAPH SPUTUM  STATUS EPILEPTICUS, RESOLVED, NO HX OF SZS- RECEIVED ZOSYN POSSIBLE TRIGGER 10/6 STARTED- WAS NON TOXIC PER DAUGHTER DOING GREAT NO COMPLAINTS WAS VERY STRESSED AFTER SHE LEFT HER UNWARNED  COULD BE 2ND TRIGGER     SDH BILAT CHRONIC, SZ RISK, no acute findings on MRB     AFIB     LEV 1250 Q8 IV   aptiom 400 qHS   Q8, level SUPRAtherapeutic, will decrease to 750 q12. Trend ammonia, Replete carnitine/coq10. FOLLOW DEPAKOATE LEVELS IN AM DAILY  R/O TOXIC/METABOLIC SZ RISKS  NSG RE SDHygromas  NO ANTITHROMBOTIC UNTIL NSG CLEARS FOR SAME WITH SDH   MRSA sputum; AVOID ABX THAT LOWER SZ THRESHOLD        Juan Manuel Vivar M.D.  , Neurohospitalist & Stroke  Clinical Professor, [employer]  Diplomate, Neurology & Neuroimaging

## 2018-10-21 NOTE — PROGRESS NOTES
[facility] Hospitalist Progress Note    Date of Service: 10/21/2018    Chief Complaint  89 y.o. female admitted 10/13/2018 with seizures  Ms. Becerril has a history of apparent subdural hematomas that had a seizure at [facility] thus was transferred to [facility] for evaluation.  She required intubation due to airway protection. A CT of the head revealed bilateral subdural hygromas without hemorrhage.    Interval Problem Update      More purposeful movement  AFib 110's  Levo at 3  BP labile   Good urine output  1.7 L   VD#9     keppra and VPA decreased per neurology              Consultants/Specialty  Critical Care.  Neurology      Disposition  ICU        Review of Systems   Unable to perform ROS: Intubated      Physical Exam  Laboratory/Imaging   Hemodynamics  No data recorded.   Monitored Temp: 37.5 °C (99.5 °F)  Pulse  Av.6  Min: 30  Max: 129 Heart Rate (Monitored): (!) 108  NIBP: (!) 95/57      Respiratory  Rider Vent Mode: APVCMV, Rate (breaths/min): 12, PEEP/CPAP: 8, PEEP/CPAP: 8, FiO2: 50, P Peak (PIP): 23, P MEAN: 11   Respiration: 12, Pulse Oximetry: 100 %     Work Of Breathing / Effort: Vented  RUL Breath Sounds: Clear, RML Breath Sounds: Clear, RLL Breath Sounds: Diminished, BLANCHE Breath Sounds: Clear, LLL Breath Sounds: Diminished    Fluids    Intake/Output Summary (Last 24 hours) at 10/21/18 0929  Last data filed at 10/21/18 0617   Gross per 24 hour   Intake          3050.57 ml   Output             2115 ml   Net           935.57 ml       Nutrition  Orders Placed This Encounter   Procedures   • Diet NPO     Standing Status:   Standing     Number of Occurrences:   1     Order Specific Question:   Type:     Answer:   Now [1]     Order Specific Question:   Restrict to:     Answer:   Strict [1]     Physical Exam   Constitutional: She appears well-developed and well-nourished.   HENT:   Head: Normocephalic and atraumatic.   Cortrak in place   Eyes: Pupils are equal, round, and reactive to light. Right eye  exhibits no discharge. Left eye exhibits no discharge. No scleral icterus.   Neck: Neck supple. No JVD present. No tracheal deviation present.   Cardiovascular: An irregular rhythm present. Exam reveals no gallop and no friction rub.    No murmur heard.  Tachycardia   Pulmonary/Chest: Effort normal. No stridor. No respiratory distress. She has no decreased breath sounds. She has no wheezes. She has no rhonchi. She has rales. She exhibits no tenderness and no bony tenderness.   Intubated   Abdominal: Soft. Bowel sounds are normal. She exhibits no distension. There is no tenderness. There is no rebound and no guarding.   Musculoskeletal: She exhibits edema. She exhibits no tenderness.   Right foot cold with delayed capillary refill and nonpalpable pulses   Neurological:   Patient is lethargic she follows command in all extremities  No focal deficits   Skin: Skin is warm and dry. No rash noted. She is not diaphoretic. No cyanosis or erythema. Nails show no clubbing.   Psychiatric: She is slowed. She is noncommunicative.   Nursing note and vitals reviewed.      Recent Labs      10/19/18   0405  10/20/18   0618  10/21/18   0448   WBC  9.4  10.7  8.4   RBC  3.23*  3.37*  3.52*   HEMOGLOBIN  8.8*  9.1*  9.7*   HEMATOCRIT  28.5*  29.5*  30.3*   MCV  88.2  87.5  86.1   MCH  27.2  27.0  27.6   MCHC  30.9*  30.8*  32.0*   RDW  48.7  48.0  47.7   PLATELETCT  243  247  259   MPV  10.4  10.8  10.8     Recent Labs      10/19/18   0405  10/20/18   0618  10/21/18   0448   SODIUM  140  143  140   POTASSIUM  3.7  3.5*  3.3*   CHLORIDE  102  98  98   CO2  31  35*  36*   GLUCOSE  105*  105*  126*   BUN  27*  29*  32*   CREATININE  0.87  0.90  0.80   CALCIUM  9.0  8.7  8.8     Recent Labs      10/19/18   2320  10/20/18   0618  10/20/18   1241   APTT  29.7  32.9  126.0*                  Assessment/Plan     * Acute respiratory failure with hypoxia (HCC)- (present on admission)   Assessment & Plan    Secondary to seizures and MRSA  pneumonia    Vent management per critical care              Pneumonia due to methicillin resistant Staphylococcus aureus (HCC)   Assessment & Plan    Continue Zyvox        Seizure ([facility])- (present on admission)   Assessment & Plan    New onset seizures.  CT head reveals subdural hygromas, could be sequelae of prior subdural hematomas.  No acute pathology on MRI of brain  She was intubated for airway protection  Neurology following  Valproate and Keppra doses decreased by neurology  On Aptiom          Hypotension   Assessment & Plan    Wean off norepinephrine as tolerated  Give IV albumin  Check cortisol level        Limb ischemia   Assessment & Plan    Preliminary report  Occlusive thrombus in distal R LEIF.    Report and patient discussed with Dr. Power from vascular surgery on 10/19/2018 he felt that this is likely chronic he did not feel that anticoagulation would be indicated at this time based on this specific finding    Continue clinical monitoring        CKD (chronic kidney disease) stage 3, GFR 30-59 ml/min ([facility])- (present on admission)   Assessment & Plan    stable        CRP elevated   Assessment & Plan    Likely secondary to pneumonia        Paroxysmal atrial fibrillation (HCC)   Assessment & Plan    metoprolol as tolerated  Given subdural hygromas patient not good candidate for chronic anticoagulation          Hyperammonemia ([facility])   Assessment & Plan    Likely secondary to valproic acid  Ammonia levels improved continue l-carnitine and monitor        Hypomagnesemia   Assessment & Plan    Replete with magnesium sulfate intravenously        Hypophosphatemia   Assessment & Plan    K-Phos        Dementia- (present on admission)   Assessment & Plan    Reported hx of        Hypokalemia- (present on admission)   Assessment & Plan    Replete and monitor        Elevated troponin- (present on admission)   Assessment & Plan    Likely demand ischemia in setting of recurrent seizures  EF 40%  She will need further  workup when more clinically stable depending on her recovery            Anemia of chronic disease- (present on admission)   Assessment & Plan    No clinical signs of bleeding    Hemoglobin improved          Discussed with pharmacist nursing staff critical care and neurology  Quality-Core Measures   Reviewed items::  Labs reviewed, Medications reviewed and Radiology images reviewed  Rosa catheter::  Unconscious / Sedated Patient on a Ventilator  DVT prophylaxis pharmacological::  Heparin  DVT prophylaxis - mechanical:  SCDs  Ulcer Prophylaxis::  Yes  Assessed for rehabilitation services:  Patient unable to tolerate rehabilitation therapeutic regimen

## 2018-10-21 NOTE — RESPIRATORY CARE
Adult Ventilation Update    Total Vent Days: 9    Patient Lines/Drains/Airways Status    Active Airway     Name: Placement date: Placement time: Site: Days:    Airway ETT Oral 7.5 10/13/18   1320   Oral   9                 Plateau Pressure (Q Shift): 18 (10/20/18 2301)  Static Compliance (ml / cm H2O): 45 (10/21/18 0311)    Patient failed trials because of Barriers to Wean: Other (Comments) (cont. eeg procedure in place) (10/20/18 0705)  Barriers to SBT Weaning Trial Stopped due to:: Pt weaned for 1 hour and returned to rest settings per protocol (10/15/18 1916)  Length of Weaning Trial Length of Weaning Trial (Hours): 3.5 (3 hrs 30 mins) (10/15/18 1916)    Weaning as tolerated.

## 2018-10-21 NOTE — PROGRESS NOTES
Yuli from Lab called with critical result of valproic acid at 0737. Critical lab result read back to Yuli.   This critical lab result is within parameters established by Dr. Vivar for this patient and meds have been adjusted.

## 2018-10-21 NOTE — CARE PLAN
Problem: Venous Thromboembolism (VTW)/Deep Vein Thrombosis (DVT) Prevention:  Goal: Patient will participate in Venous Thrombosis (VTE)/Deep Vein Thrombosis (DVT)Prevention Measures  Outcome: PROGRESSING AS EXPECTED  SCDs in place and turned on. Heparin subQ per MAR.    Problem: Skin Integrity  Goal: Risk for impaired skin integrity will decrease  Outcome: PROGRESSING AS EXPECTED  Patient's skin assessed. Clean, dry and intact. Q2 hour turns initiated. Mositurizer applied when necessary.

## 2018-10-21 NOTE — PROGRESS NOTES
CC- SDH, STATUS EPILEPTICUS     S- none new.  No other complaints.     O-   Allergies: No Known Allergies      Current Facility-Administered Medications:   •  levETIRAcetam (KEPPRA) 1,500 mg in  mL IVPB, 1,500 mg, Intravenous, Q12HRS, Juan Manuel Vivar M.D.  •  valproate (DEPACON) 500 mg in D5W 50 mL IVPB, 500 mg, Intravenous, Q12HRS, Juan Manuel Vivar M.D.  •  potassium phosphates 30 mmol in  mL ivpb, 30 mmol, Intravenous, Once, Trever Hilario M.D., Last Rate: 83.3 mL/hr at 10/21/18 1056, 30 mmol at 10/21/18 1056  •  magnesium sulfate IVPB premix 2 g, 2 g, Intravenous, Once, Trever Hilario M.D., Last Rate: 25 mL/hr at 10/21/18 1053, 2 g at 10/21/18 1053  •  hydrocortisone sodium succinate PF (SOLU-CORTEF) 100 MG injection 50 mg, 50 mg, Intravenous, Q6HRS, Trever Hilario M.D., 50 mg at 10/21/18 1132  •  albumin human 25% solution 25 g, 25 g, Intravenous, Q6HRS, Tono Jiames M.D.  •  heparin injection 5,000 Units, 5,000 Units, Subcutaneous, Q8HRS, Tono Jaimes M.D., 5,000 Units at 10/21/18 0616  •  eslicarbazepine (APTIOM) tablet 400 mg, 400 mg, Oral, QHS, Juan Manuel Vivar M.D., 400 mg at 10/20/18 2050  •  linezolid (ZYVOX) tablet 600 mg, 600 mg, Oral, Q12HRS, Trever Hilario M.D., 600 mg at 10/21/18 0615  •  norepinephrine (LEVOPHED) 8 mg in  mL Infusion, 0-30 mcg/min, Intravenous, Continuous, Nestor Pelletier M.D., Last Rate: 11.3 mL/hr at 10/21/18 1000, 6 mcg/min at 10/21/18 1000  •  LORazepam (ATIVAN) injection 1-4 mg, 1-4 mg, Intravenous, Q10 MIN PRN, Trever Hilario M.D., 2 mg at 10/17/18 1218  •  levOCARNitine (CARNITOR) 1 GM/10ML solution 500 mg, 500 mg, Feeding Tube, BID WITH MEALS, Trever Hilario M.D., 500 mg at 10/21/18 0616  •  senna-docusate (PERICOLACE or SENOKOT S) 8.6-50 MG per tablet 2 Tab, 2 Tab, Feeding Tube, BID, Stopped at 10/19/18 0600 **AND** polyethylene glycol/lytes (MIRALAX) PACKET 1 Packet, 1 Packet, Feeding Tube, BID,  Stopped at 10/19/18 0600 **AND** magnesium hydroxide (MILK OF MAGNESIA) suspension 30 mL, 30 mL, Feeding Tube, QDAY PRN, 30 mL at 10/17/18 1352 **AND** bisacodyl (DULCOLAX) suppository 10 mg, 10 mg, Rectal, QDAY PRN, Trever Hilario M.D., 10 mg at 10/18/18 1516  •  acetaminophen (TYLENOL) tablet 650 mg, 650 mg, Feeding Tube, Q6HRS PRN, 650 mg at 10/19/18 1716 **OR** acetaminophen (TYLENOL) suppository 650 mg, 650 mg, Rectal, Q4HRS PRN, Trever Hilario M.D.  •  Pharmacy Consult: Enteral tube feeding - review meds/change route/product selection, , Other, PRN, Paolo Son Jr., D.O.  •  aspirin (ASA) chewable tab 81 mg, 81 mg, Per NG Tube, DAILY, rTever Hilario M.D., 81 mg at 10/21/18 0616  •  famotidine (PEPCID) tablet 20 mg, 20 mg, Feeding Tube, DAILY, 20 mg at 10/21/18 0616 **OR** [DISCONTINUED] famotidine (PEPCID) injection 20 mg, 20 mg, Intravenous, DAILY, Gomez Holguin M.D.  •  NS infusion, , Intravenous, Continuous, Trever Hilario M.D., Last Rate: 10 mL/hr at 10/18/18 1400  •  ondansetron (ZOFRAN) syringe/vial injection 4 mg, 4 mg, Intravenous, Q4HRS PRN, Edgar Moody M.D.  •  ondansetron (ZOFRAN ODT) dispertab 4 mg, 4 mg, Oral, Q4HRS PRN, Edgar Moody M.D.  •  Respiratory Care per Protocol, , Nebulization, Continuous RT, Gomez Holguin M.D.  •  MD Alert...ICU Electrolyte Replacement per Pharmacy, , Other, pharmacy to dose, Gomez Holguin M.D.  •  fentaNYL (SUBLIMAZE) injection 25 mcg, 25 mcg, Intravenous, Q HOUR PRN, 25 mcg at 10/16/18 2046 **OR** fentaNYL (SUBLIMAZE) injection 50 mcg, 50 mcg, Intravenous, Q HOUR PRN, 50 mcg at 10/21/18 1126 **OR** fentaNYL (SUBLIMAZE) injection 100 mcg, 100 mcg, Intravenous, Q HOUR PRN, Gomez Holguin M.D.  •  ipratropium-albuterol (DUONEB) nebulizer solution, 3 mL, Nebulization, Q2HRS PRN (RT), Gomez Holguin M.D.  •  ipratropium-albuterol (DUONEB) nebulizer solution, 3 mL, Nebulization, Q4HRS (RT), Gomez Holguin M.D.,  3 mL at 10/21/18 1058      PHYSICAL EXAM    Vitals:    10/21/18 1045 10/21/18 1059 10/21/18 1100 10/21/18 1115   Pulse: 76  94 (!) 110   Resp: 12  (!) 23 19   Temp:       SpO2: 99% 100% 99% 98%   Weight:       Height:           Head/Neck: NCAT. no meningismus neg kernig neg brudzinski. No obvious mass or heard bruit. No tender arteries or lost pulses. No rash of head or neck.     Skin: Warm, dry, intact. No rashes observed head/neck or body     Eyes/Funduscopic: unable     Mental Status: sedated     Cranial Nerves:  PERRL 2mm sluggish. No ocular deviation.            Motor:   None. no abn mvmts.  bulk & tone wnl.      Sensory: pain sym withdraw x4 more robust now     Coordination: unable     DTR's: damp intact/sym. no clonus. Toes mute.     Gait/Station: n/a            Labs:  Recent Labs      10/19/18   0405  10/20/18   0618  10/21/18   0448   WBC  9.4  10.7  8.4   RBC  3.23*  3.37*  3.52*   HEMOGLOBIN  8.8*  9.1*  9.7*   HEMATOCRIT  28.5*  29.5*  30.3*   MCV  88.2  87.5  86.1   MCH  27.2  27.0  27.6   MCHC  30.9*  30.8*  32.0*   RDW  48.7  48.0  47.7   PLATELETCT  243  247  259   MPV  10.4  10.8  10.8     Recent Labs      10/19/18   0405  10/20/18   0618  10/21/18   0448   SODIUM  140  143  140   POTASSIUM  3.7  3.5*  3.3*   CHLORIDE  102  98  98   CO2  31  35*  36*   GLUCOSE  105*  105*  126*   BUN  27*  29*  32*   CREATININE  0.87  0.90  0.80   CALCIUM  9.0  8.7  8.8     Recent Labs      10/19/18   2320  10/20/18   0618  10/20/18   1241   APTT  29.7  32.9  126.0*                 Recent Labs      10/19/18   0405  10/20/18   0618  10/21/18   0448   SODIUM  140  143  140   POTASSIUM  3.7  3.5*  3.3*   CHLORIDE  102  98  98   CO2  31  35*  36*   GLUCOSE  105*  105*  126*   BUN  27*  29*  32*     Recent Labs      10/19/18   0405  10/20/18   0618  10/21/18   0448   SODIUM  140  143  140   POTASSIUM  3.7  3.5*  3.3*   CHLORIDE  102  98  98   CO2  31  35*  36*   BUN  27*  29*  32*   CREATININE  0.87  0.90  0.80    MAGNESIUM  2.1  2.0  1.9   PHOSPHORUS  2.1*  2.6  1.6*   CALCIUM  9.0  8.7  8.8     Recent Labs      10/19/18   2320  10/20/18   0618  10/20/18   1241   APTT  29.7  32.9  126.0*     No results found for this or any previous visit.           Imaging: neuroimaging reviewed and directly visualized by me  DX-CHEST-PORTABLE (1 VIEW)   Final Result         1.  Bilateral dependent pulmonary infiltrates or edema, similar to prior.   2.  Small right pleural effusion                        DX-CHEST-LIMITED (1 VIEW)   Final Result      Right perihilar and bibasilar opacities may represent asymmetric edema, atelectasis, pneumonia.      Right pleural effusion is likely present.      Right central line projects over the SVC. No pneumothorax.      Stable prominence of the cardiomediastinal silhouette.            DX-CHEST-PORTABLE (1 VIEW)   Final Result         1.  Bilateral dependent pulmonary infiltrates, similar to prior.   2.  Trace right pleural effusion                     US-SALLY SINGLE LEVEL BILAT   Final Result      US-EXTREMITY ARTERY LOWER UNILAT RIGHT   Final Result      DX-CHEST-PORTABLE (1 VIEW)   Final Result         1.  Bilateral dependent pulmonary infiltrates, similar to prior.   2.  Large quantity of stool in the colon suggests changes of constipation.   3.  Trace right pleural effusion                  DX-CHEST-PORTABLE (1 VIEW)   Final Result         1.  Bilateral dependent pulmonary infiltrates, similar to prior.   2.  Large quantity of stool in the colon suggests changes of constipation.               MR-BRAIN-W/O   Final Result      1.  Bilateral holohemispheric chronic subdural fluid collections.There is no mass effect or midline shift.   2.  Multifocal acute bilateral supratentorial lacunar infarcts.   3.  Mild chronic microvascular ischemic disease.   4.  Mild cerebral atrophy.   5.  Mild diffuse atherosclerotic disease.      DX-CHEST-PORTABLE (1 VIEW)   Final Result         1.  Bilateral dependent  pulmonary infiltrates, similar to prior.   2.  Large quantity of stool in the colon suggests changes of constipation.            DX-CHEST-PORTABLE (1 VIEW)   Final Result         1.  Bilateral dependent pulmonary infiltrates, similar to prior.         EC-ECHOCARDIOGRAM COMPLETE W/ CONT   Final Result      DX-CHEST-PORTABLE (1 VIEW)   Final Result         1.  Bilateral dependent pulmonary infiltrates, similar to prior.      IR-US GUIDED PIV   Final Result    Ultrasound-guided PERIPHERAL IV INSERTION performed by    qualified nursing staff as above.            DX-ABDOMEN FOR TUBE PLACEMENT   Final Result      Feeding tube placement with the tip projecting over the proximal stomach      DX-CHEST-PORTABLE (1 VIEW)   Final Result         1.  Bilateral dependent pulmonary infiltrates.      [id number] VIEW   Final Result      Above average stool volume      CT-HEAD W/O   Final Result         1.  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      2.  No parenchymal hemorrhage or brain swelling or edema is noted.      Findings are consistent with atrophy.  Decreased attenuation in the periventricular white matter likely indicates microvascular ischemic disease.      DX-CHEST-PORTABLE (1 VIEW)   Final Result         1.  No new infiltrates or consolidations identified.      2.  Endotracheal tube and NG tube now noted in place.      EEG INTERPRETATION:   This is an abnormal 24 hours video electroencephalogram recording in the awake, drowsy and sleep state.  A moderate encephalopathy is suggested. Frequent right frontal sharps and intermittent right hemispheric slowing. Sharps become frequently and briefly periodic (right PLEDS), but without any clear indication of seizures.  The study is improved from yesterday's, but the patient remains at a significant increased risk for recurrent seizures. The findings suggest large areas of underlying cortical irritability and  structural abnormality. Clinical and radiological correlation is recommended.     Assessment/Plan:     ENCEPHALOPATHY, HYPERAMMONEMIA NEW LIKELY DUE PARTLY TO VPA NOW CORRECTED, STAPH SPUTUM    STATUS EPILEPTICUS, RESOLVED, NO HX OF SZS- RECEIVED ZOSYN POSSIBLE TRIGGER 10/6 STARTED- PRIOR WAS NON TOXIC APPEARING PER DAUGHTER, VERY STRESSED AFTER SHE LEFT HER UNWARNED     SDH BILAT CHRONIC, SZ RISK, no acute findings on MRB     AFIB     LEV 1500 Q12 IV   aptiom 400 qHS   Q12,Trend ammonia, Replete carnitine/coq10. FOLLOW DEPAKOATE LEVELS IN AM DAILY  R/O TOXIC/METABOLIC SZ RISKS  NSG RE SDHygromas  NO ANTITHROMBOTIC UNTIL NSG CLEARS FOR SAME WITH SDH   MRSA sputum; AVOID ABX THAT LOWER SZ THRESHOLD     Scott HERNANDEZ to follow periodically    Juan Manuel Vivar M.D.  , Neurohospitalist & Stroke  Clinical Professor, [employer]  Diplomate, Neurology & Neuroimaging

## 2018-10-21 NOTE — CARE PLAN
Problem: Pain Management  Goal: Pain level will decrease to patient's comfort goal    Intervention: Follow pain managment plan developed in collaboration with patient and Interdisciplinary Team  Pain assessed at least Q2H, medicated per MAR.      Problem: Skin Integrity  Goal: Risk for impaired skin integrity will decrease    Intervention: Assess risk factors for impaired skin integrity and/or pressure ulcers  Skin assessment with Q2H turs, barrier wipes, barrier cream, protecting skin from catheters/tubes with cloth barrier.

## 2018-10-21 NOTE — PROCEDURES
VIDEO ELECTROENCEPHALOGRAM / EPILEPSY MONITORING UNIT REPORT        Referring provider: Dr. Martinez.      DOS:   10/21/2018 - 10/22/2018 (total recording for 23 hours and 38 minutes).      INDICATION:  Rene Becerril 89 y.o. female presenting with seizures.      CURRENT ANTIEPILEPTIC REGIMEN: Levetiracetam lowered to 1500 mg q 12 hrs, Valproic Acid lowered to 500 mg q 12 hrs, and Eslicarbazepine 400 mg.      TECHNIQUE: A 30-channel, 24 hrs video electroencephalogram (VEEG) was performed in accordance with the international 10-20 system. This digital study was reviewed in bipolar and referential montages. The recording examined a patient during awake, drowsy and sleep states.       DESCRIPTION OF THE RECORD:  During the awake state, background shows symmetrical 7 Hz theta activity posteriorly with amplitude of 70 mV.  During the sleep and sedated state, background shows diffuse high-amplitude 4-5 Hz delta activity.  Symmetrical high-amplitude sleep spindles and vertex sharp activities were seen in the central leads.     ACTIVATION PROCEDURES:   Not performed.      ICTAL AND/OR INTERICTAL FINDINGS:   Frequent right frontal sharps and intermittent right hemispheric slowing. Sharps become frequently and briefly periodic (right PLEDS), but without any clear indication of seizures.      EKG: sampling review of EKG recording demonstrated a sinus arhythmia.         INTERPRETATION:   This is an abnormal 24 hours video electroencephalogram recording in the awake, drowsy and sleep state.  A mild encephalopathy is suggested (this is improved). Frequent right frontal sharps and intermittent right hemispheric slowing. Sharps become briefly periodic (right PLEDS), but without any clear indication of seizures. The study is improved from yesterday's, but the patient remains at an increased risk for recurrent seizures. The findings suggest large areas of underlying cortical irritability and structural abnormality. Clinical and  radiological correlation is recommended.     Updates provided to Dr. Chavez.      Lb Velazquez MD   Epilepsy and Neurodiagnostics.   Clinical  of Neurology [employer].   Diplomate in Neurology, Epilepsy, and Electrodiagnostic Medicine.   Office: [phone]  Fax: [phone]

## 2018-10-22 PROBLEM — E83.42 HYPOMAGNESEMIA: Status: RESOLVED | Noted: 2018-01-01 | Resolved: 2018-01-01

## 2018-10-22 PROBLEM — E83.39 HYPOPHOSPHATEMIA: Status: RESOLVED | Noted: 2018-01-01 | Resolved: 2018-01-01

## 2018-10-22 NOTE — PROCEDURES
VIDEO ELECTROENCEPHALOGRAM / EPILEPSY MONITORING UNIT REPORT        Referring provider: Dr. Martinez.      DOS:   10/22/2018 (total recording for 1 hours and 39 minutes).      INDICATION:  Rene Becerril 89 y.o. female presenting with seizures.      CURRENT ANTIEPILEPTIC REGIMEN: Levetiracetam 1500 mg q 12 hrs, Valproic Acid 500 mg q 12 hrs, and Eslicarbazepine 400 mg.      TECHNIQUE: A 30-channel, extended video electroencephalogram (VEEG) was performed in accordance with the international 10-20 system. This digital study was reviewed in bipolar and referential montages. The recording examined a patient during awake, drowsy and sleep states.       DESCRIPTION OF THE RECORD:  During the awake state, background shows symmetrical 7 Hz theta activity posteriorly with amplitude of 70 mV.  During the sleep and sedated state, background shows diffuse high-amplitude 4-5 Hz delta activity.  Symmetrical high-amplitude sleep spindles and vertex sharp activities were seen in the central leads.     ACTIVATION PROCEDURES:   Not performed.      ICTAL AND/OR INTERICTAL FINDINGS:   Frequent right frontal sharps and intermittent right hemispheric slowing.       EKG: sampling review of EKG recording demonstrated a sinus arhythmia.      INTERPRETATION:   This is an abnormal extended video electroencephalogram recording in the awake, drowsy and sleep state.  A mild encephalopathy is suggested (this is improved). Frequent right frontal sharps and intermittent right hemispheric slowing. No seizures. The study is improved. The findings increase risk for seizures and suggest large areas of underlying cortical irritability and structural abnormality. Clinical and radiological correlation is recommended.       Updates provided to Dr. Chavez.      Lb Velazquez MD   Epilepsy and Neurodiagnostics.   Clinical  of Neurology [facility] [employer].   Diplomate in Neurology, Epilepsy, and  Electrodiagnostic Medicine.   Office: [phone]  Fax: [phone]

## 2018-10-22 NOTE — PALLIATIVE CARE
PALLIATIVE CARE FOLLOW-UP:    Phone call from CAROLEE Barragan for family meeting tomorrow at 1300.    Plan:  PC RN to attend meeting 1300 tomorrow    Thank you for allowing Palliative Care to support this pt and his family.  Contact [email or web address] for additional assistance, questions or concerns.

## 2018-10-22 NOTE — DISCHARGE PLANNING
Spoke with IZA Barragan about hospice order.  Per Yung family has not decided about hospice. IZA Barragan is aware of need for hospice order.  Family meeting scheduled for 10-23-18.  Referral has been held.

## 2018-10-22 NOTE — PROGRESS NOTES
[facility] Hospitalist Progress Note    Date of Service: 10/22/2018    Chief Complaint  89 y.o. female admitted 10/13/2018 with seizures  Ms. Becerril has a history of apparent subdural hematomas that had a seizure at [facility] thus was transferred to [facility] for evaluation.  She required intubation due to airway protection. A CT of the head revealed bilateral subdural hygromas without hemorrhage.    Interval Problem Update      TF at goal  AFib/bigeminy  Following command  VD#10   SBT 1 hour   On zyvox through 10-26  K repleted  VPA level 135               Consultants/Specialty  Critical Care.  Neurology      Disposition  ICU        Review of Systems   Unable to perform ROS: Intubated      Physical Exam  Laboratory/Imaging   Hemodynamics  No data recorded.   Monitored Temp: 36.2 °C (97.2 °F)  Pulse  Av.1  Min: 30  Max: 129 Heart Rate (Monitored): 100  NIBP: 102/58      Respiratory  Rider Vent Mode: APVCMV, Rate (breaths/min): 12, PEEP/CPAP: 8, PEEP/CPAP: 8, FiO2: 40, P Peak (PIP): 20, P MEAN: 10   Respiration: 14, Pulse Oximetry: 96 %     Work Of Breathing / Effort: Vented  RUL Breath Sounds: Clear, RML Breath Sounds: Diminished, RLL Breath Sounds: Diminished, BLANCHE Breath Sounds: Clear, LLL Breath Sounds: Diminished    Fluids    Intake/Output Summary (Last 24 hours) at 10/22/18 1001  Last data filed at 10/22/18 0800   Gross per 24 hour   Intake          2829.63 ml   Output             1030 ml   Net          1799.63 ml       Nutrition  Orders Placed This Encounter   Procedures   • Diet NPO     Standing Status:   Standing     Number of Occurrences:   1     Order Specific Question:   Type:     Answer:   Now [1]     Order Specific Question:   Restrict to:     Answer:   Strict [1]     Physical Exam   Constitutional: She appears well-developed and well-nourished.   HENT:   Head: Normocephalic and atraumatic.   Right Ear: External ear normal.   Left Ear: External ear normal.   Cortrak in place   Eyes: Pupils are equal,  round, and reactive to light. Conjunctivae are normal. Right eye exhibits no discharge. Left eye exhibits no discharge. No scleral icterus.   Neck: Neck supple. No JVD present. No tracheal deviation present.   Cardiovascular: Normal rate.  An irregular rhythm present. Exam reveals no gallop and no friction rub.    No murmur heard.  Pulmonary/Chest: Effort normal. No respiratory distress. She has no decreased breath sounds. She has no rhonchi. She has rales. She exhibits no tenderness and no bony tenderness.   Intubated   Abdominal: Soft. Bowel sounds are normal. She exhibits no distension. There is no tenderness. There is no rebound.   Musculoskeletal: She exhibits edema (2+). She exhibits no tenderness.   Right foot cold with delayed capillary refill and nonpalpable pulses   Neurological:   Patient is lethargic  She follows commands in all extremities  She has generalized weakness   Skin: Skin is warm and dry. She is not diaphoretic. No cyanosis or erythema. Nails show no clubbing.   Psychiatric: Cognition and memory are impaired.   Nursing note and vitals reviewed.      Recent Labs      10/20/18   0618  10/21/18   0448  10/22/18   0535   WBC  10.7  8.4  5.3   RBC  3.37*  3.52*  3.12*   HEMOGLOBIN  9.1*  9.7*  8.5*   HEMATOCRIT  29.5*  30.3*  27.4*   MCV  87.5  86.1  87.8   MCH  27.0  27.6  27.2   MCHC  30.8*  32.0*  31.0*   RDW  48.0  47.7  50.4*   PLATELETCT  247  259  228   MPV  10.8  10.8  10.9     Recent Labs      10/20/18   0618  10/21/18   0448  10/22/18   0535   SODIUM  143  140  143   POTASSIUM  3.5*  3.3*  3.8   CHLORIDE  98  98  100   CO2  35*  36*  34*   GLUCOSE  105*  126*  155*   BUN  29*  32*  32*   CREATININE  0.90  0.80  0.79   CALCIUM  8.7  8.8  9.1     Recent Labs      10/19/18   2320  10/20/18   0618  10/20/18   1241   APTT  29.7  32.9  126.0*                  Assessment/Plan     * Acute respiratory failure with hypoxia (HCC)- (present on admission)   Assessment & Plan    Secondary to seizures  and MRSA pneumonia    Vent management per critical care discussed with               Pneumonia due to methicillin resistant Staphylococcus aureus (HCC)   Assessment & Plan    Complete 7-day course of Zyvox on 10/26/2018        Seizure (HCC)- (present on admission)   Assessment & Plan    New onset seizures.  CT head reveals subdural hygromas, could be sequelae of prior subdural hematomas.  No acute pathology on MRI of brain  She was intubated for airway protection  Neurology following and valproate dose decreased    Continue Keppra aptiom and VPA  Monitor levels of VPA discussed with pharmacist will check trough level later today          Relative adrenal insufficiency (HCC)   Assessment & Plan    On stress dose hydrocortisone        Hypotension   Assessment & Plan    Start Midodrin  Wean off norepinephrine as tolerated  On stress dose hydrocortisone        Limb ischemia   Assessment & Plan    Preliminary report  Occlusive thrombus in distal R LEIF.    Report and patient discussed with Dr. Power from vascular surgery on 10/19/2018 he felt that this is likely chronic he did not feel that anticoagulation would be indicated at this time based on this specific finding     stable continue to monitor        CKD (chronic kidney disease) stage 3, GFR 30-59 ml/min ([facility])- (present on admission)   Assessment & Plan    Renal function stable continue to monitor        CRP elevated   Assessment & Plan    Likely secondary to pneumonia        Paroxysmal atrial fibrillation (HCC)   Assessment & Plan    Given subdural hygromas patient not good candidate for chronic anticoagulation          Hyperammonemia (HCC)   Assessment & Plan    Likely secondary to valproic acid  Improved with l-carnitine supplementation        Dementia- (present on admission)   Assessment & Plan    Reported hx of        Hypokalemia- (present on admission)   Assessment & Plan    Potassium 3.8 improved continue to replete and monitor        Elevated  troponin- (present on admission)   Assessment & Plan    Likely demand ischemia in setting of recurrent seizures  EF 40%    She will need further workup depending on her recovery            Anemia of chronic disease- (present on admission)   Assessment & Plan    Hemoglobin 8.5  No clinical signs of bleeding continue to monitor          Overall prognosis guarded, arrange for family meeting to discuss goals of care  Quality-Core Measures   Reviewed items::  Labs reviewed, Medications reviewed and Radiology images reviewed  Rosa catheter::  Unconscious / Sedated Patient on a Ventilator  DVT prophylaxis pharmacological::  Heparin  DVT prophylaxis - mechanical:  SCDs  Ulcer Prophylaxis::  Yes  Assessed for rehabilitation services:  Patient unable to tolerate rehabilitation therapeutic regimen

## 2018-10-22 NOTE — PROGRESS NOTES
Tech from Lab called with critical result of Valproic Acid of 135.7 at 0645 . Critical lab result read back to Tech.   Dr. Son notified of critical lab result at 0645.  Critical lab result read back by Dr. Son. Continue with plan of care at this time. Changes in medication will be discussed in rounds.

## 2018-10-22 NOTE — CARE PLAN
Problem: Ventilation Defect:  Goal: Ability to achieve and maintain unassisted ventilation or tolerate decreased levels of ventilator support    Intervention: Support and monitor invasive and noninvasive mechanical ventilation  Adult Ventilation Update    Total Vent Days: 9    Patient Lines/Drains/Airways Status    Active Airway     Name: Placement date: Placement time: Site: Days:    Airway ETT Oral 7.5 @ 22 10/13/18   1320   Oral   8              In the last 24 hours, the patient tolerated SBT for 1 hour on settings of 5/8.  Unable to follow command for V.C and NIF.  Vent day 9  APVCMV  12-rr  330-Vt  +8  50%  Events/Summary/Plan: back on resting settings,unable to follow commands for NIF and V.C (10/21/18 1740)

## 2018-10-22 NOTE — CARE PLAN
Problem: Bowel/Gastric:  Goal: Will not experience complications related to bowel motility    Intervention: Implement interventions to promote bowel evacuation if inadequate bowel movements in past 48 hours  Administer bowel protocol per MAR, monitor rectal trumpet, removed.      Problem: Skin Integrity  Goal: Risk for impaired skin integrity will decrease    Intervention: Implement precautions to protect skin integrity in collaboration with the interdisciplinary team   10/21/18 2000 10/22/18 0800   OTHER   Skin Preventative Measures --  Pillows in Use for Support / Positioning;Heel Float Boots in Use    Bed Types --  Low Air Loss Mattress   Friction Interventions --  Draw Sheet / Pad Used for Repositioning   Moisturizers --  Barrier Wipes;Barrier Paste   Containment Devices Nasal Trumpet as Rectal Tube --    PT / OT Involved in Care Order has been Placed --    Activity  Bed;Range of motion --    Patient Turns / Repositioning --  Right Side;Reposition - RUE;Reposition - LUE;Reposition - RLE;Reposition - LLE   Assistance / Tolerance for Turning/Repositioning --  Assistance of Two or More   Patient is Receiving Nutrition --  Tube Feeding Nutrition   Nutrition Consult Ordered No, Consult has Already been Placed --    Vitamin Therapy in Use No --

## 2018-10-22 NOTE — DIETARY
Nutrition support weekly update:  Day 9 of admit.  Rene Becerril is a 89 y.o. female with admitting DX of Status epilepticus.      Tube feeding initiated on 10/15. Current TF via Cortrak: Replete with Fiber running @ goal rate of 1200 kcals, 77 grams of protein and 924 mL of free water per day.    Assessment:  Weight: 70.9 kg via bed scale wt 10/22; increased 13.6 kg from admit wt.  Per I/O flow sheet, pt is +13 L of fluid.  Re-estimate of nutritional needs is not indicated at this time.    Evaluation:   1. Pt remains intubated.  2. Continuous EEG @ bedside.  3. Family meeting with Palliative 10/18.  4. Glucose 155, BUN 32, Pre-Albumin 7.0  5. Pertinent meds:  Aspirin, Pepcid, Heparin, Pericolace, Miralax  6. Current feeding is meeting pt's estimated nutrition needs.    Recommendations/Plan:  1. Continue with same TF formula and rate.   2. Fluids per MD.  3. PO diet when safe/appropriate per SLP/MD and pt can adequately consume.    RD following.

## 2018-10-22 NOTE — PROGRESS NOTES
[facility] Neurology Progress Note:     Referring Physician: Dr. Anderson    Reason for consultation: Seizures    Interval History: No acute events overnight. No complaints this am. EEG improved.     Subjective: No complaints. Remains intubated.     Objective:   Vitals:    10/22/18 0600 10/22/18 0615 10/22/18 0651 10/22/18 0730   Pulse: (!) 101 (!) 102     Resp: 12 14     Temp:       SpO2: 98%  98%    Weight:    70.9 kg (156 lb 4.9 oz)   Height:           Labs:     Lab Results   Component Value Date/Time    PROTHROMBTM 14.9 (H) 10/13/2018 01:02 PM    INR 1.15 (H) 10/13/2018 01:02 PM      Lab Results   Component Value Date/Time    WBC 5.3 10/22/2018 05:35 AM    RBC 3.12 (L) 10/22/2018 05:35 AM    HEMOGLOBIN 8.5 (L) 10/22/2018 05:35 AM    HEMATOCRIT 27.4 (L) 10/22/2018 05:35 AM    MCV 87.8 10/22/2018 05:35 AM    MCH 27.2 10/22/2018 05:35 AM    MCHC 31.0 (L) 10/22/2018 05:35 AM    MPV 10.9 10/22/2018 05:35 AM    NEUTSPOLYS 91.80 (H) 10/22/2018 05:35 AM    LYMPHOCYTES 5.60 (L) 10/22/2018 05:35 AM    MONOCYTES 2.20 10/22/2018 05:35 AM    EOSINOPHILS 0.00 10/22/2018 05:35 AM    BASOPHILS 0.00 10/22/2018 05:35 AM    ANISOCYTOSIS 1+ 10/20/2018 06:18 AM      Lab Results   Component Value Date/Time    SODIUM 143 10/22/2018 05:35 AM    POTASSIUM 3.8 10/22/2018 05:35 AM    CHLORIDE 100 10/22/2018 05:35 AM    CO2 34 (H) 10/22/2018 05:35 AM    GLUCOSE 155 (H) 10/22/2018 05:35 AM    BUN 32 (H) 10/22/2018 05:35 AM    CREATININE 0.79 10/22/2018 05:35 AM      Lab Results   Component Value Date/Time    TRIGLYCERIDE 75 10/15/2018 05:07 AM       Lab Results   Component Value Date/Time    ALKPHOSPHAT 46 10/22/2018 05:35 AM    ASTSGOT 13 10/22/2018 05:35 AM    ALTSGPT 7 10/22/2018 05:35 AM    TBILIRUBIN 0.4 10/22/2018 05:35 AM        Imaging/Testing:   MR Brain on 10/17/18 personally reviewed showing significant bilateral subdural hygromas, without new evidence for infarction/hemorrhage.   EEG personally reviewed w/ some evidence of R  hemispheric slowing as well as generalized slowing. No IEDs occurred while the patient was being examined.     Physical Exam:     General: Well appearing 89 y/o female, intubated, in NAD  Cardio: Normal S1/S2. Presence of peripheral edema on the bilateral arms/legs.   Pulm: Presence of mild rales bilaterally, otherwise CTAX2    Neurologic:  Mental Status: GCS 11T (E4, M6, V1T)  Cranial Nerves:  PERRL. EOMi. Face symmetric, Unable to eval VFs or palatoglossal movements.  Motor:  Normal muscle tone with decreased bulk. Moves all extremities spontaneously. Unable to assess segmental strength.  Reflexes:  2/4 throughout. Mute plantar responses X2  Coordination: Unable to assess  Sensation: Withdraws to nox stim throughout  Gait/Station: Unable to assess/defer    Assessment/Plan:    Rene Becerril is a 89 y.o. female with history of SDH, bilateral subdural hygromas, pneumonia, status epilepticus, afib, and CKD presenting to the hospital for seixures and consulted for seizures. At this time, seizure activity has abated. She remains stable on her ASD regimen, and is improving from a mental status standpoint. Etiology of seizures is indeterminate, as they appeared to be focal in onset, however MRI was unrevealing for a lesion, and no subdural blood was seen.     Plan:  -Continue Keppra 500mg BID.  -Continue Depakote 500mg BID. Obtain level this afternoon prior to second dose. Concern for level being drawn too soon after infusion.   -Continue Aptiom 400mg Daily.  -Ammonia downtrending.  -Continue Carnitine and CoQ supplementation.  -All other medical issues per ICU.      Tex Chavez M.D., Diplomat of the [employer] Board of Psychiatry and Neurology  [facility] Neurology

## 2018-10-22 NOTE — PROGRESS NOTES
Critical Care Progress Note    Date of admission  10/13/2018    Chief Complaint  89 y.o. female admitted 10/13/2018 with new onset seizures.    Hospital Course    This lady was admitted to the ICU with seizures and respiratory failure.      Interval Problem Update  Reviewed last 24 hour events:   - No acute events overnight   - Neuro: follows ?, opens eyes, more responsive   - HR: 100s-110s   - BP: Levo at 2   - GI: TF at goal   - UOP: adequate   - Rosa: yes   - Tm: 98.9   - Lines: midline, CVC   - PPx: GI pepcid, DVT heparin   - ABG: compensated respiratory acidosis   - Did well on SBT, not completely following commands, ?  Language barrier. Intermittent apnea   - CXR (personally reviewed and compared to prior): stable edema    YESTERDAY  More purposeful movements  AF  Replete K, PO4, Mg  NE 5  Vent 9  PEEP 8  Heparin SQ  Zyvox  Decrease Keppra 1500 q12  Decrease  q 12  albumin      Review of Systems  Review of Systems   Unable to perform ROS: Acuity of condition        Vital Signs for last 24 hours   Pulse:  [] 102  Resp:  [0-23] 14    Hemodynamic parameters for last 24 hours       Vent Settings for last 24 hours  [place] Vent Mode: Spont  Rate (breaths/min):  [12] 12  PEEP/CPAP:  [8] 8  FiO2:  [40-50] 40  P Peak (PIP):  [14-25] 14  P MEAN:  [10-12] 10    Physical Exam   Physical Exam   Constitutional: She has a sickly appearance. She appears distressed. She is intubated.   HENT:   Head: Normocephalic.   Right Ear: External ear normal.   Left Ear: External ear normal.   Mouth/Throat: Oropharynx is clear and moist.   Eyes: Pupils are equal, round, and reactive to light. Right eye exhibits no discharge. Left eye exhibits no discharge. No scleral icterus.   Neck: Neck supple. No JVD present. No tracheal deviation present.   Cardiovascular: Intact distal pulses.  Exam reveals no gallop and no friction rub.    Atrial fibrillation   Pulmonary/Chest: She is intubated. She has no wheezes. She has rales  (Coarse crackles bilaterally).   Abdominal: Soft. Bowel sounds are normal. She exhibits no distension. There is no tenderness. There is no rebound.   Tolerating enteral tube feedings   Musculoskeletal: She exhibits edema. She exhibits no tenderness.   No clubbing or cyanosis   Neurological:   Moves all 4 extremities.  Follows per grand daughter.   Skin: Skin is warm and dry. She is not diaphoretic. No erythema. No pallor.   Nursing note and vitals reviewed.      Medications  Current Facility-Administered Medications   Medication Dose Route Frequency Provider Last Rate Last Dose   • levETIRAcetam (KEPPRA) 1,500 mg in  mL IVPB  1,500 mg Intravenous Q12HRS Juan Manuel Vivar M.D.   Stopped at 10/22/18 0510   • valproate (DEPACON) 500 mg in D5W 50 mL IVPB  500 mg Intravenous Q12HRS Juan Manuel Vivar M.D.   Stopped at 10/22/18 0610   • hydrocortisone sodium succinate PF (SOLU-CORTEF) 100 MG injection 50 mg  50 mg Intravenous Q6HRS Trever Hilario M.D.   50 mg at 10/22/18 0459   • heparin injection 5,000 Units  5,000 Units Subcutaneous Q8HRS Tono Jaimes M.D.   5,000 Units at 10/22/18 0500   • eslicarbazepine (APTIOM) tablet 400 mg  400 mg Oral QHS Juan Manuel Vivar M.D.   400 mg at 10/21/18 2213   • linezolid (ZYVOX) tablet 600 mg  600 mg Oral Q12HRS Trever Hilario M.D.   600 mg at 10/22/18 0459   • norepinephrine (LEVOPHED) 8 mg in  mL Infusion  0-30 mcg/min Intravenous Continuous Nestor Pelletier M.D. 4.7 mL/hr at 10/22/18 0412 2.5 mcg/min at 10/22/18 0412   • LORazepam (ATIVAN) injection 1-4 mg  1-4 mg Intravenous Q10 MIN PRN Trever Hilario M.D.   2 mg at 10/17/18 1218   • levOCARNitine (CARNITOR) 1 GM/10ML solution 500 mg  500 mg Feeding Tube BID WITH MEALS Trever Hilario M.D.   500 mg at 10/21/18 1725   • polyethylene glycol/lytes (MIRALAX) PACKET 1 Packet  1 Packet Feeding Tube BID Trever Hilario M.D.   Stopped at 10/19/18 0600    And   • senna-docusate (PERICOLACE  or SENOKOT S) 8.6-50 MG per tablet 2 Tab  2 Tab Feeding Tube BID Trever Hilario M.D.   Stopped at 10/19/18 0600    And   • magnesium hydroxide (MILK OF MAGNESIA) suspension 30 mL  30 mL Feeding Tube QDAY PRN Trever Hilario M.D.   30 mL at 10/17/18 1352    And   • bisacodyl (DULCOLAX) suppository 10 mg  10 mg Rectal QDAY PRN Trever Hilario M.D.   10 mg at 10/18/18 1516   • acetaminophen (TYLENOL) tablet 650 mg  650 mg Feeding Tube Q6HRS PRN Trever Hilario M.D.   650 mg at 10/19/18 1716    Or   • acetaminophen (TYLENOL) suppository 650 mg  650 mg Rectal Q4HRS PRN Trever Hilario M.D.       • Pharmacy Consult: Enteral tube feeding - review meds/change route/product selection   Other PRN Paolo Son Jr., D.O.       • aspirin (ASA) chewable tab 81 mg  81 mg Per NG Tube DAILY Trever Hilario M.D.   81 mg at 10/22/18 0459   • famotidine (PEPCID) tablet 20 mg  20 mg Feeding Tube DAILY Trever Hilario M.D.   20 mg at 10/22/18 0459   • NS infusion   Intravenous Continuous Trever Hilario M.D. 10 mL/hr at 10/18/18 1400     • ondansetron (ZOFRAN) syringe/vial injection 4 mg  4 mg Intravenous Q4HRS PRN Edgar Moody M.D.       • ondansetron (ZOFRAN ODT) dispertab 4 mg  4 mg Oral Q4HRS PRN Edgar Moody M.D.       • Respiratory Care per Protocol   Nebulization Continuous RT Gomez Holguin M.D.       • MD Alert...ICU Electrolyte Replacement per Pharmacy   Other pharmacy to dose Gomez Holguin M.D.       • fentaNYL (SUBLIMAZE) injection 25 mcg  25 mcg Intravenous Q HOUR PRN Gomez Holguin M.D.   25 mcg at 10/16/18 2046    Or   • fentaNYL (SUBLIMAZE) injection 50 mcg  50 mcg Intravenous Q HOUR PRN Gomez Holguin M.D.   50 mcg at 10/21/18 1403    Or   • fentaNYL (SUBLIMAZE) injection 100 mcg  100 mcg Intravenous Q HOUR PRN Gomez Holguin M.D.   100 mcg at 10/21/18 1814   • ipratropium-albuterol (DUONEB) nebulizer solution  3 mL Nebulization Q2HRS PRN  (RT) Gomez Holguin M.D.       • ipratropium-albuterol (DUONEB) nebulizer solution  3 mL Nebulization Q4HRS (RT) Gomez Holguin M.D.   3 mL at 10/22/18 0649       Fluids    Intake/Output Summary (Last 24 hours) at 10/22/18 0702  Last data filed at 10/22/18 0600   Gross per 24 hour   Intake           2790.6 ml   Output             1200 ml   Net           1590.6 ml       Laboratory  Recent Results (from the past 48 hour(s))   APTT    Collection Time: 10/20/18 12:41 PM   Result Value Ref Range    APTT 126.0 (HH) 24.7 - 36.0 sec   QUANT BRONCHIAL WASHING    Collection Time: 10/20/18  3:20 PM   Result Value Ref Range    Significant Indicator POS (POS)     Source RESP     Site Quantitative BAL RLL     Quantitative Bronch Washing (A)     Gram Stain Result       Moderate WBCs.  Moderate Gram positive cocci.  <5% intracellular organisms.      Quantitative Bronch Washing (A)      Methicillin Resistant Staphylococcus aureus  38,000 cfu/mL  See previous culture for sensitivity report.     GRAM STAIN    Collection Time: 10/20/18  3:20 PM   Result Value Ref Range    Significant Indicator .     Source RESP     Site Quantitative BAL RLL     Gram Stain Result       Moderate WBCs.  Moderate Gram positive cocci.  <5% intracellular organisms.     ISTAT ARTERIAL BLOOD GAS    Collection Time: 10/21/18  4:23 AM   Result Value Ref Range    Ph 7.593 (H) 7.400 - 7.500    Pco2 39.2 (H) 26.0 - 37.0 mmHg    Po2 71 64 - 87 mmHg    Tco2 39 (H) 20 - 33 mmol/L    S02 96 93 - 99 %    Hco3 37.9 (H) 17.0 - 25.0 mmol/L    BE 15 (H) -4 - 3 mmol/L    Body Temp 99.0 F degrees    O2 Therapy 50 %    iPF Ratio 142     Ph Temp Tea 7.590 (H) 7.400 - 7.500    Pco2 Temp Co 39.6 (H) 26.0 - 37.0 mmHg    Po2 Temp Cor 72 64 - 87 mmHg    Specimen Arterial     Action Range Triggered NO     Inst. Qualified Patient YES    CBC with Differential    Collection Time: 10/21/18  4:48 AM   Result Value Ref Range    WBC 8.4 4.8 - 10.8 K/uL    RBC 3.52 (L) 4.20 - 5.40 M/uL     Hemoglobin 9.7 (L) 12.0 - 16.0 g/dL    Hematocrit 30.3 (L) 37.0 - 47.0 %    MCV 86.1 81.4 - 97.8 fL    MCH 27.6 27.0 - 33.0 pg    MCHC 32.0 (L) 33.6 - 35.0 g/dL    RDW 47.7 35.9 - 50.0 fL    Platelet Count 259 164 - 446 K/uL    MPV 10.8 9.0 - 12.9 fL    Neutrophils-Polys 84.60 (H) 44.00 - 72.00 %    Lymphocytes 4.70 (L) 22.00 - 41.00 %    Monocytes 9.30 0.00 - 13.40 %    Eosinophils 0.80 0.00 - 6.90 %    Basophils 0.10 0.00 - 1.80 %    Immature Granulocytes 0.50 0.00 - 0.90 %    Nucleated RBC 0.00 /100 WBC    Neutrophils (Absolute) 7.08 2.00 - 7.15 K/uL    Lymphs (Absolute) 0.39 (L) 1.00 - 4.80 K/uL    Monos (Absolute) 0.78 0.00 - 0.85 K/uL    Eos (Absolute) 0.07 0.00 - 0.51 K/uL    Baso (Absolute) 0.01 0.00 - 0.12 K/uL    Immature Granulocytes (abs) 0.04 0.00 - 0.11 K/uL    NRBC (Absolute) 0.00 K/uL   Basic Metabolic Panel (BMP)    Collection Time: 10/21/18  4:48 AM   Result Value Ref Range    Sodium 140 135 - 145 mmol/L    Potassium 3.3 (L) 3.6 - 5.5 mmol/L    Chloride 98 96 - 112 mmol/L    Co2 36 (H) 20 - 33 mmol/L    Glucose 126 (H) 65 - 99 mg/dL    Bun 32 (H) 8 - 22 mg/dL    Creatinine 0.80 0.50 - 1.40 mg/dL    Calcium 8.8 8.5 - 10.5 mg/dL    Anion Gap 6.0 0.0 - 11.9   Magnesium    Collection Time: 10/21/18  4:48 AM   Result Value Ref Range    Magnesium 1.9 1.5 - 2.5 mg/dL   Phosphorus    Collection Time: 10/21/18  4:48 AM   Result Value Ref Range    Phosphorus 1.6 (L) 2.5 - 4.5 mg/dL   VALPROIC ACID    Collection Time: 10/21/18  4:48 AM   Result Value Ref Range    Valproic Acid 127.3 (HH) 50.0 - 100.0 ug/mL   AMMONIA    Collection Time: 10/21/18  4:48 AM   Result Value Ref Range    Ammonia 36 11 - 45 umol/L   ESTIMATED GFR    Collection Time: 10/21/18  4:48 AM   Result Value Ref Range    GFR If African American >60 >60 mL/min/1.73 m 2    GFR If Non African American >60 >60 mL/min/1.73 m 2   CORTISOL    Collection Time: 10/21/18 10:22 AM   Result Value Ref Range    Cortisol 18.7 0.0 - 23.0 ug/dL   ISTAT  ARTERIAL BLOOD GAS    Collection Time: 10/22/18  4:33 AM   Result Value Ref Range    Ph 7.470 7.400 - 7.500    Pco2 51.2 (HH) 26.0 - 37.0 mmHg    Po2 134 (H) 64 - 87 mmHg    Tco2 39 (H) 20 - 33 mmol/L    S02 99 93 - 99 %    Hco3 37.2 (H) 17.0 - 25.0 mmol/L    BE 12 (H) -4 - 3 mmol/L    Body Temp 97.2 F degrees    O2 Therapy 30 %    iPF Ratio 447     Ph Temp Tea 7.481 7.400 - 7.500    Pco2 Temp Co 49.5 (H) 26.0 - 37.0 mmHg    Po2 Temp Cor 129 (H) 64 - 87 mmHg    Specimen Arterial     Action Range Triggered YES     Inst. Qualified Patient YES    CBC with Differential    Collection Time: 10/22/18  5:35 AM   Result Value Ref Range    WBC 5.3 4.8 - 10.8 K/uL    RBC 3.12 (L) 4.20 - 5.40 M/uL    Hemoglobin 8.5 (L) 12.0 - 16.0 g/dL    Hematocrit 27.4 (L) 37.0 - 47.0 %    MCV 87.8 81.4 - 97.8 fL    MCH 27.2 27.0 - 33.0 pg    MCHC 31.0 (L) 33.6 - 35.0 g/dL    RDW 50.4 (H) 35.9 - 50.0 fL    Platelet Count 228 164 - 446 K/uL    MPV 10.9 9.0 - 12.9 fL    Neutrophils-Polys 91.80 (H) 44.00 - 72.00 %    Lymphocytes 5.60 (L) 22.00 - 41.00 %    Monocytes 2.20 0.00 - 13.40 %    Eosinophils 0.00 0.00 - 6.90 %    Basophils 0.00 0.00 - 1.80 %    Immature Granulocytes 0.40 0.00 - 0.90 %    Nucleated RBC 0.00 /100 WBC    Neutrophils (Absolute) 4.90 2.00 - 7.15 K/uL    Lymphs (Absolute) 0.30 (L) 1.00 - 4.80 K/uL    Monos (Absolute) 0.12 0.00 - 0.85 K/uL    Eos (Absolute) 0.00 0.00 - 0.51 K/uL    Baso (Absolute) 0.00 0.00 - 0.12 K/uL    Immature Granulocytes (abs) 0.02 0.00 - 0.11 K/uL    NRBC (Absolute) 0.00 K/uL   Magnesium    Collection Time: 10/22/18  5:35 AM   Result Value Ref Range    Magnesium 2.1 1.5 - 2.5 mg/dL   Phosphorus    Collection Time: 10/22/18  5:35 AM   Result Value Ref Range    Phosphorus 2.6 2.5 - 4.5 mg/dL   VALPROIC ACID    Collection Time: 10/22/18  5:35 AM   Result Value Ref Range    Valproic Acid 135.7 (HH) 50.0 - 100.0 ug/mL   AMMONIA    Collection Time: 10/22/18  5:35 AM   Result Value Ref Range    Ammonia 42 11  - 45 umol/L   PREALBUMIN    Collection Time: 10/22/18  5:35 AM   Result Value Ref Range    Pre-Albumin 7.0 (L) 18.0 - 38.0 mg/dL   CRP QUANTITIVE (NON-CARDIAC)    Collection Time: 10/22/18  5:35 AM   Result Value Ref Range    Stat C-Reactive Protein 3.96 (H) 0.00 - 0.75 mg/dL   COMP METABOLIC PANEL    Collection Time: 10/22/18  5:35 AM   Result Value Ref Range    Sodium 143 135 - 145 mmol/L    Potassium 3.8 3.6 - 5.5 mmol/L    Chloride 100 96 - 112 mmol/L    Co2 34 (H) 20 - 33 mmol/L    Anion Gap 9.0 0.0 - 11.9    Glucose 155 (H) 65 - 99 mg/dL    Bun 32 (H) 8 - 22 mg/dL    Creatinine 0.79 0.50 - 1.40 mg/dL    Calcium 9.1 8.5 - 10.5 mg/dL    AST(SGOT) 13 12 - 45 U/L    ALT(SGPT) 7 2 - 50 U/L    Alkaline Phosphatase 46 30 - 99 U/L    Total Bilirubin 0.4 0.1 - 1.5 mg/dL    Albumin 3.7 3.2 - 4.9 g/dL    Total Protein 5.8 (L) 6.0 - 8.2 g/dL    Globulin 2.1 1.9 - 3.5 g/dL    A-G Ratio 1.8 g/dL   ESTIMATED GFR    Collection Time: 10/22/18  5:35 AM   Result Value Ref Range    GFR If African American >60 >60 mL/min/1.73 m 2    GFR If Non African American >60 >60 mL/min/1.73 m 2       Imaging  X-Ray:  I have personally reviewed the images and compared with prior images. and My impression is: Stable right > left lower lobe edema    Assessment/Plan  * Acute respiratory failure with hypoxia (HCC)- (present on admission)   Assessment & Plan    Intubated 10/13  RT/O2 protocols  Daily and PRN ABGs  Titration of ventilator therapy based on ABGs and patient's status  Sedation as tolerated/indicated  Daily CXR  HOB >30 degrees and peridex for VAP prevention  Pepcid for GI prophylaxis  SAT/SBT when able (ABCDEF Bundle)  Early mobility  Continue BID SBTs, nearing extubation        Pneumonia due to methicillin resistant Staphylococcus aureus (HCC)   Assessment & Plan    Zyvox 600 mg twice daily for 7 days (end 10/26/18)        Seizure (HCC)- (present on admission)   Assessment & Plan    CT with small bilateral hygromas without  mass-effect  EEG showed improvement in seizures  Valproic acid, 500 mg every 12 hours  Keppra 1500 mg every 12 hours  Continue Aptiom, 400 mg nightly  Valproic acid levels are high, ? Timing of lab draws  continue levocarnitine, 500 mg twice daily and monitor ammonia levels  MRI on 10/17 with multiple acute bilateral supratentorial lacunar infarcts        Relative adrenal insufficiency (HCC)   Assessment & Plan    Continue hydrocortisone 50 mg IV every 6 hours, wean as tolerated        Hypotension   Assessment & Plan    Titrating norepinephrine to maintain MAP > 65  Add hydrocortisone and midodrine        Limb ischemia   Assessment & Plan    Occlusion in right LEIF likely chronic  Heparin ppx        Acute systolic heart failure (HCC)   Assessment & Plan    EF 40%  Force diuresis as tolerated        CKD (chronic kidney disease) stage 3, GFR 30-59 ml/min ([facility])- (present on admission)   Assessment & Plan    Renal dose meds, avoid nephrotoxins  Strict I/Os  Follow renal function        Paroxysmal atrial fibrillation (HCC)   Assessment & Plan    Optimize magnesium and potassium  TSH is normal        Hyperammonemia ([facility])   Assessment & Plan    Resolved  Due to valproic acid  Continue levocarnitine, 500 mg twice daily        DNR (do not resuscitate)   Assessment & Plan    DNR status, I ok        Dementia- (present on admission)   Assessment & Plan    History of mild dementia        Hypokalemia- (present on admission)   Assessment & Plan    Replete potassium        Elevated troponin- (present on admission)   Assessment & Plan    Due to demand ischemia             VTE:  Heparin  Ulcer: H2 Antagonist  Lines: Central Line  Ongoing indication addressed and Rosa Catheter  Ongoing indication addressed    I have performed a physical exam and reviewed and updated ROS and Plan today (10/22/2018). In review of yesterday's note (10/21/2018), there are no changes except as documented above.     Titrating ventilator and pressors. This  patient is critically ill, at high risk for decompensation leading to worsening vital organ dysfunction and death without critical care interventions.    Discussed patient condition and risk of morbidity and/or mortality with Hospitalist, RN, RT, Pharmacy, Charge nurse / hot rounds and QA team     The patient remains critically ill.  Critical care time = 43 minutes in directly providing and coordinating critical care and extensive data review.  No time overlap and excludes procedures.    Paolo Son Jr., D.O.  Critical Care Medicine

## 2018-10-23 NOTE — PROGRESS NOTES
Critical Care Progress Note    Date of admission  10/13/2018    Chief Complaint  89 y.o. female admitted 10/13/2018 with new onset seizures.    Hospital Course    This lady was admitted to the ICU with seizures and respiratory failure.      Interval Problem Update  Reviewed last 24 hour events:   - NAEON   - Neuro: follows, sleepy   - HR: SR-AF 70s-120s   - BP: 90s-120s   - GI: TF at goal   - UOP: adequate   - Rosa: yes   - Tm: afeb   - Lines: midline, CVC   - PPx: GI pepcid  DVT heparin   - VD #11   - SBT: VC: 0.6, NIF -40, RSBI 65   - ABG: looks good   - CXR (personally reviewed and compared to prior): continued edema, hernia    YESTERDAY   - No acute events overnight   - Neuro: follows ?, opens eyes, more responsive   - HR: 100s-110s   - BP: Levo at 2   - GI: TF at goal   - UOP: adequate   - Rosa: yes   - Tm: 98.9   - Lines: midline, CVC   - PPx: GI pepcid, DVT heparin   - ABG: compensated respiratory acidosis   - Did well on SBT, not completely following commands, ?  Language barrier. Intermittent apnea   - CXR (personally reviewed and compared to prior): stable edema      Review of Systems  Review of Systems   Unable to perform ROS: Intubated        Vital Signs for last 24 hours   Pulse:  [] 100  Resp:  [0-22] 17    Hemodynamic parameters for last 24 hours       Vent Settings for last 24 hours  [place] Vent Mode: APVCMV  Rate (breaths/min):  [12-14] 12  PEEP/CPAP:  [8] 8  FiO2:  [30-40] 30  P Peak (PIP):  [19-28] 20  P MEAN:  [10-13] 13    Physical Exam   Physical Exam   Constitutional: She has a sickly appearance. She appears distressed. She is intubated.   HENT:   Head: Normocephalic.   Right Ear: External ear normal.   Left Ear: External ear normal.   Mouth/Throat: Oropharynx is clear and moist.   Eyes: Pupils are equal, round, and reactive to light. Right eye exhibits no discharge. Left eye exhibits no discharge. No scleral icterus.   Neck: Neck supple. No JVD present. No tracheal deviation  present.   Cardiovascular: Normal rate and intact distal pulses.  An irregularly irregular rhythm present. Exam reveals no gallop and no friction rub.    Pulmonary/Chest: She is intubated. She has no wheezes. She has rales (Coarse crackles bilaterally).   Abdominal: Soft. Bowel sounds are normal. She exhibits no distension. There is no tenderness. There is no rebound.   Tolerating enteral tube feedings   Musculoskeletal: She exhibits edema. She exhibits no tenderness.   No clubbing or cyanosis   Neurological:   Moves all 4 extremities.  Follows in Nauruan when asked by grand-daughter.   Skin: Skin is warm and dry. She is not diaphoretic. No erythema. No pallor.   Nursing note and vitals reviewed.      Medications  Current Facility-Administered Medications   Medication Dose Route Frequency Provider Last Rate Last Dose   • potassium bicarbonate (KLYTE) effervescent tablet 25 mEq  25 mEq Oral TID Paolo Son Jr., D.O.       • midodrine (PROAMATINE) tablet 5 mg  5 mg Oral TID WITH MEALS Poalo Son Jr., D.O.   5 mg at 10/23/18 0753   • levETIRAcetam (KEPPRA) 1,500 mg in  mL IVPB  1,500 mg Intravenous Q12HRS Juan Manuel Vivar M.D.   Stopped at 10/23/18 0653   • valproate (DEPACON) 500 mg in D5W 50 mL IVPB  500 mg Intravenous Q12HRS Juan Manuel Vivar M.D. 50 mL/hr at 10/23/18 0753 500 mg at 10/23/18 0753   • hydrocortisone sodium succinate PF (SOLU-CORTEF) 100 MG injection 50 mg  50 mg Intravenous Q6HRS Trever Hilario M.D.   50 mg at 10/23/18 0639   • heparin injection 5,000 Units  5,000 Units Subcutaneous Q8HRS Tono Jaimes M.D.   5,000 Units at 10/23/18 0638   • eslicarbazepine (APTIOM) tablet 400 mg  400 mg Oral QHS Juan Manuel Vivar M.D.   400 mg at 10/22/18 2142   • linezolid (ZYVOX) tablet 600 mg  600 mg Oral Q12HRS Trever Hilario M.D.   600 mg at 10/23/18 0639   • norepinephrine (LEVOPHED) 8 mg in  mL Infusion  0-30 mcg/min Intravenous Continuous Nestor Pelletier M.D.   Stopped at  10/22/18 1438   • LORazepam (ATIVAN) injection 1-4 mg  1-4 mg Intravenous Q10 MIN PRN Trever Hilario M.D.   2 mg at 10/17/18 1218   • levOCARNitine (CARNITOR) 1 GM/10ML solution 500 mg  500 mg Feeding Tube BID WITH MEALS Trever Hilario M.D.   500 mg at 10/23/18 0752   • polyethylene glycol/lytes (MIRALAX) PACKET 1 Packet  1 Packet Feeding Tube BID Trever Hilario M.D.   Stopped at 10/23/18 0600    And   • senna-docusate (PERICOLACE or SENOKOT S) 8.6-50 MG per tablet 2 Tab  2 Tab Feeding Tube BID Trever Hilario M.D.   2 Tab at 10/23/18 0638    And   • magnesium hydroxide (MILK OF MAGNESIA) suspension 30 mL  30 mL Feeding Tube QDAY PRN Trever Hilario M.D.   30 mL at 10/17/18 1352    And   • bisacodyl (DULCOLAX) suppository 10 mg  10 mg Rectal QDAY PRN Trever Hilario M.D.   10 mg at 10/18/18 1516   • acetaminophen (TYLENOL) tablet 650 mg  650 mg Feeding Tube Q6HRS PRN Trever Hilario M.D.   650 mg at 10/19/18 1716    Or   • acetaminophen (TYLENOL) suppository 650 mg  650 mg Rectal Q4HRS PRN Trever Hilario M.D.       • Pharmacy Consult: Enteral tube feeding - review meds/change route/product selection   Other PRN Paolo Son Jr., D.O.       • aspirin (ASA) chewable tab 81 mg  81 mg Per NG Tube DAILY Trever Hilario M.D.   81 mg at 10/23/18 0638   • famotidine (PEPCID) tablet 20 mg  20 mg Feeding Tube DAILY Trever Hilario M.D.   20 mg at 10/23/18 0638   • NS infusion   Intravenous Continuous Trever Hilario M.D. 10 mL/hr at 10/18/18 1400     • ondansetron (ZOFRAN) syringe/vial injection 4 mg  4 mg Intravenous Q4HRS PRN Edgar Moody M.D.       • ondansetron (ZOFRAN ODT) dispertab 4 mg  4 mg Oral Q4HRS PRN Edgar Moody M.D.       • Respiratory Care per Protocol   Nebulization Continuous RT Gomez Holguin M.D.       • MD Alert...ICU Electrolyte Replacement per Pharmacy   Other pharmacy to dose Gomez Holguin M.D.       •  fentaNYL (SUBLIMAZE) injection 25 mcg  25 mcg Intravenous Q HOUR PRN Gomez Holguin M.D.   25 mcg at 10/16/18 2046    Or   • fentaNYL (SUBLIMAZE) injection 50 mcg  50 mcg Intravenous Q HOUR PRN Gomez Holguin M.D.   50 mcg at 10/22/18 2307    Or   • fentaNYL (SUBLIMAZE) injection 100 mcg  100 mcg Intravenous Q HOUR PRN Gomez Holguin M.D.   100 mcg at 10/21/18 1814   • ipratropium-albuterol (DUONEB) nebulizer solution  3 mL Nebulization Q2HRS PRN (RT) Gomez Holguin M.D.       • ipratropium-albuterol (DUONEB) nebulizer solution  3 mL Nebulization Q4HRS (RT) Gomez Holguin M.D.   3 mL at 10/23/18 0641       Fluids    Intake/Output Summary (Last 24 hours) at 10/23/18 0842  Last data filed at 10/23/18 0800   Gross per 24 hour   Intake           1613.8 ml   Output             1750 ml   Net           -136.2 ml       Laboratory  Recent Results (from the past 48 hour(s))   CORTISOL    Collection Time: 10/21/18 10:22 AM   Result Value Ref Range    Cortisol 18.7 0.0 - 23.0 ug/dL   ISTAT ARTERIAL BLOOD GAS    Collection Time: 10/22/18  4:33 AM   Result Value Ref Range    Ph 7.470 7.400 - 7.500    Pco2 51.2 (HH) 26.0 - 37.0 mmHg    Po2 134 (H) 64 - 87 mmHg    Tco2 39 (H) 20 - 33 mmol/L    S02 99 93 - 99 %    Hco3 37.2 (H) 17.0 - 25.0 mmol/L    BE 12 (H) -4 - 3 mmol/L    Body Temp 97.2 F degrees    O2 Therapy 30 %    iPF Ratio 447     Ph Temp Tea 7.481 7.400 - 7.500    Pco2 Temp Co 49.5 (H) 26.0 - 37.0 mmHg    Po2 Temp Cor 129 (H) 64 - 87 mmHg    Specimen Arterial     Action Range Triggered YES     Inst. Qualified Patient YES    CBC with Differential    Collection Time: 10/22/18  5:35 AM   Result Value Ref Range    WBC 5.3 4.8 - 10.8 K/uL    RBC 3.12 (L) 4.20 - 5.40 M/uL    Hemoglobin 8.5 (L) 12.0 - 16.0 g/dL    Hematocrit 27.4 (L) 37.0 - 47.0 %    MCV 87.8 81.4 - 97.8 fL    MCH 27.2 27.0 - 33.0 pg    MCHC 31.0 (L) 33.6 - 35.0 g/dL    RDW 50.4 (H) 35.9 - 50.0 fL    Platelet Count 228 164 - 446 K/uL    MPV 10.9 9.0 -  12.9 fL    Neutrophils-Polys 91.80 (H) 44.00 - 72.00 %    Lymphocytes 5.60 (L) 22.00 - 41.00 %    Monocytes 2.20 0.00 - 13.40 %    Eosinophils 0.00 0.00 - 6.90 %    Basophils 0.00 0.00 - 1.80 %    Immature Granulocytes 0.40 0.00 - 0.90 %    Nucleated RBC 0.00 /100 WBC    Neutrophils (Absolute) 4.90 2.00 - 7.15 K/uL    Lymphs (Absolute) 0.30 (L) 1.00 - 4.80 K/uL    Monos (Absolute) 0.12 0.00 - 0.85 K/uL    Eos (Absolute) 0.00 0.00 - 0.51 K/uL    Baso (Absolute) 0.00 0.00 - 0.12 K/uL    Immature Granulocytes (abs) 0.02 0.00 - 0.11 K/uL    NRBC (Absolute) 0.00 K/uL   Magnesium    Collection Time: 10/22/18  5:35 AM   Result Value Ref Range    Magnesium 2.1 1.5 - 2.5 mg/dL   Phosphorus    Collection Time: 10/22/18  5:35 AM   Result Value Ref Range    Phosphorus 2.6 2.5 - 4.5 mg/dL   VALPROIC ACID    Collection Time: 10/22/18  5:35 AM   Result Value Ref Range    Valproic Acid 135.7 (HH) 50.0 - 100.0 ug/mL   AMMONIA    Collection Time: 10/22/18  5:35 AM   Result Value Ref Range    Ammonia 42 11 - 45 umol/L   PREALBUMIN    Collection Time: 10/22/18  5:35 AM   Result Value Ref Range    Pre-Albumin 7.0 (L) 18.0 - 38.0 mg/dL   CRP QUANTITIVE (NON-CARDIAC)    Collection Time: 10/22/18  5:35 AM   Result Value Ref Range    Stat C-Reactive Protein 3.96 (H) 0.00 - 0.75 mg/dL   COMP METABOLIC PANEL    Collection Time: 10/22/18  5:35 AM   Result Value Ref Range    Sodium 143 135 - 145 mmol/L    Potassium 3.8 3.6 - 5.5 mmol/L    Chloride 100 96 - 112 mmol/L    Co2 34 (H) 20 - 33 mmol/L    Anion Gap 9.0 0.0 - 11.9    Glucose 155 (H) 65 - 99 mg/dL    Bun 32 (H) 8 - 22 mg/dL    Creatinine 0.79 0.50 - 1.40 mg/dL    Calcium 9.1 8.5 - 10.5 mg/dL    AST(SGOT) 13 12 - 45 U/L    ALT(SGPT) 7 2 - 50 U/L    Alkaline Phosphatase 46 30 - 99 U/L    Total Bilirubin 0.4 0.1 - 1.5 mg/dL    Albumin 3.7 3.2 - 4.9 g/dL    Total Protein 5.8 (L) 6.0 - 8.2 g/dL    Globulin 2.1 1.9 - 3.5 g/dL    A-G Ratio 1.8 g/dL   ESTIMATED GFR    Collection Time: 10/22/18   5:35 AM   Result Value Ref Range    GFR If African American >60 >60 mL/min/1.73 m 2    GFR If Non African American >60 >60 mL/min/1.73 m 2   VALPROIC ACID    Collection Time: 10/22/18  4:10 PM   Result Value Ref Range    Valproic Acid 124.0 (HH) 50.0 - 100.0 ug/mL   CBC with Differential    Collection Time: 10/23/18  4:20 AM   Result Value Ref Range    WBC 5.6 4.8 - 10.8 K/uL    RBC 3.47 (L) 4.20 - 5.40 M/uL    Hemoglobin 9.2 (L) 12.0 - 16.0 g/dL    Hematocrit 30.5 (L) 37.0 - 47.0 %    MCV 87.9 81.4 - 97.8 fL    MCH 26.5 (L) 27.0 - 33.0 pg    MCHC 30.2 (L) 33.6 - 35.0 g/dL    RDW 49.4 35.9 - 50.0 fL    Platelet Count 250 164 - 446 K/uL    MPV 11.0 9.0 - 12.9 fL    Neutrophils-Polys 87.30 (H) 44.00 - 72.00 %    Lymphocytes 7.10 (L) 22.00 - 41.00 %    Monocytes 4.10 0.00 - 13.40 %    Eosinophils 0.00 0.00 - 6.90 %    Basophils 0.40 0.00 - 1.80 %    Immature Granulocytes 1.10 (H) 0.00 - 0.90 %    Nucleated RBC 0.00 /100 WBC    Neutrophils (Absolute) 4.92 2.00 - 7.15 K/uL    Lymphs (Absolute) 0.40 (L) 1.00 - 4.80 K/uL    Monos (Absolute) 0.23 0.00 - 0.85 K/uL    Eos (Absolute) 0.00 0.00 - 0.51 K/uL    Baso (Absolute) 0.02 0.00 - 0.12 K/uL    Immature Granulocytes (abs) 0.06 0.00 - 0.11 K/uL    NRBC (Absolute) 0.00 K/uL   Basic Metabolic Panel (BMP)    Collection Time: 10/23/18  4:20 AM   Result Value Ref Range    Sodium 140 135 - 145 mmol/L    Potassium 3.1 (L) 3.6 - 5.5 mmol/L    Chloride 99 96 - 112 mmol/L    Co2 34 (H) 20 - 33 mmol/L    Glucose 155 (H) 65 - 99 mg/dL    Bun 35 (H) 8 - 22 mg/dL    Creatinine 0.82 0.50 - 1.40 mg/dL    Calcium 9.0 8.5 - 10.5 mg/dL    Anion Gap 7.0 0.0 - 11.9   Magnesium    Collection Time: 10/23/18  4:20 AM   Result Value Ref Range    Magnesium 2.1 1.5 - 2.5 mg/dL   Phosphorus    Collection Time: 10/23/18  4:20 AM   Result Value Ref Range    Phosphorus 2.6 2.5 - 4.5 mg/dL   VALPROIC ACID    Collection Time: 10/23/18  4:20 AM   Result Value Ref Range    Valproic Acid 111.9 (HH) 50.0 -  100.0 ug/mL   AMMONIA    Collection Time: 10/23/18  4:20 AM   Result Value Ref Range    Ammonia 47 (H) 11 - 45 umol/L   ESTIMATED GFR    Collection Time: 10/23/18  4:20 AM   Result Value Ref Range    GFR If African American >60 >60 mL/min/1.73 m 2    GFR If Non African American >60 >60 mL/min/1.73 m 2   ISTAT ARTERIAL BLOOD GAS    Collection Time: 10/23/18  4:46 AM   Result Value Ref Range    Ph 7.450 7.400 - 7.500    Pco2 50.9 (H) 26.0 - 37.0 mmHg    Po2 98 (H) 64 - 87 mmHg    Tco2 37 (H) 20 - 33 mmol/L    S02 98 93 - 99 %    Hco3 35.4 (H) 17.0 - 25.0 mmol/L    BE 10 (H) -4 - 3 mmol/L    Body Temp 97.0 F degrees    O2 Therapy 40 %    iPF Ratio 245     Ph Temp Tea 7.463 7.400 - 7.500    Pco2 Temp Co 48.9 (H) 26.0 - 37.0 mmHg    Po2 Temp Cor 93 (H) 64 - 87 mmHg    Specimen Arterial     Action Range Triggered YES     Inst. Qualified Patient YES        Imaging  X-Ray:  I have personally reviewed the images and compared with prior images. and My impression is: Stable right > left lower lobe edema    Assessment/Plan  * Acute respiratory failure with hypoxia (HCC)- (present on admission)   Assessment & Plan    Intubated 10/13  RT/O2 protocols  Daily and PRN ABGs  Titration of ventilator therapy based on ABGs and patient's status  Sedation as tolerated/indicated  Daily CXR  HOB >30 degrees and peridex for VAP prevention  Pepcid for GI prophylaxis  SAT/SBT when able (ABCDEF Bundle)  Early mobility  Finally following commands, SBT passed. Will trial ventilator liberation today. She is at high risk of requiring re-intubation. This was discussed with her family.        Pneumonia due to methicillin resistant Staphylococcus aureus (HCC)   Assessment & Plan    Zyvox 600 mg twice daily for 7 days (end 10/26/18)        Seizure (HCC)- (present on admission)   Assessment & Plan    CT with small bilateral hygromas without mass-effect  EEG showed improvement in seizures  Valproic acid 500 mg every 12 hours  Keppra 1500 mg every 12  hours  Aptiom 400 mg nightly  Valproic acid levels are down-trending, recheck in AM  continue levocarnitine 500 mg twice daily and monitor ammonia levels  MRI on 10/17 with multiple acute bilateral supratentorial lacunar infarcts        Relative adrenal insufficiency (HCC)   Assessment & Plan    hydrocortisone 50 mg IV every 6 hours, wean when tolerated        Hypotension   Assessment & Plan    Levophed finally titrated off, remains vasoplegic  Continue hydrocortisone and midodrine        Limb ischemia   Assessment & Plan    Occlusion in right LEIF likely chronic  Heparin ppx        Atelectasis   Assessment & Plan    IS, PEP        Acute systolic heart failure (HCC)   Assessment & Plan    EF 40%  Force diuresis when tolerated        CKD (chronic kidney disease) stage 3, GFR 30-59 ml/min (HCC)- (present on admission)   Assessment & Plan    Renal dose meds, avoid nephrotoxins  Strict I/Os  Follow renal function        Paroxysmal atrial fibrillation (HCC)   Assessment & Plan    Optimize magnesium and potassium  TSH is normal        Hyperammonemia (HCC)   Assessment & Plan    Worsening  Due to valproic acid  levocarnitine 500 mg twice daily        DNR (do not resuscitate)   Assessment & Plan    DNR status, I ok  This was reviewed with family and the patient today. Her POA, Vickie her are in agreement to continue this code status        Dementia- (present on admission)   Assessment & Plan    History of mild dementia        Hypokalemia- (present on admission)   Assessment & Plan    Replete potassium        Elevated troponin- (present on admission)   Assessment & Plan    Due to demand ischemia             VTE:  Heparin  Ulcer: H2 Antagonist  Lines: Central Line  Ongoing indication addressed and Rosa Catheter  Ongoing indication addressed    I have performed a physical exam and reviewed and updated ROS and Plan today (10/23/2018). In review of yesterday's note (10/22/2018), there are no changes except as documented above.      Titrating ventilator, closely monitoring encephalopathy, she remains at high risk for worsening and requiring re-intubation. This patient is critically ill, at high risk for decompensation leading to worsening vital organ dysfunction and death without critical care interventions.    Discussed patient condition and risk of morbidity and/or mortality with Hospitalist, RN, RT, Pharmacy, Dietary, , Charge nurse / hot rounds, Patient and neurology     The patient remains critically ill.  Critical care time = 88 minutes in directly providing and coordinating critical care and extensive data review.  No time overlap and excludes procedures.    Paolo Son Jr., D.O.  Critical Care Medicine

## 2018-10-23 NOTE — PROCEDURES
Intubation  Date/Time: 10/22/2018 5:42 PM  Performed by: AGUSTO GARCIA JR  Authorized by: AGUSTO GARCIA JR     Consent:     Consent obtained:  Verbal    Consent given by: family.  Pre-procedure details:     Patient status:  Awake    Mallampati score:  I    Pretreatment meds: etomidate.    Paralytics:  Succinylcholine  Procedure details:     Preoxygenation: tube exchange, placed on 100% FiO2 prior to exchange.    Laryngoscope blade:  Mac 3    Cormack-Lehane Classification:  Grade 1    Tube size (mm):  7.5    Tube type:  Cuffed    Number of attempts:  1    Tube visualized through cords: yes    Placement assessment:     ETT to teeth:  23    Tube secured with:  ETT jung    Breath sounds:  Equal    Placement verification: chest rise, condensation, CXR verification, direct visualization, equal breath sounds and ETCO2 detector      CXR findings:  ETT in proper place  Post-procedure details:     Patient tolerance of procedure:  Tolerated well, no immediate complications  Comments:      Tube exchange for blown cuff on existing tube

## 2018-10-23 NOTE — RESPIRATORY CARE
Extubation    Cuff leak noted yes  Stridor present no     4lpm NC        Patient toleration well, no complications  RCP Complete? yes

## 2018-10-23 NOTE — PROGRESS NOTES
[facility] Neurology Progress Note:     Interval History: No acute events overnight. No complaints this am. No clinical change. No clinical seizures    Subjective: No complaints.     Objective:   Vitals:    10/23/18 0642 10/23/18 0700 10/23/18 0800 10/23/18 0801   Pulse:  100     Resp:  17 18    Temp:       SpO2: 98% 96%  96%   Weight:       Height:           Labs:     Lab Results   Component Value Date/Time    PROTHROMBTM 14.9 (H) 10/13/2018 01:02 PM    INR 1.15 (H) 10/13/2018 01:02 PM      Lab Results   Component Value Date/Time    WBC 5.6 10/23/2018 04:20 AM    RBC 3.47 (L) 10/23/2018 04:20 AM    HEMOGLOBIN 9.2 (L) 10/23/2018 04:20 AM    HEMATOCRIT 30.5 (L) 10/23/2018 04:20 AM    MCV 87.9 10/23/2018 04:20 AM    MCH 26.5 (L) 10/23/2018 04:20 AM    MCHC 30.2 (L) 10/23/2018 04:20 AM    MPV 11.0 10/23/2018 04:20 AM    NEUTSPOLYS 87.30 (H) 10/23/2018 04:20 AM    LYMPHOCYTES 7.10 (L) 10/23/2018 04:20 AM    MONOCYTES 4.10 10/23/2018 04:20 AM    EOSINOPHILS 0.00 10/23/2018 04:20 AM    BASOPHILS 0.40 10/23/2018 04:20 AM    ANISOCYTOSIS 1+ 10/20/2018 06:18 AM      Lab Results   Component Value Date/Time    SODIUM 140 10/23/2018 04:20 AM    POTASSIUM 3.1 (L) 10/23/2018 04:20 AM    CHLORIDE 99 10/23/2018 04:20 AM    CO2 34 (H) 10/23/2018 04:20 AM    GLUCOSE 155 (H) 10/23/2018 04:20 AM    BUN 35 (H) 10/23/2018 04:20 AM    CREATININE 0.82 10/23/2018 04:20 AM      Lab Results   Component Value Date/Time    TRIGLYCERIDE 75 10/15/2018 05:07 AM       Lab Results   Component Value Date/Time    ALKPHOSPHAT 46 10/22/2018 05:35 AM    ASTSGOT 13 10/22/2018 05:35 AM    ALTSGPT 7 10/22/2018 05:35 AM    TBILIRUBIN 0.4 10/22/2018 05:35 AM        Imaging/Testing:   No new neuroimaging to review    Physical Exam:     General: Well appearing 89 y/o female in NAD    Neurologic:  Mental Status: GCS 11T (E4 M6 V1)  Cranial Nerves: PERRL, EOMi. Face symmetric  Motor: Moves all 4 extremities spontaneously.  Reflexes: 2/4 throughout. Flexor plantars  X2  Coordination: Unable to assess  Sensation: Withdraws to nox stim on all 4s   Gait/Station: Deferred/unable to assess     Assessment/Plan:    Rene Becerril is a 89 y.o. female with history of SDH, bilateral subdural hygromas, pneumonia, status epilepticus, afib, and CKD presenting to the hospital for seizures and consulted for seizures. At this time, seizure activity has abated. She remains stable on her ASD regimen, and is improving from a mental status standpoint. Etiology of seizures is indeterminate, as they appeared to be focal in onset, however MRI was unrevealing for a lesion, and no subdural blood was seen.      Plan:  -Continue Keppra 500mg BID.  -Depakote level is 111  -Continue Depakote 500mg BID for now.  -Continue Aptiom 400mg Daily.  -Continue Carnitine and CoQ supplementation.  -All other medical issues per ICU.  -Neurology signing off. Please call with further questions.  -Will need outpatient neurology followup for medication regimen simplification      Tex Chavez M.D., Diplomat of the American Board of Psychiatry and Neurology  [facility]  Inpatient Neurology

## 2018-10-23 NOTE — FLOWSHEET NOTE
10/23/18 0801   Weaning Parameters   RR (bpm) 17   #FVC / Vital Capacity (liters)  0.6   NIF (cm H2O)  -40   Rapid Shallow Breathing Index (RR/VT) 55   Spontaneous VE 5.2   Spontaneous    Weaning Trial   Weaning Trial Stopped due to: Pt weaned for 1 hour and returned to rest settings per protocol   Length of Weaning Trial (Hours) 1   Vent Weaning Smart Text completed? Yes

## 2018-10-23 NOTE — FLOWSHEET NOTE
10/22/18 1735   Weaning Parameters   RR (bpm) 17   #FVC / Vital Capacity (liters)  0.6   NIF (cm H2O)  -30   Rapid Shallow Breathing Index (RR/VT) 41   Spontaneous VE 5.9   Spontaneous    Weaning Trial   Weaning Trial Initiated Yes   Weaning Trial Stopped due to: Pt weaned for 1 hour and returned to rest settings per protocol   Length of Weaning Trial (Hours) 1   Vent Weaning Smart Text completed? Yes

## 2018-10-23 NOTE — PALLIATIVE CARE
Palliative Care follow-up  Family meeting 13:00 10/23/18-  Collaboration with Yung and Dr. Son  -  Dr. Son spoke with patient and family at bedside.  Plan to continue current treatment, re-intubate if needed, NO compressions.  Trach was not reviewed, if re-intubation required, MD will discuss at this time.    Updated: CAROLEE Barragan, Dr. Son, Palliative Care    Plan: DNR/I OK, ongoing current care.        Thank you for allowing Palliative Care to participate in this patient's care. Please feel free to call [phone] with any questions or concerns.

## 2018-10-23 NOTE — PROGRESS NOTES
[facility] Hospitalist Progress Note    Date of Service: 10/23/2018    Chief Complaint  89 y.o. female admitted 10/13/2018 with hx of subdural hematoma and while at [facility] had a seizure and was transferred to [facility].  She did require intubation for airway protection    Interval Problem Update  Edema in legs/arms  BM yesterday  UOP adequate via bonner  Afebrile  Received KCl for replacement  Elevated Valproic level  Family conference at 1300 today?    Consultants/Specialty  Neurology   Critical Care    Disposition  To remain in ICU today        Review of Systems   Unable to perform ROS: Mental acuity      Physical Exam  Laboratory/Imaging   Hemodynamics  No data recorded.   Monitored Temp: 36.7 °C (98.1 °F)  Pulse  Av  Min: 30  Max: 129 Heart Rate (Monitored): (!) 101  NIBP: 102/61      Respiratory  Rider Vent Mode: Spont, Rate (breaths/min): 12, PEEP/CPAP: 8, PEEP/CPAP: 8, FiO2: 30, P Peak (PIP): 15, P MEAN: 10   Respiration: 18, Pulse Oximetry: 92 %     Work Of Breathing / Effort: Vented  RUL Breath Sounds: Diminished, RML Breath Sounds: Diminished, RLL Breath Sounds: Diminished, BLANCHE Breath Sounds: Diminished, LLL Breath Sounds: Diminished    Fluids    Intake/Output Summary (Last 24 hours) at 10/23/18 1708  Last data filed at 10/23/18 1600   Gross per 24 hour   Intake             2030 ml   Output              995 ml   Net             1035 ml       Nutrition  Orders Placed This Encounter   Procedures   • Diet NPO     Standing Status:   Standing     Number of Occurrences:   1     Order Specific Question:   Type:     Answer:   Now [1]     Order Specific Question:   Restrict to:     Answer:   Strict [1]     Physical Exam   Constitutional: She appears lethargic. She appears ill. No distress.   Generalized Anasarca.  Frail   HENT:   Head: Normocephalic and atraumatic.   Nose: Nose normal.   Eyes: Conjunctivae and EOM are normal. Right eye exhibits no discharge. Left eye exhibits no discharge.   Neck: No tracheal  deviation present.   Cardiovascular: Normal rate, regular rhythm and normal heart sounds.    Pulmonary/Chest: Effort normal and breath sounds normal. No respiratory distress. She has no wheezes.   Abdominal: Bowel sounds are normal. She exhibits no distension. There is no tenderness.   Musculoskeletal: She exhibits edema.   Lymphadenopathy:     She has no cervical adenopathy.   Neurological: She appears lethargic. No cranial nerve deficit.   Skin: Skin is warm and dry. She is not diaphoretic.   Bruising of left posterior forearm. Thin skin   Psychiatric: She has a normal mood and affect. She is slowed and withdrawn. Cognition and memory are impaired. She is noncommunicative.   Vitals reviewed.      Recent Labs      10/21/18   0448  10/22/18   0535  10/23/18   0420   WBC  8.4  5.3  5.6   RBC  3.52*  3.12*  3.47*   HEMOGLOBIN  9.7*  8.5*  9.2*   HEMATOCRIT  30.3*  27.4*  30.5*   MCV  86.1  87.8  87.9   MCH  27.6  27.2  26.5*   MCHC  32.0*  31.0*  30.2*   RDW  47.7  50.4*  49.4   PLATELETCT  259  228  250   MPV  10.8  10.9  11.0     Recent Labs      10/21/18   0448  10/22/18   0535  10/23/18   0420   SODIUM  140  143  140   POTASSIUM  3.3*  3.8  3.1*   CHLORIDE  98  100  99   CO2  36*  34*  34*   GLUCOSE  126*  155*  155*   BUN  32*  32*  35*   CREATININE  0.80  0.79  0.82   CALCIUM  8.8  9.1  9.0                      Assessment/Plan     * Acute respiratory failure with hypoxia (HCC)- (present on admission)   Assessment & Plan    Secondary to seizures and MRSA pneumonia  S/P extubation.  RT protocol  Supplemental oxygen        Pneumonia due to methicillin resistant Staphylococcus aureus (HCC)   Assessment & Plan    Complete 7-day course of Zyvox on 10/26/2018  RT protcol   As needed bronchodilators        Seizure (HCC)- (present on admission)   Assessment & Plan    new onset seizures.  CT head reveals subdural hygromas, could be sequelae of prior subdural hematomas.  No acute pathology on MRI of brain  Neurology  following and valproate dose decreased  Depakote 500mg BID, eslicarbazepine 400mg qHS, Keppra 1500mg BID        Relative adrenal insufficiency (HCC)   Assessment & Plan    On stress dose hydrocortisone  midodrine        Hypotension   Assessment & Plan    Midodrine 5mg TID  Wean levophed   Monitor I/O's        Limb ischemia   Assessment & Plan    Preliminary report  Occlusive thrombus in distal R LEIF.    Report and patient discussed with Dr. Power from vascular surgery on 10/19/2018 he felt that this is likely chronic he did not feel that anticoagulation would be indicated at this time based on this specific finding     stable continue to monitor        Acute systolic heart failure (HCC)   Assessment & Plan    EF:40% on 10/15/18 Echocardiogram  Anasarca on exam 10/23 with good albumin levels recently        CKD (chronic kidney disease) stage 3, GFR 30-59 ml/min ([facility])- (present on admission)   Assessment & Plan    Monitor on daily BMP  Avoid nephrotoxic meds        CRP elevated   Assessment & Plan    Likely secondary to pneumonia        Paroxysmal atrial fibrillation (HCC)   Assessment & Plan    Given subdural hygromas patient not good candidate for chronic anticoagulation          Hyperammonemia (HCC)   Assessment & Plan    Likely secondary to valproic acid  Improved with l-carnitine supplementation        Dementia- (present on admission)   Assessment & Plan    Reported hx of        Hypokalemia- (present on admission)   Assessment & Plan    Potassium 3.1  On 10/23  replete and monitor tomorrow am bmp        Elevated troponin- (present on admission)   Assessment & Plan    Likely demand ischemia in setting of recurrent seizures  EF 40%        Anemia of chronic disease- (present on admission)   Assessment & Plan    Hemoglobin 9.2 10/23  Monitor CBC          Quality-Core Measures   Reviewed items::  Labs reviewed, Medications reviewed and Radiology images reviewed  Rosa catheter::  Strict Intake and Output During  Sepisis or Shock  DVT prophylaxis pharmacological::  Heparin

## 2018-10-23 NOTE — WOUND TEAM
[facility]  Inpatient Services  Wound and Skin Care Progress Note    Admission Date:  10/13/2018   HPI, PMH, SH: Reviewed  Unit where seen by Wound Team: R107/00    WOUND TEAM FOLLOW UP: pressure injury mgmt    SUBJECTIVE: intubated    Self Report / Pain Level: no s/s of distress      OBJECTIVE: on LATRICE bed, heels floated off pillows, scabs present from continuous EEG now MCKENNA  WOUND TYPE, LOCATION, CHARACTERISTICS (Pressure ulcers: location, stage, POA or date identified)  Pressure Injury 10/22/18 Ear Stage 2   Wound Image       Pressure Injury Stage 2    State of Healing Early/partial granulation    Site Assessment Red;Dry    Bria-wound Assessment Intact    Margins Attached edges    Wound Length (cm) 0.5 cm lateral 0.4 , posterior 0.5    Wound Width (cm) 0.5 cm  lateral 0.4 , posterior 0.5    Wound Surface Area (cm^2) 0.25 cm^2    Drainage Amount None    Drainage Description Serosanguineous    Treatments Cleansed    Cleansing Normal Saline Irrigation    Periwound Protectant Not Applicable    Dressing Options Open to Air    Odor None     Exposed Structures None     Tissue Type and Percentage 100% red      Vascular:  Wounds not r/t vascular problem    Lab Values:    WBC:       WBC   Date/Time Value Ref Range Status   10/22/2018 05:35 AM 5.3 4.8 - 10.8 K/uL Final     AIC:      Lab Results   Component Value Date/Time    HBA1C 5.7 (H) 07/14/2018 02:51 PM        Culture:  na    INTERVENTIONS BY WOUND TEAM: viewed and palpated wounds, cleaned with NS, dried. Left MCKENNA  Dressing Options: Open to Air    Interdisciplinary consultation:   With Nursing;With Patient    EVALUATION: pt has St 2 pressure injuries to R ear lobe anterior and posterior.    Factors affecting wound healing: decreased mobility, age, anemia, dementia, CKD  Goals:  Steady decrease in wound area and depth weekly     NURSING PLAN OF CARE:    Dressing changes: Continue previous Dressing Maintenance orders:  x      See new Dressing Maintenance  orders:       Skin care: See Skin Care orders:  X cont      Rectal tube care: See Rectal Tube Care orders:      Other orders:           WOUND TEAM PLAN OF CARE (X):   NPWT change 3 x week:        Dressing changes:       Follow up as needed:  x     Other:

## 2018-10-23 NOTE — ASSESSMENT & PLAN NOTE
EF:40% on 10/15/18 Echocardiogram  BNP elevated  Anasarca on exam 10/23 with good albumin levels recently  On Diamox 500 mg IV every 12 hours  Monitor strict I's and O's

## 2018-10-24 PROBLEM — S42.209A CLOSED FRACTURE OF PROXIMAL END OF HUMERUS: Status: ACTIVE | Noted: 2018-01-01

## 2018-10-24 PROBLEM — E87.6 HYPOKALEMIA: Status: RESOLVED | Noted: 2018-01-01 | Resolved: 2018-01-01

## 2018-10-24 NOTE — CARE PLAN
Problem: Respiratory:  Goal: Respiratory status will improve  Pt extubated yesterday. Pt on 5 l oxy mask. Pt able to cough secretions up at this time.     Problem: Skin Integrity  Goal: Risk for impaired skin integrity will decrease  Outcome: PROGRESSING AS EXPECTED  Pt on low loss air mattress, Pt skin kept clean and dry. All lines and medical equipment kept clear of skin. Pt with multiple busies, edema, sacral tear open. Wound consult placed.

## 2018-10-24 NOTE — PROGRESS NOTES
Discussed Pt's SYS Bp 83 - 88/ 50 with MAP 62-65 when Pt sleeping. Discussed current midodrine dose with DR Dailey.

## 2018-10-24 NOTE — PROGRESS NOTES
AM bedside report received from NOC Rn. Plan of care discussed. Lines labs meds and orders reviewed. Neuro assessment completed.

## 2018-10-24 NOTE — CARE PLAN
Problem: Respiratory:  Goal: Respiratory status will improve    Intervention: Administer and titrate oxygen therapy  Pt. Extubated today 10/23, remains on 3L O2 per oxymask, lungs diminished, RT involved.       Problem: Skin Integrity  Goal: Risk for impaired skin integrity will decrease    Intervention: Assess risk factors for impaired skin integrity and/or pressure ulcers  Pt. Has multiple wounds, bruises and swelling, appropriate dressing protocols in place, see wound LDA.

## 2018-10-24 NOTE — WOUND TEAM
[facility]  Inpatient Services  Wound and Skin Care Progress Note    Admission Date:  10/13/2018   HPI, PMH, SH: Reviewed  Unit where seen by Wound Team: R107/00    WOUND TEAM FOLLOW UP: sacral wound open    SUBJECTIVE:  Non-verbal, biting as mouth care performed     Self Report / Pain Level: no s/s of distress      OBJECTIVE: on LATRICE bed, heels floated off pillows  WOUND TYPE, LOCATION, CHARACTERISTICS (Pressure ulcers: location, stage, POA or date identified)  Pressure Injury 10/13/18 Coccyx;Sacrum POA DTI (Active) revealed deep St 2 10/24/18   Wound Image      Pressure Injury Stage 2    State of Healing Early/partial granulation    Site Assessment Red    Bria-wound Assessment Maceration    Margins Defined edges    Wound Length (cm) 4.5 cm  glutealcleft/coccyx fissure 4.5    Wound Width (cm) 1.5 cm  glutealcleft/coccyx fissure 0.5    Wound Depth (cm) 0.3 cm   glutealcleft/coccyx fissure 0.1    Wound Surface Area (cm^2) 6.75 cm^2    Closure GREG    Drainage Amount None    Treatments Other (Comment)    Cleansing Normal Saline Irrigation    Periwound Protectant Barrier Paste    Dressing Options Open to Air    Dressing Cleansing/Solutions Not Applicable    Dressing Changed New    Dressing Status Clean;Dry;Intact    Dressing Change Frequency Every 72 hrs    NEXT Dressing Change  10/23/18    NEXT Weekly Photo (Inpatient Only) 10/24/18    Odor None     Exposed Structures None     Tissue Type and Percentage 100% red           Vascular:  Wounds not r/t vascular problem    Lab Values:    WBC:       WBC   Date/Time Value Ref Range Status   10/24/2018 03:23 AM 4.2 (L) 4.8 - 10.8 K/uL Final     AIC:      Lab Results   Component Value Date/Time    HBA1C 5.7 (H) 07/14/2018 02:51 PM          Culture:  na    INTERVENTIONS BY WOUND TEAM: pt turned to L side, viewed and palpated skin, cleaned with NS, dried. Sprinkled stoma powder to  Wound, covered with barrier paste, pad changed and positioned L side lying.      Dressing Options: Open to Air  Stoma powder, zinc barrier paste    Interdisciplinary consultation:   With Nursing;With Patient    EVALUATION:  Pt's POA DTI has opened to deep St 2 pressure injury, distal to that there is a fissure anf kali-wound is macerated. Stoma powder to help dry and heal wound. Unable to apply drsg r/t incontinence.     Factors affecting wound healing: decreased mobility, age, anemia, dementia, CKD  Goals:  Steady decrease in wound area and depth weekly     NURSING PLAN OF CARE:    Dressing changes: Continue previous Dressing Maintenance orders:        See new Dressing Maintenance orders:       Skin care: See Skin Care orders:   continue    Rectal tube care: See Rectal Tube Care orders:      Other orders:           WOUND TEAM PLAN OF CARE (X):   NPWT change 3 x week:        Dressing changes:       Follow up as needed:       Other:  tbd

## 2018-10-24 NOTE — DISCHARGE PLANNING
Anticipated Discharge Disposition: [facility]    Action: Family contact and update  Met face to face with pt's granddaughter and POALashonda as well as the attending physician, Dr. Son. Lashonda informs the treatment team that she still wants the pt to receive active treatment; however, in the event the pt's heart stops not to necessitate the pt.      Barriers to Discharge: Pt is still requiring ICU LOC    Plan: Assist as needed.

## 2018-10-24 NOTE — DOCUMENTATION QUERY
DOCUMENTATION QUERY    PROVIDERS: Please select “Cosign w/ note”to reply to query.    To better represent the severity of illness of your patient, please review the following information and exercise your independent professional judgment in responding to this query.     Stage 2 pressure injury of right ear from 10/22/2018 is documented in the Wound Care RN note. Based upon the clinical findings, risk factors, and treatment, can you specify whether you agree or disagree with this Wound Care RN assessment?    • Agree with Wound Care RN assessment  • Disagree with Wound Care RN assessment (document your clinical findings)  • Other explanation of clinical findings  • Unable to determine    The medical record reflects the following:   Clinical Findings 10/22 Wound Team note:  Pressure injury to right earlobe, Stage 2, 10/22/2018  State of healing: early/partial granulation  Assessment: red/dry  Periwound: intact  Length: 0.5cm lateral 0.4, posterior 0.5  Width: 0.5cm lateral 0.4. Posterior 0.5  Drainage: serosanguinous   Tissue type and %: 100% red   Treatment Wound Team consult  Wound Team interventions: cleansed area with NS, left open to air   Risk Factors Dementia  Advanced age  Seizures  Mechanical ventilation  CKD  Anemia    Location within medical record Wound Team note     Thank you,   Sita Doll, RN, BSN  Clinical   [phone]

## 2018-10-24 NOTE — PROGRESS NOTES
Critical Care Progress Note    Date of admission  10/13/2018    Chief Complaint  89 y.o. female admitted 10/13/2018 with new onset seizures.    Hospital Course    This lady was admitted to the ICU with seizures and respiratory failure.      Interval Problem Update  Reviewed last 24 hour events:   - Did well overnight, right proximal humerus fx identified   - Neuro: Somnolent, generally weak   - HR: 50s-90s   - BP: 80s-100s   - GI: TF at goal   - UOP: 400 mL   - Rosa: yes   - Tm: Afeb   - Lines: CVC   - PPx: GI Pepcid, DVT heparin   - 5L oxymask    YESTERDAY   - NAEON   - Neuro: follows, sleepy   - HR: SR-AF 70s-120s   - BP: 90s-120s   - GI: TF at goal   - UOP: adequate   - Rosa: yes   - Tm: afeb   - Lines: midline, CVC   - PPx: GI pepcid  DVT heparin   - VD #11   - SBT: VC: 0.6, NIF -40, RSBI 65   - ABG: looks good   - CXR (personally reviewed and compared to prior): continued edema, hernia    Review of Systems  Review of Systems   Unable to perform ROS: Acuity of condition        Vital Signs for last 24 hours   Pulse:  [] 53  Resp:  [11-24] 19    Hemodynamic parameters for last 24 hours       Vent Settings for last 24 hours  [place] Vent Mode: Spont  Rate (breaths/min):  [12] 12  PEEP/CPAP:  [8] 8  P Peak (PIP):  [15-20] 15  P MEAN:  [10-13] 10    Physical Exam   Physical Exam   Constitutional: She has a sickly appearance. She appears distressed.   HENT:   Head: Normocephalic.   Right Ear: External ear normal.   Left Ear: External ear normal.   Mouth/Throat: Oropharynx is clear and moist.   Eyes: Pupils are equal, round, and reactive to light. Right eye exhibits no discharge. Left eye exhibits no discharge. No scleral icterus.   Neck: Neck supple. No JVD present. No tracheal deviation present.   Cardiovascular: Normal rate and intact distal pulses.  An irregularly irregular rhythm present. Exam reveals no gallop and no friction rub.    Pulmonary/Chest: She has no wheezes. She has rales (Coarse crackles  bilaterally).   Abdominal: Soft. Bowel sounds are normal. She exhibits no distension. There is no tenderness. There is no rebound.   Tolerating enteral tube feedings   Musculoskeletal: She exhibits edema and tenderness (right shoulder).   No clubbing or cyanosis   Neurological:   Moves all 4 extremities.   Skin: Skin is warm and dry. She is not diaphoretic. No erythema. No pallor.   Diffuse bruising   Nursing note and vitals reviewed.      Medications  Current Facility-Administered Medications   Medication Dose Route Frequency Provider Last Rate Last Dose   • levETIRAcetam (KEPPRA) tablet 1,500 mg  1,500 mg Per NG Tube BID Paolo Son Jr., D.O.   1,500 mg at 10/24/18 0517   • Divalproex Sodium (DEPAKOTE) capsule 500 mg  500 mg Feeding Tube Q12HRS Paolo Son Jr., D.O.   500 mg at 10/24/18 0518   • midodrine (PROAMATINE) tablet 5 mg  5 mg Oral TID WITH MEALS Paolo Son Jr., D.O.   5 mg at 10/23/18 1728   • hydrocortisone sodium succinate PF (SOLU-CORTEF) 100 MG injection 50 mg  50 mg Intravenous Q6HRS rTever Hilario M.D.   50 mg at 10/24/18 0517   • heparin injection 5,000 Units  5,000 Units Subcutaneous Q8HRS Tono Jaimes M.D.   5,000 Units at 10/24/18 0517   • eslicarbazepine (APTIOM) tablet 400 mg  400 mg Oral QHS Juan Manuel Vivar M.D.   400 mg at 10/23/18 2108   • linezolid (ZYVOX) tablet 600 mg  600 mg Oral Q12HRS Trever Hilario M.D.   600 mg at 10/24/18 0518   • norepinephrine (LEVOPHED) 8 mg in  mL Infusion  0-30 mcg/min Intravenous Continuous Nestor Pelletier M.D.   Stopped at 10/22/18 1438   • LORazepam (ATIVAN) injection 1-4 mg  1-4 mg Intravenous Q10 MIN PRN Trever Hilario M.D.   2 mg at 10/17/18 1218   • levOCARNitine (CARNITOR) 1 GM/10ML solution 500 mg  500 mg Feeding Tube BID WITH MEALS Trever Hilario M.D.   500 mg at 10/23/18 1730   • polyethylene glycol/lytes (MIRALAX) PACKET 1 Packet  1 Packet Feeding Tube BID Trever Hilario M.D.    Stopped at 10/24/18 0518    And   • senna-docusate (PERICOLACE or SENOKOT S) 8.6-50 MG per tablet 2 Tab  2 Tab Feeding Tube BID Trever Hilario M.D.   Stopped at 10/24/18 0518    And   • magnesium hydroxide (MILK OF MAGNESIA) suspension 30 mL  30 mL Feeding Tube QDAY PRN Trever Hilario M.D.   30 mL at 10/17/18 1352    And   • bisacodyl (DULCOLAX) suppository 10 mg  10 mg Rectal QDAY PRN Trever Hilario M.D.   10 mg at 10/18/18 1516   • acetaminophen (TYLENOL) tablet 650 mg  650 mg Feeding Tube Q6HRS PRN Trever Hilario M.D.   650 mg at 10/19/18 1716    Or   • acetaminophen (TYLENOL) suppository 650 mg  650 mg Rectal Q4HRS PRN Trever Hilario M.D.       • Pharmacy Consult: Enteral tube feeding - review meds/change route/product selection   Other PRN Paolo Son Jr., D.O.       • aspirin (ASA) chewable tab 81 mg  81 mg Per NG Tube DAILY Trever Hilario M.D.   81 mg at 10/24/18 0517   • famotidine (PEPCID) tablet 20 mg  20 mg Feeding Tube DAILY Trever Hilario M.D.   20 mg at 10/24/18 0517   • NS infusion   Intravenous Continuous Trever Hilario M.D. 10 mL/hr at 10/18/18 1400     • ondansetron (ZOFRAN) syringe/vial injection 4 mg  4 mg Intravenous Q4HRS PRN Edgar Moody M.D.       • ondansetron (ZOFRAN ODT) dispertab 4 mg  4 mg Oral Q4HRS PRN Edgar Moody M.D.       • Respiratory Care per Protocol   Nebulization Continuous RT Gomez Holguin M.D.       • MD Alert...ICU Electrolyte Replacement per Pharmacy   Other pharmacy to dose Gomez Holguin M.D.       • fentaNYL (SUBLIMAZE) injection 25 mcg  25 mcg Intravenous Q HOUR PRN Gomez Holguin M.D.   25 mcg at 10/16/18 2046    Or   • fentaNYL (SUBLIMAZE) injection 50 mcg  50 mcg Intravenous Q HOUR PRN Gomez Holguin M.D.   50 mcg at 10/22/18 2307    Or   • fentaNYL (SUBLIMAZE) injection 100 mcg  100 mcg Intravenous Q HOUR PRN Gomez Holguin M.D.   100 mcg at 10/21/18 1814   •  ipratropium-albuterol (DUONEB) nebulizer solution  3 mL Nebulization Q2HRS PRN (RT) Gomez Holguin M.D.           Fluids    Intake/Output Summary (Last 24 hours) at 10/24/18 0752  Last data filed at 10/24/18 0600   Gross per 24 hour   Intake             1870 ml   Output              865 ml   Net             1005 ml       Laboratory  Recent Results (from the past 48 hour(s))   VALPROIC ACID    Collection Time: 10/22/18  4:10 PM   Result Value Ref Range    Valproic Acid 124.0 (HH) 50.0 - 100.0 ug/mL   CBC with Differential    Collection Time: 10/23/18  4:20 AM   Result Value Ref Range    WBC 5.6 4.8 - 10.8 K/uL    RBC 3.47 (L) 4.20 - 5.40 M/uL    Hemoglobin 9.2 (L) 12.0 - 16.0 g/dL    Hematocrit 30.5 (L) 37.0 - 47.0 %    MCV 87.9 81.4 - 97.8 fL    MCH 26.5 (L) 27.0 - 33.0 pg    MCHC 30.2 (L) 33.6 - 35.0 g/dL    RDW 49.4 35.9 - 50.0 fL    Platelet Count 250 164 - 446 K/uL    MPV 11.0 9.0 - 12.9 fL    Neutrophils-Polys 87.30 (H) 44.00 - 72.00 %    Lymphocytes 7.10 (L) 22.00 - 41.00 %    Monocytes 4.10 0.00 - 13.40 %    Eosinophils 0.00 0.00 - 6.90 %    Basophils 0.40 0.00 - 1.80 %    Immature Granulocytes 1.10 (H) 0.00 - 0.90 %    Nucleated RBC 0.00 /100 WBC    Neutrophils (Absolute) 4.92 2.00 - 7.15 K/uL    Lymphs (Absolute) 0.40 (L) 1.00 - 4.80 K/uL    Monos (Absolute) 0.23 0.00 - 0.85 K/uL    Eos (Absolute) 0.00 0.00 - 0.51 K/uL    Baso (Absolute) 0.02 0.00 - 0.12 K/uL    Immature Granulocytes (abs) 0.06 0.00 - 0.11 K/uL    NRBC (Absolute) 0.00 K/uL   Basic Metabolic Panel (BMP)    Collection Time: 10/23/18  4:20 AM   Result Value Ref Range    Sodium 140 135 - 145 mmol/L    Potassium 3.1 (L) 3.6 - 5.5 mmol/L    Chloride 99 96 - 112 mmol/L    Co2 34 (H) 20 - 33 mmol/L    Glucose 155 (H) 65 - 99 mg/dL    Bun 35 (H) 8 - 22 mg/dL    Creatinine 0.82 0.50 - 1.40 mg/dL    Calcium 9.0 8.5 - 10.5 mg/dL    Anion Gap 7.0 0.0 - 11.9   Magnesium    Collection Time: 10/23/18  4:20 AM   Result Value Ref Range    Magnesium 2.1 1.5  - 2.5 mg/dL   Phosphorus    Collection Time: 10/23/18  4:20 AM   Result Value Ref Range    Phosphorus 2.6 2.5 - 4.5 mg/dL   VALPROIC ACID    Collection Time: 10/23/18  4:20 AM   Result Value Ref Range    Valproic Acid 111.9 (HH) 50.0 - 100.0 ug/mL   AMMONIA    Collection Time: 10/23/18  4:20 AM   Result Value Ref Range    Ammonia 47 (H) 11 - 45 umol/L   ESTIMATED GFR    Collection Time: 10/23/18  4:20 AM   Result Value Ref Range    GFR If African American >60 >60 mL/min/1.73 m 2    GFR If Non African American >60 >60 mL/min/1.73 m 2   ISTAT ARTERIAL BLOOD GAS    Collection Time: 10/23/18  4:46 AM   Result Value Ref Range    Ph 7.450 7.400 - 7.500    Pco2 50.9 (H) 26.0 - 37.0 mmHg    Po2 98 (H) 64 - 87 mmHg    Tco2 37 (H) 20 - 33 mmol/L    S02 98 93 - 99 %    Hco3 35.4 (H) 17.0 - 25.0 mmol/L    BE 10 (H) -4 - 3 mmol/L    Body Temp 97.0 F degrees    O2 Therapy 40 %    iPF Ratio 245     Ph Temp Tea 7.463 7.400 - 7.500    Pco2 Temp Co 48.9 (H) 26.0 - 37.0 mmHg    Po2 Temp Cor 93 (H) 64 - 87 mmHg    Specimen Arterial     Action Range Triggered YES     Inst. Qualified Patient YES    CBC with Differential    Collection Time: 10/24/18  3:23 AM   Result Value Ref Range    WBC 4.2 (L) 4.8 - 10.8 K/uL    RBC 3.52 (L) 4.20 - 5.40 M/uL    Hemoglobin 9.5 (L) 12.0 - 16.0 g/dL    Hematocrit 30.8 (L) 37.0 - 47.0 %    MCV 87.5 81.4 - 97.8 fL    MCH 27.0 27.0 - 33.0 pg    MCHC 30.8 (L) 33.6 - 35.0 g/dL    RDW 49.5 35.9 - 50.0 fL    Platelet Count 251 164 - 446 K/uL    MPV 11.2 9.0 - 12.9 fL    Neutrophils-Polys 83.40 (H) 44.00 - 72.00 %    Lymphocytes 10.10 (L) 22.00 - 41.00 %    Monocytes 4.60 0.00 - 13.40 %    Eosinophils 0.00 0.00 - 6.90 %    Basophils 0.20 0.00 - 1.80 %    Immature Granulocytes 1.70 (H) 0.00 - 0.90 %    Nucleated RBC 0.00 /100 WBC    Neutrophils (Absolute) 3.46 2.00 - 7.15 K/uL    Lymphs (Absolute) 0.42 (L) 1.00 - 4.80 K/uL    Monos (Absolute) 0.19 0.00 - 0.85 K/uL    Eos (Absolute) 0.00 0.00 - 0.51 K/uL    Baso  (Absolute) 0.01 0.00 - 0.12 K/uL    Immature Granulocytes (abs) 0.07 0.00 - 0.11 K/uL    NRBC (Absolute) 0.00 K/uL   Basic Metabolic Panel (BMP)    Collection Time: 10/24/18  3:23 AM   Result Value Ref Range    Sodium 140 135 - 145 mmol/L    Potassium 3.9 3.6 - 5.5 mmol/L    Chloride 99 96 - 112 mmol/L    Co2 36 (H) 20 - 33 mmol/L    Glucose 118 (H) 65 - 99 mg/dL    Bun 39 (H) 8 - 22 mg/dL    Creatinine 0.82 0.50 - 1.40 mg/dL    Calcium 8.7 8.5 - 10.5 mg/dL    Anion Gap 5.0 0.0 - 11.9   Magnesium    Collection Time: 10/24/18  3:23 AM   Result Value Ref Range    Magnesium 2.1 1.5 - 2.5 mg/dL   Phosphorus    Collection Time: 10/24/18  3:23 AM   Result Value Ref Range    Phosphorus 2.8 2.5 - 4.5 mg/dL   VALPROIC ACID    Collection Time: 10/24/18  3:23 AM   Result Value Ref Range    Valproic Acid 96.5 50.0 - 100.0 ug/mL   AMMONIA    Collection Time: 10/24/18  3:23 AM   Result Value Ref Range    Ammonia 72 (H) 11 - 45 umol/L   ESTIMATED GFR    Collection Time: 10/24/18  3:23 AM   Result Value Ref Range    GFR If African American >60 >60 mL/min/1.73 m 2    GFR If Non African American >60 >60 mL/min/1.73 m 2       Imaging  X-Ray:  I have personally reviewed the images and compared with prior images. and My impression is: Stable right > left lower lobe edema    Assessment/Plan  * Acute respiratory failure with hypoxia (HCC)- (present on admission)   Assessment & Plan    Intubated 10/13- 10/23/2018  RT/O2 Protocols  Titrate supplemental FiO2 to maintain SpO2 >92%  Remains at incredibly high likelihood for reintubation        Pneumonia due to methicillin resistant Staphylococcus aureus (HCC)   Assessment & Plan    Zyvox 600 mg twice daily for 7 days (end 10/26/18)        Seizure (HCC)- (present on admission)   Assessment & Plan    CT with small bilateral hygromas without mass-effect  EEG showed improvement in seizures  Valproic acid 500 mg every 12 hours  Keppra 1500 mg every 12 hours  Aptiom 400 mg nightly  Valproic acid  levels are down-trending, recheck in AM  Increase levocarnitine to 1000 mg twice daily and monitor ammonia levels  MRI on 10/17 with multiple acute bilateral supratentorial lacunar infarcts        Relative adrenal insufficiency (HCC)   Assessment & Plan    hydrocortisone 50 mg IV every 6 hours, wean when hemodynamically stable        Hypotension   Assessment & Plan    Levophed finally titrated off, remains vasoplegic  Continue hydrocortisone and increase midodrine  Echocardiogram reviewed: Left ventricular ejection fraction is visually estimated to be 40%. A reliable estimation of diastolic function cannot be made due to   tachycardia.        Limb ischemia   Assessment & Plan    Occlusion in right LEIF likely chronic  Heparin ppx        Atelectasis   Assessment & Plan    IS, PEP        Acute systolic heart failure (HCC)   Assessment & Plan    EF 40%  Force diuresis when able        CKD (chronic kidney disease) stage 3, GFR 30-59 ml/min ([facility])- (present on admission)   Assessment & Plan    Renal dose meds, avoid nephrotoxins  Strict I/Os  Follow renal function        Encephalopathy acute   Assessment & Plan    Likely metabolic and pharmacologic with elevated ammonia and on multiple antiepileptic drugs  Re-orientation, family, glasses, hearing aids, sleep, lights on during day, treat pain, ambulate, minimize benzos/anticholinergic agents.           Closed fracture of proximal end of humerus   Assessment & Plan    Place right arm in sling        Paroxysmal atrial fibrillation (HCC)   Assessment & Plan    Optimize magnesium and potassium  TSH is normal        Hyperammonemia (HCC)   Assessment & Plan    Worsening  Due to valproic acid  Increase levocarnitine to 1 g twice daily        DNR (do not resuscitate)   Assessment & Plan    DNR status, I ok  This was reviewed with family and the patient today. Her POA, Vickie her are in agreement to continue this code status        Dementia- (present on admission)   Assessment &  Plan    History of mild dementia        Elevated troponin- (present on admission)   Assessment & Plan    Due to demand ischemia             VTE:  Heparin  Ulcer: H2 Antagonist  Lines: Central Line  Ongoing indication addressed and Rosa Catheter  Ongoing indication addressed    I have performed a physical exam and reviewed and updated ROS and Plan today (10/24/2018). In review of yesterday's note (10/23/2018), there are no changes except as documented above.     Remains at incredibly high likelihood for intubation given severe encephalopathy and hypoxia. This patient is critically ill, at high risk for decompensation leading to worsening vital organ dysfunction and death without critical care interventions.    Discussed patient condition and risk of morbidity and/or mortality with Hospitalist, RN, RT, Pharmacy, Dietary, , Charge nurse / hot rounds and Patient     The patient remains critically ill.  Critical care time = 34 minutes in directly providing and coordinating critical care and extensive data review.  No time overlap and excludes procedures.    Paolo Son Jr., D.O.  Critical Care Medicine

## 2018-10-24 NOTE — PROGRESS NOTES
[facility] Hospitalist Progress Note    Date of Service: 10/24/2018    Chief Complaint  89 y.o. female admitted 10/13/2018 with hx of subdural hematoma and [facility] and [facility] to [facility].  She did require intubation for airway protection    Interval Problem Update  Xray showing right proximal humerus fracture.  Weak.  BP:80-90's, increase midodrine  Tmax:98  Enteral feeds at goal  UOP 400cc  Replacing potassium  On 5 L NC  Air in intestines on cxr    Consultants/Specialty  Neurology   Critical Care    Disposition  To remain in ICU today        Review of Systems   Unable to perform ROS: Mental acuity      Physical Exam  Laboratory/Imaging   Hemodynamics  No data recorded.   Monitored Temp: 36.7 °C (98.1 °F)  Pulse  Av.4  Min: 30  Max: 129 Heart Rate (Monitored): (!) 53  NIBP: (!) 88/53      Respiratory      Respiration: 18, Pulse Oximetry: 97 %, O2 Daily Delivery Respiratory : OxyMask        RUL Breath Sounds: Coarse Crackles;Diminished, RML Breath Sounds: Diminished, RLL Breath Sounds: Coarse Crackles;Diminished, BLANCHE Breath Sounds: Diminished, LLL Breath Sounds: Diminished    Fluids    Intake/Output Summary (Last 24 hours) at 10/24/18 1719  Last data filed at 10/24/18 1600   Gross per 24 hour   Intake             1640 ml   Output              805 ml   Net              835 ml       Nutrition  Orders Placed This Encounter   Procedures   • Diet NPO     Standing Status:   Standing     Number of Occurrences:   1     Order Specific Question:   Type:     Answer:   Now [1]     Order Specific Question:   Restrict to:     Answer:   Strict [1]     Physical Exam   Constitutional: She appears lethargic. She appears ill. No distress.   Generalized Anasarca.  Frail   HENT:   Head: Normocephalic and atraumatic.   Nose: Nose normal.   Mouth/Throat: Oropharynx is clear and moist.   Eyes: Conjunctivae and EOM are normal. Right eye exhibits no discharge. Left eye exhibits no discharge.   Neck: No  tracheal deviation present.   Cardiovascular: Normal rate and regular rhythm.    Murmur heard.  Pulses:       Radial pulses are 1+ on the right side, and 1+ on the left side.        Dorsalis pedis pulses are 1+ on the right side, and 1+ on the left side.   Pulmonary/Chest: Effort normal and breath sounds normal. No respiratory distress. She has no wheezes.   Abdominal: Bowel sounds are normal. She exhibits no distension. There is no tenderness.   Musculoskeletal: She exhibits edema (general anasarca) and tenderness (with movement of left upper arm).   Neurological: She appears lethargic. No cranial nerve deficit.   Skin: Skin is warm and dry. She is not diaphoretic.   Bruising of left posterior forearm. Thin skin   Psychiatric: She has a normal mood and affect. She is slowed and withdrawn. Cognition and memory are impaired. She is noncommunicative.   Vitals reviewed.      Recent Labs      10/22/18   0535  10/23/18   0420  10/24/18   0323   WBC  5.3  5.6  4.2*   RBC  3.12*  3.47*  3.52*   HEMOGLOBIN  8.5*  9.2*  9.5*   HEMATOCRIT  27.4*  30.5*  30.8*   MCV  87.8  87.9  87.5   MCH  27.2  26.5*  27.0   MCHC  31.0*  30.2*  30.8*   RDW  50.4*  49.4  49.5   PLATELETCT  228  250  251   MPV  10.9  11.0  11.2     Recent Labs      10/22/18   0535  10/23/18   0420  10/24/18   0323   SODIUM  143  140  140   POTASSIUM  3.8  3.1*  3.9   CHLORIDE  100  99  99   CO2  34*  34*  36*   GLUCOSE  155*  155*  118*   BUN  32*  35*  39*   CREATININE  0.79  0.82  0.82   CALCIUM  9.1  9.0  8.7                      Assessment/Plan     * Acute respiratory failure with hypoxia (HCC)- (present on admission)   Assessment & Plan    Secondary to seizures and MRSA pneumonia  S/P extubation.  RT protocol  Supplemental oxygen  Mobilize  Encourage deep inspiratory efforts        Pneumonia due to methicillin resistant Staphylococcus aureus (HCC)   Assessment & Plan    Complete 7-day course of Zyvox on 10/26/2018  RT protcol   As needed bronchodilators    "     Seizure (HCC)- (present on admission)   Assessment & Plan    new onset seizures.  CT head reveals subdural hygromas, could be sequelae of prior subdural hematomas.  No acute pathology on MRI of brain  Neurology consulting  Depakote 500mg BID, eslicarbazepine 400mg qHS, Keppra 1500mg BID        Relative adrenal insufficiency (HCC)   Assessment & Plan    On stress dose hydrocortisone wean as able  Midodrine 10mg TID        Hypotension   Assessment & Plan    Increase of Midodrine 5mg to 10mg TID on 10/24/18  Wean levophed   Monitor I/O's        Limb ischemia   Assessment & Plan    Preliminary report  Occlusive thrombus in distal R LEIF.  Per prior notes \"discussed with Dr. Power from vascular surgery on 10/19/2018 he felt that this is likely chronic he did not feel that anticoagulation would be indicated at this time\"  stable continue to monitor        Acute systolic heart failure (HCC)   Assessment & Plan    EF:40% on 10/15/18 Echocardiogram  Anasarca on exam 10/23 with good albumin levels recently        CKD (chronic kidney disease) stage 3, GFR 30-59 ml/min ([facility])- (present on admission)   Assessment & Plan    Monitor on daily BMP  Avoid nephrotoxic meds        Closed fracture of proximal end of humerus   Assessment & Plan    Sling when upright  Oscal + D        CRP elevated   Assessment & Plan    Likely secondary to pneumonia        Paroxysmal atrial fibrillation (HCC)   Assessment & Plan    Given subdural hygromas patient not good candidate for chronic anticoagulation        Hyperammonemia (HCC)   Assessment & Plan    Likely secondary to valproic acid  Improved with l-carnitine supplementation  Monitor ammonia levels        DNR (do not resuscitate)   Assessment & Plan    Noted NO CPR  Okay to intubation        Dementia- (present on admission)   Assessment & Plan    Reported hx of        Elevated troponin- (present on admission)   Assessment & Plan    Likely demand ischemia in setting of recurrent seizures  EF " 40%        Anemia of chronic disease- (present on admission)   Assessment & Plan    Hemoglobin 9.2 10/23  Monitor CBC          Quality-Core Measures   Reviewed items::  Labs reviewed, Medications reviewed and Radiology images reviewed  Rosa catheter::  Strict Intake and Output During Sepisis or Shock  DVT prophylaxis pharmacological::  Heparin

## 2018-10-25 PROBLEM — R79.89 INCREASED AMMONIA LEVEL: Status: ACTIVE | Noted: 2018-01-01

## 2018-10-25 PROBLEM — G93.40 ENCEPHALOPATHY ACUTE: Status: ACTIVE | Noted: 2018-01-01

## 2018-10-25 NOTE — PROGRESS NOTES
[facility] Hospitalist Progress Note    Date of Service: 10/25/2018    Chief Complaint  89 y.o. female admitted 10/13/2018 with hx of subdural hematoma and while at [facility] had a seizure and was transferred to [facility].  She did require intubation for airway protection    Interval Problem Update  Lethargic but nods.  Increase oxygen demands this am.but back to 5L oxymask later in am.  Enteral feeding at goal  Mobized to edge of bed last night.      Consultants/Specialty  Neurology   Critical Care    Disposition  To remain in ICU today        Review of Systems   Unable to perform ROS: Mental acuity      Physical Exam  Laboratory/Imaging   Hemodynamics  Temp (24hrs), Av.3 °C (97.4 °F), Min:36 °C (96.8 °F), Max:36.7 °C (98 °F)   Temperature: 36.7 °C (98 °F), Monitored Temp: 36.5 °C (97.7 °F)  Pulse  Av.9  Min: 30  Max: 129 Heart Rate (Monitored): (!) 56  NIBP: 127/65      Respiratory      Respiration: 19, Pulse Oximetry: 98 %, O2 Daily Delivery Respiratory : OxyMask     Work Of Breathing / Effort: Tachypnea  RUL Breath Sounds: Coarse Crackles;Rhonchi, RML Breath Sounds: Coarse Crackles, RLL Breath Sounds: Diminished, BLANCHE Breath Sounds: Coarse Crackles;Rhonchi, LLL Breath Sounds: Diminished    Fluids    Intake/Output Summary (Last 24 hours) at 10/25/18 2010  Last data filed at 10/25/18 1800   Gross per 24 hour   Intake             1620 ml   Output              905 ml   Net              715 ml       Nutrition  Orders Placed This Encounter   Procedures   • Diet NPO     Standing Status:   Standing     Number of Occurrences:   1     Order Specific Question:   Type:     Answer:   Now [1]     Order Specific Question:   Restrict to:     Answer:   Strict [1]     Physical Exam   Constitutional: She appears lethargic. She appears ill. No distress.   Generalized Anasarca.  Frail   HENT:   Head: Normocephalic and atraumatic.   Nose: Nose normal.   Mouth/Throat: Oropharynx is clear and moist.   Eyes: Conjunctivae and EOM are  normal. Right eye exhibits no discharge. Left eye exhibits no discharge.   Neck: No tracheal deviation present.   Cardiovascular: Normal rate and regular rhythm.    Murmur heard.  Pulses:       Radial pulses are 1+ on the right side, and 1+ on the left side.        Dorsalis pedis pulses are 1+ on the right side, and 1+ on the left side.   Pulmonary/Chest: Effort normal and breath sounds normal. No stridor. No respiratory distress. She has no wheezes. She has no rales.   Abdominal: Soft. Bowel sounds are normal. She exhibits no distension.   Musculoskeletal: She exhibits edema (general anasarca) and tenderness (with movement of left upper arm).   Neurological: She appears lethargic. No cranial nerve deficit.   Skin: Skin is warm and dry. She is not diaphoretic.   Thin skin   Psychiatric: She has a normal mood and affect. She is slowed and withdrawn. Cognition and memory are impaired. She is noncommunicative.   Vitals reviewed.      Recent Labs      10/23/18   0420  10/24/18   0323  10/25/18   0349   WBC  5.6  4.2*  7.2   RBC  3.47*  3.52*  4.12*   HEMOGLOBIN  9.2*  9.5*  11.3*   HEMATOCRIT  30.5*  30.8*  36.2*   MCV  87.9  87.5  87.9   MCH  26.5*  27.0  27.4   MCHC  30.2*  30.8*  31.2*   RDW  49.4  49.5  48.5   PLATELETCT  250  251  288   MPV  11.0  11.2  11.1     Recent Labs      10/23/18   0420  10/24/18   0323  10/25/18   0349   SODIUM  140  140  138   POTASSIUM  3.1*  3.9  4.5   CHLORIDE  99  99  97   CO2  34*  36*  38*   GLUCOSE  155*  118*  104*   BUN  35*  39*  44*   CREATININE  0.82  0.82  0.84   CALCIUM  9.0  8.7  9.1                      Assessment/Plan     * Acute respiratory failure with hypoxia (HCC)- (present on admission)   Assessment & Plan    Secondary to seizures and MRSA pneumonia  S/P extubation.  RT protocol  Supplemental oxygen  Mobilize  Encourage deep inspiratory efforts        Increased ammonia level   Assessment & Plan    Likely due to valproic  Lactulose  Monitor neurostatus       "  Pneumonia due to methicillin resistant Staphylococcus aureus (HCC)   Assessment & Plan    Complete 7-day course of Zyvox on 10/26/2018  RT protcol   As needed bronchodilators        Seizure (HCC)- (present on admission)   Assessment & Plan    new onset seizures.  CT head reveals subdural hygromas, could be sequelae of prior subdural hematomas.  No acute pathology on MRI of brain  Neurology consulting  Depakote 500mg BID, eslicarbazepine 400mg qHS, Keppra 1500mg BID        Relative adrenal insufficiency (HCC)   Assessment & Plan    On stress dose hydrocortisone wean as able  Midodrine 10mg TID        Hypotension   Assessment & Plan    Increase of Midodrine 5mg to 10mg TID on 10/24/18  Wean levophed   Monitor I/O's        Limb ischemia   Assessment & Plan    Preliminary report  Occlusive thrombus in distal R LEIF.  Per prior notes \"discussed with Dr. Power from vascular surgery on 10/19/2018 he felt that this is likely chronic he did not feel that anticoagulation would be indicated at this time\"  stable continue to monitor        Acute systolic heart failure (HCC)   Assessment & Plan    EF:40% on 10/15/18 Echocardiogram  Anasarca on exam 10/23 with good albumin levels recently        CKD (chronic kidney disease) stage 3, GFR 30-59 ml/min ([facility])- (present on admission)   Assessment & Plan    Monitor on daily BMP  Avoid nephrotoxic meds        Encephalopathy acute   Assessment & Plan    Lactulose for hyperammonemia  Monitor neurochecks.        Closed fracture of proximal end of humerus   Assessment & Plan    Sling when upright  Oscal + D        CRP elevated   Assessment & Plan    Likely secondary to pneumonia        Atelectasis   Assessment & Plan    Encourage deep inspiratory efforts        Paroxysmal atrial fibrillation (HCC)   Assessment & Plan    Given subdural hygromas patient not good candidate for chronic anticoagulation        Hyperammonemia (HCC)   Assessment & Plan    Likely secondary to valproic " acid  Improved with l-carnitine supplementation  Monitor ammonia levels        DNR (do not resuscitate)   Assessment & Plan    Noted NO CPR  Okay to intubation        Dementia- (present on admission)   Assessment & Plan    Reported hx of        Elevated troponin- (present on admission)   Assessment & Plan    Likely demand ischemia in setting of recurrent seizures  EF 40%        Anemia of chronic disease- (present on admission)   Assessment & Plan    Hemoglobin 9.2 10/23  Hgb 11.3 10/25  Monitor CBC          Quality-Core Measures   Reviewed items::  Labs reviewed, Medications reviewed and Radiology images reviewed  Rosa catheter::  Strict Intake and Output During Sepisis or Shock  DVT prophylaxis pharmacological::  Heparin

## 2018-10-25 NOTE — CARE PLAN
Problem: Knowledge Deficit  Goal: Knowledge of disease process/condition, treatment plan, diagnostic tests, and medications will improve    Intervention: Assess knowledge level of disease process/condition, treatment plan, diagnostic tests, and medications  Spoke with ptJeff Chen, updated on pt. Status and POC, questions answered, needs met.       Problem: Respiratory:  Goal: Respiratory status will improve    Intervention: Administer and titrate oxygen therapy  Pt. Remains on 5L O2 oxymask, lungs coarse/diminished, has upper respiratory secretions, extubated 10/23, RT involved.

## 2018-10-25 NOTE — THERAPY
OT orders received. Will hold OT eval until WB status for R humeral fx is clarified.    Viola De La Cruz, OTR/L  Pager: [phone]

## 2018-10-25 NOTE — PROGRESS NOTES
Critical Care Progress Note    Date of admission  10/13/2018    Chief Complaint  89 y.o. female admitted 10/13/2018 with new onset seizures.    Hospital Course    This lady was admitted to the ICU with seizures and respiratory failure.      Interval Problem Update  Reviewed last 24 hour events:   - hypoxic requiring NT suctioning   - Neuro: follows, language barrier, communicates appropriately with grand-daughter   - HR: 50s-90   - BP: 80s-110s   - GI: TF at goal   - UOP: 550 mL overnight   - Rosa: yes   - Tm: 98   - Lines: CVC   - PPx: GI pepcid, DVT heparin   - 5L oxymask   - CXR (personally reviewed and compared to prior): stable/improved, large amount of gas    YESTERDAY   - Did well overnight, right proximal humerus fx identified   - Neuro: Somnolent, generally weak   - HR: 50s-90s   - BP: 80s-100s   - GI: TF at goal   - UOP: 400 mL   - Rosa: yes   - Tm: Afeb   - Lines: CVC   - PPx: GI Pepcid, DVT heparin   - 5L oxymask    Review of Systems  Review of Systems   Unable to perform ROS: Acuity of condition        Vital Signs for last 24 hours   Temp:  [36.1 °C (97 °F)-36.4 °C (97.6 °F)] 36.2 °C (97.2 °F)  Pulse:  [50-96] 82  Resp:  [17-33] 33    Hemodynamic parameters for last 24 hours       Vent Settings for last 24 hours       Physical Exam   Physical Exam   Constitutional: She has a sickly appearance.   HENT:   Head: Normocephalic.   Right Ear: External ear normal.   Left Ear: External ear normal.   Mouth/Throat: Oropharynx is clear and moist.   Eyes: Pupils are equal, round, and reactive to light. Right eye exhibits no discharge. Left eye exhibits no discharge. No scleral icterus.   Neck: Neck supple. No JVD present. No tracheal deviation present.   Cardiovascular: Normal rate and intact distal pulses.  An irregularly irregular rhythm present. Exam reveals no gallop and no friction rub.    Pulmonary/Chest: She has decreased breath sounds (moderate, worse than yesterday). She has no wheezes. She has rales  (Coarse crackles bilaterally).   Requiring multiple NT suctioning   Abdominal: Soft. Bowel sounds are normal. She exhibits no distension. There is no tenderness. There is no rebound.   Tolerating enteral tube feedings   Musculoskeletal: She exhibits edema and tenderness (right shoulder).   No clubbing or cyanosis   Neurological:   Moves all 4 extremities.   Skin: Skin is warm and dry. She is not diaphoretic. No erythema. No pallor.   Diffuse bruising   Nursing note and vitals reviewed.      Medications  Current Facility-Administered Medications   Medication Dose Route Frequency Provider Last Rate Last Dose   • miconazole 2%-zinc oxide (GILDA) topical cream   Topical Q6HRS PRN Jeremy M Gonda, M.D.       • levOCARNitine (CARNITOR) 1 GM/10ML solution 1,000 mg  1,000 mg Feeding Tube BID WITH MEALS Paolo Son Jr., D.O.   1,000 mg at 10/25/18 0745   • midodrine (PROAMATINE) tablet 10 mg  10 mg Oral TID WITH MEALS Paolo Son Jr., D.O.   10 mg at 10/25/18 0745   • hydrocortisone sodium succinate PF (SOLU-CORTEF) 100 MG injection 50 mg  50 mg Intravenous Q8HRS NANETTE High.O.   50 mg at 10/25/18 0517   • oyster shell calcium/vitamin D 250-125 MG-UNIT tablet 1 Tab  1 Tab Oral DAILY NANETTE High.O.   1 Tab at 10/25/18 0519   • multivitamin (THERAGRAN) tablet 1 Tab  1 Tab Oral DAILY NANETTE High.O.   1 Tab at 10/25/18 0519   • levETIRAcetam (KEPPRA) tablet 1,500 mg  1,500 mg Per NG Tube BID Paolo Son Jr. D.O.   1,500 mg at 10/25/18 0518   • Divalproex Sodium (DEPAKOTE) capsule 500 mg  500 mg Feeding Tube Q12HRS Paolo Son Jr., D.O.   500 mg at 10/25/18 0517   • heparin injection 5,000 Units  5,000 Units Subcutaneous Q8HRS Tono Jaimes M.D.   5,000 Units at 10/25/18 0517   • eslicarbazepine (APTIOM) tablet 400 mg  400 mg Oral QHS Juan Manuel Vivar M.D.   400 mg at 10/24/18 2105   • linezolid (ZYVOX) tablet 600 mg  600 mg Oral Q12HRS Trever Hilario M.D.   600 mg at 10/25/18 0538    • LORazepam (ATIVAN) injection 1-4 mg  1-4 mg Intravenous Q10 MIN PRN Trever Hilario M.D.   2 mg at 10/17/18 1218   • polyethylene glycol/lytes (MIRALAX) PACKET 1 Packet  1 Packet Feeding Tube BID Trever Hilario M.D.   Stopped at 10/25/18 0518    And   • senna-docusate (PERICOLACE or SENOKOT S) 8.6-50 MG per tablet 2 Tab  2 Tab Feeding Tube BID Trever Hilario M.D.   2 Tab at 10/25/18 0518    And   • magnesium hydroxide (MILK OF MAGNESIA) suspension 30 mL  30 mL Feeding Tube QDAY PRN Trever Hilario M.D.   30 mL at 10/17/18 1352    And   • bisacodyl (DULCOLAX) suppository 10 mg  10 mg Rectal QDAY PRN Trever Hilario M.D.   10 mg at 10/18/18 1516   • acetaminophen (TYLENOL) tablet 650 mg  650 mg Feeding Tube Q6HRS PRN Trever Hilario M.D.   650 mg at 10/19/18 1716    Or   • acetaminophen (TYLENOL) suppository 650 mg  650 mg Rectal Q4HRS PRN Trever Hilario M.D.       • Pharmacy Consult: Enteral tube feeding - review meds/change route/product selection   Other PRN Paolo Son Jr., D.O.       • aspirin (ASA) chewable tab 81 mg  81 mg Per NG Tube DAILY Trever Hilario M.D.   81 mg at 10/25/18 0518   • NS infusion   Intravenous Continuous Trever Hilario M.D. 10 mL/hr at 10/24/18 0700     • ondansetron (ZOFRAN) syringe/vial injection 4 mg  4 mg Intravenous Q4HRS PRN Edgar Moody M.D.       • ondansetron (ZOFRAN ODT) dispertab 4 mg  4 mg Oral Q4HRS PRN Edgar Moody M.D.       • Respiratory Care per Protocol   Nebulization Continuous RT Gomez Holguin M.D.       • MD Alert...ICU Electrolyte Replacement per Pharmacy   Other pharmacy to dose Gomez Holguin M.D.       • fentaNYL (SUBLIMAZE) injection 25 mcg  25 mcg Intravenous Q HOUR PRN Gomez Holguin M.D.   25 mcg at 10/16/18 2046    Or   • fentaNYL (SUBLIMAZE) injection 50 mcg  50 mcg Intravenous Q HOUR PRN Gomez Holguin M.D.   50 mcg at 10/22/18 2307    Or   • fentaNYL (SUBLIMAZE)  injection 100 mcg  100 mcg Intravenous Q HOUR PRN Gomez Holguin M.D.   100 mcg at 10/21/18 1814   • ipratropium-albuterol (DUONEB) nebulizer solution  3 mL Nebulization Q2HRS PRN (RT) Gomez Holguin M.D.           Fluids    Intake/Output Summary (Last 24 hours) at 10/25/18 0808  Last data filed at 10/25/18 0600   Gross per 24 hour   Intake             1500 ml   Output              990 ml   Net              510 ml       Laboratory  Recent Results (from the past 48 hour(s))   CBC with Differential    Collection Time: 10/24/18  3:23 AM   Result Value Ref Range    WBC 4.2 (L) 4.8 - 10.8 K/uL    RBC 3.52 (L) 4.20 - 5.40 M/uL    Hemoglobin 9.5 (L) 12.0 - 16.0 g/dL    Hematocrit 30.8 (L) 37.0 - 47.0 %    MCV 87.5 81.4 - 97.8 fL    MCH 27.0 27.0 - 33.0 pg    MCHC 30.8 (L) 33.6 - 35.0 g/dL    RDW 49.5 35.9 - 50.0 fL    Platelet Count 251 164 - 446 K/uL    MPV 11.2 9.0 - 12.9 fL    Neutrophils-Polys 83.40 (H) 44.00 - 72.00 %    Lymphocytes 10.10 (L) 22.00 - 41.00 %    Monocytes 4.60 0.00 - 13.40 %    Eosinophils 0.00 0.00 - 6.90 %    Basophils 0.20 0.00 - 1.80 %    Immature Granulocytes 1.70 (H) 0.00 - 0.90 %    Nucleated RBC 0.00 /100 WBC    Neutrophils (Absolute) 3.46 2.00 - 7.15 K/uL    Lymphs (Absolute) 0.42 (L) 1.00 - 4.80 K/uL    Monos (Absolute) 0.19 0.00 - 0.85 K/uL    Eos (Absolute) 0.00 0.00 - 0.51 K/uL    Baso (Absolute) 0.01 0.00 - 0.12 K/uL    Immature Granulocytes (abs) 0.07 0.00 - 0.11 K/uL    NRBC (Absolute) 0.00 K/uL   Basic Metabolic Panel (BMP)    Collection Time: 10/24/18  3:23 AM   Result Value Ref Range    Sodium 140 135 - 145 mmol/L    Potassium 3.9 3.6 - 5.5 mmol/L    Chloride 99 96 - 112 mmol/L    Co2 36 (H) 20 - 33 mmol/L    Glucose 118 (H) 65 - 99 mg/dL    Bun 39 (H) 8 - 22 mg/dL    Creatinine 0.82 0.50 - 1.40 mg/dL    Calcium 8.7 8.5 - 10.5 mg/dL    Anion Gap 5.0 0.0 - 11.9   Magnesium    Collection Time: 10/24/18  3:23 AM   Result Value Ref Range    Magnesium 2.1 1.5 - 2.5 mg/dL   Phosphorus     Collection Time: 10/24/18  3:23 AM   Result Value Ref Range    Phosphorus 2.8 2.5 - 4.5 mg/dL   VALPROIC ACID    Collection Time: 10/24/18  3:23 AM   Result Value Ref Range    Valproic Acid 96.5 50.0 - 100.0 ug/mL   AMMONIA    Collection Time: 10/24/18  3:23 AM   Result Value Ref Range    Ammonia 72 (H) 11 - 45 umol/L   ESTIMATED GFR    Collection Time: 10/24/18  3:23 AM   Result Value Ref Range    GFR If African American >60 >60 mL/min/1.73 m 2    GFR If Non African American >60 >60 mL/min/1.73 m 2   CBC with Differential    Collection Time: 10/25/18  3:49 AM   Result Value Ref Range    WBC 7.2 4.8 - 10.8 K/uL    RBC 4.12 (L) 4.20 - 5.40 M/uL    Hemoglobin 11.3 (L) 12.0 - 16.0 g/dL    Hematocrit 36.2 (L) 37.0 - 47.0 %    MCV 87.9 81.4 - 97.8 fL    MCH 27.4 27.0 - 33.0 pg    MCHC 31.2 (L) 33.6 - 35.0 g/dL    RDW 48.5 35.9 - 50.0 fL    Platelet Count 288 164 - 446 K/uL    MPV 11.1 9.0 - 12.9 fL    Neutrophils-Polys 78.50 (H) 44.00 - 72.00 %    Lymphocytes 10.10 (L) 22.00 - 41.00 %    Monocytes 9.20 0.00 - 13.40 %    Eosinophils 0.10 0.00 - 6.90 %    Basophils 0.10 0.00 - 1.80 %    Immature Granulocytes 2.00 (H) 0.00 - 0.90 %    Nucleated RBC 0.00 /100 WBC    Neutrophils (Absolute) 5.61 2.00 - 7.15 K/uL    Lymphs (Absolute) 0.72 (L) 1.00 - 4.80 K/uL    Monos (Absolute) 0.66 0.00 - 0.85 K/uL    Eos (Absolute) 0.01 0.00 - 0.51 K/uL    Baso (Absolute) 0.01 0.00 - 0.12 K/uL    Immature Granulocytes (abs) 0.14 (H) 0.00 - 0.11 K/uL    NRBC (Absolute) 0.00 K/uL   Basic Metabolic Panel (BMP)    Collection Time: 10/25/18  3:49 AM   Result Value Ref Range    Sodium 138 135 - 145 mmol/L    Potassium 4.5 3.6 - 5.5 mmol/L    Chloride 97 96 - 112 mmol/L    Co2 38 (H) 20 - 33 mmol/L    Glucose 104 (H) 65 - 99 mg/dL    Bun 44 (H) 8 - 22 mg/dL    Creatinine 0.84 0.50 - 1.40 mg/dL    Calcium 9.1 8.5 - 10.5 mg/dL    Anion Gap 3.0 0.0 - 11.9   Magnesium    Collection Time: 10/25/18  3:49 AM   Result Value Ref Range    Magnesium 2.4  1.5 - 2.5 mg/dL   Phosphorus    Collection Time: 10/25/18  3:49 AM   Result Value Ref Range    Phosphorus 2.6 2.5 - 4.5 mg/dL   VALPROIC ACID    Collection Time: 10/25/18  3:49 AM   Result Value Ref Range    Valproic Acid 81.4 50.0 - 100.0 ug/mL   AMMONIA    Collection Time: 10/25/18  3:49 AM   Result Value Ref Range    Ammonia 83 (H) 11 - 45 umol/L   ESTIMATED GFR    Collection Time: 10/25/18  3:49 AM   Result Value Ref Range    GFR If African American >60 >60 mL/min/1.73 m 2    GFR If Non African American >60 >60 mL/min/1.73 m 2       Imaging  X-Ray:  I have personally reviewed the images and compared with prior images. and My impression is: Stable right > left lower lobe edema    Assessment/Plan  * Acute respiratory failure with hypoxia (HCC)- (present on admission)   Assessment & Plan    Intubated 10/13- 10/23/2018  RT/O2 Protocols  Titrate supplemental FiO2 to maintain SpO2 >92%  Requiring intensive pulmonary toilet with frequent NT suctioning  Remains at incredibly high likelihood for reintubation, this plan was reaffirmed with family        Increased ammonia level   Assessment & Plan    2/2 valproate  Discussed with Neuro, no changes in AEDs        Pneumonia due to methicillin resistant Staphylococcus aureus (HCC)   Assessment & Plan    Zyvox 600 mg twice daily for 7 days (to end today, 10/26/18)        Seizure (HCC)- (present on admission)   Assessment & Plan    CT with small bilateral hygromas without mass-effect  EEG showed improvement in seizures  Valproic acid 500 mg every 12 hours  Keppra 1500 mg every 12 hours  Aptiom 400 mg nightly  Valproic acid levels are down-trending, follow  Continue levocarnitine to 1000 mg twice daily and monitor ammonia levels  MRI on 10/17 with multiple acute bilateral supratentorial lacunar infarcts        Relative adrenal insufficiency (HCC)   Assessment & Plan    hydrocortisone 50 mg IV every 6 hours, wean when hemodynamically stable        Hypotension   Assessment & Plan     Levophed titrated off, remains vasoplegic  Continue hydrocortisone and midodrine at current doses  Echocardiogram reviewed: Left ventricular ejection fraction is visually estimated to be 40%. A reliable estimation of diastolic function cannot be made due to   tachycardia.        Limb ischemia   Assessment & Plan    Occlusion in right LEIF likely chronic  Heparin ppx        Acute systolic heart failure (HCC)   Assessment & Plan    EF 40%  Force diuresis when able        CKD (chronic kidney disease) stage 3, GFR 30-59 ml/min (HCC)- (present on admission)   Assessment & Plan    Renal dose meds, avoid nephrotoxins  Strict I/Os  Follow renal function        Encephalopathy acute   Assessment & Plan    Likely metabolic and pharmacologic with elevated ammonia and on multiple antiepileptic drugs  Re-orientation, family, glasses, hearing aids, sleep, lights on during day, treat pain, ambulate, minimize benzos/anticholinergic agents.   Native language is Pakistani, family translates          Closed fracture of proximal end of humerus   Assessment & Plan    Place right arm in sling - ordered        Atelectasis   Assessment & Plan    IS, PEP        Paroxysmal atrial fibrillation (HCC)   Assessment & Plan    Optimize magnesium and potassium  TSH is normal        Hyperammonemia ([facility])   Assessment & Plan    Worsening  Due to valproic acid  Continue levocarnitine to 1 g twice daily        DNR (do not resuscitate)   Assessment & Plan    DNR status, I ok  This was reviewed with family and the patient. Her Vickie RODRIGUEZ and her are in agreement to continue this code status        Dementia- (present on admission)   Assessment & Plan    History of mild dementia        Elevated troponin- (present on admission)   Assessment & Plan    Due to demand ischemia             VTE:  Heparin  Ulcer: H2 Antagonist  Lines: Central Line  Ongoing indication addressed and Rosa Catheter  Ongoing indication addressed    I have performed a physical exam and  reviewed and updated ROS and Plan today (10/25/2018). In review of yesterday's note (10/24/2018), there are no changes except as documented above.     Increasingly worsening respiratory distress, at incredibly high risk for reintubation given encephalopathy. This patient is critically ill, at high risk for decompensation leading to worsening vital organ dysfunction and death without critical care interventions.    Discussed patient condition and risk of morbidity and/or mortality with Hospitalist, RN, RT, Pharmacy, Dietary, , Charge nurse / hot rounds and Patient     The patient remains critically ill.  Critical care time = 89 minutes in directly providing and coordinating critical care and extensive data review.  No time overlap and excludes procedures.    Paolo Son Jr., D.O.  Critical Care Medicine

## 2018-10-25 NOTE — CARE PLAN
Pt. overall improving over shift    Acute Events: Acute o2 desaturation this AM requiring NRB Mask. Coughing/deep breathing encouraged, secretions cleared. o2 requirements reduced.    Mobility: Edge of bed with 3 assist.    Neuro:  Opens eyes to command, follows commands with upper extremities, withdraws with lower extremities.    CV: Hemodynamically stable, SB-NSR     Resp: 5 L Oxymask, thick secretions nt suctioned. Cough/deep breathing encouraged. o2 weaned as tolerated.    GI: Cortrak in place w/ TF @ goal. No BM this shift, loose stool previous shift. Bowel meds held.     : Rosa catheter In place, UOP adequate    Skin: See skin flowsheet/wound care note. Jose Eduardo cream applied to sacrum as ordered.     Summary: Pt. Overall improving. Hemodynamically stable, o2 requirements improving.      Problem: Venous Thromboembolism (VTW)/Deep Vein Thrombosis (DVT) Prevention:  Goal: Patient will participate in Venous Thrombosis (VTE)/Deep Vein Thrombosis (DVT)Prevention Measures  Outcome: PROGRESSING AS EXPECTED  SCD's on, VTE prophylaxis in place.    Problem: Respiratory:  Goal: Respiratory status will improve  Outcome: PROGRESSING AS EXPECTED  Pt. Has weak cough, increasing o2 requirements this AM. MD Huy to bedside, NT suctioning performed. O2 requirements improved after frequent encouragement of coughing/deep breathing.

## 2018-10-25 NOTE — THERAPY
New PT consult received, Pt with newly found right humeral fracture.  Hold PT eval until WB status is clarified, will re attempt as able/appropriate.      Tamanna Braun PT/DPT  Pager# [phone]

## 2018-10-26 NOTE — CARE PLAN
Problem: Respiratory:  Goal: Respiratory status will improve    Intervention: Assess and monitor pulmonary status  Pt. On 5L O2 per oxymask, satting 97%, lungs coarse crackles/ronchi/diminished, NT suctioned during the day.       Problem: Skin Integrity  Goal: Risk for impaired skin integrity will decrease    Intervention: Assess risk factors for impaired skin integrity and/or pressure ulcers  Pt. Has multiple areas of bruising on upper extremities, has upper and lower extremity edema, sacrum has open area, WOCN involved, appropriate skin care measures in place.

## 2018-10-26 NOTE — PALLIATIVE CARE
Palliative Care follow-up  PC RN visited pt at bedside, no family present. Discussed with Dr. Son, pt speaks to granddaughter Lashonda in [place], however pt does not speak with clinical team. Pt's mentation hasn't improved, plan to decrease antiepileptic medications in hopes of improved mentation.       Plan:   Continue to support pt and family, continue GOC discussion.     Thank you for allowing Palliative Care to participate in this patient's care. Please feel free to call [phone] with any questions or concerns.

## 2018-10-26 NOTE — PROCEDURES
Procedures  Procedure Note    Date: 10/26/2018    Time: 12:22 PM    Procedure: Bronchoscopy    Indication: Mucus plugging    Consent: Informed consent obtained from patient or designated decision maker after explaining the benefits/risks of the procedure including but not limited to bleeding, infection, airway trauma or loss therof, pneumothorax/hemothorax, arrythmia, or death. Patient or surrogate expressed understanding and agreement and signed consent which can be found in the patient's chart.    Procedure: After obtaining consent, a time-out was performed. Respiratory therapy and nursing at bedside throughout procedure. Patient provided sedation and analgesia throughout the procedure. Placed on full ventilator support with an FiO2 of 100% throughout the procedure. Using a fiberoptic bronchoscope, trachea entered via ETT.  0 mL of local anesthetic sprayed at the godfrey (2% lidocaine) achieving appropriate comfort level for patient. Airways visualized directly and the following intervention was performed: suctioning of obstructing mucus, BAL. Findings as below. Patient tolerated procedure well without any difficulties and left in care of bedside nurse/RT.     Medications: Etomidate, rocuronium  Findings: Upper airway - Not visualized as bronchoscope passed through ETT.        Trachea to godfrey -inflamed appearing mucosa without lesions or mass, ETT tip measured 2-3 cm from the godfrey.        R proximal and distal airways -inflamed appearing mucosa without mass/lesion/anatomic variance, secretions: Copious, cloudy, thick, secretions of the right lower and middle lobes.  Lavaged until clear.  BAL obtained        L proximal and distal airways -inflamed appearing mucosa without mass/lesion/anatomic variance, secretions: Copious, cloudy, thick, secretions of the left lower lobe.  Lavaged until clear.        Samples -right lower lobe BAL    Complications: None apparent  CXR (if applicable): Pending    Paolo Son,  DO  Critical Care Medicine

## 2018-10-26 NOTE — PROGRESS NOTES
Late entry:    1140 Patient using accessory muscles to breathe, with increased work of breathing. MD Son notified will need to re-intubate patient.  Granddaughter Lashonda notified and wants to continue with intubation.

## 2018-10-26 NOTE — PROCEDURES
Date: 10/26/2018      Procedure:  Emergent orotracheal intubation    Indication: Respiratory failure, patient remains Full Code     Physician:  Renetta Zarate M.D.  Directly supervised by Dr. Son throughout procedure     Consent:  Emergent     Procedure:  This patient has developed increasing respiratory failure, andrequires emergent intubation.  RSI given with etomidate and rocuronium.  Using direct visualized laryngoscopy, a 7.5 ETT was placed on the first attempt w/o complication.  Tube placement confirmed by direct visualization of placement, end-tidal CO2 monitor color change and equal breath sounds.  No immediate complications.  Vitals remained stable throughout the procedure.  A post-procedure CXR will be reviewed.

## 2018-10-26 NOTE — THERAPY
Received WB status for RUE (NWB) but Pt now once again intubated.  Defer PT eval as pt is not appropriate for skilled therapy.  Please re consult if there is a status change.      Tamanna Braun PT/DPT  Pager# [phone]

## 2018-10-26 NOTE — THERAPY
Awaiting WB status for R humeral fx. Will complete eval when WB established and pt appropriate for mobilization.

## 2018-10-26 NOTE — THERAPY
Pt required reintubation and is now on bedrest. Not appropriate for OT at this time. Please re-order when indicated.

## 2018-10-26 NOTE — ASSESSMENT & PLAN NOTE
Likely due to valproic. Previously depakote stopped due to increase in lethargy and ammonia levels  Lactulose & L-carnitine  Monitor neurostatus

## 2018-10-26 NOTE — PROGRESS NOTES
[facility]    Date of Service: 10/26/2018    Chief Complaint  89 y.o. female admitted 10/13/2018 with hx of subdural hematoma and [facility] and was transferred to [facility].  She did require intubation for airway protection    Interval Problem Update  On oxymask 6 L  Moderate secretions  Withdraws  More alert with family at bedside and c/o language barrier  Right nare gastric with goal enteral feeding  Adequate UOP:520cc overnight  Mobilize to edge of bed  Depakote dc'd and continue L-carnitine  Wean steroids    Worsening respiratory status after evaluation requiring intubation. Dr Son spoke to patient's family member and they wished for intubation. I was next to Dr Son and he did alert family that patient may not be able to easily come of the ventilator and we would be discussion her care if no improvement. Remains DNR      Consultants/Specialty  Neurology   Critical Care    Disposition  To remain in ICU today        Review of Systems   Unable to perform ROS: Mental acuity      Physical Exam  Laboratory/Imaging   Hemodynamics  Temp (24hrs), Av.3 °C (97.4 °F), Min:36.1 °C (97 °F), Max:36.7 °C (98 °F)   Temperature: 36.2 °C (97.2 °F)  Pulse  Av.1  Min: 30  Max: 129 Heart Rate (Monitored): (!) 53  NIBP: 119/58      Respiratory  Rider Vent Mode: APVCMV, Rate (breaths/min): 18, PEEP/CPAP: 8, PEEP/CPAP: 8, FiO2: 30, P Peak (PIP): 27, P MEAN: 14   Respiration: 12, Pulse Oximetry: 90 %, O2 Daily Delivery Respiratory : OxyMask     Work Of Breathing / Effort: Vented  RUL Breath Sounds: Coarse Crackles;Rhonchi, RML Breath Sounds: Coarse Crackles, RLL Breath Sounds: Diminished, BLANCHE Breath Sounds: Coarse Crackles;Rhonchi, LLL Breath Sounds: Diminished    Fluids    Intake/Output Summary (Last 24 hours) at 10/26/18 1515  Last data filed at 10/26/18 0600   Gross per 24 hour   Intake             1260 ml   Output              670 ml   Net              590 ml        Nutrition  Orders Placed This Encounter   Procedures   • Diet NPO     Standing Status:   Standing     Number of Occurrences:   1     Order Specific Question:   Type:     Answer:   Now [1]     Order Specific Question:   Restrict to:     Answer:   Strict [1]     Physical Exam   Constitutional: She appears lethargic. She appears ill. No distress.   Generalized Anasarca.  Frail   HENT:   Head: Normocephalic and atraumatic.   Nose: Nose normal.   Mouth/Throat: Oropharynx is clear and moist.   Eyes: Conjunctivae and EOM are normal. Right eye exhibits no discharge. Left eye exhibits no discharge.   Neck: No tracheal deviation present.   Cardiovascular: Normal rate and regular rhythm.    Murmur heard.  Pulses:       Radial pulses are 1+ on the right side, and 1+ on the left side.        Dorsalis pedis pulses are 1+ on the right side, and 1+ on the left side.   Pulmonary/Chest: Effort normal and breath sounds normal. No stridor. No respiratory distress. She has no wheezes. She has no rales.   Abdominal: Soft. Bowel sounds are normal. She exhibits no distension.   Musculoskeletal: She exhibits edema (general anasarca) and tenderness (with movement of left upper arm).   Neurological: She appears lethargic. No cranial nerve deficit.   Skin: Skin is warm and dry. She is not diaphoretic.   Thin skin   Psychiatric: She has a normal mood and affect. She is slowed and withdrawn. Cognition and memory are impaired. She is noncommunicative.   Vitals reviewed.      Recent Labs      10/24/18   0323  10/25/18   0349  10/26/18   0310   WBC  4.2*  7.2  7.2   RBC  3.52*  4.12*  4.10*   HEMOGLOBIN  9.5*  11.3*  10.7*   HEMATOCRIT  30.8*  36.2*  35.9*   MCV  87.5  87.9  87.6   MCH  27.0  27.4  26.1*   MCHC  30.8*  31.2*  29.8*   RDW  49.5  48.5  48.8   PLATELETCT  251  288  251   MPV  11.2  11.1  10.8     Recent Labs      10/24/18   0323  10/25/18   0349  10/26/18   0310   SODIUM  140  138  138   POTASSIUM  3.9  4.5  4.6   CHLORIDE  99   "97  97   CO2  36*  38*  39*   GLUCOSE  118*  104*  124*   BUN  39*  44*  46*   CREATININE  0.82  0.84  0.89   CALCIUM  8.7  9.1  9.1                      Assessment/Plan     * Acute respiratory failure with hypoxia (HCC)- (present on admission)   Assessment & Plan    Initially due to to seizures and MRSA pneumonia  Reintubated 10/26  RT protocol  Supplemental oxygen        Increased ammonia level   Assessment & Plan    Likely due to valproic. DC depakote due to increase in lethargy and ammonia levels  Lactulose & L-carnitine  Monitor neurostatus        Pneumonia due to methicillin resistant Staphylococcus aureus (HCC)   Assessment & Plan    Completed 7-day course of Zyvox on 10/26/2018  RT protcol   As needed bronchodilators        Seizure (HCC)- (present on admission)   Assessment & Plan    new onset seizures.  CT head reveals subdural hygromas, could be sequelae of prior subdural hematomas.  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  Per prior notes \"discussed with Dr. Power from vascular surgery on 10/19/2018 he felt that this is likely chronic he did not feel that anticoagulation would be indicated at this time\"  stable continue to monitor        Acute systolic heart failure (HCC)   Assessment & Plan    EF:40% on 10/15/18 Echocardiogram  Anasarca on exam 10/23 with good albumin levels recently        CKD (chronic kidney disease) stage 3, GFR 30-59 ml/min ([facility])- (present on admission)   Assessment & Plan    Monitor on daily BMP  Avoid " nephrotoxic meds        Encephalopathy acute   Assessment & Plan    L-carnitine for hyperammonemia  Monitor neurochecks.        Closed fracture of proximal end of humerus   Assessment & Plan    Sling when upright  Oscal + D        CRP elevated   Assessment & Plan    Likely secondary to pneumonia        Atelectasis   Assessment & Plan    Encourage deep inspiratory efforts        Paroxysmal atrial fibrillation (HCC)   Assessment & Plan    Given subdural hygromas patient not good candidate for chronic anticoagulation        Hyperammonemia (HCC)   Assessment & Plan    Likely secondary to valproic acid. Depakote dc'd  Check BNP as anasarca and possible hepatic congestion with decrease EF40%  Improved with l-carnitine supplementation  Monitor ammonia levels        DNR (do not resuscitate)   Assessment & Plan    Noted NO CPR  Okay to intubation        Dementia- (present on admission)   Assessment & Plan    Reported hx of        Elevated troponin- (present on admission)   Assessment & Plan    Likely demand ischemia in setting of recurrent seizures  EF 40%        Anemia of chronic disease- (present on admission)   Assessment & Plan    Hemoglobin 9.2 10/23  Hgb 11.3 10/25  10/26 Hgb:10.7  Monitor CBC          Quality-Core Measures   Reviewed items::  Labs reviewed, Medications reviewed and Radiology images reviewed  Rosa catheter::  Strict Intake and Output During Sepisis or Shock  DVT prophylaxis pharmacological::  Heparin

## 2018-10-26 NOTE — RESPIRATORY CARE
COPD EDUCATION by COPD CLINICAL EDUCATOR  10/26/2018 at 1:08 PM by Mahi Mccrary     Patient reviewed by COPD education team. Patient does not qualify for COPD program.

## 2018-10-26 NOTE — PROGRESS NOTES
Critical Care Progress Note    Date of admission  10/13/2018    Chief Complaint  89 y.o. female admitted 10/13/2018 with new onset seizures.    Hospital Course    This lady was admitted to the ICU with seizures and respiratory failure.      Interval Problem Update  Reviewed last 24 hour events:   - Somnolent, gurgling   - Neuro: doesn't follow in english, grand-daughter coming in later today   - HR: 50s-60s   - BP: 100s-120s   - GI: TF at goal   - UOP: adequate   - Rosa: yes   - Tm: 98   - Lines: CVC, midline   - PPx: GI pepcid, DVT heparin   - 6L oxymask   - CXR (personally reviewed and compared to prior): worsening R>L infiltrates    YESTERDAY   - hypoxic requiring NT suctioning   - Neuro: follows, language barrier, communicates appropriately with grand-daughter   - HR: 50s-90   - BP: 80s-110s   - GI: TF at goal   - UOP: 550 mL overnight   - Rosa: yes   - Tm: 98   - Lines: CVC   - PPx: GI pepcid, DVT heparin   - 5L oxymask   - CXR (personally reviewed and compared to prior): stable/improved, large amount of gas    Review of Systems  Review of Systems   Unable to perform ROS: Mental status change        Vital Signs for last 24 hours   Temp:  [36.1 °C (97 °F)-36.7 °C (98 °F)] 36.2 °C (97.2 °F)  Pulse:  [51-89] 65  Resp:  [8-45] 19    Hemodynamic parameters for last 24 hours       Vent Settings for last 24 hours       Physical Exam   Physical Exam   Constitutional: She appears toxic. She appears distressed.   HENT:   Head: Normocephalic.   Right Ear: External ear normal.   Left Ear: External ear normal.   Mouth/Throat: Oropharynx is clear and moist.   Eyes: Pupils are equal, round, and reactive to light. Right eye exhibits no discharge. Left eye exhibits no discharge. No scleral icterus.   Neck: Neck supple. No JVD present. No tracheal deviation present.   Cardiovascular: Normal rate and intact distal pulses.  An irregularly irregular rhythm present. Exam reveals no gallop and no friction rub.    Pulmonary/Chest:  Tachypnea noted. She is in respiratory distress. She has decreased breath sounds (moderate, worse than yesterday). She has no wheezes. She has rhonchi in the right middle field, the right lower field and the left lower field.   Requiring multiple NT suctioning, NPA   Abdominal: Soft. Bowel sounds are normal. She exhibits no distension. There is no tenderness. There is no rebound.   Tolerating enteral tube feedings   Musculoskeletal: She exhibits edema and tenderness (right shoulder).   No clubbing or cyanosis   Neurological:   Moves all 4 extremities.   Skin: Skin is warm and dry. She is not diaphoretic. No erythema. No pallor.   Diffuse bruising   Nursing note and vitals reviewed.      Medications  Current Facility-Administered Medications   Medication Dose Route Frequency Provider Last Rate Last Dose   • miconazole 2%-zinc oxide (GILDA) topical cream   Topical Q6HRS PRN Jeremy M Gonda, M.D.       • levOCARNitine (CARNITOR) 1 GM/10ML solution 1,000 mg  1,000 mg Feeding Tube BID WITH MEALS Paolo Son Jr., D.O.   1,000 mg at 10/26/18 0849   • midodrine (PROAMATINE) tablet 10 mg  10 mg Oral TID WITH MEALS Paolo Son Jr., D.O.   10 mg at 10/26/18 0849   • hydrocortisone sodium succinate PF (SOLU-CORTEF) 100 MG injection 50 mg  50 mg Intravenous Q8HRS Trever Dailey D.O.   50 mg at 10/26/18 0507   • oyster shell calcium/vitamin D 250-125 MG-UNIT tablet 1 Tab  1 Tab Oral DAILY NANETTE High.O.   1 Tab at 10/26/18 0507   • multivitamin (THERAGRAN) tablet 1 Tab  1 Tab Oral DAILY NANETTE High.O.   1 Tab at 10/26/18 0507   • levETIRAcetam (KEPPRA) tablet 1,500 mg  1,500 mg Per NG Tube BID Paolo Son Jr. D.O.   1,500 mg at 10/26/18 0507   • heparin injection 5,000 Units  5,000 Units Subcutaneous Q8HRS Tono Jaimes M.D.   5,000 Units at 10/26/18 0507   • eslicarbazepine (APTIOM) tablet 400 mg  400 mg Oral QHS Juan Manuel Vivar M.D.   400 mg at 10/25/18 2107   • LORazepam (ATIVAN) injection 1-4 mg   1-4 mg Intravenous Q10 MIN PRN Trever Hilario M.D.   2 mg at 10/17/18 1218   • polyethylene glycol/lytes (MIRALAX) PACKET 1 Packet  1 Packet Feeding Tube BID Trever Hilario M.D.   Stopped at 10/25/18 0518    And   • senna-docusate (PERICOLACE or SENOKOT S) 8.6-50 MG per tablet 2 Tab  2 Tab Feeding Tube BID Trever Hilario M.D.   Stopped at 10/25/18 1800    And   • magnesium hydroxide (MILK OF MAGNESIA) suspension 30 mL  30 mL Feeding Tube QDAY PRN Trever Hilario M.D.   30 mL at 10/17/18 1352    And   • bisacodyl (DULCOLAX) suppository 10 mg  10 mg Rectal QDAY PRN Trever Hilario M.D.   10 mg at 10/18/18 1516   • acetaminophen (TYLENOL) tablet 650 mg  650 mg Feeding Tube Q6HRS PRN Trever Hilario M.D.   650 mg at 10/19/18 1716    Or   • acetaminophen (TYLENOL) suppository 650 mg  650 mg Rectal Q4HRS PRN Trever Hilario M.D.       • Pharmacy Consult: Enteral tube feeding - review meds/change route/product selection   Other PRN Paolo Son Jr., D.O.       • aspirin (ASA) chewable tab 81 mg  81 mg Per NG Tube DAILY Trever Hilario M.D.   81 mg at 10/26/18 0507   • NS infusion   Intravenous Continuous Trever Hilario M.D. 10 mL/hr at 10/24/18 0700     • ondansetron (ZOFRAN) syringe/vial injection 4 mg  4 mg Intravenous Q4HRS PRN Edgar Moody M.D.       • ondansetron (ZOFRAN ODT) dispertab 4 mg  4 mg Oral Q4HRS PRN Edgar Moody M.D.       • Respiratory Care per Protocol   Nebulization Continuous RT Gomez Holguin M.D.       • MD Alert...ICU Electrolyte Replacement per Pharmacy   Other pharmacy to dose Gomez Holguin M.D.       • fentaNYL (SUBLIMAZE) injection 25 mcg  25 mcg Intravenous Q HOUR PRN Gomez Holguin M.D.   25 mcg at 10/16/18 2046    Or   • fentaNYL (SUBLIMAZE) injection 50 mcg  50 mcg Intravenous Q HOUR PRN Gomez Holguin M.D.   50 mcg at 10/22/18 2307    Or   • fentaNYL (SUBLIMAZE) injection 100 mcg  100 mcg Intravenous Q  HOUR PRN Gomez Holguin M.D.   100 mcg at 10/21/18 1814   • ipratropium-albuterol (DUONEB) nebulizer solution  3 mL Nebulization Q2HRS PRN (RT) Gomez Holguin M.D.           Fluids    Intake/Output Summary (Last 24 hours) at 10/26/18 0907  Last data filed at 10/26/18 0600   Gross per 24 hour   Intake             1680 ml   Output              950 ml   Net              730 ml       Laboratory  Recent Results (from the past 48 hour(s))   CBC with Differential    Collection Time: 10/25/18  3:49 AM   Result Value Ref Range    WBC 7.2 4.8 - 10.8 K/uL    RBC 4.12 (L) 4.20 - 5.40 M/uL    Hemoglobin 11.3 (L) 12.0 - 16.0 g/dL    Hematocrit 36.2 (L) 37.0 - 47.0 %    MCV 87.9 81.4 - 97.8 fL    MCH 27.4 27.0 - 33.0 pg    MCHC 31.2 (L) 33.6 - 35.0 g/dL    RDW 48.5 35.9 - 50.0 fL    Platelet Count 288 164 - 446 K/uL    MPV 11.1 9.0 - 12.9 fL    Neutrophils-Polys 78.50 (H) 44.00 - 72.00 %    Lymphocytes 10.10 (L) 22.00 - 41.00 %    Monocytes 9.20 0.00 - 13.40 %    Eosinophils 0.10 0.00 - 6.90 %    Basophils 0.10 0.00 - 1.80 %    Immature Granulocytes 2.00 (H) 0.00 - 0.90 %    Nucleated RBC 0.00 /100 WBC    Neutrophils (Absolute) 5.61 2.00 - 7.15 K/uL    Lymphs (Absolute) 0.72 (L) 1.00 - 4.80 K/uL    Monos (Absolute) 0.66 0.00 - 0.85 K/uL    Eos (Absolute) 0.01 0.00 - 0.51 K/uL    Baso (Absolute) 0.01 0.00 - 0.12 K/uL    Immature Granulocytes (abs) 0.14 (H) 0.00 - 0.11 K/uL    NRBC (Absolute) 0.00 K/uL   Basic Metabolic Panel (BMP)    Collection Time: 10/25/18  3:49 AM   Result Value Ref Range    Sodium 138 135 - 145 mmol/L    Potassium 4.5 3.6 - 5.5 mmol/L    Chloride 97 96 - 112 mmol/L    Co2 38 (H) 20 - 33 mmol/L    Glucose 104 (H) 65 - 99 mg/dL    Bun 44 (H) 8 - 22 mg/dL    Creatinine 0.84 0.50 - 1.40 mg/dL    Calcium 9.1 8.5 - 10.5 mg/dL    Anion Gap 3.0 0.0 - 11.9   Magnesium    Collection Time: 10/25/18  3:49 AM   Result Value Ref Range    Magnesium 2.4 1.5 - 2.5 mg/dL   Phosphorus    Collection Time: 10/25/18  3:49 AM    Result Value Ref Range    Phosphorus 2.6 2.5 - 4.5 mg/dL   VALPROIC ACID    Collection Time: 10/25/18  3:49 AM   Result Value Ref Range    Valproic Acid 81.4 50.0 - 100.0 ug/mL   AMMONIA    Collection Time: 10/25/18  3:49 AM   Result Value Ref Range    Ammonia 83 (H) 11 - 45 umol/L   ESTIMATED GFR    Collection Time: 10/25/18  3:49 AM   Result Value Ref Range    GFR If African American >60 >60 mL/min/1.73 m 2    GFR If Non African American >60 >60 mL/min/1.73 m 2   CBC with Differential    Collection Time: 10/26/18  3:10 AM   Result Value Ref Range    WBC 7.2 4.8 - 10.8 K/uL    RBC 4.10 (L) 4.20 - 5.40 M/uL    Hemoglobin 10.7 (L) 12.0 - 16.0 g/dL    Hematocrit 35.9 (L) 37.0 - 47.0 %    MCV 87.6 81.4 - 97.8 fL    MCH 26.1 (L) 27.0 - 33.0 pg    MCHC 29.8 (L) 33.6 - 35.0 g/dL    RDW 48.8 35.9 - 50.0 fL    Platelet Count 251 164 - 446 K/uL    MPV 10.8 9.0 - 12.9 fL    Nucleated RBC 0.00 /100 WBC    NRBC (Absolute) 0.00 K/uL    Neutrophils-Polys 79.00 (H) 44.00 - 72.00 %    Lymphocytes 9.80 (L) 22.00 - 41.00 %    Monocytes 7.70 0.00 - 13.40 %    Eosinophils 0.30 0.00 - 6.90 %    Basophils 0.40 0.00 - 1.80 %    Immature Granulocytes 2.80 (H) 0.00 - 0.90 %    Lymphs (Absolute) 0.71 (L) 1.00 - 4.80 K/uL    Monos (Absolute) 0.56 0.00 - 0.85 K/uL    Eos (Absolute) 0.02 0.00 - 0.51 K/uL    Baso (Absolute) 0.03 0.00 - 0.12 K/uL    Immature Granulocytes (abs) 0.20 (H) 0.00 - 0.11 K/uL    Neutrophils (Absolute) 5.71 2.00 - 7.15 K/uL    Anisocytosis 1+     Macrocytosis 1+    Basic Metabolic Panel (BMP)    Collection Time: 10/26/18  3:10 AM   Result Value Ref Range    Sodium 138 135 - 145 mmol/L    Potassium 4.6 3.6 - 5.5 mmol/L    Chloride 97 96 - 112 mmol/L    Co2 39 (H) 20 - 33 mmol/L    Glucose 124 (H) 65 - 99 mg/dL    Bun 46 (H) 8 - 22 mg/dL    Creatinine 0.89 0.50 - 1.40 mg/dL    Calcium 9.1 8.5 - 10.5 mg/dL    Anion Gap 2.0 0.0 - 11.9   Magnesium    Collection Time: 10/26/18  3:10 AM   Result Value Ref Range    Magnesium  2.3 1.5 - 2.5 mg/dL   Phosphorus    Collection Time: 10/26/18  3:10 AM   Result Value Ref Range    Phosphorus 2.3 (L) 2.5 - 4.5 mg/dL   VALPROIC ACID    Collection Time: 10/26/18  3:10 AM   Result Value Ref Range    Valproic Acid 71.2 50.0 - 100.0 ug/mL   AMMONIA    Collection Time: 10/26/18  3:10 AM   Result Value Ref Range    Ammonia 63 (H) 11 - 45 umol/L   ESTIMATED GFR    Collection Time: 10/26/18  3:10 AM   Result Value Ref Range    GFR If African American >60 >60 mL/min/1.73 m 2    GFR If Non African American 59 (A) >60 mL/min/1.73 m 2   PERIPHERAL SMEAR REVIEW    Collection Time: 10/26/18  3:10 AM   Result Value Ref Range    Peripheral Smear Review see below    PLATELET ESTIMATE    Collection Time: 10/26/18  3:10 AM   Result Value Ref Range    Plt Estimation Normal    MORPHOLOGY    Collection Time: 10/26/18  3:10 AM   Result Value Ref Range    RBC Morphology Present     Poikilocytosis 1+     Ovalocytes 1+    DIFFERENTIAL COMMENT    Collection Time: 10/26/18  3:10 AM   Result Value Ref Range    Comments-Diff see below        Imaging  X-Ray:  I have personally reviewed the images and compared with prior images. and My impression is: Stable right > left lower lobe edema    Assessment/Plan  * Acute respiratory failure with hypoxia (HCC)- (present on admission)   Assessment & Plan    Intubated 10/13- 10/23/2018, re-intubated 10/26/2018  RT/O2 protocols  Daily and PRN ABGs  Titration of ventilator therapy based on ABGs and patient's status  Sedation as tolerated/indicated  Daily CXR  HOB >30 degrees and peridex for VAP prevention  Pepcid for GI prophylaxis  SAT/SBT when able (ABCDEF Bundle)  Early mobility  Ethics/Palliative meeting on Monday with grand-daughter and other family regarding plan of care ? futility        Increased ammonia level   Assessment & Plan    2/2 valproate  Stop valproated  Continue Carnitor 1 g BID        Pneumonia due to methicillin resistant Staphylococcus aureus (HCC)   Assessment & Plan     S/P Zyvox 600 mg twice daily for 7 days  F/U repeat bronch        Seizure (HCC)- (present on admission)   Assessment & Plan    CT with small bilateral hygromas without mass-effect  EEG showed improvement in seizures  Keppra 1500 mg every 12 hours  Aptiom 400 mg nightly  Stop Depakote  Continue levocarnitine to 1000 mg twice daily and monitor ammonia levels  MRI on 10/17 with multiple acute bilateral supratentorial lacunar infarcts        Relative adrenal insufficiency (HCC)   Assessment & Plan    Wean hydrocortisone to 50 mg IV every 12 hours        Hypotension   Assessment & Plan    Levophed titrated off, remains vasoplegic  Wean hydrocortisone and continue midodrine at current dose  Echocardiogram reviewed: Left ventricular ejection fraction is visually estimated to be 40%. A reliable estimation of diastolic function cannot be made due to   tachycardia.        Limb ischemia   Assessment & Plan    Occlusion in right LEIF likely chronic  Heparin ppx        Acute systolic heart failure (HCC)   Assessment & Plan    EF 40%  Force diuresis when able        CKD (chronic kidney disease) stage 3, GFR 30-59 ml/min ([facility])- (present on admission)   Assessment & Plan    Renal dose meds, avoid nephrotoxins  Strict I/Os  Follow renal function        Encephalopathy acute   Assessment & Plan    Likely metabolic and pharmacologic with elevated ammonia and on multiple antiepileptic drugs  Wean depakote today, ammonia improving  Re-orientation, family, glasses, hearing aids, sleep, lights on during day, treat pain, ambulate, minimize benzos/anticholinergic agents.   Native language is Montenegrin, family translates          Closed fracture of proximal end of humerus   Assessment & Plan    Place right arm in sling - ordered  NWB RUE  WBAT all others        Atelectasis   Assessment & Plan    IS, PEP        Paroxysmal atrial fibrillation (HCC)   Assessment & Plan    Optimize magnesium and potassium  TSH is normal        Hyperammonemia (HCC)    Assessment & Plan    Worsening  Due to valproic acid  Continue levocarnitine to 1 g twice daily        DNR (do not resuscitate)   Assessment & Plan    DNR status, I ok  This was reviewed with family and the patient. Her POAVickie and her are in agreement to continue this code status        Dementia- (present on admission)   Assessment & Plan    History of mild dementia        Elevated troponin- (present on admission)   Assessment & Plan    Due to demand ischemia             VTE:  Heparin  Ulcer: H2 Antagonist  Lines: Central Line  Ongoing indication addressed and Rosa Catheter  Ongoing indication addressed    I have performed a physical exam and reviewed and updated ROS and Plan today (10/26/2018). In review of yesterday's note (10/25/2018), there are no changes except as documented above.     Placed back on ventilator, ventilator titrated multiple times, transient hypotension surrounding intubation, provided vasopressors.. This patient is critically ill, at high risk for decompensation leading to worsening vital organ dysfunction and death without critical care interventions.    Discussed patient condition and risk of morbidity and/or mortality with Hospitalist, RN, RT, Pharmacy, Dietary, , Charge nurse / hot rounds and Patient     The patient remains critically ill.  Critical care time = 89 minutes in directly providing and coordinating critical care and extensive data review.  No time overlap and excludes procedures.    Paolo Son Jr., D.O.  Critical Care Medicine

## 2018-10-27 NOTE — PROGRESS NOTES
Critical Care Progress Note    Date of admission  10/13/2018    Chief Complaint  89 y.o. female admitted 10/13/2018 with new onset seizures.    Hospital Course    This lady was admitted to the ICU with seizures and respiratory failure.      Interval Problem Update  Reviewed last 24 hour events:   - hypothermic   - Neuro: Still somnolent and not completely interactive   - HR: SB   - BP: 90s-120s   - GI: TF at goal   - UOP: 900 mL   - Rosa: yes   - Tm: hypothermia   - Lines: CVC   - PPx: GI pepcid, DVT heparin   - ABG: severe alkalosis, hypoxia   - CXR (personally reviewed and compared to prior): RLL opacity worsening    YESTERDAY   - hypoxic requiring NT suctioning   - Neuro: follows, language barrier, communicates appropriately with grand-daughter   - HR: 50s-90   - BP: 80s-110s   - GI: TF at goal   - UOP: 550 mL overnight   - Rosa: yes   - Tm: 98   - Lines: CVC   - PPx: GI pepcid, DVT heparin   - 5L oxymask   - CXR (personally reviewed and compared to prior): stable/improved, large amount of gas    Review of Systems  Review of Systems   Unable to perform ROS: Intubated        Vital Signs for last 24 hours   Temp:  [36.2 °C (97.1 °F)-37.6 °C (99.7 °F)] 37.4 °C (99.3 °F)  Pulse:  [51-96] 56  Resp:  [0-27] 12    Hemodynamic parameters for last 24 hours       Vent Settings for last 24 hours  Rider Vent Mode: APVCMV  Rate (breaths/min):  [12-18] 12  PEEP/CPAP:  [8-12] 12  FiO2:  [30-90] 70  P Peak (PIP):  [27-32] 28  P MEAN:  [14-17] 16    Physical Exam   Physical Exam   Constitutional: She has a sickly appearance. She appears distressed.   HENT:   Head: Normocephalic.   Right Ear: External ear normal.   Left Ear: External ear normal.   Mouth/Throat: Oropharynx is clear and moist.   Eyes: Pupils are equal, round, and reactive to light. Right eye exhibits no discharge. Left eye exhibits no discharge. No scleral icterus.   Neck: Neck supple. No JVD present. No tracheal deviation present.   Cardiovascular: Normal rate  and intact distal pulses.  An irregularly irregular rhythm present. Exam reveals no gallop and no friction rub.    Pulmonary/Chest: She has decreased breath sounds (moderate, worse than yesterday). She has no wheezes. She has rales (Coarse crackles bilaterally).   Abdominal: Soft. Bowel sounds are normal. She exhibits no distension. There is no tenderness. There is no rebound.   Tolerating enteral tube feedings   Musculoskeletal: She exhibits edema and tenderness (right shoulder).   No clubbing or cyanosis   Neurological:   Moves all 4 extremities.   Skin: Skin is warm and dry. She is not diaphoretic. No erythema. No pallor.   Diffuse bruising   Nursing note and vitals reviewed.      Medications  Current Facility-Administered Medications   Medication Dose Route Frequency Provider Last Rate Last Dose   • ipratropium-albuterol (DUONEB) nebulizer solution  3 mL Nebulization Q4HRS (RT) Jeremy M Gonda, M.D.   3 mL at 10/27/18 0726   • hydrocortisone sodium succinate PF (SOLU-CORTEF) 100 MG injection 50 mg  50 mg Intravenous Q12HRS Paolo Son Jr. D.O.   50 mg at 10/27/18 0503   • Respiratory Care per Protocol   Nebulization Continuous RT NANETTE Noriega Jr..O.       • famotidine (PEPCID) tablet 20 mg  20 mg Feeding Tube DAILY Paolo Son Jr. D.O.   20 mg at 10/27/18 0505    Or   • famotidine (PEPCID) injection 20 mg  20 mg Intravenous DAILY NANETTE Noriega Jr..O.       • MD Alert...ICU Electrolyte Replacement per Pharmacy   Other pharmacy to dose NANETTE Noriega Jr..O.       • fentaNYL (SUBLIMAZE) injection 25 mcg  25 mcg Intravenous Q HOUR PRN NANETTE Noriega Jr..O.        Or   • fentaNYL (SUBLIMAZE) injection 50 mcg  50 mcg Intravenous Q HOUR PRN NANETTE Noriega Jr..O.        Or   • fentaNYL (SUBLIMAZE) injection 100 mcg  100 mcg Intravenous Q HOUR PRN NANETTE Noriega Jr..O.       • propofol (DIPRIVAN) injection  0-80 mcg/kg/min Intravenous Continuous NANETTE Noriega Jr..O. 2.2 mL/hr at  10/27/18 0628 5 mcg/kg/min at 10/27/18 0628   • magnesium hydroxide (MILK OF MAGNESIA) suspension 30 mL  30 mL Feeding Tube QDAY PRN Paolo Son Jr. D.O.        And   • senna-docusate (PERICOLACE or SENOKOT S) 8.6-50 MG per tablet 2 Tab  2 Tab Feeding Tube BID NANETTE Noriega Jr..O.   2 Tab at 10/27/18 0504    And   • polyethylene glycol/lytes (MIRALAX) PACKET 1 Packet  1 Packet Feeding Tube QDAY PRN Paolo Son Jr. D.O.        And   • bisacodyl (DULCOLAX) suppository 10 mg  10 mg Rectal QDAY PRN NANETTE Noriega Jr..O.       • midodrine (PROAMATINE) tablet 10 mg  10 mg Feeding Tube TID WITH MEALS Paolo Son Jr. D.O.   10 mg at 10/27/18 0746   • multivitamin (THERAGRAN) tablet 1 Tab  1 Tab Feeding Tube DAILY Paolo Son Jr. D.O.   1 Tab at 10/27/18 0504   • oyster shell calcium/vitamin D 250-125 MG-UNIT tablet 1 Tab  1 Tab Feeding Tube DAILY NANETTE Noriega Jr..O.   1 Tab at 10/27/18 0600   • eslicarbazepine (APTIOM) tablet 400 mg  400 mg Feeding Tube QHS Paolo Son Jr. D.O.   400 mg at 10/26/18 2122   • phosphorus (K-PHOS-NEUTRAL, PHOSPHA 250 NEUTRAL) per tablet 1 Tab  1 Tab Feeding Tube Q6HRS Paolo Son Jr. D.O.   1 Tab at 10/27/18 0504   • furosemide (LASIX) injection 20 mg  20 mg Intravenous BID DIURETIC Paolo Son Jr. D.O.   20 mg at 10/27/18 0505   • norepinephrine (LEVOPHED) 8 mg in  mL Infusion  0-30 mcg/min Intravenous Continuous NANETTE Noriega Jr..O.   Stopped at 10/26/18 2015   • levETIRAcetam (KEPPRA) tablet 1,000 mg  1,000 mg Per NG Tube BID Paolo Son Jr. D.O.   1,000 mg at 10/27/18 0504   • acetylcysteine (MUCOMYST) 20 % solution 3 mL  3 mL Inhalation Q4HRS Paolo Son Jr., D.O.   3 mL at 10/27/18 0726   • miconazole 2%-zinc oxide (GILDA) topical cream   Topical Q6HRS PRN Jeremy M Gonda, M.D.       • levOCARNitine (CARNITOR) 1 GM/10ML solution 1,000 mg  1,000 mg Feeding Tube BID WITH MEALS Paolo Son Jr., D.O.   1,000 mg at  10/27/18 0746   • heparin injection 5,000 Units  5,000 Units Subcutaneous Q8HRS Tono Jaimes M.D.   5,000 Units at 10/27/18 0503   • LORazepam (ATIVAN) injection 1-4 mg  1-4 mg Intravenous Q10 MIN PRN Trever Hilario M.D.   2 mg at 10/17/18 1218   • acetaminophen (TYLENOL) tablet 650 mg  650 mg Feeding Tube Q6HRS PRN Trever Hilario M.D.   650 mg at 10/19/18 1716    Or   • acetaminophen (TYLENOL) suppository 650 mg  650 mg Rectal Q4HRS PRN Trever Hilario M.D.       • Pharmacy Consult: Enteral tube feeding - review meds/change route/product selection   Other PRN Paolo Son Jr., D.O.       • aspirin (ASA) chewable tab 81 mg  81 mg Per NG Tube DAILY Trever Hilario M.D.   81 mg at 10/27/18 0504   • NS infusion   Intravenous Continuous Trever Hilario M.D. 10 mL/hr at 10/24/18 0700     • ondansetron (ZOFRAN) syringe/vial injection 4 mg  4 mg Intravenous Q4HRS PRN Edgar Moody M.D.       • ondansetron (ZOFRAN ODT) dispertab 4 mg  4 mg Oral Q4HRS PRN Edgar Moody M.D.       • ipratropium-albuterol (DUONEB) nebulizer solution  3 mL Nebulization Q2HRS PRN (RT) Gomez Holguin M.D.   3 mL at 10/26/18 2307       Fluids    Intake/Output Summary (Last 24 hours) at 10/27/18 0906  Last data filed at 10/27/18 0800   Gross per 24 hour   Intake          1524.69 ml   Output             1400 ml   Net           124.69 ml       Laboratory  Recent Results (from the past 48 hour(s))   CBC with Differential    Collection Time: 10/26/18  3:10 AM   Result Value Ref Range    WBC 7.2 4.8 - 10.8 K/uL    RBC 4.10 (L) 4.20 - 5.40 M/uL    Hemoglobin 10.7 (L) 12.0 - 16.0 g/dL    Hematocrit 35.9 (L) 37.0 - 47.0 %    MCV 87.6 81.4 - 97.8 fL    MCH 26.1 (L) 27.0 - 33.0 pg    MCHC 29.8 (L) 33.6 - 35.0 g/dL    RDW 48.8 35.9 - 50.0 fL    Platelet Count 251 164 - 446 K/uL    MPV 10.8 9.0 - 12.9 fL    Nucleated RBC 0.00 /100 WBC    NRBC (Absolute) 0.00 K/uL    Neutrophils-Polys 79.00 (H) 44.00 -  72.00 %    Lymphocytes 9.80 (L) 22.00 - 41.00 %    Monocytes 7.70 0.00 - 13.40 %    Eosinophils 0.30 0.00 - 6.90 %    Basophils 0.40 0.00 - 1.80 %    Immature Granulocytes 2.80 (H) 0.00 - 0.90 %    Lymphs (Absolute) 0.71 (L) 1.00 - 4.80 K/uL    Monos (Absolute) 0.56 0.00 - 0.85 K/uL    Eos (Absolute) 0.02 0.00 - 0.51 K/uL    Baso (Absolute) 0.03 0.00 - 0.12 K/uL    Immature Granulocytes (abs) 0.20 (H) 0.00 - 0.11 K/uL    Neutrophils (Absolute) 5.71 2.00 - 7.15 K/uL    Anisocytosis 1+     Macrocytosis 1+    Basic Metabolic Panel (BMP)    Collection Time: 10/26/18  3:10 AM   Result Value Ref Range    Sodium 138 135 - 145 mmol/L    Potassium 4.6 3.6 - 5.5 mmol/L    Chloride 97 96 - 112 mmol/L    Co2 39 (H) 20 - 33 mmol/L    Glucose 124 (H) 65 - 99 mg/dL    Bun 46 (H) 8 - 22 mg/dL    Creatinine 0.89 0.50 - 1.40 mg/dL    Calcium 9.1 8.5 - 10.5 mg/dL    Anion Gap 2.0 0.0 - 11.9   Magnesium    Collection Time: 10/26/18  3:10 AM   Result Value Ref Range    Magnesium 2.3 1.5 - 2.5 mg/dL   Phosphorus    Collection Time: 10/26/18  3:10 AM   Result Value Ref Range    Phosphorus 2.3 (L) 2.5 - 4.5 mg/dL   VALPROIC ACID    Collection Time: 10/26/18  3:10 AM   Result Value Ref Range    Valproic Acid 71.2 50.0 - 100.0 ug/mL   AMMONIA    Collection Time: 10/26/18  3:10 AM   Result Value Ref Range    Ammonia 63 (H) 11 - 45 umol/L   ESTIMATED GFR    Collection Time: 10/26/18  3:10 AM   Result Value Ref Range    GFR If African American >60 >60 mL/min/1.73 m 2    GFR If Non African American 59 (A) >60 mL/min/1.73 m 2   PERIPHERAL SMEAR REVIEW    Collection Time: 10/26/18  3:10 AM   Result Value Ref Range    Peripheral Smear Review see below    PLATELET ESTIMATE    Collection Time: 10/26/18  3:10 AM   Result Value Ref Range    Plt Estimation Normal    MORPHOLOGY    Collection Time: 10/26/18  3:10 AM   Result Value Ref Range    RBC Morphology Present     Poikilocytosis 1+     Ovalocytes 1+    DIFFERENTIAL COMMENT    Collection Time:  10/26/18  3:10 AM   Result Value Ref Range    Comments-Diff see below    GRAM STAIN    Collection Time: 10/26/18 12:05 PM   Result Value Ref Range    Significant Indicator .     Source RESP     Site BRONCHOALVEOLAR LAVAGE     Gram Stain Result Many WBCs.  No organisms seen.      ISTAT ARTERIAL BLOOD GAS    Collection Time: 10/26/18  2:23 PM   Result Value Ref Range    Ph 7.491 7.400 - 7.500    Pco2 50.6 (H) 26.0 - 37.0 mmHg    Po2 26 (LL) 64 - 87 mmHg    Tco2 40 (H) 20 - 33 mmol/L    S02 52 (L) 93 - 99 %    Hco3 38.6 (H) 17.0 - 25.0 mmol/L    BE 13 (H) -4 - 3 mmol/L    Body Temp 99.6 F degrees    O2 Therapy 60 %    iPF Ratio 43     Ph Temp Tea 7.483 7.400 - 7.500    Pco2 Temp Co 51.8 (HH) 26.0 - 37.0 mmHg    Po2 Temp Cor 27 (LL) 64 - 87 mmHg    Specimen Arterial     Action Range Triggered YES     Inst. Qualified Patient YES    CBC with Differential    Collection Time: 10/27/18  3:48 AM   Result Value Ref Range    WBC 11.0 (H) 4.8 - 10.8 K/uL    RBC 3.85 (L) 4.20 - 5.40 M/uL    Hemoglobin 10.5 (L) 12.0 - 16.0 g/dL    Hematocrit 32.9 (L) 37.0 - 47.0 %    MCV 85.5 81.4 - 97.8 fL    MCH 27.3 27.0 - 33.0 pg    MCHC 31.9 (L) 33.6 - 35.0 g/dL    RDW 48.0 35.9 - 50.0 fL    Platelet Count 188 164 - 446 K/uL    MPV 10.5 9.0 - 12.9 fL    Neutrophils-Polys 71.10 44.00 - 72.00 %    Lymphocytes 15.00 (L) 22.00 - 41.00 %    Monocytes 10.70 0.00 - 13.40 %    Eosinophils 0.40 0.00 - 6.90 %    Basophils 0.10 0.00 - 1.80 %    Immature Granulocytes 2.70 (H) 0.00 - 0.90 %    Nucleated RBC 0.00 /100 WBC    Neutrophils (Absolute) 7.85 (H) 2.00 - 7.15 K/uL    Lymphs (Absolute) 1.65 1.00 - 4.80 K/uL    Monos (Absolute) 1.18 (H) 0.00 - 0.85 K/uL    Eos (Absolute) 0.04 0.00 - 0.51 K/uL    Baso (Absolute) 0.01 0.00 - 0.12 K/uL    Immature Granulocytes (abs) 0.30 (H) 0.00 - 0.11 K/uL    NRBC (Absolute) 0.00 K/uL   Basic Metabolic Panel (BMP)    Collection Time: 10/27/18  3:48 AM   Result Value Ref Range    Sodium 140 135 - 145 mmol/L     Potassium 3.9 3.6 - 5.5 mmol/L    Chloride 97 96 - 112 mmol/L    Co2 38 (H) 20 - 33 mmol/L    Glucose 85 65 - 99 mg/dL    Bun 47 (H) 8 - 22 mg/dL    Creatinine 0.99 0.50 - 1.40 mg/dL    Calcium 8.9 8.5 - 10.5 mg/dL    Anion Gap 5.0 0.0 - 11.9   Magnesium    Collection Time: 10/27/18  3:48 AM   Result Value Ref Range    Magnesium 2.2 1.5 - 2.5 mg/dL   Phosphorus    Collection Time: 10/27/18  3:48 AM   Result Value Ref Range    Phosphorus 2.0 (L) 2.5 - 4.5 mg/dL   VALPROIC ACID    Collection Time: 10/27/18  3:48 AM   Result Value Ref Range    Valproic Acid 52.9 50.0 - 100.0 ug/mL   AMMONIA    Collection Time: 10/27/18  3:48 AM   Result Value Ref Range    Ammonia 54 (H) 11 - 45 umol/L   TRIGLYCERIDE    Collection Time: 10/27/18  3:48 AM   Result Value Ref Range    Triglycerides 188 (H) 0 - 149 mg/dL   BTYPE NATRIURETIC PEPTIDE    Collection Time: 10/27/18  3:48 AM   Result Value Ref Range    B Natriuretic Peptide 559 (H) 0 - 100 pg/mL   ESTIMATED GFR    Collection Time: 10/27/18  3:48 AM   Result Value Ref Range    GFR If African American >60 >60 mL/min/1.73 m 2    GFR If Non  53 (A) >60 mL/min/1.73 m 2   ISTAT ARTERIAL BLOOD GAS    Collection Time: 10/27/18  4:35 AM   Result Value Ref Range    Ph 7.653 (HH) 7.400 - 7.500    Pco2 34.0 26.0 - 37.0 mmHg    Po2 187 (H) 64 - 87 mmHg    Tco2 39 (H) 20 - 33 mmol/L    S02 100 (H) 93 - 99 %    Hco3 37.7 (H) 17.0 - 25.0 mmol/L    BE 16 (H) -4 - 3 mmol/L    Body Temp 96.5 F degrees    O2 Therapy 80 %    iPF Ratio 234     Ph Temp Tea 7.672 (HH) 7.400 - 7.500    Pco2 Temp Co 32.3 26.0 - 37.0 mmHg    Po2 Temp Cor 181 (H) 64 - 87 mmHg    Specimen Arterial     Action Range Triggered YES     Inst. Qualified Patient YES    HISTOLOGY REQUEST    Collection Time: 10/27/18  4:59 AM   Result Value Ref Range    Pathology Request Sent to [facility]        Imaging  X-Ray:  I have personally reviewed the images and compared with prior images. and My impression is: Stable right >  left lower lobe edema    Assessment/Plan  * Acute respiratory failure with hypoxia (HCC)- (present on admission)   Assessment & Plan    Intubated 10/13- 10/23/2018, re-intubated 10/26/2018  RT/O2 protocols  Daily and PRN ABGs  Titration of ventilator therapy based on ABGs and patient's status  Sedation as tolerated/indicated  Daily CXR  HOB >30 degrees and peridex for VAP prevention  Pepcid for GI prophylaxis  SAT/SBT when able (ABCDEF Bundle)  Early mobility  Drop RR, add diamox  Ethics/Palliative meeting on Monday with grand-daughter and other family regarding plan of care ? Futility of care        Increased ammonia level   Assessment & Plan    2/2 valproate  Stopped valproate  Keppra decreased to 100 BID  Continue Carnitor 1 g BID        Pneumonia due to unspecified organism (HCC)   Assessment & Plan    S/P Zyvox 600 mg twice daily for 7 days for MRSA  BAL now with lactose fermenting gram-negative rods  Start Zosyn        Seizure (HCC)- (present on admission)   Assessment & Plan    CT with small bilateral hygromas without mass-effect  EEG showed improvement in seizures  Keppra decreased to 1000 mg every 12 hours  Aptiom 400 mg nightly  Stopped Depakote  Continue levocarnitine to 1000 mg twice daily and monitor ammonia levels  MRI on 10/17 with multiple acute bilateral supratentorial lacunar infarcts        Relative adrenal insufficiency (HCC)   Assessment & Plan    hydrocortisone at 50 mg IV every 12 hours        Hypotension   Assessment & Plan    Levophed titrated off, remains vasoplegic  Continue hydrocortisone and midodrine at current doses  Echocardiogram reviewed: Left ventricular ejection fraction is visually estimated to be 40%. A reliable estimation of diastolic function cannot be made due to   tachycardia.        Limb ischemia   Assessment & Plan    Occlusion in right LEIF likely chronic  Heparin ppx        Acute systolic heart failure (HCC)   Assessment & Plan    EF 40%  Force diuresis        CKD  (chronic kidney disease) stage 3, GFR 30-59 ml/min (HCC)- (present on admission)   Assessment & Plan    Renal dose meds, avoid nephrotoxins  Strict I/Os  Follow renal function        Encephalopathy acute   Assessment & Plan    Likely metabolic and pharmacologic with elevated ammonia and on multiple antiepileptic drugs  Off depakote, ammonia improving. Wean Keppra  Re-orientation, family, glasses, hearing aids, sleep, lights on during day, treat pain, ambulate, minimize benzos/anticholinergic agents.   Native language is Vincentian, family translates          Closed fracture of proximal end of humerus   Assessment & Plan    Place right arm in sling  NWB RUE  WBAT all others        Atelectasis   Assessment & Plan    IPV, mucomyst q4 hrs        Paroxysmal atrial fibrillation (HCC)   Assessment & Plan    Optimize magnesium and potassium  TSH is normal        Hyperammonemia (HCC)   Assessment & Plan    Worsening  Due to valproic acid  Continue levocarnitine to 1 g twice daily        DNR (do not resuscitate)   Assessment & Plan    DNR status, I ok  This was reviewed with family and the patient. Her Vickie RODRIGUEZ and her are in agreement to continue this code status        Dementia- (present on admission)   Assessment & Plan    History of mild dementia        Elevated troponin- (present on admission)   Assessment & Plan    Due to demand ischemia             VTE:  Heparin  Ulcer: H2 Antagonist  Lines: Central Line  Ongoing indication addressed and Rosa Catheter  Ongoing indication addressed    I have performed a physical exam and reviewed and updated ROS and Plan today (10/27/2018). In review of yesterday's note (10/26/2018), there are no changes except as documented above.     Titrating ventilator, dressing infection. This patient is critically ill, at high risk for decompensation leading to worsening vital organ dysfunction and death without critical care interventions.    Discussed patient condition and risk of morbidity  and/or mortality with Hospitalist, Family, RN, RT, Pharmacy, Dietary, , Charge nurse / hot rounds and neurology     The patient remains critically ill.  Critical care time = 39 minutes in directly providing and coordinating critical care and extensive data review.  No time overlap and excludes procedures.    KERMIT Noriega Jr.O.  Critical Care Medicine

## 2018-10-27 NOTE — CARE PLAN
Problem: Ventilation Defect:  Goal: Ability to achieve and maintain unassisted ventilation or tolerate decreased levels of ventilator support    Intervention: Support and monitor invasive and noninvasive mechanical ventilation  Adult Ventilation Update    Total Vent Days: 2    Patient Lines/Drains/Airways Status    Active Airway     Name: Placement date: Placement time: Site: Days:    Airway ETT Oral 7.5 @ 22 10/26/18   2231   Oral   2              APV: 16, 340, +12, 80%  IPV Q4  DUO Q4  Mucomyst Q4    Pt had a blown cuff, required tube exchange @ 2231.  No other events overnight, will continue to monitor.

## 2018-10-27 NOTE — CARE PLAN
Problem: Safety  Goal: Will remain free from injury  Outcome: PROGRESSING AS EXPECTED  Pt resting in bed with granddaughter at bedside. Family educated on safety and if assistance is needed to press the call light. Call light within reach and bed alarm activated. Will continue to monitor.     Problem: Fluid Volume:  Goal: Will maintain balanced intake and output  Outcome: PROGRESSING AS EXPECTED  Pt receiving diuretics at this time. Will continue to monitor urinary output.     Problem: Safety:  Goal: Will remain free from injury  Outcome: PROGRESSING AS EXPECTED  Pt resting in bed with granddaughter at bedside. Family educated on safety and if assistance is needed to press the call light. Call light within reach and bed alarm activated. Will continue to monitor.

## 2018-10-27 NOTE — PROGRESS NOTES
2300- pt experiencing severe cuff leak, tube replaced.   Gonda at bedside. Will continue to monitor.

## 2018-10-27 NOTE — PROCEDURES
Procedure Note    Date: 10/26/2018  Time: 2245    Procedure: Intubation    Indication: Acute respiratory failure, failure of existing ETT to maintain cuff pressures/seal  Consent: Emergency/implied    Procedure: A time-out was performed. Airway assessed and patient found to have Mallampati class 2.  Equipment prepared and RT/RN at bedside. Patient pre-oxygenated with 100% FiO2.  After medication delivery, a tube exchanged placed through the existing 7.5 endotracheal tube over which it was removed and a new endotracheal tube was placed using Seldinger technique.  10 L/min oxygen flow was provided through the tube exchanger throughout the procedure to avoid hypoxic events. A 7.5 ETT was placed with 1 attempt(s) into the trachea directly through the vocal cords. Appropriate placement confirmed by direct visualization, bilateral chest and epigastric auscultation, misting in the ETT, and ETCO2 detector. ETT secured in place at 22 cm at the teeth and patient connected to ventilator. Sedation/analgesia continued as appropriate. Patient tolerated procedure well without any difficulties and remains in care of bedside nurse and respiratory therapy. CXR will be performed to confirm appropriate placement of ETT.    Medications: etomidate 20 mg, rocuronium 50 mg  Complications: none  CXR: pending    Jeremy Gonda, MD  Critical Care Medicine

## 2018-10-27 NOTE — PROGRESS NOTES
[facility] Hospitalist Progress Note    Date of Service: 10/27/2018    Chief Complaint  89 y.o. female admitted 10/13/2018 with hx of subdural hematoma and while at [facility] had a seizure and was transferred to [facility].  She did require intubation for airway protection    Interval Problem Update  Intubated yesterday  Having to doppler pedal pulses  Sacral pressure ulcer per RN  CXR right pleural effusion  Completed 7 days zyvox yesterday  Replace potassium and phosphorus  Diamox being initiated    Consultants/Specialty  Neurology   Critical Care    Disposition  To remain in ICU today        Review of Systems   Unable to perform ROS: Mental acuity      Physical Exam  Laboratory/Imaging   Hemodynamics  Temp (24hrs), Av.1 °C (98.8 °F), Min:36.2 °C (97.1 °F), Max:37.6 °C (99.7 °F)   Temperature: 37.4 °C (99.3 °F), Monitored Temp: 36.8 °C (98.2 °F)  Pulse  Av.9  Min: 30  Max: 129 Heart Rate (Monitored): 92  NIBP: 109/56      Respiratory  Rider Vent Mode: APVCMV, Rate (breaths/min): 12, PEEP/CPAP: 10, PEEP/CPAP: 10, FiO2: 50, P Peak (PIP): 23, P MEAN: 12   Respiration: 14, Pulse Oximetry: 92 %     Given By:: Vent, Work Of Breathing / Effort: Vented  RUL Breath Sounds: Clear, RML Breath Sounds: Diminished, RLL Breath Sounds: Diminished, BLANCHE Breath Sounds: Clear, LLL Breath Sounds: Diminished    Fluids    Intake/Output Summary (Last 24 hours) at 10/27/18 1753  Last data filed at 10/27/18 1600   Gross per 24 hour   Intake          1852.49 ml   Output             1495 ml   Net           357.49 ml       Nutrition  Orders Placed This Encounter   Procedures   • Diet NPO     Standing Status:   Standing     Number of Occurrences:   1     Order Specific Question:   Type:     Answer:   Now [1]     Order Specific Question:   Restrict to:     Answer:   Strict [1]     Physical Exam   Constitutional: She has a sickly appearance. She appears ill. No distress. She is sedated, intubated and restrained.   Generalized Anasarca.  Frail    HENT:   Head: Normocephalic and atraumatic.   Nose: Nose normal.   Mouth/Throat: Oropharynx is clear and moist.   Eyes: Conjunctivae and EOM are normal. Right eye exhibits no discharge. Left eye exhibits no discharge.   Neck: No tracheal deviation present.   Cardiovascular: Normal rate and regular rhythm.    Murmur heard.  Pulses:       Radial pulses are 1+ on the right side, and 1+ on the left side.        Dorsalis pedis pulses are 1+ on the right side, and 1+ on the left side.   Pulmonary/Chest: Effort normal and breath sounds normal. No stridor. She is intubated. No respiratory distress. She has no wheezes. She has no rales.   Abdominal: Soft. Bowel sounds are normal. She exhibits no distension.   Musculoskeletal: She exhibits edema (general anasarca).   Skin: Skin is warm and dry. She is not diaphoretic.   Thin skin.  Bruising/hematoma of the right arm   Psychiatric: She is slowed and withdrawn. Cognition and memory are impaired. She is noncommunicative.   Vitals reviewed.      Recent Labs      10/25/18   0349  10/26/18   0310  10/27/18   0348   WBC  7.2  7.2  11.0*   RBC  4.12*  4.10*  3.85*   HEMOGLOBIN  11.3*  10.7*  10.5*   HEMATOCRIT  36.2*  35.9*  32.9*   MCV  87.9  87.6  85.5   MCH  27.4  26.1*  27.3   MCHC  31.2*  29.8*  31.9*   RDW  48.5  48.8  48.0   PLATELETCT  288  251  188   MPV  11.1  10.8  10.5     Recent Labs      10/25/18   0349  10/26/18   0310  10/27/18   0348   SODIUM  138  138  140   POTASSIUM  4.5  4.6  3.9   CHLORIDE  97  97  97   CO2  38*  39*  38*   GLUCOSE  104*  124*  85   BUN  44*  46*  47*   CREATININE  0.84  0.89  0.99   CALCIUM  9.1  9.1  8.9         Recent Labs      10/27/18   0348   BNPBTYPENAT  559*     Recent Labs      10/27/18   0348   TRIGLYCERIDE  188*          Assessment/Plan     * Acute respiratory failure with hypoxia (HCC)- (present on admission)   Assessment & Plan    Initially due to to seizures and MRSA pneumonia  Monitor daily ABG, chest x-ray, labs, vitals and  "strict I's and O's  Reintubated 10/26  RT protocol  Supplemental oxygen        Increased ammonia level   Assessment & Plan    Likely due to valproic. Previously depakote stopped due to increase in lethargy and ammonia levels  Lactulose & L-carnitine  Monitor neurostatus        Pneumonia due to methicillin resistant Staphylococcus aureus (HCC)   Assessment & Plan    Completed 7-day course of Zyvox on 10/26/2018  RT protcol   As needed bronchodilators        Seizure (HCC)- (present on admission)   Assessment & Plan    new onset seizures.  CT head reveals subdural hygromas, could be sequelae of prior subdural hematomas.  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  Per prior notes \"discussed with Dr. Power from vascular surgery on 10/19/2018 he felt that this is likely chronic he did not feel that anticoagulation would be indicated at this time\"  stable continue to monitor        Acute systolic heart failure (HCC)   Assessment & Plan    EF:40% on 10/15/18 Echocardiogram  Anasarca on exam 10/23 with good albumin levels recently        CKD (chronic kidney disease) stage 3, GFR 30-59 ml/min ([facility])- (present on admission)   Assessment & Plan    Monitor on daily BMP  Avoid nephrotoxic meds        Encephalopathy acute   Assessment & Plan    L-carnitine for hyperammonemia  Monitor neurochecks.        Closed fracture of proximal end of humerus   Assessment & Plan    Sling when upright  Oscal + D        CRP elevated   Assessment & Plan    Likely secondary to " pneumonia        Atelectasis   Assessment & Plan    On ventilator monitoring chest x-rays  Pulmonary consulting        Paroxysmal atrial fibrillation (HCC)   Assessment & Plan    Given subdural hygromas patient not good candidate for chronic anticoagulation        Hyperammonemia (HCC)   Assessment & Plan    Likely secondary to valproic acid. Depakote dc'd  Check BNP as anasarca and possible hepatic congestion with decrease EF40%  Improved with l-carnitine supplementation  Monitor ammonia levels        DNR (do not resuscitate)   Assessment & Plan    Noted NO CPR  Okay to intubation        Dementia- (present on admission)   Assessment & Plan    Reported hx of        Elevated troponin- (present on admission)   Assessment & Plan    Likely demand ischemia in setting of recurrent seizures  EF 40%        Anemia of chronic disease- (present on admission)   Assessment & Plan    Unchanged hemoglobin over the past 4 days  Monitor CBC          Quality-Core Measures   Reviewed items::  Labs reviewed, Medications reviewed and Radiology images reviewed  Rosa catheter::  Strict Intake and Output During Sepisis or Shock  DVT prophylaxis pharmacological::  Heparin

## 2018-10-27 NOTE — CARE PLAN
Problem: Safety  Goal: Will remain free from falls  Bed alarm on, wrist restraints in place.     Problem: Venous Thromboembolism (VTW)/Deep Vein Thrombosis (DVT) Prevention:  Goal: Patient will participate in Venous Thrombosis (VTE)/Deep Vein Thrombosis (DVT)Prevention Measures  SCD's in place and heparin per MD order.

## 2018-10-27 NOTE — CARE PLAN
Problem: Ventilation Defect:  Goal: Ability to achieve and maintain unassisted ventilation or tolerate decreased levels of ventilator support  Adult Ventilation Update    Total Vent Days: 1    Patient Lines/Drains/Airways Status    Active Airway     Name: Placement date: Placement time: Site: Days:    Airway ETT 7.5 10/26/18   1150      less than 1                  #FVC / Vital Capacity (liters) : 0.6 (10/23/18 0801)  NIF (cm H2O) : -40 (10/23/18 0801)  Rapid Shallow Breathing Index (RR/VT): 55 (10/23/18 0801)  Plateau Pressure (Q Shift): 23 (10/26/18 1424)  Static Compliance (ml / cm H2O): 20.3 (10/26/18 1424)    Patient failed trials because of Barriers to Wean: Other (Comments) (Cont EEG procedure in place) (10/21/18 0704)  Barriers to SBT Weaning Trial Stopped due to:: Pt weaned for 1 hour and returned to rest settings per protocol (10/23/18 0801)  Length of Weaning Trial Length of Weaning Trial (Hours): 1 (10/23/18 0801)  Cough: Weak;Productive (10/26/18 1600)  Sputum Amount: Moderate (10/26/18 1600)  Sputum Color: Bloody;White (10/26/18 1600)  Sputum Consistency: Thick (10/26/18 1600)    Mobility  Level of Mobility: Level IV (10/25/18 1400)  Activity Performed: Edge of bed (10/26/18 0000)  Time Activity Tolerated: 5 min (10/26/18 0000)  Assistance / Tolerance: Assistance of Two or More (10/26/18 0000)  Pt Calls for Assistance: No (10/26/18 0000)  Staff Present for Mobilization: RN;CNA (10/26/18 0000)  Gait: Unable to Ambulate (10/25/18 1800)  Assistive Devices: Hand held assist (10/26/18 0000)  Reason Not Mobilized: Unstable condition (Pt bp 80-83 sys/ fragile breathing oxymask/ pain with moveme) (10/24/18 0800)    Events/Summary/Plan: Intubated (10/26/18 1150)

## 2018-10-28 NOTE — CARE PLAN
Problem: Ventilation Defect:  Goal: Ability to achieve and maintain unassisted ventilation or tolerate decreased levels of ventilator support  Adult Ventilation Update    Total Vent Days: 2    APVcmv 12, 340, +10, 50%    Patient Lines/Drains/Airways Status    Active Airway     Name: Placement date: Placement time: Site: Days:    Airway ETT Oral 7.5 10/26/18   2231   Oral   less than 1            Cough: Weak;Productive (10/27/18 1600)  Sputum Amount: Small (10/27/18 1600)  Sputum Color: Bloody;White;Clear (10/27/18 1600)  Sputum Consistency: Thick;Thin (10/27/18 1600)    Mobility  Level of Mobility: Level II (10/27/18 0800)  Gait: Unable to Ambulate (10/27/18 0400)  Reason Not Mobilized: Other (comment) (FIO@ 70%, PEEP of 12) (10/27/18 0800)    Events/Summary/Plan: RR to 12 from 16, PEEP to 10 from 12, no SBTs today per high PEEP

## 2018-10-28 NOTE — CARE PLAN
Problem: Communication  Goal: The ability to communicate needs accurately and effectively will improve  Outcome: PROGRESSING SLOWER THAN EXPECTED  Pt is intubated. English also not native language    Problem: Safety  Goal: Will remain free from injury  Outcome: PROGRESSING AS EXPECTED  Free of injury. Patient remains restrained for safety    Problem: Infection  Goal: Will remain free from infection  Outcome: PROGRESSING AS EXPECTED  S/s of infection based on cultures. Treated with abx    Problem: Bowel/Gastric:  Goal: Normal bowel function is maintained or improved  Outcome: PROGRESSING SLOWER THAN EXPECTED      Problem: Fluid Volume:  Goal: Will maintain balanced intake and output  Outcome: PROGRESSING SLOWER THAN EXPECTED  Receiving lasix and diamox    Problem: Safety - Medical Restraint  Goal: Remains free of injury from restraints (Restraint for Interference with Medical Device)  INTERVENTIONS:  1. Determine that other, less restrictive measures have been tried or would not be effective before applying the restraint  2. Evaluate the patient's condition at the time of restraint application  3. Inform patient/family regarding the reason for restraint  4. Q2H: Monitor safety, psychosocial status, comfort, nutrition and hydration     Outcome: PROGRESSING AS EXPECTED  No s/s of injury from restraints    Problem: Safety:  Goal: Will remain free from injury  Outcome: PROGRESSING AS EXPECTED  Free of injury. Patient remains restrained for safety

## 2018-10-28 NOTE — CARE PLAN
Problem: Infection  Goal: Will remain free from infection  Oral care as patient tolerates. Suction as needed. Turns every 2 hours.     Problem: Mobility  Goal: Risk for activity intolerance will decrease  Will mobilize to edge of bed during shift.

## 2018-10-28 NOTE — PROGRESS NOTES
Critical Care Progress Note    Date of admission  10/13/2018    Chief Complaint  89 y.o. female admitted 10/13/2018 with new onset seizures.    Hospital Course    This lady was admitted to the ICU with seizures and respiratory failure.      Interval Problem Update  Reviewed last 24 hour events:   - NAEON   - Neuro: agitated, moves all 4   - HR: 50s-90s   - SBP: 100s-110s   - GI: TF at goal   - UOP: adequate   - Rosa: yes   - Tm: afeb   - Lines: CVC   - PPx: GI pepcid, DVT heparin   - ABG: metabolic alkalosis -> diamox   - CXR (personally reviewed and compared to prior): slightly improved RLL opacities    YESTERDAY   - hypothermic   - Neuro: Still somnolent and not completely interactive   - HR: SB   - BP: 90s-120s   - GI: TF at goal   - UOP: 900 mL   - Rosa: yes   - Tm: hypothermia   - Lines: CVC   - PPx: GI pepcid, DVT heparin   - ABG: severe alkalosis, hypoxia   - CXR (personally reviewed and compared to prior): RLL opacity worsening    Review of Systems  Review of Systems   Unable to perform ROS: Acuity of condition        Vital Signs for last 24 hours   Pulse:  [54-97] 89  Resp:  [11-19] 13    Hemodynamic parameters for last 24 hours       Vent Settings for last 24 hours  S[place]man Vent Mode: Spont  Rate (breaths/min):  [12] 12  PEEP/CPAP:  [10-12] 10  FiO2:  [40-60] 40  P Peak (PIP):  [19-29] 19  P MEAN:  [12-20] 12      Physical Exam   Physical Exam   Constitutional: She has a sickly appearance. She appears distressed.   HENT:   Head: Normocephalic.   Right Ear: External ear normal.   Left Ear: External ear normal.   Mouth/Throat: Oropharynx is clear and moist.   Eyes: Pupils are equal, round, and reactive to light. Right eye exhibits no discharge. Left eye exhibits no discharge. No scleral icterus.   Neck: Neck supple. No JVD present. No tracheal deviation present.   Cardiovascular: Normal rate and intact distal pulses.  An irregularly irregular rhythm present. Exam reveals no gallop and no friction rub.     Pulmonary/Chest: She has decreased breath sounds (moderate, worse than yesterday). She has no wheezes. She has rales (Coarse crackles bilaterally).   Abdominal: Soft. Bowel sounds are normal. She exhibits no distension. There is no tenderness. There is no rebound.   Tolerating enteral tube feedings   Musculoskeletal: She exhibits edema and tenderness (right shoulder).   No clubbing or cyanosis   Neurological:   Moves all 4 extremities. Does not follow   Skin: Skin is warm and dry. She is not diaphoretic. No erythema. No pallor.   Diffuse bruising   Nursing note and vitals reviewed.      Medications  Current Facility-Administered Medications   Medication Dose Route Frequency Provider Last Rate Last Dose   • potassium bicarbonate (KLYTE) effervescent tablet 25 mEq  25 mEq Oral Q2HRS Edgar Moody M.D.       • ipratropium-albuterol (DUONEB) nebulizer solution  3 mL Nebulization Q4HRS (RT) Jeremy M Gonda, M.D.   3 mL at 10/28/18 0718   • piperacillin-tazobactam (ZOSYN) 3.375 g in  mL IVPB  3.375 g Intravenous Q8HRS NANETTE Noriega Jr..O. 25 mL/hr at 10/28/18 0600 3.375 g at 10/28/18 0600   • hydrocortisone sodium succinate PF (SOLU-CORTEF) 100 MG injection 50 mg  50 mg Intravenous Q12HRS Paolo Son Jr. D.O.   50 mg at 10/28/18 0608   • Respiratory Care per Protocol   Nebulization Continuous RT NANETTE Noriega Jr..O.       • famotidine (PEPCID) tablet 20 mg  20 mg Feeding Tube DAILY Paolo Son Jr. D.O.   20 mg at 10/28/18 0608    Or   • famotidine (PEPCID) injection 20 mg  20 mg Intravenous DAILY NANETTE Noriega Jr..O.       • MD Alert...ICU Electrolyte Replacement per Pharmacy   Other pharmacy to dose NANETTE Noriega Jr..O.       • fentaNYL (SUBLIMAZE) injection 25 mcg  25 mcg Intravenous Q HOUR PRN NANETTE Noriega Jr..O.   25 mcg at 10/27/18 6041    Or   • fentaNYL (SUBLIMAZE) injection 50 mcg  50 mcg Intravenous Q HOUR PRN Paolo M Huy Jr., D.O.        Or   • fentaNYL  (SUBLIMAZE) injection 100 mcg  100 mcg Intravenous Q HOUR PRN NANETTE Noriega Jr..O.       • propofol (DIPRIVAN) injection  0-80 mcg/kg/min Intravenous Continuous NANETTE Noriega Jr..O.   Stopped at 10/27/18 0700   • magnesium hydroxide (MILK OF MAGNESIA) suspension 30 mL  30 mL Feeding Tube QDAY PRN NANETTE Noriega Jr..O.        And   • senna-docusate (PERICOLACE or SENOKOT S) 8.6-50 MG per tablet 2 Tab  2 Tab Feeding Tube BID NANETTE Noriega Jr..O.   2 Tab at 10/28/18 0608    And   • polyethylene glycol/lytes (MIRALAX) PACKET 1 Packet  1 Packet Feeding Tube QDAY PRN NANETTE Noriega Jr..OJeff        And   • bisacodyl (DULCOLAX) suppository 10 mg  10 mg Rectal QDAY PRN NANETTE Noriega Jr..O.       • midodrine (PROAMATINE) tablet 10 mg  10 mg Feeding Tube TID WITH MEALS NANETTE Noriega Jr..O.   10 mg at 10/27/18 1759   • multivitamin (THERAGRAN) tablet 1 Tab  1 Tab Feeding Tube DAILY NANETTE Noriega Jr..O.   1 Tab at 10/28/18 0608   • oyster shell calcium/vitamin D 250-125 MG-UNIT tablet 1 Tab  1 Tab Feeding Tube DAILY NANETTE Noriega Jr..O.   1 Tab at 10/28/18 0620   • eslicarbazepine (APTIOM) tablet 400 mg  400 mg Feeding Tube QHS NANETTE Noriega Jr..O.   400 mg at 10/27/18 2017   • furosemide (LASIX) injection 20 mg  20 mg Intravenous BID DIURETIC Paolo Son Jr. D.O.   20 mg at 10/28/18 0608   • norepinephrine (LEVOPHED) 8 mg in  mL Infusion  0-30 mcg/min Intravenous Continuous NANETTE Noriega Jr..O.   Stopped at 10/26/18 2015   • levETIRAcetam (KEPPRA) tablet 1,000 mg  1,000 mg Per NG Tube BID NANETTE Noriega Jr..O.   1,000 mg at 10/28/18 0608   • acetylcysteine (MUCOMYST) 20 % solution 3 mL  3 mL Inhalation Q4HRS Paolo Son Jr., D.O.   3 mL at 10/28/18 0718   • miconazole 2%-zinc oxide (GILDA) topical cream   Topical Q6HRS PRN Jeremy M Gonda, M.D.       • levOCARNitine (CARNITOR) 1 GM/10ML solution 1,000 mg  1,000 mg Feeding Tube BID WITH MEALS Paolo Son  , D.OJeff   1,000 mg at 10/28/18 0744   • heparin injection 5,000 Units  5,000 Units Subcutaneous Q8HRS Tono Jaimes M.D.   5,000 Units at 10/28/18 0608   • LORazepam (ATIVAN) injection 1-4 mg  1-4 mg Intravenous Q10 MIN PRN Trever Hilario M.D.   2 mg at 10/17/18 1218   • acetaminophen (TYLENOL) tablet 650 mg  650 mg Feeding Tube Q6HRS PRN Trever Hilario M.D.   650 mg at 10/27/18 2017    Or   • acetaminophen (TYLENOL) suppository 650 mg  650 mg Rectal Q4HRS PRN Trever Hilario M.D.       • Pharmacy Consult: Enteral tube feeding - review meds/change route/product selection   Other PRN Paolo Son Jr., D.OJeff       • aspirin (ASA) chewable tab 81 mg  81 mg Per NG Tube DAILY Trever Hilario M.D.   81 mg at 10/28/18 0608   • NS infusion   Intravenous Continuous Trever Hilario M.D. 10 mL/hr at 10/24/18 0700     • ondansetron (ZOFRAN) syringe/vial injection 4 mg  4 mg Intravenous Q4HRS PRN Edgar Moody M.D.       • ondansetron (ZOFRAN ODT) dispertab 4 mg  4 mg Oral Q4HRS PRN Edgar Moody M.D.       • ipratropium-albuterol (DUONEB) nebulizer solution  3 mL Nebulization Q2HRS PRN (RT) Gomez Holguin M.D.   3 mL at 10/26/18 2307       Fluids    Intake/Output Summary (Last 24 hours) at 10/28/18 0839  Last data filed at 10/28/18 0800   Gross per 24 hour   Intake             2410 ml   Output             2470 ml   Net              -60 ml       Laboratory  Recent Results (from the past 48 hour(s))   FUNGAL CULTURE    Collection Time: 10/26/18 12:05 PM   Result Value Ref Range    Significant Indicator NEG     Source RESP     Site BRONCHOALVEOLAR LAVAGE     Fungal Culture Culture in progress.    AFB    Collection Time: 10/26/18 12:05 PM   Result Value Ref Range    Significant Indicator NEG     Source RESP     Site BRONCHOALVEOLAR LAVAGE     AFB Culture Culture in progress.     AFB Smear Results No acid fast bacilli seen.    QUANT BRONCHIAL WASHING    Collection Time:  10/26/18 12:05 PM   Result Value Ref Range    Significant Indicator POS (POS)     Source RESP     Site BRONCHOALVEOLAR LAVAGE     Quantitative Bronch Washing (A)      400 cfu/mL Usual upper respiratory adamaris.  No clinically significant Staphylococcus aureus, Methicillin  Resistant Staphylococcus aureus, or Pseudomonas species  isolated.      Gram Stain Result Many WBCs.  No organisms seen.       Quantitative Bronch Washing Escherichia coli  2,200 cfu/mL   (A)        Susceptibility    Escherichia coli - SENSITIVITY, OSWALD     Ceftriaxone <=8 Sensitive mcg/mL     Ceftazidime <=1 Sensitive mcg/mL     Cefotaxime 8 Sensitive mcg/mL     Ciprofloxacin <=1 Sensitive mcg/mL     Cefepime <=8 Sensitive mcg/mL     Cefuroxime 8 Sensitive mcg/mL     Ampicillin >16 Resistant mcg/mL     Cefotetan <=16 Sensitive mcg/mL     Tobramycin <=4 Sensitive mcg/mL     Ertapenem <=1 Sensitive mcg/mL     Gentamicin <=4 Sensitive mcg/mL     Pip/Tazobactam <=16 Sensitive mcg/mL     Trimeth/Sulfa <=2/38 Sensitive mcg/mL     Tigecycline <=2 Sensitive mcg/mL   GRAM STAIN    Collection Time: 10/26/18 12:05 PM   Result Value Ref Range    Significant Indicator .     Source RESP     Site BRONCHOALVEOLAR LAVAGE     Gram Stain Result Many WBCs.  No organisms seen.      ACID FAST STAIN    Collection Time: 10/26/18 12:05 PM   Result Value Ref Range    Significant Indicator NEG     Source RESP     Site BRONCHOALVEOLAR LAVAGE     AFB Smear Results No acid fast bacilli seen.    ISTAT ARTERIAL BLOOD GAS    Collection Time: 10/26/18  2:23 PM   Result Value Ref Range    Ph 7.491 7.400 - 7.500    Pco2 50.6 (H) 26.0 - 37.0 mmHg    Po2 26 (LL) 64 - 87 mmHg    Tco2 40 (H) 20 - 33 mmol/L    S02 52 (L) 93 - 99 %    Hco3 38.6 (H) 17.0 - 25.0 mmol/L    BE 13 (H) -4 - 3 mmol/L    Body Temp 99.6 F degrees    O2 Therapy 60 %    iPF Ratio 43     Ph Temp Tea 7.483 7.400 - 7.500    Pco2 Temp Co 51.8 (HH) 26.0 - 37.0 mmHg    Po2 Temp Cor 27 (LL) 64 - 87 mmHg    Specimen Arterial      Action Range Triggered YES     Inst. Qualified Patient YES    CBC with Differential    Collection Time: 10/27/18  3:48 AM   Result Value Ref Range    WBC 11.0 (H) 4.8 - 10.8 K/uL    RBC 3.85 (L) 4.20 - 5.40 M/uL    Hemoglobin 10.5 (L) 12.0 - 16.0 g/dL    Hematocrit 32.9 (L) 37.0 - 47.0 %    MCV 85.5 81.4 - 97.8 fL    MCH 27.3 27.0 - 33.0 pg    MCHC 31.9 (L) 33.6 - 35.0 g/dL    RDW 48.0 35.9 - 50.0 fL    Platelet Count 188 164 - 446 K/uL    MPV 10.5 9.0 - 12.9 fL    Neutrophils-Polys 71.10 44.00 - 72.00 %    Lymphocytes 15.00 (L) 22.00 - 41.00 %    Monocytes 10.70 0.00 - 13.40 %    Eosinophils 0.40 0.00 - 6.90 %    Basophils 0.10 0.00 - 1.80 %    Immature Granulocytes 2.70 (H) 0.00 - 0.90 %    Nucleated RBC 0.00 /100 WBC    Neutrophils (Absolute) 7.85 (H) 2.00 - 7.15 K/uL    Lymphs (Absolute) 1.65 1.00 - 4.80 K/uL    Monos (Absolute) 1.18 (H) 0.00 - 0.85 K/uL    Eos (Absolute) 0.04 0.00 - 0.51 K/uL    Baso (Absolute) 0.01 0.00 - 0.12 K/uL    Immature Granulocytes (abs) 0.30 (H) 0.00 - 0.11 K/uL    NRBC (Absolute) 0.00 K/uL   Basic Metabolic Panel (BMP)    Collection Time: 10/27/18  3:48 AM   Result Value Ref Range    Sodium 140 135 - 145 mmol/L    Potassium 3.9 3.6 - 5.5 mmol/L    Chloride 97 96 - 112 mmol/L    Co2 38 (H) 20 - 33 mmol/L    Glucose 85 65 - 99 mg/dL    Bun 47 (H) 8 - 22 mg/dL    Creatinine 0.99 0.50 - 1.40 mg/dL    Calcium 8.9 8.5 - 10.5 mg/dL    Anion Gap 5.0 0.0 - 11.9   Magnesium    Collection Time: 10/27/18  3:48 AM   Result Value Ref Range    Magnesium 2.2 1.5 - 2.5 mg/dL   Phosphorus    Collection Time: 10/27/18  3:48 AM   Result Value Ref Range    Phosphorus 2.0 (L) 2.5 - 4.5 mg/dL   VALPROIC ACID    Collection Time: 10/27/18  3:48 AM   Result Value Ref Range    Valproic Acid 52.9 50.0 - 100.0 ug/mL   AMMONIA    Collection Time: 10/27/18  3:48 AM   Result Value Ref Range    Ammonia 54 (H) 11 - 45 umol/L   TRIGLYCERIDE    Collection Time: 10/27/18  3:48 AM   Result Value Ref Range     Triglycerides 188 (H) 0 - 149 mg/dL   BTYPE NATRIURETIC PEPTIDE    Collection Time: 10/27/18  3:48 AM   Result Value Ref Range    B Natriuretic Peptide 559 (H) 0 - 100 pg/mL   ESTIMATED GFR    Collection Time: 10/27/18  3:48 AM   Result Value Ref Range    GFR If African American >60 >60 mL/min/1.73 m 2    GFR If Non  53 (A) >60 mL/min/1.73 m 2   ISTAT ARTERIAL BLOOD GAS    Collection Time: 10/27/18  4:35 AM   Result Value Ref Range    Ph 7.653 (HH) 7.400 - 7.500    Pco2 34.0 26.0 - 37.0 mmHg    Po2 187 (H) 64 - 87 mmHg    Tco2 39 (H) 20 - 33 mmol/L    S02 100 (H) 93 - 99 %    Hco3 37.7 (H) 17.0 - 25.0 mmol/L    BE 16 (H) -4 - 3 mmol/L    Body Temp 96.5 F degrees    O2 Therapy 80 %    iPF Ratio 234     Ph Temp Tea 7.672 (HH) 7.400 - 7.500    Pco2 Temp Co 32.3 26.0 - 37.0 mmHg    Po2 Temp Cor 181 (H) 64 - 87 mmHg    Specimen Arterial     Action Range Triggered YES     Inst. Qualified Patient YES    HISTOLOGY REQUEST    Collection Time: 10/27/18  4:59 AM   Result Value Ref Range    Pathology Request Sent to [facility]    CBC with Differential    Collection Time: 10/28/18  4:25 AM   Result Value Ref Range    WBC 12.5 (H) 4.8 - 10.8 K/uL    RBC 3.62 (L) 4.20 - 5.40 M/uL    Hemoglobin 9.5 (L) 12.0 - 16.0 g/dL    Hematocrit 30.9 (L) 37.0 - 47.0 %    MCV 85.4 81.4 - 97.8 fL    MCH 26.2 (L) 27.0 - 33.0 pg    MCHC 30.7 (L) 33.6 - 35.0 g/dL    RDW 49.0 35.9 - 50.0 fL    Platelet Count 174 164 - 446 K/uL    MPV 10.8 9.0 - 12.9 fL    Neutrophils-Polys 80.50 (H) 44.00 - 72.00 %    Lymphocytes 6.20 (L) 22.00 - 41.00 %    Monocytes 11.80 0.00 - 13.40 %    Eosinophils 0.20 0.00 - 6.90 %    Basophils 0.30 0.00 - 1.80 %    Immature Granulocytes 1.00 (H) 0.00 - 0.90 %    Nucleated RBC 0.00 /100 WBC    Neutrophils (Absolute) 10.04 (H) 2.00 - 7.15 K/uL    Lymphs (Absolute) 0.78 (L) 1.00 - 4.80 K/uL    Monos (Absolute) 1.48 (H) 0.00 - 0.85 K/uL    Eos (Absolute) 0.03 0.00 - 0.51 K/uL    Baso (Absolute) 0.04 0.00 - 0.12 K/uL     Immature Granulocytes (abs) 0.13 (H) 0.00 - 0.11 K/uL    NRBC (Absolute) 0.00 K/uL   Basic Metabolic Panel (BMP)    Collection Time: 10/28/18  4:25 AM   Result Value Ref Range    Sodium 139 135 - 145 mmol/L    Potassium 3.2 (L) 3.6 - 5.5 mmol/L    Chloride 95 (L) 96 - 112 mmol/L    Co2 36 (H) 20 - 33 mmol/L    Glucose 132 (H) 65 - 99 mg/dL    Bun 50 (H) 8 - 22 mg/dL    Creatinine 0.93 0.50 - 1.40 mg/dL    Calcium 8.7 8.5 - 10.5 mg/dL    Anion Gap 8.0 0.0 - 11.9   Magnesium    Collection Time: 10/28/18  4:25 AM   Result Value Ref Range    Magnesium 2.2 1.5 - 2.5 mg/dL   Phosphorus    Collection Time: 10/28/18  4:25 AM   Result Value Ref Range    Phosphorus 3.5 2.5 - 4.5 mg/dL   VALPROIC ACID    Collection Time: 10/28/18  4:25 AM   Result Value Ref Range    Valproic Acid 37.9 (L) 50.0 - 100.0 ug/mL   ESTIMATED GFR    Collection Time: 10/28/18  4:25 AM   Result Value Ref Range    GFR If African American >60 >60 mL/min/1.73 m 2    GFR If Non  56 (A) >60 mL/min/1.73 m 2   AMMONIA    Collection Time: 10/28/18  4:40 AM   Result Value Ref Range    Ammonia 44 11 - 45 umol/L   TRIGLYCERIDE    Collection Time: 10/28/18  4:40 AM   Result Value Ref Range    Triglycerides 90 0 - 149 mg/dL   ISTAT ARTERIAL BLOOD GAS    Collection Time: 10/28/18  5:56 AM   Result Value Ref Range    Ph 7.554 (H) 7.400 - 7.500    Pco2 42.1 (H) 26.0 - 37.0 mmHg    Po2 124 (H) 64 - 87 mmHg    Tco2 38 (H) 20 - 33 mmol/L    S02 99 93 - 99 %    Hco3 37.2 (H) 17.0 - 25.0 mmol/L    BE 14 (H) -4 - 3 mmol/L    Body Temp 97.7 F degrees    O2 Therapy 50 %    iPF Ratio 248     Ph Temp Tea 7.562 (H) 7.400 - 7.500    Pco2 Temp Co 41.2 (H) 26.0 - 37.0 mmHg    Po2 Temp Cor 121 (H) 64 - 87 mmHg    Specimen Arterial     Action Range Triggered NO     Inst. Qualified Patient YES        Imaging  X-Ray:  I have personally reviewed the images and compared with prior images. and My impression is: Stable right > left lower lobe edema    Assessment/Plan  *  Acute respiratory failure with hypoxia (HCC)- (present on admission)   Assessment & Plan    Intubated 10/13- 10/23/2018, re-intubated 10/26/2018  RT/O2 protocols  Daily and PRN ABGs  Titration of ventilator therapy based on ABGs and patient's status  Sedation as tolerated/indicated  Daily CXR  HOB >30 degrees and peridex for VAP prevention  Pepcid for GI prophylaxis  SAT/SBT when able (ABCDEF Bundle)  Early mobility  Drop respiratory rate, Diamox x1  Ethics and palliative care meeting on Monday with granddaughter (POA) to discuss plan of care and possible futility of further care        Increased ammonia level   Assessment & Plan    2/2 valproate, normalized after stopping valproate  Continue Carnitor 1 g BID        Pneumonia due to unspecified organism (HCC)   Assessment & Plan    S/P Zyvox 600 mg twice daily for 7 days for MRSA  BAL now with E. coli  Change Zosyn to Rocephin for 7 days        Seizure (HCC)- (present on admission)   Assessment & Plan    CT with small bilateral hygromas without mass-effect  EEG showed improvement in seizures  Keppra decreased to 1000 mg every 12 hours  Aptiom 400 mg nightly  Stopped Depakote  Continue levocarnitine to 1000 mg twice daily and monitor ammonia levels  MRI on 10/17 with multiple acute bilateral supratentorial lacunar infarcts  Consider reconsulting neurology for assistance in AED weaning        Relative adrenal insufficiency (HCC)   Assessment & Plan    Weaned hydrocortisone at 50 mg IV every 12 hours        Hypotension   Assessment & Plan    Levophed titrated off, remains vasoplegic  Continue hydrocortisone and midodrine at current doses; continue to wean hydrocortisone as tolerated  Echocardiogram reviewed: Left ventricular ejection fraction is visually estimated to be 40%. A reliable estimation of diastolic function cannot be made due to   tachycardia.        Limb ischemia   Assessment & Plan    Occlusion in right LEIF likely chronic  Heparin ppx        Acute systolic  heart failure (HCC)   Assessment & Plan    EF 40%  Continue forced diuresis        CKD (chronic kidney disease) stage 3, GFR 30-59 ml/min (HCC)- (present on admission)   Assessment & Plan    Renal dose meds, avoid nephrotoxins  Strict I/Os  Follow renal function closely while diuresing        Encephalopathy acute   Assessment & Plan    Likely metabolic and pharmacologic with elevated ammonia and on multiple antiepileptic drugs  Off depakote, ammonia improving.   Continue weaning Keppra  Consider re-consulting neurology for AED weaning assistance  Re-orientation, family, glasses, hearing aids, sleep, lights on during day, treat pain, ambulate, minimize benzos/anticholinergic agents.   Native language is [place], family translates          Closed fracture of proximal end of humerus   Assessment & Plan    Place right arm in sling  NWB RUE  WBAT all others        Atelectasis   Assessment & Plan    IPV, mucomyst q4 hrs        Paroxysmal atrial fibrillation (HCC)   Assessment & Plan    Optimize magnesium and potassium  TSH is normal        Hyperammonemia (HCC)   Assessment & Plan    Improved  Due to valproic acid  Continue levocarnitine to 1 g twice daily        DNR (do not resuscitate)   Assessment & Plan    DNR status, I ok  This was reviewed with family and the patient. Her Vickie RODRIGUEZ and her are in agreement to continue this code status        Dementia- (present on admission)   Assessment & Plan    History of mild dementia        Hypokalemia- (present on admission)   Assessment & Plan    Replete potassium        Elevated troponin- (present on admission)   Assessment & Plan    Due to demand ischemia             VTE:  Heparin  Ulcer: H2 Antagonist  Lines: Central Line  Ongoing indication addressed and Rosa Catheter  Ongoing indication addressed    I have performed a physical exam and reviewed and updated ROS and Plan today (10/28/2018). In review of yesterday's note (10/27/2018), there are no changes except as  documented above.     Titrating ventilator and metabolic acidosis, encephalopathic status remains and is continuing to be addressed. This patient is critically ill, at high risk for decompensation leading to worsening vital organ dysfunction and death without critical care interventions.    Discussed patient condition and risk of morbidity and/or mortality with Hospitalist, Family, RN, RT, Pharmacy, Dietary, , Charge nurse / hot rounds and neurology     The patient remains critically ill.  Critical care time = 37 minutes in directly providing and coordinating critical care and extensive data review.  No time overlap and excludes procedures.    Paolo Son Jr., D.O.  Critical Care Medicine

## 2018-10-28 NOTE — CARE PLAN
Problem: Ventilation Defect:  Goal: Ability to achieve and maintain unassisted ventilation or tolerate decreased levels of ventilator support  Adult Ventilation Update    Total Vent Days: 3    APVcmv 12, 340, +10, 30%    Patient Lines/Drains/Airways Status    Active Airway     Name: Placement date: Placement time: Site: Days:    Airway ETT Oral 7.5 10/26/18   2231   Oral   1            Cough: Productive (10/28/18 1552)  Sputum Amount: Moderate (10/28/18 1552)  Sputum Color: Tan;White (10/28/18 1552)  Sputum Consistency: Thick (10/28/18 1552)    Mobility  Level of Mobility: Level II (10/28/18 0800)  Activity Performed: Edge of bed (10/28/18 1200)  Time Activity Tolerated: 5 min (10/28/18 1200)  Assistance / Tolerance: Assistance of One;General Weakness (10/28/18 1200)  Pt Calls for Assistance: No (10/28/18 1200)  Staff Present for Mobilization: RN;CNA (10/28/18 1200)  Gait: Unable to Ambulate (10/28/18 1200)    Events/Summary/Plan: 1 SBT done today, required PS 8, RSBI 93, FVC 0.4L, NIF -10, no other vent changes made will continue to monitor

## 2018-10-28 NOTE — CARE PLAN
Problem: Ventilation Defect:  Goal: Ability to achieve and maintain unassisted ventilation or tolerate decreased levels of ventilator support    Intervention: Support and monitor invasive and noninvasive mechanical ventilation  Adult Ventilation Update    Total Vent Days: 3    Patient Lines/Drains/Airways Status    Active Airway     Name: Placement date: Placement time: Site: Days:    Airway ETT Oral 7.5 @ 22 10/26/18   2231   Oral   3              APV: 12, 340, +10, 50%  DUO Q4  Mucomyst Q4  IPV Q4    No changes made overnight, will continue to monitor.

## 2018-10-28 NOTE — PROGRESS NOTES
[facility] Hospitalist Progress Note    Date of Service: 10/28/2018    Chief Complaint  89 y.o. female admitted 10/13/2018 with hx of subdural hematoma and while at [facility] had a seizure and was transferred to [facility].  She did require intubation for airway protection    Interval Problem Update  NSR  PRN Pain med given once overnight  ON ventilator  Enteral feeding at goal  UOP: 1200cc overnight  Afebrile  CXR: mild improved right base recruitement, right base effusion    Consultants/Specialty  Neurology   Critical Care    Disposition  To remain in ICU today        Review of Systems   Unable to perform ROS: Intubated      Physical Exam  Laboratory/Imaging   Hemodynamics  No data recorded.   Monitored Temp: 37.1 °C (98.8 °F)  Pulse  Av.3  Min: 30  Max: 129 Heart Rate (Monitored): 92  NIBP: 101/55      Respiratory  Rider Vent Mode: APVCMV, Rate (breaths/min): 12, PEEP/CPAP: 10, PEEP/CPAP: 10, FiO2: 30, P Peak (PIP): 26, P MEAN: 13   Respiration: 12, Pulse Oximetry: 94 %     Given By:: Vent, Work Of Breathing / Effort: Vented  RUL Breath Sounds: Clear, RML Breath Sounds: Diminished, RLL Breath Sounds: Diminished, BLANCHE Breath Sounds: Clear, LLL Breath Sounds: Diminished    Fluids    Intake/Output Summary (Last 24 hours) at 10/28/18 1611  Last data filed at 10/28/18 1400   Gross per 24 hour   Intake          2436.67 ml   Output             3095 ml   Net          -658.33 ml       Nutrition  Orders Placed This Encounter   Procedures   • Diet NPO     Standing Status:   Standing     Number of Occurrences:   1     Order Specific Question:   Type:     Answer:   Now [1]     Order Specific Question:   Restrict to:     Answer:   Strict [1]     Physical Exam   Constitutional: She has a sickly appearance. No distress. She is sedated, intubated and restrained.   Generalized Anasarca.  Frail   HENT:   Head: Normocephalic and atraumatic.   Nose: Nose normal.   Eyes: Conjunctivae are normal. Right eye exhibits no discharge. Left eye  exhibits no discharge.   Neck: No tracheal deviation present.   Cardiovascular: Normal rate and regular rhythm.    Murmur heard.  Pulses:       Radial pulses are 1+ on the right side, and 1+ on the left side.        Dorsalis pedis pulses are 1+ on the right side, and 1+ on the left side.   Pulmonary/Chest: Effort normal and breath sounds normal. She is intubated. No respiratory distress.   Abdominal: Soft. Bowel sounds are normal. She exhibits no distension. There is no tenderness.   Musculoskeletal: She exhibits edema (general anasarca).   Skin: Skin is warm and dry. She is not diaphoretic.   Thin skin.  Bruising/hematoma of the right arm   Psychiatric: She is slowed and withdrawn. Cognition and memory are impaired. She is noncommunicative.   Vitals reviewed.      Recent Labs      10/26/18   0310  10/27/18   0348  10/28/18   0425   WBC  7.2  11.0*  12.5*   RBC  4.10*  3.85*  3.62*   HEMOGLOBIN  10.7*  10.5*  9.5*   HEMATOCRIT  35.9*  32.9*  30.9*   MCV  87.6  85.5  85.4   MCH  26.1*  27.3  26.2*   MCHC  29.8*  31.9*  30.7*   RDW  48.8  48.0  49.0   PLATELETCT  251  188  174   MPV  10.8  10.5  10.8     Recent Labs      10/26/18   0310  10/27/18   0348  10/28/18   0425   SODIUM  138  140  139   POTASSIUM  4.6  3.9  3.2*   CHLORIDE  97  97  95*   CO2  39*  38*  36*   GLUCOSE  124*  85  132*   BUN  46*  47*  50*   CREATININE  0.89  0.99  0.93   CALCIUM  9.1  8.9  8.7         Recent Labs      10/27/18   0348   BNPBTYPENAT  559*     Recent Labs      10/27/18   0348  10/28/18   0440   TRIGLYCERIDE  188*  90          Assessment/Plan     * Acute respiratory failure with hypoxia (HCC)- (present on admission)   Assessment & Plan    Initially due to to seizures and MRSA pneumonia  Monitor daily ABG, chest x-ray, labs, vitals and strict I's and O's  Reintubated 10/26 2 2 increasing oxygen demands  RT protocol  Supplemental oxygen        Increased ammonia level   Assessment & Plan    Likely due to valproic. Previously depakote  "stopped due to increase in lethargy and ammonia levels  Lactulose & L-carnitine  Monitor neurostatus        Pneumonia due to unspecified organism (HCC)   Assessment & Plan    Completed 7-day course of Zyvox on 10/26/2018  RT protcol   As needed bronchodilators        Seizure (HCC)- (present on admission)   Assessment & Plan    new onset seizures.  CT head reveals subdural hygromas, could be sequelae of prior subdural hematomas.  No acute pathology on MRI of brain  Neurology consulting  Depakote stopped due to elevated ammonia levels  Continue eslicarbazepine 400mg qHS, Keppra 1500mg BID        Relative adrenal insufficiency (HCC)   Assessment & Plan    On stress dose hydrocortisone wean as able  Midodrine 10mg TID        Hypotension   Assessment & Plan    Midodrine 10mg TID   Pressor support with norepinephrine as needed to keep systolic blood pressure greater than 90 and map greater than 65  Monitor I/O's        Limb ischemia   Assessment & Plan    Preliminary report  Occlusive thrombus in distal R LEIF.  Per prior notes \"discussed with Dr. Power from vascular surgery on 10/19/2018 he felt that this is likely chronic he did not feel that anticoagulation would be indicated at this time\"  stable continue to monitor        Acute systolic heart failure (HCC)   Assessment & Plan    EF:40% on 10/15/18 Echocardiogram  Anasarca on exam 10/23 with good albumin levels recently  On Diamox 500 mg IV every 12 hours  Monitor strict I's and O's        CKD (chronic kidney disease) stage 3, GFR 30-59 ml/min ([facility])- (present on admission)   Assessment & Plan    Monitor on daily BMP  Avoid nephrotoxic meds        Encephalopathy acute   Assessment & Plan    L-carnitine for hyperammonemia  Previously stopped on Depakote  No need for further ammonia level checks  Monitor neurochecks.        Closed fracture of proximal end of humerus   Assessment & Plan    Sling when upright  Oscal + D        CRP elevated   Assessment & Plan    Likely " secondary to pneumonia        Atelectasis   Assessment & Plan    On ventilator monitoring chest x-rays  Pulmonary consulting        Paroxysmal atrial fibrillation (HCC)   Assessment & Plan    Given subdural hygromas patient not good candidate for chronic anticoagulation        Hyperammonemia (HCC)   Assessment & Plan    Likely secondary to valproic acid. Depakote dc'd  Check BNP as anasarca and possible hepatic congestion with decrease EF40%  Improved with l-carnitine supplementation  Discontinue ammonia levels        DNR (do not resuscitate)   Assessment & Plan    Noted NO CPR  Okay to intubation        Dementia- (present on admission)   Assessment & Plan    Reported hx of        Elevated troponin- (present on admission)   Assessment & Plan    Likely demand ischemia in setting of recurrent seizures  EF 40%        Anemia of chronic disease- (present on admission)   Assessment & Plan    Unchanged hemoglobin over the past 4 days  Monitor CBC          Quality-Core Measures   Reviewed items::  Labs reviewed, Medications reviewed and Radiology images reviewed  Rosa catheter::  Strict Intake and Output During Sepisis or Shock  DVT prophylaxis pharmacological::  Heparin

## 2018-10-29 PROBLEM — I62.00 SUBDURAL BLEEDING (HCC): Status: ACTIVE | Noted: 2018-01-01

## 2018-10-29 PROBLEM — J15.5 PNEUMONIA DUE TO ESCHERICHIA COLI (HCC): Status: ACTIVE | Noted: 2018-01-01

## 2018-10-29 PROBLEM — R79.82 CRP ELEVATED: Status: RESOLVED | Noted: 2018-01-01 | Resolved: 2018-01-01

## 2018-10-29 NOTE — PROGRESS NOTES
I was called in regards to CT head which shows a very large left acute subdural hematoma with uncal herniation.  Due to her age and medical condition prior to this event and finding on CT, surgical intervention will not improve her outcome.  She has a mixed brainstem exam indicative of a very severe brain injury  I spoke with Dr Dailey and conveyed my opinion that surgery is not recommended

## 2018-10-29 NOTE — PROGRESS NOTES
PT neuro check @ 4774 noted to be different from assessement of previous RN. Dr. Gutierres notified and brought to bedside to assess patient. See doc flowsheet for trending neuro exams.

## 2018-10-29 NOTE — PROGRESS NOTES
Decision made in rounds to get head CT; pt taken for STAT head CT.     1200: Dr. Dailey notified of results and contacted neurosurgery. Pt's family also updated and will be arriving soon.

## 2018-10-29 NOTE — PROGRESS NOTES
Arm sling was delivered to patient.  If any further assistance needed, please call extension [phone] or place order for Ortho Technician assistance as a communication order in [employer].

## 2018-10-29 NOTE — DIETARY
Nutrition support weekly update:  Day 16 of admit.  Rene Becerril is a 89 y.o.  Female with admitting DX of status epilepticus.  Tube feeding  initiated on 10/15.     Pt con't to receive nutrition support via cortrak: Replete Fiber at goal rate of 50 mL/hr which is providing 1200 kcals, 77 grams protein and 924 mL free water per day.     Assessment:  Weight: 72.8 kg via bed scale on 10/29  Re-estimation of nutritional needs not indicated at this time as pt's wt has increased since admit which is likely related to fluids - per I/Os she is +12L     Evaluation:   1. Pt intubated 10/13 - 10/23; re-intubated 10/26 and remains intubated therefore nutrition support is appropriate   2. Serum Glucose 120, BUN 43, creat 0.83, K+ 2.8, PreAlb 17.0 (improving, was 7.0 on 10/22), CRP 2.46 (improving, was 3.96 on 10/22)   3. Receiving MVI, Oyster shell Ca/Vitamin D and Klyte   4. Last BM 10/29   5. Per wound team, pt noted with stage II pressure injury to coccyx; sacrum - wound team following prn     Recommendations/Plan:  · Con't with current TF regimen   · Fluids per MD   · Obtain measured weights per TF protocol   · Monitor wt and lab trends     RD following

## 2018-10-29 NOTE — CARE PLAN
Problem: Communication  Goal: The ability to communicate needs accurately and effectively will improve  Outcome: PROGRESSING AS EXPECTED  In communication with family, pt remains intubated      Problem: Safety  Goal: Will remain free from injury  Outcome: PROGRESSING AS EXPECTED  Bed alarm on, restraints in use    Problem: Infection  Goal: Will remain free from infection  Outcome: PROGRESSING AS EXPECTED  Antibiotics changed per culture results    Problem: Venous Thromboembolism (VTW)/Deep Vein Thrombosis (DVT) Prevention:  Goal: Patient will participate in Venous Thrombosis (VTE)/Deep Vein Thrombosis (DVT)Prevention Measures  Outcome: PROGRESSING AS EXPECTED  Heparin in use, scds on    Problem: Bowel/Gastric:  Goal: Normal bowel function is maintained or improved  Outcome: PROGRESSING AS EXPECTED      Problem: Fluid Volume:  Goal: Will maintain balanced intake and output  Outcome: PROGRESSING AS EXPECTED  D/c lasix today, added scheduled diamox    Problem: Pain Management  Goal: Pain level will decrease to patient's comfort goal  Outcome: PROGRESSING AS EXPECTED  No s/s of pain at this time    Problem: Skin Integrity  Goal: Risk for impaired skin integrity will decrease  Outcome: PROGRESSING AS EXPECTED  Wound care involved    Problem: Safety:  Goal: Will remain free from injury  Outcome: PROGRESSING AS EXPECTED  Bed alarm on, restraints in use

## 2018-10-29 NOTE — PALLIATIVE CARE
"Palliative Care follow-up  Family meeting with Dr. Dailey, CAROLEE Barragan, Oli Barragan Julia, RN and myself.  Dr. Dailey reviewed admission, events leading up to today and overnight event.  Patient showed neuro changes with nursing assessment this am.  STAT CT was ordered.    Per Neuro-imaging results of a \"very large left acute subdural hematoma with uncal herniation.  Due to her age and medical condition prior to this event and finding on CT, surgical intervention will not improve her outcome.  She has a mixed brainstem exam indicative of a very severe brain injury\".  Medical team discussed use of abx, seizures, embolic stroke, age related changes, irreversible status related to new bleed, dying process, patient's stated wishes \"love life\" and \"being a fighter\", burden vs benefit of care/treatment, creating suffering, QOL and recommendation for comfort care vs passing on life support.  Family to discuss and make a decision.  Lashonda expressed that because the patient loves life so much, she is such a fighter; and because she still \"has those neurons that are still there (making her breath) they believe that \"what we are doing is right\".  Again we discussed honoring the patient's wishes and ability to provide non-beneficial care and create suffering vs QOL.  Family stated understanding and denied any further needs at this time.  Contact information provided to family and they are encouraged to call for questions, concerns.      1600- Family stated that they are awaiting more family to arrive tomorrow and plan to withdraw at 6pm 10/30/18.  Dr. Dailey notified.  Updated: Medical team    Plan: TBD.  Family to discuss and make a decision.      Thank you for allowing Palliative Care to participate in this patient's care. Please feel free to call [phone] with any questions or concerns.  "

## 2018-10-29 NOTE — WOUND TEAM
Spoke to RN, who advised no further needs for wound care as patient is transitioning to comfort care.  Gave RN dressings to pad over wounds.  Please re-consult if any other needs come up.

## 2018-10-29 NOTE — PROGRESS NOTES
Critical Care Progress Note    Date of admission  10/13/2018    Chief Complaint  89 y.o. female admitted 10/13/2018 with new onset seizures.    Hospital Course    This lady was admitted to the ICU with seizures and respiratory failure.      Interval Problem Update  Reviewed last 24 hour events:      SR with PACs  TF  Mental status worse  Replete K    Addendum: Repeat head CT shows a large left subdural hematoma with mass-effect.  Neurosurgery does not recommend any surgical intervention as this will not improve her outcome.      Review of Systems  Review of Systems   Unable to perform ROS: Acuity of condition        Vital Signs for last 24 hours   Pulse:  [57-98] 97  Resp:  [11-22] 16    Hemodynamic parameters for last 24 hours       Vent Settings for last 24 hours  Rider Vent Mode: APVCMV  Rate (breaths/min):  [12] 12  PEEP/CPAP:  [10] 10  FiO2:  [30-40] 30  P Peak (PIP):  [19-29] 29  P MEAN:  [12-18] 18      Physical Exam   Physical Exam   Constitutional:   On ventilator   HENT:   Head: Normocephalic and atraumatic.   Right Ear: External ear normal.   Left Ear: External ear normal.   Nose: Nose normal.   Mouth/Throat: Oropharynx is clear and moist.   Eyes: Right eye exhibits no discharge. Left eye exhibits no discharge. No scleral icterus.   Pupils 4-5 mm and minimally reactive to light   Neck: Neck supple. No JVD present. No tracheal deviation present.   Cardiovascular: Intact distal pulses.  Exam reveals no gallop and no friction rub.    Sinus rhythm with frequent PACs   Pulmonary/Chest: No stridor. She has no wheezes. She has rales (Coarse crackles bilaterally).   Abdominal: Soft. She exhibits no distension. There is no tenderness. There is no rebound.   Tolerating enteral tube feedings   Musculoskeletal: She exhibits no tenderness or deformity.   No clubbing or cyanosis   Neurological:   Does not arouse for me   Skin: Skin is warm and dry. No rash noted. She is not diaphoretic.       Medications  Current  Facility-Administered Medications   Medication Dose Route Frequency Provider Last Rate Last Dose   • ampicillin/sulbactam (UNASYN) 3 g in  mL IVPB  3 g Intravenous Q6HRS NANETTE Noriega Jr..O. 200 mL/hr at 10/29/18 0515 3 g at 10/29/18 0515   • ondansetron (ZOFRAN ODT) dispertab 4 mg  4 mg Feeding Tube Q4HRS PRN Edgar Moody M.D.       • ipratropium-albuterol (DUONEB) nebulizer solution  3 mL Nebulization Q4HRS (RT) Jeremy M Gonda, M.D.   3 mL at 10/29/18 0324   • hydrocortisone sodium succinate PF (SOLU-CORTEF) 100 MG injection 50 mg  50 mg Intravenous Q12HRS NANETTE Noriega Jr..O.   50 mg at 10/29/18 0516   • Respiratory Care per Protocol   Nebulization Continuous RT NANETTE Noriega Jr..O.       • famotidine (PEPCID) tablet 20 mg  20 mg Feeding Tube DAILY NANETTE Noriega Jr..O.   20 mg at 10/29/18 0516    Or   • famotidine (PEPCID) injection 20 mg  20 mg Intravenous DAILY NANETTE Noriega Jr..O.       • MD Alert...ICU Electrolyte Replacement per Pharmacy   Other pharmacy to dose NANETTE Noriega Jr..O.       • fentaNYL (SUBLIMAZE) injection 25 mcg  25 mcg Intravenous Q HOUR PRN NANETTE Noriega Jr..O.   25 mcg at 10/27/18 2344    Or   • fentaNYL (SUBLIMAZE) injection 50 mcg  50 mcg Intravenous Q HOUR PRN NANETTE Noriega Jr..O.        Or   • fentaNYL (SUBLIMAZE) injection 100 mcg  100 mcg Intravenous Q HOUR PRN NANETTE Noriega Jr..O.       • propofol (DIPRIVAN) injection  0-80 mcg/kg/min Intravenous Continuous NANETTE Noriega Jr..O.   Stopped at 10/27/18 0700   • magnesium hydroxide (MILK OF MAGNESIA) suspension 30 mL  30 mL Feeding Tube QDAY PRN NANETTE Noriega Jr..O.        And   • senna-docusate (PERICOLACE or SENOKOT S) 8.6-50 MG per tablet 2 Tab  2 Tab Feeding Tube BID Paolo Son Jr., D.O.   2 Tab at 10/29/18 0516    And   • polyethylene glycol/lytes (MIRALAX) PACKET 1 Packet  1 Packet Feeding Tube QDAY PRN Paolo Son Jr., D.O.        And   • bisacodyl  (DULCOLAX) suppository 10 mg  10 mg Rectal QDAY PRN Paolo Son Jr., D.O.       • midodrine (PROAMATINE) tablet 10 mg  10 mg Feeding Tube TID WITH MEALS Paolo Son Jr. D.O.   10 mg at 10/28/18 1724   • multivitamin (THERAGRAN) tablet 1 Tab  1 Tab Feeding Tube DAILY Paolo Son Jr. D.O.   1 Tab at 10/29/18 0516   • oyster shell calcium/vitamin D 250-125 MG-UNIT tablet 1 Tab  1 Tab Feeding Tube DAILY Paolo Son Jr. D.O.   1 Tab at 10/29/18 0516   • eslicarbazepine (APTIOM) tablet 400 mg  400 mg Feeding Tube QHS Paolo Son Jr. D.O.   400 mg at 10/28/18 2114   • norepinephrine (LEVOPHED) 8 mg in  mL Infusion  0-30 mcg/min Intravenous Continuous NANETTE Noriega Jr..O.   Stopped at 10/26/18 2015   • levETIRAcetam (KEPPRA) tablet 1,000 mg  1,000 mg Per NG Tube BID Paolo Son Jr., D.O.   1,000 mg at 10/29/18 0516   • acetylcysteine (MUCOMYST) 20 % solution 3 mL  3 mL Inhalation Q4HRS Paolo Son Jr., D.O.   3 mL at 10/29/18 0324   • miconazole 2%-zinc oxide (GILDA) topical cream   Topical Q6HRS PRN Jeremy M Gonda, M.D.       • levOCARNitine (CARNITOR) 1 GM/10ML solution 1,000 mg  1,000 mg Feeding Tube BID WITH MEALS Paolo Son Jr. D.O.   1,000 mg at 10/28/18 1724   • heparin injection 5,000 Units  5,000 Units Subcutaneous Q8HRS Tono Jaimes M.D.   5,000 Units at 10/29/18 0516   • LORazepam (ATIVAN) injection 1-4 mg  1-4 mg Intravenous Q10 MIN PRN Trever Hilario M.D.   2 mg at 10/17/18 1218   • acetaminophen (TYLENOL) tablet 650 mg  650 mg Feeding Tube Q6HRS PRN Trever Hilario M.D.   650 mg at 10/27/18 2017    Or   • acetaminophen (TYLENOL) suppository 650 mg  650 mg Rectal Q4HRS PRN Trever Hilario M.D.       • Pharmacy Consult: Enteral tube feeding - review meds/change route/product selection   Other PRN Paolo Son Jr., D.O.       • aspirin (ASA) chewable tab 81 mg  81 mg Per NG Tube DAILY Trever Hilario M.D.   81 mg at 10/29/18  0516   • NS infusion   Intravenous Continuous Trever Hilario M.D. 10 mL/hr at 10/24/18 0700     • ondansetron (ZOFRAN) syringe/vial injection 4 mg  4 mg Intravenous Q4HRS PRN Edgar Moody M.D.       • ipratropium-albuterol (DUONEB) nebulizer solution  3 mL Nebulization Q2HRS PRN (RT) Gomez Holguin M.D.   3 mL at 10/26/18 2307       Fluids    Intake/Output Summary (Last 24 hours) at 10/29/18 0521  Last data filed at 10/29/18 0300   Gross per 24 hour   Intake          2836.67 ml   Output             2735 ml   Net           101.67 ml       Laboratory  Recent Results (from the past 48 hour(s))   CBC with Differential    Collection Time: 10/28/18  4:25 AM   Result Value Ref Range    WBC 12.5 (H) 4.8 - 10.8 K/uL    RBC 3.62 (L) 4.20 - 5.40 M/uL    Hemoglobin 9.5 (L) 12.0 - 16.0 g/dL    Hematocrit 30.9 (L) 37.0 - 47.0 %    MCV 85.4 81.4 - 97.8 fL    MCH 26.2 (L) 27.0 - 33.0 pg    MCHC 30.7 (L) 33.6 - 35.0 g/dL    RDW 49.0 35.9 - 50.0 fL    Platelet Count 174 164 - 446 K/uL    MPV 10.8 9.0 - 12.9 fL    Neutrophils-Polys 80.50 (H) 44.00 - 72.00 %    Lymphocytes 6.20 (L) 22.00 - 41.00 %    Monocytes 11.80 0.00 - 13.40 %    Eosinophils 0.20 0.00 - 6.90 %    Basophils 0.30 0.00 - 1.80 %    Immature Granulocytes 1.00 (H) 0.00 - 0.90 %    Nucleated RBC 0.00 /100 WBC    Neutrophils (Absolute) 10.04 (H) 2.00 - 7.15 K/uL    Lymphs (Absolute) 0.78 (L) 1.00 - 4.80 K/uL    Monos (Absolute) 1.48 (H) 0.00 - 0.85 K/uL    Eos (Absolute) 0.03 0.00 - 0.51 K/uL    Baso (Absolute) 0.04 0.00 - 0.12 K/uL    Immature Granulocytes (abs) 0.13 (H) 0.00 - 0.11 K/uL    NRBC (Absolute) 0.00 K/uL   Basic Metabolic Panel (BMP)    Collection Time: 10/28/18  4:25 AM   Result Value Ref Range    Sodium 139 135 - 145 mmol/L    Potassium 3.2 (L) 3.6 - 5.5 mmol/L    Chloride 95 (L) 96 - 112 mmol/L    Co2 36 (H) 20 - 33 mmol/L    Glucose 132 (H) 65 - 99 mg/dL    Bun 50 (H) 8 - 22 mg/dL    Creatinine 0.93 0.50 - 1.40 mg/dL    Calcium 8.7 8.5 -  10.5 mg/dL    Anion Gap 8.0 0.0 - 11.9   Magnesium    Collection Time: 10/28/18  4:25 AM   Result Value Ref Range    Magnesium 2.2 1.5 - 2.5 mg/dL   Phosphorus    Collection Time: 10/28/18  4:25 AM   Result Value Ref Range    Phosphorus 3.5 2.5 - 4.5 mg/dL   VALPROIC ACID    Collection Time: 10/28/18  4:25 AM   Result Value Ref Range    Valproic Acid 37.9 (L) 50.0 - 100.0 ug/mL   ESTIMATED GFR    Collection Time: 10/28/18  4:25 AM   Result Value Ref Range    GFR If African American >60 >60 mL/min/1.73 m 2    GFR If Non  56 (A) >60 mL/min/1.73 m 2   AMMONIA    Collection Time: 10/28/18  4:40 AM   Result Value Ref Range    Ammonia 44 11 - 45 umol/L   TRIGLYCERIDE    Collection Time: 10/28/18  4:40 AM   Result Value Ref Range    Triglycerides 90 0 - 149 mg/dL   ISTAT ARTERIAL BLOOD GAS    Collection Time: 10/28/18  5:56 AM   Result Value Ref Range    Ph 7.554 (H) 7.400 - 7.500    Pco2 42.1 (H) 26.0 - 37.0 mmHg    Po2 124 (H) 64 - 87 mmHg    Tco2 38 (H) 20 - 33 mmol/L    S02 99 93 - 99 %    Hco3 37.2 (H) 17.0 - 25.0 mmol/L    BE 14 (H) -4 - 3 mmol/L    Body Temp 97.7 F degrees    O2 Therapy 50 %    iPF Ratio 248     Ph Temp Tea 7.562 (H) 7.400 - 7.500    Pco2 Temp Co 41.2 (H) 26.0 - 37.0 mmHg    Po2 Temp Cor 121 (H) 64 - 87 mmHg    Specimen Arterial     Action Range Triggered NO     Inst. Qualified Patient YES    Comp Metabolic Panel (CMP) - Every Monday    Collection Time: 10/29/18  3:35 AM   Result Value Ref Range    Sodium 142 135 - 145 mmol/L    Potassium 2.8 (L) 3.6 - 5.5 mmol/L    Chloride 98 96 - 112 mmol/L    Co2 33 20 - 33 mmol/L    Anion Gap 11.0 0.0 - 11.9    Glucose 120 (H) 65 - 99 mg/dL    Bun 42 (H) 8 - 22 mg/dL    Creatinine 0.83 0.50 - 1.40 mg/dL    Calcium 8.3 (L) 8.5 - 10.5 mg/dL    AST(SGOT) 28 12 - 45 U/L    ALT(SGPT) 15 2 - 50 U/L    Alkaline Phosphatase 74 30 - 99 U/L    Total Bilirubin 0.4 0.1 - 1.5 mg/dL    Albumin 2.9 (L) 3.2 - 4.9 g/dL    Total Protein 5.4 (L) 6.0 - 8.2 g/dL     Globulin 2.5 1.9 - 3.5 g/dL    A-G Ratio 1.2 g/dL   CBC with Differential    Collection Time: 10/29/18  3:35 AM   Result Value Ref Range    WBC 11.4 (H) 4.8 - 10.8 K/uL    RBC 3.18 (L) 4.20 - 5.40 M/uL    Hemoglobin 8.7 (L) 12.0 - 16.0 g/dL    Hematocrit 27.0 (L) 37.0 - 47.0 %    MCV 84.9 81.4 - 97.8 fL    MCH 27.4 27.0 - 33.0 pg    MCHC 32.2 (L) 33.6 - 35.0 g/dL    RDW 48.0 35.9 - 50.0 fL    Platelet Count 151 (L) 164 - 446 K/uL    MPV 10.6 9.0 - 12.9 fL    Neutrophils-Polys 80.40 (H) 44.00 - 72.00 %    Lymphocytes 8.20 (L) 22.00 - 41.00 %    Monocytes 10.00 0.00 - 13.40 %    Eosinophils 0.20 0.00 - 6.90 %    Basophils 0.10 0.00 - 1.80 %    Immature Granulocytes 1.10 (H) 0.00 - 0.90 %    Nucleated RBC 0.00 /100 WBC    Neutrophils (Absolute) 9.18 (H) 2.00 - 7.15 K/uL    Lymphs (Absolute) 0.93 (L) 1.00 - 4.80 K/uL    Monos (Absolute) 1.14 (H) 0.00 - 0.85 K/uL    Eos (Absolute) 0.02 0.00 - 0.51 K/uL    Baso (Absolute) 0.01 0.00 - 0.12 K/uL    Immature Granulocytes (abs) 0.13 (H) 0.00 - 0.11 K/uL    NRBC (Absolute) 0.00 K/uL   CRP QUANTITIVE (NON-CARDIAC)    Collection Time: 10/29/18  3:35 AM   Result Value Ref Range    Stat C-Reactive Protein 2.46 (H) 0.00 - 0.75 mg/dL   PREALBUMIN    Collection Time: 10/29/18  3:35 AM   Result Value Ref Range    Pre-Albumin 17.0 (L) 18.0 - 38.0 mg/dL   ESTIMATED GFR    Collection Time: 10/29/18  3:35 AM   Result Value Ref Range    GFR If African American >60 >60 mL/min/1.73 m 2    GFR If Non African American >60 >60 mL/min/1.73 m 2   ISTAT ARTERIAL BLOOD GAS    Collection Time: 10/29/18  4:38 AM   Result Value Ref Range    Ph 7.561 (H) 7.400 - 7.500    Pco2 39.1 (H) 26.0 - 37.0 mmHg    Po2 74 64 - 87 mmHg    Tco2 36 (H) 20 - 33 mmol/L    S02 96 93 - 99 %    Hco3 35.0 (H) 17.0 - 25.0 mmol/L    BE 12 (H) -4 - 3 mmol/L    Body Temp 98.8 F degrees    O2 Therapy 30 %    iPF Ratio 247     Ph Temp Tea 7.559 (H) 7.400 - 7.500    Pco2 Temp Co 39.2 (H) 26.0 - 37.0 mmHg    Po2 Temp Cor 74 64 -  87 mmHg    Specimen Arterial     Action Range Triggered NO     Inst. Qualified Patient YES        Imaging  X-Ray:  I have personally reviewed the images and compared with prior images. and My impression is: Unchanged right greater than left lung opacities    Assessment/Plan  * Acute respiratory failure with hypoxia (HCC)- (present on admission)   Assessment & Plan    Intubated 10/13- 10/23, re-intubated 10/26  Continue vent support  All appropriate ventilator bundles have been initiated  Prognosis is very poor        Subdural bleeding (HCC)   Assessment & Plan    STAT head CT this morning reveals large left subdural hematoma with mass-effect  Dr. Grimm has evaluated and no surgical intervention is indicated as this will not improve her outcome  This is a devastating intracranial hemorrhage with no hope of meaningful recovery  Family has been made aware and is discussing goals of care        Encephalopathy acute   Assessment & Plan    Acutely worse due to large left subdural hematoma with mass-effect        Pneumonia due to Escherichia coli (HCC)   Assessment & Plan    Previously received Zyvox for 7 days for MRSA  Continue Unasyn and complete 7 days of therapy        Seizure (HCC)- (present on admission)   Assessment & Plan    CT with small bilateral hygromas without mass-effect  EEG on 10/22 is improved with no seizures  Continue Keppra, 1000 mg twice daily  Continue Aptiom, 400 mg daily  Valproic acid has been stopped  Stop levocarnitine  Ammonia level is now normal  MRI on 10/17 with multiple acute bilateral supratentorial lacunar infarcts        Relative adrenal insufficiency (HCC)   Assessment & Plan    Taper hydrocortisone, 25 mg IV every 12 hours        Hypotension   Assessment & Plan    Blood pressure is improved  Currently off vasopressor support  Continue midodrine, 10 mg 3 times daily  Taper hydrocortisone, 25 mg IV every 12 hours        Limb ischemia   Assessment & Plan    Occlusion in right LEIF  likely chronic        Acute systolic heart failure (HCC)   Assessment & Plan    EF 40%        CKD (chronic kidney disease) stage 3, GFR 30-59 ml/min ([facility])- (present on admission)   Assessment & Plan    Avoid nephrotoxins  Renal dose medications  Follow renal function        Closed fracture of proximal end of humerus   Assessment & Plan    Place right arm in sling  NWB RUE  WBAT all others        Paroxysmal atrial fibrillation (HCC)   Assessment & Plan    Optimize potassium and magnesium  TSH is normal        DNR (do not resuscitate)   Assessment & Plan    DNR status  Family agrees to ventilator support  Prognosis is very poor for any meaningful recovery        Dementia- (present on admission)   Assessment & Plan    History of mild dementia        Hypokalemia- (present on admission)   Assessment & Plan    Potassium is critically low  Replete potassium        Elevated troponin- (present on admission)   Assessment & Plan    Due to demand ischemia             VTE:  Heparin  Ulcer: H2 Antagonist  Lines: Central Line  Ongoing indication addressed and Rosa Catheter  Ongoing indication addressed    I have performed a physical exam and reviewed and updated ROS and Plan today (10/29/2018). In review of yesterday's note (10/28/2018), there are no changes except as documented above.     I have assessed and reassessed her respiratory status with ventilator adjustments, blood pressure, hemodynamics, cardiovascular status and neurologic status.  She is at increased risk for worsening respiratory, cardiovascular and CNS system dysfunction.  Her prognosis is very poor.    Discussed patient condition and risk of morbidity and/or mortality with Hospitalist, RN, RT, Pharmacy, , Charge nurse / hot rounds and QA team     The patient remains critically ill.  Critical care time = 90 minutes in directly providing and coordinating critical care and extensive data review.  No time overlap and excludes procedures.    Trever Zapien  Charlie Wolff MD  Pulmonary and Critical Care Medicine

## 2018-10-29 NOTE — PROGRESS NOTES
Referral made to Donor services. Spoke with Paty BRUNO Pt does not meet criteria for organ donation d/t age, but please call back once cardiac death has occurred. Referall # [phone]

## 2018-10-29 NOTE — CARE PLAN
Problem: Ventilation Defect:  Goal: Ability to achieve and maintain unassisted ventilation or tolerate decreased levels of ventilator support    Intervention: Support and monitor invasive and noninvasive mechanical ventilation  Adult Ventilation Update    Total Vent Days: 4    Patient Lines/Drains/Airways Status    Active Airway     Name: Placement date: Placement time: Site: Days:    Airway ETT Oral 7.5 @ 22 10/26/18   2231   Oral   4              APV: 12, 340, +10, 30%  DUO Q4  Mucomyst Q4  IPV Q4    Increased thick tan, yellow secretions overnight.   No changes made, will continue to monitor.

## 2018-10-29 NOTE — PROGRESS NOTES
[facility] Hospitalist Progress Note    Date of Service: 10/29/2018    Chief Complaint  89 y.o. female admitted 10/13/2018 with hx of subdural hematoma and while at [facility] had a seizure and was transferred to [facility].  She did require intubation for airway protection    Interval Problem Update  On ventilator  Hypokalemia with replacement ordered  Non responsive with dilated pupils this am  STAT CT head ordered with results showing severe subdural bleeding with concern of uncal herniation.  Aspirin and heparin stopped  Neurosurgery consulted and they reviewed film and do not recommend surgery.  I called and updated family.  Family coming for conference    Critical care time: 31 minutes    Consultants/Specialty  Neurology   Critical Care  Neurosurgery    Disposition  To remain in ICU today        Review of Systems   Unable to perform ROS: Intubated      Physical Exam  Laboratory/Imaging   Hemodynamics  No data recorded.   Monitored Temp: 37.4 °C (99.3 °F)  Pulse  Av.8  Min: 30  Max: 129 Heart Rate (Monitored): 73  NIBP: (!) 98/53      Respiratory  Rider Vent Mode: APVCMV, Rate (breaths/min): 12, PEEP/CPAP: 8, PEEP/CPAP: 8, FiO2: 30, P Peak (PIP): 25, P MEAN: 12   Respiration: 16, Pulse Oximetry: 98 %     Given By:: Vent, Work Of Breathing / Effort: Vented  RUL Breath Sounds: Clear;Coarse Crackles, RML Breath Sounds: Diminished, RLL Breath Sounds: Diminished, BLANCHE Breath Sounds: Coarse Crackles, LLL Breath Sounds: Diminished    Fluids    Intake/Output Summary (Last 24 hours) at 10/29/18 2021  Last data filed at 10/29/18 1800   Gross per 24 hour   Intake          1966.67 ml   Output             1980 ml   Net           -13.33 ml       Nutrition  Orders Placed This Encounter   Procedures   • Diet NPO     Standing Status:   Standing     Number of Occurrences:   1     Order Specific Question:   Type:     Answer:   Now [1]     Order Specific Question:   Restrict to:     Answer:   Strict [1]     Physical Exam    Constitutional: She has a sickly appearance. No distress. She is sedated, intubated and restrained.   Generalized Anasarca.  Frail   HENT:   Head: Normocephalic and atraumatic.   Nose: Nose normal.   Eyes: Conjunctivae are normal. Right eye exhibits no discharge. Left eye exhibits no discharge.   Pupils unequal and not reactive to light.   Neck: No tracheal deviation present.   Cardiovascular: Normal rate and regular rhythm.    Murmur heard.  Pulses:       Radial pulses are 1+ on the right side, and 1+ on the left side.   Pulmonary/Chest: Effort normal and breath sounds normal. She is intubated. No respiratory distress. She has no wheezes.   Abdominal: Soft. Bowel sounds are normal. She exhibits no distension. There is no tenderness.   Musculoskeletal: She exhibits edema (general anasarca).   Lymphadenopathy:     She has no cervical adenopathy.   Neurological: A cranial nerve deficit is present.   Corneal, gag reflexes remain present  Withdraws to painful stimuli  Not following commands   Skin: Skin is warm and dry. She is not diaphoretic.   Thin skin.  Bruising/hematoma of the right arm   Vitals reviewed.      Recent Labs      10/27/18   0348  10/28/18   0425  10/29/18   0335   WBC  11.0*  12.5*  11.4*   RBC  3.85*  3.62*  3.18*   HEMOGLOBIN  10.5*  9.5*  8.7*   HEMATOCRIT  32.9*  30.9*  27.0*   MCV  85.5  85.4  84.9   MCH  27.3  26.2*  27.4   MCHC  31.9*  30.7*  32.2*   RDW  48.0  49.0  48.0   PLATELETCT  188  174  151*   MPV  10.5  10.8  10.6     Recent Labs      10/27/18   0348  10/28/18   0425  10/29/18   0335   SODIUM  140  139  142   POTASSIUM  3.9  3.2*  2.8*   CHLORIDE  97  95*  98   CO2  38*  36*  33   GLUCOSE  85  132*  120*   BUN  47*  50*  42*   CREATININE  0.99  0.93  0.83   CALCIUM  8.9  8.7  8.3*         Recent Labs      10/27/18   0348   BNPBTYPENAT  559*     Recent Labs      10/27/18   0348  10/28/18   0440   TRIGLYCERIDE  188*  90          Assessment/Plan     * Acute respiratory failure with  "hypoxia (HCC)- (present on admission)   Assessment & Plan    Initially due to to seizures and MRSA pneumonia  Monitor daily ABG, chest x-ray, labs, vitals and strict I's and O's  Reintubated 10/26 2 2 increasing oxygen demands  RT protocol  Supplemental oxygen        Subdural bleeding (HCC)   Assessment & Plan    Severe with significant mass effect on CT head 10/29  Asa and heparin stopped  No clear etiology as no trauma per familiy/Rn.  I have consulted Neurosurgeon and on review of CT head, decision made for no surgery  Devastating bleed and alerting family of unlikely to survive in face of no surgery          Encephalopathy acute   Assessment & Plan    L-carnitine discontinued  Previously stopped on Depakote  No need for further ammonia level checks  Monitor neurochecks.        Pneumonia due to Escherichia coli (HCC)   Assessment & Plan    Completed 7-day course of Zyvox on 10/26/2018  RT protcol   As needed bronchodilators        Seizure (HCC)- (present on admission)   Assessment & Plan    new onset seizures.  CT head reveals subdural hygromas, could be sequelae of prior subdural hematomas.  No acute pathology on MRI of brain  Neurology consulting  Depakote stopped due to elevated ammonia levels  Continue eslicarbazepine 400mg qHS, Keppra 1500mg BID        Relative adrenal insufficiency (HCC)   Assessment & Plan    Weaning hydrocortisone   Midodrine 10mg TID        Hypotension   Assessment & Plan    Midodrine 10mg TID   Pressor support with norepinephrine as needed to keep systolic blood pressure greater than 90 and map greater than 65  Monitor I/O's        Limb ischemia   Assessment & Plan    Preliminary report  Occlusive thrombus in distal R LEIF.  Per prior notes \"discussed with Dr. Power from vascular surgery on 10/19/2018 he felt that this is likely chronic he did not feel that anticoagulation would be indicated at this time\"  stable continue to monitor        Acute systolic heart failure (HCC)   Assessment " & Plan    EF:40% on 10/15/18 Echocardiogram  BNP elevated  Anasarca on exam 10/23 with good albumin levels recently  On Diamox 500 mg IV every 12 hours  Monitor strict I's and O's        CKD (chronic kidney disease) stage 3, GFR 30-59 ml/min (HCC)- (present on admission)   Assessment & Plan    Monitor on daily BMP  Avoid nephrotoxic meds        Increased ammonia level   Assessment & Plan    Likely due to valproic. Previously depakote stopped due to increase in lethargy and ammonia levels  Lactulose & L-carnitine  Monitor neurostatus        Closed fracture of proximal end of humerus   Assessment & Plan    Sling when upright  Oscal + D        Atelectasis   Assessment & Plan    On ventilator monitoring chest x-rays  Pulmonary consulting        Paroxysmal atrial fibrillation (HCC)   Assessment & Plan    Given subdural hygromas patient not good candidate for chronic anticoagulation        Hyperammonemia (HCC)   Assessment & Plan    Likely secondary to valproic acid. Depakote dc'd   and possible hepatic congestion component as EF:40% on echo.        DNR (do not resuscitate)   Assessment & Plan    Noted NO CPR  Okay to intubation        Dementia- (present on admission)   Assessment & Plan    Reported hx of        Hypokalemia- (present on admission)   Assessment & Plan    Electrolyte protocol  Monitor bmp        Elevated troponin- (present on admission)   Assessment & Plan    Likely demand ischemia in setting of recurrent seizures  EF 40%        Anemia of chronic disease- (present on admission)   Assessment & Plan    Unchanged hemoglobin over the past 4 days  Monitor CBC          Quality-Core Measures   Reviewed items::  Labs reviewed, Medications reviewed and Radiology images reviewed  Rosa catheter::  Strict Intake and Output During Sepisis or Shock  DVT prophylaxis - mechanical:  SCDs

## 2018-10-30 NOTE — CARE PLAN
Problem: Safety - Medical Restraint  Goal: Free from restraint(s) (Restraint for Interference with Medical Device)  INTERVENTIONS:  1. ONCE/SHIFT or MINIMUM Q12H: Assess and document the continuing need for restraints  2. Q24H: Continued use of restraint requires LIP to perform face to face examination and written order  3. Identify and implement measures to help patient regain control     Outcome: PROGRESSING AS EXPECTED  Restraints no longer in place.    Problem: Mobility  Goal: Risk for activity intolerance will decrease  Outcome: PROGRESSING SLOWER THAN EXPECTED  Patient only moves with physical stimulation. Patient unable to be mobilized due to RASS -4. Patient is not on any sedation.

## 2018-10-30 NOTE — ASSESSMENT & PLAN NOTE
Severe with significant mass effect on CT head 10/29  Asa and heparin stopped  No clear etiology as no trauma per familiy/Rn.  I have consulted Neurosurgeon and on review of CT head, decision made for no surgery  Devastating bleed and alerting family of unlikely to survive in face of no surgery

## 2018-10-30 NOTE — PROGRESS NOTES
Critical Care Progress Note    Date of admission  10/13/2018    Chief Complaint  89 y.o. female admitted 10/13/2018 with new onset seizures.    Hospital Course    This lady was admitted to the ICU with seizures and respiratory failure.      Interval Problem Update  Reviewed last 24 hour events:      SR-ST  Pupils unequal  TF at goal  Replete K  Vent 5  Unasyn 4/7      Review of Systems  Review of Systems   Unable to perform ROS: Acuity of condition        Vital Signs for last 24 hours   Pulse:  [] 90  Resp:  [10-24] 12    Hemodynamic parameters for last 24 hours       Vent Settings for last 24 hours  [place] Vent Mode: APVCMV  Rate (breaths/min):  [12] 12  PEEP/CPAP:  [8-10] 8  FiO2:  [30] 30  P Peak (PIP):  [20-25] 21  P MEAN:  [11-12] 11      Physical Exam   Physical Exam   Constitutional:   On ventilator   HENT:   Right Ear: External ear normal.   Left Ear: External ear normal.   Nose: Nose normal.   Mouth/Throat: No oropharyngeal exudate.   Eyes: Right eye exhibits no discharge. Left eye exhibits no discharge. No scleral icterus.   Right pupil 4 mm, left pupil 6 mm.   Neck: Neck supple. No JVD present. No tracheal deviation present.   Cardiovascular: Intact distal pulses.  Exam reveals no gallop and no friction rub.    Sinus rhythm   Pulmonary/Chest: She has no wheezes. She has rales (Scattered coarse crackles).   Abdominal: Soft. She exhibits no distension. There is no tenderness. There is no guarding.   Tolerating enteral tube feedings   Musculoskeletal: She exhibits no tenderness or deformity.   No clubbing or cyanosis   Neurological:   Comatose.   Skin: Skin is warm and dry. She is not diaphoretic. No erythema.       Medications  Current Facility-Administered Medications   Medication Dose Route Frequency Provider Last Rate Last Dose   • hydrocortisone sodium succinate PF (SOLU-CORTEF) 100 MG injection 25 mg  25 mg Intravenous Q12HRS Trever Hilario M.D.   25 mg at 10/29/18 3826   •  ampicillin/sulbactam (UNASYN) 3 g in  mL IVPB  3 g Intravenous Q6HRS Trever Hilario M.D. 200 mL/hr at 10/30/18 0059 3 g at 10/30/18 0059   • ondansetron (ZOFRAN ODT) dispertab 4 mg  4 mg Feeding Tube Q4HRS PRN Edgar Moody M.D.       • ipratropium-albuterol (DUONEB) nebulizer solution  3 mL Nebulization Q4HRS (RT) Jeremy M Gonda, M.D.   3 mL at 10/30/18 0240   • Respiratory Care per Protocol   Nebulization Continuous RT NANETTE Noriega Jr..O.       • famotidine (PEPCID) tablet 20 mg  20 mg Feeding Tube DAILY NANETTE Noriega Jr..OJeff   20 mg at 10/29/18 0516    Or   • famotidine (PEPCID) injection 20 mg  20 mg Intravenous DAILY NANETTE Noriega Jr..O.       • MD Alert...ICU Electrolyte Replacement per Pharmacy   Other pharmacy to dose NANETTE Noriega Jr..O.       • fentaNYL (SUBLIMAZE) injection 25 mcg  25 mcg Intravenous Q HOUR PRN NANETTE Noriega Jr..O.   25 mcg at 10/27/18 2344    Or   • fentaNYL (SUBLIMAZE) injection 50 mcg  50 mcg Intravenous Q HOUR PRN NANETTE Noriega Jr..O.        Or   • fentaNYL (SUBLIMAZE) injection 100 mcg  100 mcg Intravenous Q HOUR PRN NANETTE Noriega Jr..O.       • magnesium hydroxide (MILK OF MAGNESIA) suspension 30 mL  30 mL Feeding Tube QDAY PRN Paolo Son Jr., NANETTE.O.        And   • senna-docusate (PERICOLACE or SENOKOT S) 8.6-50 MG per tablet 2 Tab  2 Tab Feeding Tube BID NANETTE Noriega Jr..O.   Stopped at 10/29/18 1800    And   • polyethylene glycol/lytes (MIRALAX) PACKET 1 Packet  1 Packet Feeding Tube QDAY PRN NANETTE Noriega Jr..O.        And   • bisacodyl (DULCOLAX) suppository 10 mg  10 mg Rectal QDAY PRN Paolo Son Jr., D.O.       • midodrine (PROAMATINE) tablet 10 mg  10 mg Feeding Tube TID WITH MEALS Paolo Son Jr., D.O.   10 mg at 10/29/18 1708   • multivitamin (THERAGRAN) tablet 1 Tab  1 Tab Feeding Tube DAILY Paolo Son Jr., D.O.   1 Tab at 10/29/18 3130   • oyster shell calcium/vitamin D 250-125 MG-UNIT tablet  1 Tab  1 Tab Feeding Tube DAILY Paolo Son Jr., D.O.   1 Tab at 10/29/18 0516   • eslicarbazepine (APTIOM) tablet 400 mg  400 mg Feeding Tube QHS Paolo Son Jr., D.O.   400 mg at 10/29/18 2159   • levETIRAcetam (KEPPRA) tablet 1,000 mg  1,000 mg Per NG Tube BID Paolo Son Jr., D.O.   1,000 mg at 10/29/18 1708   • acetylcysteine (MUCOMYST) 20 % solution 3 mL  3 mL Inhalation Q4HRS Paolo Son Jr., D.O.   3 mL at 10/30/18 0240   • miconazole 2%-zinc oxide (GILDA) topical cream   Topical Q6HRS PRN Jeremy M Gonda, M.D.       • LORazepam (ATIVAN) injection 1-4 mg  1-4 mg Intravenous Q10 MIN PRN Trever Hilario M.D.   2 mg at 10/17/18 1218   • acetaminophen (TYLENOL) tablet 650 mg  650 mg Feeding Tube Q6HRS PRN Trever Hilario M.D.   650 mg at 10/27/18 2017    Or   • acetaminophen (TYLENOL) suppository 650 mg  650 mg Rectal Q4HRS PRN Trever Hilario M.D.       • Pharmacy Consult: Enteral tube feeding - review meds/change route/product selection   Other PRN Paolo Son Jr., D.OJeff       • NS infusion   Intravenous Continuous Trever Hilario M.D. 10 mL/hr at 10/29/18 2045     • ondansetron (ZOFRAN) syringe/vial injection 4 mg  4 mg Intravenous Q4HRS PRN Edgar Moody M.D.       • ipratropium-albuterol (DUONEB) nebulizer solution  3 mL Nebulization Q2HRS PRN (RT) Gomez Holguin M.D.   3 mL at 10/26/18 2307       Fluids    Intake/Output Summary (Last 24 hours) at 10/30/18 0443  Last data filed at 10/30/18 0200   Gross per 24 hour   Intake             1760 ml   Output             2160 ml   Net             -400 ml       Laboratory  Recent Results (from the past 48 hour(s))   ISTAT ARTERIAL BLOOD GAS    Collection Time: 10/28/18  5:56 AM   Result Value Ref Range    Ph 7.554 (H) 7.400 - 7.500    Pco2 42.1 (H) 26.0 - 37.0 mmHg    Po2 124 (H) 64 - 87 mmHg    Tco2 38 (H) 20 - 33 mmol/L    S02 99 93 - 99 %    Hco3 37.2 (H) 17.0 - 25.0 mmol/L    BE 14 (H) -4 - 3 mmol/L    Body Temp  97.7 F degrees    O2 Therapy 50 %    iPF Ratio 248     Ph Temp Tea 7.562 (H) 7.400 - 7.500    Pco2 Temp Co 41.2 (H) 26.0 - 37.0 mmHg    Po2 Temp Cor 121 (H) 64 - 87 mmHg    Specimen Arterial     Action Range Triggered NO     Inst. Qualified Patient YES    Comp Metabolic Panel (CMP) - Every Monday    Collection Time: 10/29/18  3:35 AM   Result Value Ref Range    Sodium 142 135 - 145 mmol/L    Potassium 2.8 (L) 3.6 - 5.5 mmol/L    Chloride 98 96 - 112 mmol/L    Co2 33 20 - 33 mmol/L    Anion Gap 11.0 0.0 - 11.9    Glucose 120 (H) 65 - 99 mg/dL    Bun 42 (H) 8 - 22 mg/dL    Creatinine 0.83 0.50 - 1.40 mg/dL    Calcium 8.3 (L) 8.5 - 10.5 mg/dL    AST(SGOT) 28 12 - 45 U/L    ALT(SGPT) 15 2 - 50 U/L    Alkaline Phosphatase 74 30 - 99 U/L    Total Bilirubin 0.4 0.1 - 1.5 mg/dL    Albumin 2.9 (L) 3.2 - 4.9 g/dL    Total Protein 5.4 (L) 6.0 - 8.2 g/dL    Globulin 2.5 1.9 - 3.5 g/dL    A-G Ratio 1.2 g/dL   CBC with Differential    Collection Time: 10/29/18  3:35 AM   Result Value Ref Range    WBC 11.4 (H) 4.8 - 10.8 K/uL    RBC 3.18 (L) 4.20 - 5.40 M/uL    Hemoglobin 8.7 (L) 12.0 - 16.0 g/dL    Hematocrit 27.0 (L) 37.0 - 47.0 %    MCV 84.9 81.4 - 97.8 fL    MCH 27.4 27.0 - 33.0 pg    MCHC 32.2 (L) 33.6 - 35.0 g/dL    RDW 48.0 35.9 - 50.0 fL    Platelet Count 151 (L) 164 - 446 K/uL    MPV 10.6 9.0 - 12.9 fL    Neutrophils-Polys 80.40 (H) 44.00 - 72.00 %    Lymphocytes 8.20 (L) 22.00 - 41.00 %    Monocytes 10.00 0.00 - 13.40 %    Eosinophils 0.20 0.00 - 6.90 %    Basophils 0.10 0.00 - 1.80 %    Immature Granulocytes 1.10 (H) 0.00 - 0.90 %    Nucleated RBC 0.00 /100 WBC    Neutrophils (Absolute) 9.18 (H) 2.00 - 7.15 K/uL    Lymphs (Absolute) 0.93 (L) 1.00 - 4.80 K/uL    Monos (Absolute) 1.14 (H) 0.00 - 0.85 K/uL    Eos (Absolute) 0.02 0.00 - 0.51 K/uL    Baso (Absolute) 0.01 0.00 - 0.12 K/uL    Immature Granulocytes (abs) 0.13 (H) 0.00 - 0.11 K/uL    NRBC (Absolute) 0.00 K/uL   CRP QUANTITIVE (NON-CARDIAC)    Collection Time:  10/29/18  3:35 AM   Result Value Ref Range    Stat C-Reactive Protein 2.46 (H) 0.00 - 0.75 mg/dL   PREALBUMIN    Collection Time: 10/29/18  3:35 AM   Result Value Ref Range    Pre-Albumin 17.0 (L) 18.0 - 38.0 mg/dL   ESTIMATED GFR    Collection Time: 10/29/18  3:35 AM   Result Value Ref Range    GFR If African American >60 >60 mL/min/1.73 m 2    GFR If Non African American >60 >60 mL/min/1.73 m 2   MAGNESIUM    Collection Time: 10/29/18  3:35 AM   Result Value Ref Range    Magnesium 2.1 1.5 - 2.5 mg/dL   ISTAT ARTERIAL BLOOD GAS    Collection Time: 10/29/18  4:38 AM   Result Value Ref Range    Ph 7.561 (H) 7.400 - 7.500    Pco2 39.1 (H) 26.0 - 37.0 mmHg    Po2 74 64 - 87 mmHg    Tco2 36 (H) 20 - 33 mmol/L    S02 96 93 - 99 %    Hco3 35.0 (H) 17.0 - 25.0 mmol/L    BE 12 (H) -4 - 3 mmol/L    Body Temp 98.8 F degrees    O2 Therapy 30 %    iPF Ratio 247     Ph Temp Tea 7.559 (H) 7.400 - 7.500    Pco2 Temp Co 39.2 (H) 26.0 - 37.0 mmHg    Po2 Temp Cor 74 64 - 87 mmHg    Specimen Arterial     Action Range Triggered NO     Inst. Qualified Patient YES        Imaging  X-Ray:  I have personally reviewed the images and compared with prior images. and My impression is: Increased pulmonary edema    Assessment/Plan  * Acute respiratory failure with hypoxia (HCC)- (present on admission)   Assessment & Plan    Intubated 10/13- 10/23, re-intubated 10/26  All appropriate ventilator bundles have been initiated  Continue vent support  Prognosis is abysmal        Subdural bleeding (HCC)   Assessment & Plan    STAT head CT on 10/29 reveals large left subdural hematoma with mass-effect  Dr. Grimm has evaluated and no surgical intervention is indicated as this will not improve her outcome  This is a devastating intracranial hemorrhage with no hope of meaningful recovery  Family considering transitioning to comfort care status, however they are continuing support at this time        Encephalopathy acute   Assessment & Plan    Acutely  worse due to large left subdural hematoma with mass-effect        Pneumonia due to Escherichia coli ([facility])   Assessment & Plan    Previously received Zyvox for 7 days for MRSA  Continue Unasyn and complete 7 days of therapy        Seizure ([facility])- (present on admission)   Assessment & Plan    EEG on 10/22 is improved with no seizures  Continue Aptiom, 400 mg daily  Continue Keppra, 1000 mg twice daily  MRI on 10/17 with multiple acute bilateral supratentorial lacunar infarcts        Relative adrenal insufficiency ([facility])   Assessment & Plan    Continue hydrocortisone, 25 mg IV every 12 hours        Hypotension   Assessment & Plan    Remains off vasopressor support  Continue midodrine, 10 mg 3 times daily  Continue hydrocortisone, 25 mg IV every 12 hours        Limb ischemia   Assessment & Plan    Occlusion in right LEIF likely chronic        Acute systolic heart failure ([facility])   Assessment & Plan    EF 40%        CKD (chronic kidney disease) stage 3, GFR 30-59 ml/min ([facility])- (present on admission)   Assessment & Plan    Avoid nephrotoxins  Renal dose medications  Follow renal function        Closed fracture of proximal end of humerus   Assessment & Plan    Place right arm in sling  NWB RUE  WBAT all others        Paroxysmal atrial fibrillation ([facility])   Assessment & Plan    Optimize potassium and magnesium  TSH is normal        DNR (do not resuscitate)   Assessment & Plan    DNR status  Prognosis is very poor for meaningful recovery        Dementia- (present on admission)   Assessment & Plan    History of mild dementia        Hypokalemia- (present on admission)   Assessment & Plan    Replete potassium        Elevated troponin- (present on admission)   Assessment & Plan    Due to demand ischemia             VTE:  Contraindicated  Ulcer: H2 Antagonist  Lines: Central Line  Ongoing indication addressed and Rosa Catheter  Ongoing indication addressed    I have performed a physical exam and reviewed and updated ROS and Plan today  (10/30/2018). In review of yesterday's note (10/29/2018), there are no changes except as documented above.     I have assessed and reassessed her respiratory status with ventilator adjustments, hemodynamics, cardiovascular status and neurologic status.  Her prognosis is abysmal.  She is at increased risk for worsening respiratory, cardiovascular and CNS system dysfunction.  She has DNR/DNI status.  The family is at the bedside.  They are considering transitioning to comfort care, but her continue vent support at this time    Discussed patient condition and risk of morbidity and/or mortality with Hospitalist, Family, RN, RT, Pharmacy, Charge nurse / hot rounds and QA team     The patient remains critically ill.  Critical care time = 35 minutes in directly providing and coordinating critical care and extensive data review.  No time overlap and excludes procedures.    Trever Hilario MD  Pulmonary and Critical Care Medicine

## 2018-10-30 NOTE — ASSESSMENT & PLAN NOTE
STAT head CT on 10/29 reveals large left subdural hematoma with mass-effect  Dr. Grimm has evaluated and no surgical intervention is indicated as this will not improve her outcome  This is a devastating intracranial hemorrhage with no hope of meaningful recovery  She is now comfort care status

## 2018-10-30 NOTE — PROGRESS NOTES
Prior to the meeting:  The granddaughter had alerted me that she visited last night and the patient was awake and following and even was laughing over the ventilator at times.  I reviewed the patient's prior CT head with her and showed her the atrophy present on early CT. We reviewed the MRI brain showing no bleed but acute ischemic area. Then we looked at the Ct Head from today showing a massive bleed.     Today's meeting was initially arranged to discuss the issue with acute respiratory and updating family on present problems.    At the Meeting:  Met with grand daughters and patient's son.  Present were Palliative care Rn, ICU Rn,  and Ethics, Mr Yung Conley    I initiated meeting with updates on patient new acute subdural hemorrhage which is now a devastating event to any attempt of weaning the patient of the ventilator.  Family made aware that this bleed will cause severe neurologic changes that the patient is unlikely resolve, let alone survive.  I have updated them that I have consulted and discussed with neurosurgeon and that it was felt no meaningful recovery would benefit from surgery. Given this information recommendation for comfort care was discussed.  I alerted the family that the patient remains critical and that her BP could drop further and her heart could stop potentially as well.  It was discussed that no escalation of care be made if this occurs.  Patient remains DNR.  Family will bring in additional family members to say their goodbyes.  The patient's son gave a comforting prayer to close our meeting.  Family looking at going to comfort care in next 24 hours.    Face to face time during with family and meeting 51 minutes 14:00 to 14:51

## 2018-10-30 NOTE — CARE PLAN
Problem: Ventilation Defect:  Goal: Ability to achieve and maintain unassisted ventilation or tolerate decreased levels of ventilator support  Outcome: PROGRESSING AS EXPECTED  Adult Ventilation Update    Total Vent Days: 5    Patient Lines/Drains/Airways Status    Active Airway     Name: Placement date: Placement time: Site: Days:    Airway ETT Oral 7.5 10/26/18   2231   Oral   3              Plateau Pressure (Q Shift): 17 (10/29/18 1829)  Static Compliance (ml / cm H2O): 22.7 (10/30/18 0241)    Patient failed trials because of Barriers to Wean: FiO2 >60% or PEEP >10 CM H2O (10/27/18 0751)  Barriers to SBT Weaning Trial Stopped due to:: Pt weaned for 1 hour and returned to rest settings per protocol (10/29/18 0754)  Length of Weaning Trial Length of Weaning Trial (Hours): 1 (10/29/18 0754)    Cough: Weak;Productive (10/29/18 2243)  Sputum Amount: Scant (10/30/18 0000)  Sputum Color: Clear;Yellow (10/30/18 0000)  Sputum Consistency: Thin (10/30/18 0000)    Mobility  Level of Mobility: Level I (10/29/18 2000)  Activity Performed: Unable to mobilize (10/29/18 2000)  Time Activity Tolerated: 5 min (10/28/18 1200)  Assistance / Tolerance: Assistance of One;General Weakness (10/28/18 1200)  Pt Calls for Assistance: No (10/29/18 0800)  Staff Present for Mobilization: RN;CNA (10/28/18 1200)  Gait: Unable to Ambulate (10/28/18 1600)  Assistive Devices: Hand held assist (10/26/18 0000)  Reason Not Mobilized: Unstable condition (10/29/18 2000)  Mobilization Comments: RASS -4 (10/29/18 2000)    Events/Summary/Plan: ABG drawn (10/30/18 8617)

## 2018-10-30 NOTE — CARE PLAN
Problem: Infection  Goal: Will remain free from infection  Outcome: PROGRESSING AS EXPECTED  Pt remains on scheduled unasyn. Afebrile, trending labs qAM    Problem: Skin Integrity  Goal: Risk for impaired skin integrity will decrease  Outcome: NOT MET  Pt has pitting edema; fragile skin, bruises easily. Pressure ulcer on sacrum, q2 hour neuro checks; wound care involved. Will follow treatments as ordered.

## 2018-10-30 NOTE — CONSULTS
ETHICS CASE DISCUSSION  Patient Name: Rene Becerril  MRN: [id number]  DATE OF SERVICE: 10/30/18    ETHICAL ISSUE:   An ethics case discussion was requested for the patient.  The ethical issues are around goals of care and designated power of  decision-making.      DISCUSSIONS WITH MEDICAL/CARE TEAM:   Ethics spoke with Trever Dailey DO, Hospitalist, on 10/29/18.  Per Dr. Dailey, there were previous discussions with the patient’s family, including the patient’s granddaughter/DPOA, regarding the patient’s poor prognosis due to various comorbidities, including but not limited to acute respiratory failure with hypoxia, acute encephalopathy, pneumonia due to Escherichia coli, seizure, relative adrenal insufficiency, hypotension, limb ischemia, acute systolic heart failure, chronic kidney disease, increased ammonia level, closed fracture of proximal end of humerus, atelectasis, paroxysmal atrial fibrillation, hyperammonemia, dementia, hypokalemia, elevated troponin, and anemia of chronic disease.  The patient’s family was informed of the medical team’s recommendation to transition to comfort care given the patient’s aforementioned comorbidities.  Per the medical team, the patient’s family was experiencing some difficulty balancing the medical team’s recommendations, specifically, transitioning to comfort care, in part due to the patient’s advance directive, which stated an apparent desire to do everything at all costs.      Per Dr. Dailey, a CT of the head was ordered after the patient was noted to have a change in mental function on 10/29/18.  Per Dr. Dailey, the patient experienced a devastating bleed and, in the opinion of the medical team, including Neurosurgery, the patient is highly unlikely to survive this hospitalization given the patient’s aforementioned comorbidities and this recent injury that likely occurred during the evening of 10/28/18 or morning or 10/29/18.    DISCUSSIONS WITH  AGENTS/SURROGATES/FAMILY:  A care conference was held on 10/29/18.  The patient’s granddaughter/DPOA, Lashonda Russell, the patient’s son, a second granddaughter, Dr. Dailey, Ori Almanza, RN, Palliative Care, Bedside Nursing, Social Work, and Ethics were present.  Dr. Dailey discussed the patient’s hospital course, poor prognosis due to various comorbidities, as well as the patient’s recent bleed that made it highly unlikely the patient would survive this hospitalization.  The patient’s granddaughter/DPOA asked about what she appeared to observe as the patient being able to breathe while on the mechanical ventilator and what other mental function the patient may be demonstrating at this time.  Dr. Dailey explained that the patient is likely only able to perform basic functions like breathing and reacting to pain at a limited level and the patient’s overall cognitive function is severely compromised with no reasonable expectation of meaningful improvement.  The patient’s family discussed how the patient is a fighter and they are struggling with their desire to help the patient hang on to life as long as possible.  Dr. Dailey, Palliative Care RN, and this writer discussed the importance of considering the patient’s quality of life with the limited time she has remaining, as well as the potential harm that may be caused with continued aggressive intervention that is highly unlikely to improve the patient’s quality of life or improve the patient’s overall prognosis.  The patient’s family expressed their understanding of the medical team’s recommendation to transition to comfort care within the next 24 hours and asked for time to speak with additional family members.      *Ethics was informed by Palliative Care RN that family decided to transition to comfort care on 10/30/18 at approximately 6PM.    RECOMMENDATIONS:   1) Recommend transitioning the patient to comfort care only on 10/30/18 at approximately 6PM once the patient’s  family has arrived and is prepared for transition to comfort care only.  2) Recommend, if the medical team determines the patient to be appropriate for GIP or Hospice care following observation of the patient after transition to comfort care, referring the patient to hospice following discussion with the patient’s wife about the benefits of hospice care at the end of life, including but not limited to bereavement services for the patient’s family.      Thank you for involving us in the care of this patient. Please let me know if you have any other questions or concerns.      Respectfully submitted,    Yung Conley JD, MA  Bioethics   [phone] (office)  [phone](mobile)

## 2018-10-31 NOTE — PROGRESS NOTES
[facility] Hospitalist Progress Note    Date of Service: 10/30/2018    Chief Complaint  89 y.o. female admitted 10/13/2018 with hx of subdural hematoma and while at [facility] had a seizure and was transferred to [facility].  She did require intubation for airway protection    Interval Problem Update  Patient intubated and unresponsive in the ICU, thus unable to provide interval history  Patient is off sedation, she does not respond or withdraw to painful stimuli  Systolic blood pressure ranges from 120s-140's  Urine output 2.5 L over the last 24h    Consultants/Specialty  Neurology   Critical Care  Neurosurgery    Disposition  To remain in ICU today        Review of Systems   Unable to perform ROS: Intubated      Physical Exam  Laboratory/Imaging   Hemodynamics  No data recorded.   Monitored Temp: 37.8 °C (100 °F)  Pulse  Av.6  Min: 30  Max: 129 Heart Rate (Monitored): 95  NIBP: 146/70      Respiratory  [place] Vent Mode: APVCMV, Rate (breaths/min): 12, PEEP/CPAP: 8, PEEP/CPAP: 8, FiO2: 30, P Peak (PIP): 25, P MEAN: 12   Respiration: 18, Pulse Oximetry: 94 %     Given By:: Vent, Work Of Breathing / Effort: Vented  RUL Breath Sounds: Coarse Crackles, RML Breath Sounds: Coarse Crackles, RLL Breath Sounds: Diminished, BLANCHE Breath Sounds: Coarse Crackles, LLL Breath Sounds: Diminished    Fluids    Intake/Output Summary (Last 24 hours) at 10/30/18 1813  Last data filed at 10/30/18 1400   Gross per 24 hour   Intake             2000 ml   Output             1350 ml   Net              650 ml       Nutrition  Orders Placed This Encounter   Procedures   • Diet NPO     Standing Status:   Standing     Number of Occurrences:   1     Order Specific Question:   Type:     Answer:   Now [1]     Order Specific Question:   Restrict to:     Answer:   Strict [1]     Physical Exam   Constitutional: She has a sickly appearance. No distress. She is sedated, intubated and restrained.   Generalized Anasarca.  Frail   HENT:   Head: Normocephalic  and atraumatic.   Nose: Nose normal.   Eyes: Conjunctivae are normal. No scleral icterus.   Pupils unequal and not reactive to light.   Neck: No tracheal deviation present.   Cardiovascular: Normal rate and regular rhythm.    Murmur heard.  Pulses:       Radial pulses are 1+ on the right side, and 1+ on the left side.   Pulmonary/Chest: Effort normal and breath sounds normal. She is intubated. No respiratory distress. She has no wheezes.   Abdominal: Soft. Bowel sounds are normal. She exhibits no distension. There is no tenderness. There is no rebound.   Musculoskeletal: She exhibits edema (general anasarca).   Lymphadenopathy:     She has no cervical adenopathy.   Neurological: A cranial nerve deficit is present.   Corneal, gag reflexes remain present  Withdraws to painful stimuli  Not following commands   Skin: Skin is warm and dry. No rash noted. She is not diaphoretic.   Thin skin.  Bruising/hematoma of the right arm   Vitals reviewed.      Recent Labs      10/28/18   0425  10/29/18   0335  10/30/18   0450   WBC  12.5*  11.4*  12.8*   RBC  3.62*  3.18*  2.76*   HEMOGLOBIN  9.5*  8.7*  7.4*   HEMATOCRIT  30.9*  27.0*  24.1*   MCV  85.4  84.9  87.3   MCH  26.2*  27.4  26.8*   MCHC  30.7*  32.2*  30.7*   RDW  49.0  48.0  49.5   PLATELETCT  174  151*  223   MPV  10.8  10.6  11.5     Recent Labs      10/28/18   0425  10/29/18   0335  10/30/18   0450   SODIUM  139  142  142   POTASSIUM  3.2*  2.8*  3.4*   CHLORIDE  95*  98  100   CO2  36*  33  34*   GLUCOSE  132*  120*  133*   BUN  50*  42*  39*   CREATININE  0.93  0.83  0.76   CALCIUM  8.7  8.3*  8.4*             Recent Labs      10/28/18   0440   TRIGLYCERIDE  90          Assessment/Plan     * Acute respiratory failure with hypoxia (HCC)- (present on admission)   Assessment & Plan    Initially due to to seizures and MRSA pneumonia  Monitor daily ABG, chest x-ray, labs, vitals and strict I's and O's  Reintubated 10/26 2 2 increasing oxygen demands  Vent as per  "critical care        Subdural bleeding (HCC)   Assessment & Plan    Severe with significant mass effect on CT head 10/29  Asa and heparin stopped  No clear etiology as no trauma per familiy/Rn.  I have consulted Neurosurgeon and on review of CT head, decision made for no surgery  Devastating bleed and alerting family of unlikely to survive in face of no surgery          Encephalopathy acute   Assessment & Plan    Due to intracranial        Pneumonia due to Escherichia coli (HCC)   Assessment & Plan    Completed 7-day course of Zyvox on 10/26/2018  RT protcol   As needed bronchodilators        Seizure (HCC)- (present on admission)   Assessment & Plan    new onset seizures.  CT head reveals subdural hygromas, could be sequelae of prior subdural hematomas.  No acute pathology on MRI of brain  Neurology consulting  Depakote stopped due to elevated ammonia levels  Continue eslicarbazepine 400mg qHS, Keppra 1500mg BID        Relative adrenal insufficiency (HCC)   Assessment & Plan    Weaning hydrocortisone   Midodrine 10mg TID        Hypotension   Assessment & Plan    Midodrine 10mg TID   Pressor support with norepinephrine as needed to keep systolic blood pressure greater than 90 and map greater than 65  Monitor I/O's        Limb ischemia   Assessment & Plan    Preliminary report  Occlusive thrombus in distal R LEIF.  Per prior notes \"discussed with Dr. Power from vascular surgery on 10/19/2018 he felt that this is likely chronic he did not feel that anticoagulation would be indicated at this time\"  stable continue to monitor        Acute systolic heart failure (HCC)- (present on admission)   Assessment & Plan    EF:40% on 10/15/18 Echocardiogram  BNP elevated  Anasarca on exam 10/23 with good albumin levels recently  On Diamox 500 mg IV every 12 hours  Monitor strict I's and O's        CKD (chronic kidney disease) stage 3, GFR 30-59 ml/min (HCC)- (present on admission)   Assessment & Plan    Monitor on daily BMP  Avoid " nephrotoxic meds        Increased ammonia level   Assessment & Plan    Likely due to valproic. Previously depakote stopped due to increase in lethargy and ammonia levels  Lactulose & L-carnitine  Monitor neurostatus        Closed fracture of proximal end of humerus   Assessment & Plan    Sling when upright  Oscal + D        Atelectasis   Assessment & Plan    On ventilator monitoring chest x-rays  Pulmonary consulting        Paroxysmal atrial fibrillation (HCC)   Assessment & Plan    Given subdural hygromas patient not good candidate for chronic anticoagulation        Hyperammonemia (HCC)   Assessment & Plan    Likely secondary to valproic acid. Depakote dc'd   and possible hepatic congestion component as EF:40% on echo.        DNR (do not resuscitate)   Assessment & Plan    Per the family's wishes        Dementia- (present on admission)   Assessment & Plan    Reported hx of        Hypokalemia- (present on admission)   Assessment & Plan    Electrolyte protocol  Monitor bmp        Elevated troponin- (present on admission)   Assessment & Plan    Likely demand ischemia in setting of recurrent seizures  EF 40%        Anemia of chronic disease- (present on admission)   Assessment & Plan    Hemoglobin low but no need for transfusion          Quality-Core Measures   Reviewed items::  Labs reviewed, Medications reviewed and Radiology images reviewed  Rosa catheter::  Strict Intake and Output During Sepisis or Shock  DVT prophylaxis - mechanical:  SCDs

## 2018-10-31 NOTE — PROGRESS NOTES
Pt  at 1002, pronounced by 2 RNs per policy/MD order.  MDs notified, NAM notified, tissue and  notified, not eligible for either at this time.  Family at bedside, POA/granddaughter Lashonda Houston at bedside.

## 2018-10-31 NOTE — PROGRESS NOTES
Pt extubated to RA for comfort care, many family members at bedside, questions answered, mortuary chosen by family is [employer], Dr Murillo at bedside to answer family questions.  Pt given meds for comfort

## 2018-10-31 NOTE — CARE PLAN
Problem: Psychosocial needs  Goal: Privacy for patient and family  Outcome: PROGRESSING AS EXPECTED  Patient in private room. Curtain closed.    Problem: Self Care Deficit  Goal: Oral Hygiene    Intervention: Oral Hygiene given  Oral care kit utilized. Patient's mouth swabbed as well.  Intervention: Lubricant to lips  Moisturizer applied to lips.

## 2018-10-31 NOTE — PROGRESS NOTES
Critical Care Progress Note    Date of admission  10/13/2018    Chief Complaint  89 y.o. female admitted 10/13/2018 with new onset seizures.    Hospital Course    This lady was admitted to the ICU with seizures and respiratory failure.      Interval Problem Update  Reviewed last 24 hour events:      She is now comfort care status.  She appears comfortable.      Review of Systems  Review of Systems   Unable to perform ROS: Acuity of condition        Vital Signs for last 24 hours   Pulse:  [] 98  Resp:  [0-22] 9    Hemodynamic parameters for last 24 hours       Vent Settings for last 24 hours  [place] Vent Mode: APVCMV  Rate (breaths/min):  [12] 12  PEEP/CPAP:  [8] 8  FiO2:  [30] 30  P Peak (PIP):  [20-25] 25  P MEAN:  [11-13] 12      Physical Exam   Physical Exam   Constitutional: No distress.   HENT:   Head: Normocephalic.   Mouth/Throat: No oropharyngeal exudate.   Eyes: Right eye exhibits no discharge. Left eye exhibits no discharge.   Unequal pupils   Neck: Neck supple. No JVD present. No tracheal deviation present.   Cardiovascular: Regular rhythm.    Pulmonary/Chest: No stridor. She has no wheezes. She has rales (Scattered coarse crackles).   Abdominal: Soft. She exhibits no distension. There is no tenderness.   Musculoskeletal: She exhibits no tenderness or deformity.   No clubbing or cyanosis   Neurological:   She appears comfortable   Skin: Skin is warm and dry. She is not diaphoretic.       Medications  Current Facility-Administered Medications   Medication Dose Route Frequency Provider Last Rate Last Dose   • MD ALERT...adult comfort care   Other PRN Duarte Murillo M.D.       • morphine (pf) 10 mg/ml 10 MG/ML injection 10 mg  10 mg Intravenous Q30 MIN PRFREDRICK Murillo M.D.   10 mg at 10/31/18 0413   • ondansetron (ZOFRAN ODT) dispertab 4 mg  4 mg Feeding Tube Q4HRS PRN Edgar Moody M.D.       • ondansetron (ZOFRAN) syringe/vial injection 4 mg  4 mg Intravenous Q4HRS PRN Edgar Moody M.D.            Fluids    Intake/Output Summary (Last 24 hours) at 10/31/18 0426  Last data filed at 10/30/18 2200   Gross per 24 hour   Intake             1140 ml   Output              310 ml   Net              830 ml       Laboratory  Recent Results (from the past 48 hour(s))   ISTAT ARTERIAL BLOOD GAS    Collection Time: 10/29/18  4:38 AM   Result Value Ref Range    Ph 7.561 (H) 7.400 - 7.500    Pco2 39.1 (H) 26.0 - 37.0 mmHg    Po2 74 64 - 87 mmHg    Tco2 36 (H) 20 - 33 mmol/L    S02 96 93 - 99 %    Hco3 35.0 (H) 17.0 - 25.0 mmol/L    BE 12 (H) -4 - 3 mmol/L    Body Temp 98.8 F degrees    O2 Therapy 30 %    iPF Ratio 247     Ph Temp Tea 7.559 (H) 7.400 - 7.500    Pco2 Temp Co 39.2 (H) 26.0 - 37.0 mmHg    Po2 Temp Cor 74 64 - 87 mmHg    Specimen Arterial     Action Range Triggered NO     Inst. Qualified Patient YES    ISTAT ARTERIAL BLOOD GAS    Collection Time: 10/30/18  4:40 AM   Result Value Ref Range    Ph 7.561 (H) 7.400 - 7.500    Pco2 38.7 (H) 26.0 - 37.0 mmHg    Po2 74 64 - 87 mmHg    Tco2 36 (H) 20 - 33 mmol/L    S02 96 93 - 99 %    Hco3 34.7 (H) 17.0 - 25.0 mmol/L    BE 12 (H) -4 - 3 mmol/L    Body Temp 98.6 F degrees    O2 Therapy 30 %    iPF Ratio 247     Ph Temp Tea 7.561 (H) 7.400 - 7.500    Pco2 Temp Co 38.7 (H) 26.0 - 37.0 mmHg    Po2 Temp Cor 74 64 - 87 mmHg    Specimen Arterial     Action Range Triggered NO     Inst. Qualified Patient YES    Basic Metabolic Panel (BMP)    Collection Time: 10/30/18  4:50 AM   Result Value Ref Range    Sodium 142 135 - 145 mmol/L    Potassium 3.4 (L) 3.6 - 5.5 mmol/L    Chloride 100 96 - 112 mmol/L    Co2 34 (H) 20 - 33 mmol/L    Glucose 133 (H) 65 - 99 mg/dL    Bun 39 (H) 8 - 22 mg/dL    Creatinine 0.76 0.50 - 1.40 mg/dL    Calcium 8.4 (L) 8.5 - 10.5 mg/dL    Anion Gap 8.0 0.0 - 11.9   CBC with Differential    Collection Time: 10/30/18  4:50 AM   Result Value Ref Range    WBC 12.8 (H) 4.8 - 10.8 K/uL    RBC 2.76 (L) 4.20 - 5.40 M/uL    Hemoglobin 7.4 (L) 12.0 - 16.0  g/dL    Hematocrit 24.1 (L) 37.0 - 47.0 %    MCV 87.3 81.4 - 97.8 fL    MCH 26.8 (L) 27.0 - 33.0 pg    MCHC 30.7 (L) 33.6 - 35.0 g/dL    RDW 49.5 35.9 - 50.0 fL    Platelet Count 223 164 - 446 K/uL    MPV 11.5 9.0 - 12.9 fL    Neutrophils-Polys 77.60 (H) 44.00 - 72.00 %    Lymphocytes 6.00 (L) 22.00 - 41.00 %    Monocytes 14.60 (H) 0.00 - 13.40 %    Eosinophils 0.50 0.00 - 6.90 %    Basophils 0.20 0.00 - 1.80 %    Immature Granulocytes 1.10 (H) 0.00 - 0.90 %    Nucleated RBC 0.00 /100 WBC    Neutrophils (Absolute) 9.92 (H) 2.00 - 7.15 K/uL    Lymphs (Absolute) 0.77 (L) 1.00 - 4.80 K/uL    Monos (Absolute) 1.86 (H) 0.00 - 0.85 K/uL    Eos (Absolute) 0.06 0.00 - 0.51 K/uL    Baso (Absolute) 0.03 0.00 - 0.12 K/uL    Immature Granulocytes (abs) 0.14 (H) 0.00 - 0.11 K/uL    NRBC (Absolute) 0.00 K/uL   MAGNESIUM    Collection Time: 10/30/18  4:50 AM   Result Value Ref Range    Magnesium 2.1 1.5 - 2.5 mg/dL   ESTIMATED GFR    Collection Time: 10/30/18  4:50 AM   Result Value Ref Range    GFR If African American >60 >60 mL/min/1.73 m 2    GFR If Non African American >60 >60 mL/min/1.73 m 2       Imaging  X-Ray:  I have personally reviewed the images and compared with prior images. and My impression is: Increased pulmonary edema    Assessment/Plan  * Acute respiratory failure with hypoxia (HCC)- (present on admission)   Assessment & Plan    Intubated 10/13- 10/23, re-intubated 10/26  Extubated on 10/30 and now comfort care status        Subdural bleeding (HCC)   Assessment & Plan    STAT head CT on 10/29 reveals large left subdural hematoma with mass-effect  Dr. Grimm has evaluated and no surgical intervention is indicated as this will not improve her outcome  This is a devastating intracranial hemorrhage with no hope of meaningful recovery  She is now comfort care status        Pneumonia due to Escherichia coli (HCC)   Assessment & Plan    Now comfort care status        Seizure (HCC)- (present on admission)   Assessment  & Plan    EEG on 10/22 is improved with no seizures  MRI on 10/17 with multiple acute bilateral supratentorial lacunar infarcts        Hypotension   Assessment & Plan    Now comfort care status        Limb ischemia   Assessment & Plan    Occlusion in right LEIF likely chronic        Acute systolic heart failure (HCC)- (present on admission)   Assessment & Plan    EF 40%        CKD (chronic kidney disease) stage 3, GFR 30-59 ml/min ([facility])- (present on admission)   Assessment & Plan    History of        DNR (do not resuscitate)   Assessment & Plan    Comfort care status        Dementia- (present on admission)   Assessment & Plan    History of             VTE:  Contraindicated  Ulcer: Not Indicated  Lines: Central Line  Ongoing indication addressed and Rosa Catheter  Ongoing indication addressed    I have performed a physical exam and reviewed and updated ROS and Plan today (10/31/2018). In review of yesterday's note (10/30/2018), there are no changes except as documented above.     This lady is comfort care status.  She appears comfortable.    Discussed patient condition and risk of morbidity and/or mortality with Hospitalist, RN, Charge nurse / hot rounds and QA team     Trever Hilario MD  Pulmonary and Critical Care Medicine

## 2018-10-31 NOTE — CARE PLAN
Problem: Ventilation Defect:  Goal: Ability to achieve and maintain unassisted ventilation or tolerate decreased levels of ventilator support  Outcome: PROGRESSING SLOWER THAN EXPECTED  Vent Day 5  7.5 ETT @ 26    CMV 12/340/30%/8  DUOneb and IPV QID

## 2018-10-31 NOTE — DISCHARGE PLANNING
Anticipated Discharge Disposition: [facility]    Action: Family discussion about moving to, [facility]  Discussed pt during Am Rounds.    Family informs treatment team that they plan on moving the pt to [facility] once a family member arrives at bedside later on today.     Barriers to Discharge: None    Plan: Move to [facility] when appropriate.

## 2018-10-31 NOTE — DIETARY
Nutrition Services:  Brief Update    Pt previously on tube feedings.  Per chart review, pt has transitioned to comfort care.  Cortrak has been removed and tube feedings are off.  RD will re-screen pt weekly.  Consult RD as needed.    RD available PRN.

## 2018-11-01 NOTE — DISCHARGE SUMMARY
Death Summary    Cause of Death  Acute, nontraumatic subdural hematoma from unknown cause    Comorbid Conditions at the Time of Death  Principal Problem:    Acute respiratory failure with hypoxia (HCC) POA: Yes  Active Problems:    Seizure (HCC) POA: Yes    Pneumonia due to Escherichia coli (HCC) POA: Unknown    Encephalopathy acute POA: Unknown    Subdural bleeding (HCC) POA: Unknown    CKD (chronic kidney disease) stage 3, GFR 30-59 ml/min (HCC) POA: Yes    Acute systolic heart failure (HCC) POA: Yes    Limb ischemia POA: Unknown    Hypotension POA: Unknown    Relative adrenal insufficiency (HCC) POA: Unknown    Anemia of chronic disease POA: Yes    Elevated troponin POA: Yes    Hypokalemia POA: Yes    Dementia POA: Yes    DNR (do not resuscitate) POA: Unknown    Hyperammonemia (HCC) POA: Unknown    Paroxysmal atrial fibrillation (HCC) POA: Unknown    Atelectasis POA: Unknown    Closed fracture of proximal end of humerus POA: Unknown      Overview: right    Increased ammonia level POA: Unknown  Resolved Problems:    Hypophosphatemia POA: Unknown    Hypomagnesemia POA: Unknown    Slow transit constipation POA: Unknown    CRP elevated POA: Unknown      History of Presenting Illness and Hospital Course    This is a 89 y.o. female admitted 10/13/2018 with seizures.    Patient with history of dementia, patient was recently admitted to [facility] from 10 5-10 to 10-10 with severe sepsis.  She was transferred to [facility].  Limited details on events leading up to admission but apparently the patient became less responsive at the skilled nursing facility, EMS was called and the patient was actively seizing on her arrival.  Patient was brought to the emergency room and required intubation.  She was admitted to the ICU.  CT scan of the head on 10/13/2018 demonstrated chronic bilateral hygromas but no acute findings.  MRI of the brain on 10/17/2018 did show evidence of bilateral acute infarction.  Patient was  continued with supportive care.  On 10/29/2018 the patient had acute change in neurologic exam, a CT scan showed evidence of a large left subdural hematoma with evidence of mass-effect and early herniation.  Neurosurgery was consulted, surgery was not recommended.  Care team met with the family, the team felt as though patient had no chance of meaningful recovery on 10/30/2018 the patient was extubated compassionately provided with medication to treat any signs of pain or dyspnea.  She  on 10/31/2018.      Death Date: 10/31/18   Death Time: 1002         Pronounced By (RN1): AYO Myrick  Pronounced By (RN2): AYO Maynard    Patient was seen and examined by myself this morning, discussed with bedside RN.  Total 20 minutes spent discharging this patient from the hospital

## 2018-11-15 LAB
FUNGUS SPEC CULT: ABNORMAL
FUNGUS SPEC CULT: ABNORMAL
SIGNIFICANT IND 70042: ABNORMAL
SITE SITE: ABNORMAL
SOURCE SOURCE: ABNORMAL

## 2018-11-15 NOTE — ADDENDUM NOTE
Encounter addended by: Juan Manuel Vivar M.D. on: 11/15/2018  1:24 PM<BR>    Actions taken: Sign clinical note

## 2018-11-21 LAB
FUNGUS SPEC CULT: NORMAL
SIGNIFICANT IND 70042: NORMAL
SITE SITE: NORMAL
SOURCE SOURCE: NORMAL

## 2018-11-28 NOTE — ADDENDUM NOTE
Encounter addended by: Juan Manuel Vivar M.D. on: 11/28/2018 10:43 AM<BR>    Actions taken: Sign clinical note

## 2018-12-13 LAB
MYCOBACTERIUM SPEC CULT: NORMAL
RHODAMINE-AURAMINE STN SPEC: NORMAL
SIGNIFICANT IND 70042: NORMAL
SITE SITE: NORMAL
SOURCE SOURCE: NORMAL

## 2021-06-09 NOTE — CARE PLAN
Occupational Therapy   Date: 6/9/2021  Patient did not attend nor call to cancel  today's (6/9/2021) scheduled appointment.  This is the patient's first no show/late cancel.      Voice message was left on the patient's (home, cell) number indicating missed appointment and date of next scheduled appointment.  Attendance guidelines were reviewed.   Problem: Ventilation Defect:  Goal: Ability to achieve and maintain unassisted ventilation or tolerate decreased levels of ventilator support  Outcome: PROGRESSING SLOWER THAN EXPECTED  Adult Ventilation Update    Total Vent Days: 10    Patient Lines/Drains/Airways Status    Active Airway     Name: Placement date: Placement time: Site: Days:    Airway ETT Oral 7.5 10/13/18   1320   Oral   10              In the last 24 hours, the patient tolerated SBT for 1 hr on settings of 5/8.    #FVC / Vital Capacity (liters) :  (unable to follow command) (10/21/18 1740)  NIF (cm H2O) :  (unable to follow command) (10/21/18 1740)  Rapid Shallow Breathing Index (RR/VT): 72 (10/21/18 1740)  Plateau Pressure (Q Shift): 20 (10/21/18 1834)  Static Compliance (ml / cm H2O): 30 (10/22/18 0451)    Patient failed trials because of Barriers to Wean: Other (Comments) (Cont EEG procedure in place) (10/21/18 0704)        Sputum/Suction   Cough: Productive (10/22/18 0236)  Sputum Amount: Small (10/22/18 0236)  Sputum Color: Yellow;Clear (10/22/18 0236)  Sputum Consistency: Thin (10/22/18 0236)    Mobility  Level of Mobility: Level I (10/21/18 2000)  Activity Performed: Unable to mobilize (10/21/18 2000)  Pt Calls for Assistance: No (10/21/18 2000)  Reason Not Mobilized: Bed rest;Other (comment) (Continuous EEG in place) (10/21/18 2000)    Events/Summary/Plan: pt is on apvcmv 12/330/+8/40% getting Duoneb Q4

## 2023-07-12 NOTE — ASSESSMENT & PLAN NOTE
Advance Directives:  not discussed  PHQ-9 0      Denies known Latex allergy or symptoms of Latex sensitivity.  Medications reviewed and updated.  Social History     Tobacco Use   Smoking Status Never   Smokeless Tobacco Never         Health Maintenance Due   Topic Date Due   • COVID-19 Vaccine (6 - Pfizer series) 02/18/2022   • Depression Screening  07/12/2023       Patient is due for topics as listed above but is not proceeding with Immunization(s) COVID-19 at this time.      Follow hemoglobin

## 2024-03-12 NOTE — FLOWSHEET NOTE
10/22/18 0806   Weaning Parameters   RR (bpm) 18   #FVC / Vital Capacity (liters)  0.7   NIF (cm H2O)  -35   Rapid Shallow Breathing Index (RR/VT) 70   Spontaneous VE (!) 4.4   Spontaneous    Weaning Trial   Weaning Trial Stopped due to: Pt weaned for 1 hour and returned to rest settings per protocol   Length of Weaning Trial (Hours) 1   Vent Weaning Smart Text completed? Yes      

## 2024-09-02 NOTE — PALLIATIVE CARE
Problem: Discharge Planning  Goal: Discharge to home or other facility with appropriate resources  9/2/2024 0811 by Fernanda Holt, RN  Outcome: Progressing  Flowsheets (Taken 9/2/2024 0737)  Discharge to home or other facility with appropriate resources:   Identify barriers to discharge with patient and caregiver   Arrange for needed discharge resources and transportation as appropriate   Identify discharge learning needs (meds, wound care, etc)   Refer to discharge planning if patient needs post-hospital services based on physician order or complex needs related to functional status, cognitive ability or social support system  9/2/2024 0419 by Tasneem Delacruz, RN  Outcome: Progressing     Problem: Pain  Goal: Verbalizes/displays adequate comfort level or baseline comfort level  9/2/2024 0811 by Fernanda Holt, RN  Outcome: Progressing  9/2/2024 0419 by Tasneem Delacruz, RN  Outcome: Progressing     Problem: Safety - Adult  Goal: Free from fall injury  9/2/2024 0811 by Fernanda Holt, RN  Outcome: Progressing  9/2/2024 0419 by Tasneem Delacruz, RN  Outcome: Progressing      Palliative Care follow-up  PC RN visited pt, pt currently being intubated. Discussed with Dr. Son, will need to have another care conference Monday with family regarding GOC.     Updated:   [facility] Ethics    Plan:   Schedule a care conference for Monday 10/29    Recommendations: I recommend an ethics consult.    Thank you for allowing Palliative Care to participate in this patient's care. Please feel free to call [phone] with any questions or concerns.

## (undated) DEVICE — NEPTUNE 4 PORT MANIFOLD - (20/PK)

## (undated) DEVICE — BLADE SURGICAL #10 - (50/BX)

## (undated) DEVICE — HEAD HOLDER JUNIOR/ADULT

## (undated) DEVICE — KIT ANESTHESIA W/CIRCUIT & 3/LT BAG W/FILTER (20EA/CA)

## (undated) DEVICE — TIP INTPLS HFLO ML ORFC BTRY - (12/CS)  FOR SURGILAV

## (undated) DEVICE — DRAPE IOBAN II 23 IN X 33 IN - (10/BX)

## (undated) DEVICE — KIT EVACUATER 3 SPRING PVC LF 1/8 DRAIN SIZE (10EA/CA)"

## (undated) DEVICE — SUTURE GENERAL

## (undated) DEVICE — GLOVE BIOGEL INDICATOR SZ 8.5 SURGICAL PF LTX - (50/BX 4BX/CA)

## (undated) DEVICE — KIT ROOM DECONTAMINATION

## (undated) DEVICE — TUBE CONNECTING SUCTION - CLEAR PLASTIC STERILE 72 IN (50EA/CA)

## (undated) DEVICE — HUMID-VENT HEAT AND MOISTURE EXCHANGE- (50/BX)

## (undated) DEVICE — DRAPE LARGE 3 QUARTER - (20/CA)

## (undated) DEVICE — DRESSING TRANSPARENT FILM TEGADERM 4 X 4.75" (50EA/BX)"

## (undated) DEVICE — BLADE SAGITTAL SAW DUAL CUT 75.0 X 25.0MM (1/EA)

## (undated) DEVICE — LACTATED RINGERS INJ 1000 ML - (14EA/CA 60CA/PF)

## (undated) DEVICE — PACK TOTAL HIP - (1/CA)

## (undated) DEVICE — KIT HIP PREP IM ENCHANCE TOTAL (5EA/BX)

## (undated) DEVICE — WATER IRRIGATION STERILE 1000ML (12EA/CA)

## (undated) DEVICE — GLOVE BIOGEL SZ 7 SURGICAL PF LTX - (50PR/BX 4BX/CA)

## (undated) DEVICE — SUCTION INSTRUMENT YANKAUER BULBOUS TIP W/O VENT (50EA/CA)

## (undated) DEVICE — STAPLER SKIN DISP - (6/BX 10BX/CA) VISISTAT

## (undated) DEVICE — SUTURE 2-0 VICRYL PLUS CT-1 - 8 X 18 INCH(12/BX)

## (undated) DEVICE — SODIUM CHL. IRRIGATION 0.9% 3000ML (4EA/CA 65CA/PF)

## (undated) DEVICE — GOWN WARMING STANDARD FLEX - (30/CA)

## (undated) DEVICE — BRUSH FMCNL TIP IRR STRL - (12/PKG) FOR SURGILAV

## (undated) DEVICE — ELECTRODE DUAL RETURN W/ CORD - (50/PK)

## (undated) DEVICE — HANDPIECE 10FT INTPLS SCT PLS IRRIGATION HAND CONTROL SET (6/PK)

## (undated) DEVICE — SENSOR SPO2 NEO LNCS ADHESIVE (20/BX) SEE USER NOTES

## (undated) DEVICE — BOVIE BLADE COATED &INSULATED (50EA/PK)

## (undated) DEVICE — CHLORAPREP 26 ML APPLICATOR - ORANGE TINT(25/CA)

## (undated) DEVICE — BOVIE BLADE COATED - (50/PK)

## (undated) DEVICE — GLOVE SZ 7 BIOGEL PI MICRO - PF LF (50PR/BX 4BX/CA)

## (undated) DEVICE — MASK ANESTHESIA ADULT  - (100/CA)

## (undated) DEVICE — CANISTER SUCTION RIGID RED 1500CC (40EA/CA)

## (undated) DEVICE — GLOVE SZ 7.5 BIOGEL PI MICRO - PF LF (50PR/BX)

## (undated) DEVICE — MIXER BONE CEMENT REVOLUTION - W/FEMORAL PRESSURIZER (6/CA)

## (undated) DEVICE — ELECTRODE 850 FOAM ADHESIVE - HYDROGEL RADIOTRNSPRNT (50/PK)

## (undated) DEVICE — BAG, SPONGE COUNT 50600

## (undated) DEVICE — SUTURE 1 VICRYL PLUS CTX - 8 X 18 INCH (12/BX)

## (undated) DEVICE — GLOVE, LITE (PAIR)

## (undated) DEVICE — TUBE E-T HI-LO CUFF 6.5MM (10EA/BX)

## (undated) DEVICE — LENS/HOOD FOR SPACESUIT - (32/PK) PEEL AWAY FACE

## (undated) DEVICE — PROTECTOR ULNA NERVE - (36PR/CA)

## (undated) DEVICE — SODIUM CHL IRRIGATION 0.9% 1000ML (12EA/CA)

## (undated) DEVICE — GOWN SURGEONS X-LARGE - DISP. (30/CA)

## (undated) DEVICE — GLOVE BIOGEL ECLIPSE PF LATEX SIZE 8.0  (50PR/BX)